# Patient Record
Sex: FEMALE | Race: WHITE | NOT HISPANIC OR LATINO | Employment: OTHER | URBAN - METROPOLITAN AREA
[De-identification: names, ages, dates, MRNs, and addresses within clinical notes are randomized per-mention and may not be internally consistent; named-entity substitution may affect disease eponyms.]

---

## 2017-03-15 ENCOUNTER — TRANSFERRED RECORDS (OUTPATIENT)
Dept: HEALTH INFORMATION MANAGEMENT | Facility: CLINIC | Age: 57
End: 2017-03-15

## 2017-04-04 ENCOUNTER — MEDICAL CORRESPONDENCE (OUTPATIENT)
Dept: HEALTH INFORMATION MANAGEMENT | Facility: CLINIC | Age: 57
End: 2017-04-04

## 2017-04-19 ENCOUNTER — TRANSFERRED RECORDS (OUTPATIENT)
Dept: HEALTH INFORMATION MANAGEMENT | Facility: CLINIC | Age: 57
End: 2017-04-19

## 2017-04-27 ENCOUNTER — PRE VISIT (OUTPATIENT)
Dept: RHEUMATOLOGY | Facility: CLINIC | Age: 57
End: 2017-04-27

## 2017-04-27 NOTE — TELEPHONE ENCOUNTER
"    [facility] CLINICAL DOCUMENTATION    Pre-Visit Planning   PREVISIT INFORMATION                                                    Janice Mayfield scheduled for future visit at [facility] specialty clinics.    Patient is scheduled to see LO Cavazos (provider) on 4/28/17 (date)  Reason for visit: rheumatoid arthritis involving multiple sites with positive RF; consult, diagnose and treat  Referring provider Marah Hare PA-C, [facility]  Has patient seen previous specialist? Yes.  Name of provider Zana Peck MD , [facility].   Medical Records:  Records received from [facility] office visit notes, laboratory testing, consults and etc.       REVIEW                                                      New patient packet mailed to patient: N/A  Medication reconciliation complete: N/A      No current outpatient prescriptions on file.     Current Outpatient Prescriptions   Medication     FOLIC ACID PO     Calcium Carb-Cholecalciferol (CALCIUM + D3) 600-200 MG-UNIT TABS     InFLIXimab (REMICADE IV)     Methotrexate Sodium (METHOTREXATE PO)     PREDNISONE PO     DiphenhydrAMINE HCl (BENADRYL PO)     DOXYCYCLINE PO     Acetaminophen (TYLENOL PO)     VITAMIN D, CHOLECALCIFEROL, PO     No current facility-administered medications for this visit.         Allergies   Allergen Reactions     Humira      hives     Methotrexate      anxiety     Plaquenil [Hydroxychloroquine]      Elevated LFTs     Sulfa Drugs      delusions           Rheumatology Previsit:    FYI: from Dr. Salvador, \"I don't need all lab going back years but initial lab that may include autoimmune testing is important, and if they are already on DMARD or biologic therapy lab going back 6-12 months to assure there is no trend.  Any imaging, preferably the images and not just reports is helpful.  There is rarely pathology but if there is that is important.  GB\"    What is the reason for your visit? RA to " "establish care, consult, diagnose and treat  Who is the referring provider and clinic name? PCP  Have you been seen by Rheumatologist, if so, when was your last office visit? Zana Peck MD [facility] 2/23/2016   (name and location)  Who is currently managing your care (primary care provider)? Unknown?  Are you currently taking any medications to manage your rheumatology condition? Unknown if still taking medication?  If not, have you previously taken any rheumatology medications like DMARD or biologic (type, dates, length of tx, effective or not)? See note below. Unknown timeframe of treatment of Enbrel, Humira, Plaquenil. Currently on methotrexate, infliximab and prednisone.  Are you taking any medications over-the-counter? unknown    Have you had any recent rheumatology labs? [place] report, 2016 labs. No lab report from PCP.   Last lab results in chart:  No results found for: WBC  No results found for: RBC  No results found for: HGB  No results found for: HCT  No results found for: MCV  No results found for: MCH  No results found for: MCHC  No results found for: RDW  No results found for: PLT  No results found for: AST  No results found for: ALT  No results found for: CR  No results found for: ALBUMIN  No results found for: COLOR, APPEARANCE, URINEGLC, URINEBILI, URINEKETONE, SG, URINEPH, PROTEIN, UROBILINOGEN, NITRITE, LEUKEST    Have you had any recent x-rays related to your visit? Unknown, no reports received    Are your immunizations up-to-date? unknown Do you have copy of your immunizations?     There is no immunization history on file for this patient.      Note copied from [facility] Rheumatology Assessment by Zana Peck MD on 2/23/2016, LOV.  \"Chief Complaint:  RA.    History of Present Illness:  She developed symptoms of inflammatory arthritis starting in June 2009. The diagnosis of rheumatoid arthritis was made by Dr. Miranda. The patient had evidence of synovitis on exam and had a " "positive rheumatoid factor. CCP antibody was negative. She was initially started on hydroxychloroquine, and later, methotrexate was added. Because of liver enzyme elevation, methotrexate was later discontinued, and plaquenil was also stopped. She was then on Enbrel and later was switched to Humira and did note improvement in her joint symptoms. Unfortunately, she developed a generalized urticarial reaction to the Humira.    On her evaluation here in December 2011, the patient was noted to have rheumatoid factor positive, erosive disease. Ultrasound of the abdomen showed fatty liver infiltration. She also had an MR elastogram of the liver which was normal.   She has been treated with combination of methotrexate, infliximab, and low-dose prednisone.     Currently, she is doing very well with no symptoms of active joint inflammation. She is tolerating medications well. Additional details including the review of systems is documented in the CDM report.    Mrs. Melly Mayfield reports that she has had a corneal abrasion/scar in the right eye and has been treated with antibiotics for two months. My understanding is that there was no active infection. She was evaluated by an ophthalmologist locally.    Impression/report/plan:  #1 Rheumatoid arthritis, in remission on therapy  Overall, Mrs. Melly Mayfield is doing very well on combination of methotrexate, infliximab, and low-dose prednisone. We discussed her prednisone, and we will try tapering by 1 mg every two to four weeks as tolerated. Otherwise, plan to continue methotrexate and infliximab at the current dose. Overall, she is doing very well. Mrs. Melly Mayfield asked about a general physical examination and transferring her general medical are here to [facility]. We will try to make arrangements for her to be seen in the [facility].\"       PLAN/FOLLOW-UP NEEDED                                                      Will route to care " team for follow-up. Attempted to contact patient, unable to leave voicemail-mail box full.   Records placed into providers folder for review.    Patient Reminders Given:  Please, make sure you bring an updated list of your medications.   If you are having a procedure, please, present 15 minutes early.  If you need to cancel or reschedule,please call [phone].    VIKAS VelásquezN, RN, PHN  Rheumatology Care Coordinator  [facility]

## 2017-04-28 ENCOUNTER — CARE COORDINATION (OUTPATIENT)
Dept: RHEUMATOLOGY | Facility: CLINIC | Age: 57
End: 2017-04-28

## 2017-04-28 ENCOUNTER — RADIANT APPOINTMENT (OUTPATIENT)
Dept: GENERAL RADIOLOGY | Facility: CLINIC | Age: 57
End: 2017-04-28
Attending: NURSE PRACTITIONER
Payer: COMMERCIAL

## 2017-04-28 ENCOUNTER — OFFICE VISIT (OUTPATIENT)
Dept: RHEUMATOLOGY | Facility: CLINIC | Age: 57
End: 2017-04-28
Payer: COMMERCIAL

## 2017-04-28 VITALS
SYSTOLIC BLOOD PRESSURE: 104 MMHG | WEIGHT: 162.8 LBS | DIASTOLIC BLOOD PRESSURE: 68 MMHG | HEART RATE: 73 BPM | TEMPERATURE: 97.8 F

## 2017-04-28 DIAGNOSIS — M25.512 CHRONIC LEFT SHOULDER PAIN: ICD-10-CM

## 2017-04-28 DIAGNOSIS — M05.79 RHEUMATOID ARTHRITIS INVOLVING MULTIPLE SITES WITH POSITIVE RHEUMATOID FACTOR (H): Primary | ICD-10-CM

## 2017-04-28 DIAGNOSIS — D84.9 IMMUNOSUPPRESSION (H): ICD-10-CM

## 2017-04-28 DIAGNOSIS — Z79.52 LONG TERM (CURRENT) USE OF SYSTEMIC STEROIDS: ICD-10-CM

## 2017-04-28 DIAGNOSIS — M05.79 RHEUMATOID ARTHRITIS INVOLVING MULTIPLE SITES WITH POSITIVE RHEUMATOID FACTOR (H): ICD-10-CM

## 2017-04-28 DIAGNOSIS — G89.29 CHRONIC LEFT SHOULDER PAIN: ICD-10-CM

## 2017-04-28 LAB
ALBUMIN SERPL-MCNC: 3.3 G/DL (ref 3.4–5)
ALBUMIN UR-MCNC: NEGATIVE MG/DL
ALP SERPL-CCNC: 94 U/L (ref 40–150)
ALT SERPL W P-5'-P-CCNC: 26 U/L (ref 0–50)
ANION GAP SERPL CALCULATED.3IONS-SCNC: 4 MMOL/L (ref 3–14)
APPEARANCE UR: CLEAR
AST SERPL W P-5'-P-CCNC: 21 U/L (ref 0–45)
BACTERIA #/AREA URNS HPF: ABNORMAL /HPF
BASOPHILS # BLD AUTO: 0 10E9/L (ref 0–0.2)
BASOPHILS NFR BLD AUTO: 0.2 %
BILIRUB SERPL-MCNC: 0.3 MG/DL (ref 0.2–1.3)
BILIRUB UR QL STRIP: NEGATIVE
BUN SERPL-MCNC: 16 MG/DL (ref 7–30)
CALCIUM SERPL-MCNC: 9 MG/DL (ref 8.5–10.1)
CHLORIDE SERPL-SCNC: 107 MMOL/L (ref 94–109)
CO2 SERPL-SCNC: 30 MMOL/L (ref 20–32)
COLOR UR AUTO: YELLOW
CREAT SERPL-MCNC: 0.7 MG/DL (ref 0.52–1.04)
CRP SERPL-MCNC: 18.1 MG/L (ref 0–8)
DEPRECATED CALCIDIOL+CALCIFEROL SERPL-MC: 14 UG/L (ref 20–75)
DIFFERENTIAL METHOD BLD: NORMAL
EOSINOPHIL # BLD AUTO: 0.1 10E9/L (ref 0–0.7)
EOSINOPHIL NFR BLD AUTO: 0.8 %
ERYTHROCYTE [DISTWIDTH] IN BLOOD BY AUTOMATED COUNT: 14.9 % (ref 10–15)
ERYTHROCYTE [SEDIMENTATION RATE] IN BLOOD BY WESTERGREN METHOD: 32 MM/H (ref 0–30)
GFR SERPL CREATININE-BSD FRML MDRD: 88 ML/MIN/1.7M2
GLUCOSE SERPL-MCNC: 84 MG/DL (ref 70–99)
GLUCOSE UR STRIP-MCNC: NEGATIVE MG/DL
HBV CORE AB SERPL QL IA: NONREACTIVE
HBV SURFACE AG SERPL QL IA: NONREACTIVE
HCT VFR BLD AUTO: 36.3 % (ref 35–47)
HCV AB SERPL QL IA: NORMAL
HGB BLD-MCNC: 11.9 G/DL (ref 11.7–15.7)
HGB UR QL STRIP: NEGATIVE
KETONES UR STRIP-MCNC: NEGATIVE MG/DL
LEUKOCYTE ESTERASE UR QL STRIP: ABNORMAL
LYMPHOCYTES # BLD AUTO: 2.4 10E9/L (ref 0.8–5.3)
LYMPHOCYTES NFR BLD AUTO: 39.6 %
MCH RBC QN AUTO: 28.7 PG (ref 26.5–33)
MCHC RBC AUTO-ENTMCNC: 32.8 G/DL (ref 31.5–36.5)
MCV RBC AUTO: 88 FL (ref 78–100)
MONOCYTES # BLD AUTO: 0.8 10E9/L (ref 0–1.3)
MONOCYTES NFR BLD AUTO: 14 %
NEUTROPHILS # BLD AUTO: 2.7 10E9/L (ref 1.6–8.3)
NEUTROPHILS NFR BLD AUTO: 45.4 %
NITRATE UR QL: NEGATIVE
NON-SQ EPI CELLS #/AREA URNS LPF: ABNORMAL /LPF
PH UR STRIP: 6 PH (ref 5–7)
PLATELET # BLD AUTO: 313 10E9/L (ref 150–450)
POTASSIUM SERPL-SCNC: 3.9 MMOL/L (ref 3.4–5.3)
PROT SERPL-MCNC: 7.9 G/DL (ref 6.8–8.8)
RADIOLOGIST FLAGS: NORMAL
RBC # BLD AUTO: 4.14 10E12/L (ref 3.8–5.2)
RBC #/AREA URNS AUTO: ABNORMAL /HPF (ref 0–2)
SODIUM SERPL-SCNC: 141 MMOL/L (ref 133–144)
SP GR UR STRIP: 1.02 (ref 1–1.03)
URN SPEC COLLECT METH UR: ABNORMAL
UROBILINOGEN UR STRIP-MCNC: NORMAL MG/DL (ref 0–2)
WBC # BLD AUTO: 5.9 10E9/L (ref 4–11)
WBC #/AREA URNS AUTO: ABNORMAL /HPF (ref 0–2)

## 2017-04-28 PROCEDURE — 86803 HEPATITIS C AB TEST: CPT | Performed by: NURSE PRACTITIONER

## 2017-04-28 PROCEDURE — 99204 OFFICE O/P NEW MOD 45 MIN: CPT | Performed by: NURSE PRACTITIONER

## 2017-04-28 PROCEDURE — 82306 VITAMIN D 25 HYDROXY: CPT | Performed by: NURSE PRACTITIONER

## 2017-04-28 PROCEDURE — 85652 RBC SED RATE AUTOMATED: CPT | Performed by: NURSE PRACTITIONER

## 2017-04-28 PROCEDURE — 81001 URINALYSIS AUTO W/SCOPE: CPT | Performed by: NURSE PRACTITIONER

## 2017-04-28 PROCEDURE — 80053 COMPREHEN METABOLIC PANEL: CPT | Performed by: NURSE PRACTITIONER

## 2017-04-28 PROCEDURE — 86480 TB TEST CELL IMMUN MEASURE: CPT | Performed by: NURSE PRACTITIONER

## 2017-04-28 PROCEDURE — 73130 X-RAY EXAM OF HAND: CPT | Mod: RT | Performed by: RADIOLOGY

## 2017-04-28 PROCEDURE — 85025 COMPLETE CBC W/AUTO DIFF WBC: CPT | Performed by: NURSE PRACTITIONER

## 2017-04-28 PROCEDURE — 86140 C-REACTIVE PROTEIN: CPT | Performed by: NURSE PRACTITIONER

## 2017-04-28 PROCEDURE — 73030 X-RAY EXAM OF SHOULDER: CPT | Mod: LT | Performed by: RADIOLOGY

## 2017-04-28 PROCEDURE — 87340 HEPATITIS B SURFACE AG IA: CPT | Performed by: NURSE PRACTITIONER

## 2017-04-28 PROCEDURE — 36415 COLL VENOUS BLD VENIPUNCTURE: CPT | Performed by: NURSE PRACTITIONER

## 2017-04-28 PROCEDURE — 73100 X-RAY EXAM OF WRIST: CPT | Mod: LT | Performed by: RADIOLOGY

## 2017-04-28 PROCEDURE — 86704 HEP B CORE ANTIBODY TOTAL: CPT | Performed by: NURSE PRACTITIONER

## 2017-04-28 RX ORDER — PREDNISONE 1 MG/1
4 TABLET ORAL DAILY
Qty: 120 TABLET | Refills: 5 | Status: SHIPPED | OUTPATIENT
Start: 2017-04-28 | End: 2017-11-03

## 2017-04-28 RX ORDER — DIPHENHYDRAMINE HCL 25 MG
25 CAPSULE ORAL
Status: CANCELLED
Start: 2017-04-28

## 2017-04-28 RX ORDER — ACETAMINOPHEN 325 MG/1
650 TABLET ORAL
Status: CANCELLED
Start: 2017-04-28

## 2017-04-28 RX ORDER — FOLIC ACID 1 MG/1
1 TABLET ORAL DAILY
Qty: 90 TABLET | Refills: 3 | Status: SHIPPED | OUTPATIENT
Start: 2017-04-28 | End: 2017-04-28

## 2017-04-28 RX ORDER — METHYLPREDNISOLONE SODIUM SUCCINATE 125 MG/2ML
100 INJECTION, POWDER, LYOPHILIZED, FOR SOLUTION INTRAMUSCULAR; INTRAVENOUS
Status: CANCELLED | OUTPATIENT
Start: 2017-04-28

## 2017-04-28 RX ORDER — FOLIC ACID 1 MG/1
2 TABLET ORAL DAILY
Qty: 180 TABLET | Refills: 1 | Status: SHIPPED | OUTPATIENT
Start: 2017-04-28 | End: 2017-11-03

## 2017-04-28 ASSESSMENT — PAIN SCALES - GENERAL: PAINLEVEL: MILD PAIN (2)

## 2017-04-28 NOTE — PROGRESS NOTES
X-rays hands/wrists noted. +severe degenerative radiocarpal joints b/l with widening of scapholunate, total loss of space between triquetrum and lunate in the right, marginal erosions. Exam showed right wrist drop. Will recommend her to see wrist surgeon for opinion of this

## 2017-04-28 NOTE — LETTER
4/28/2017      RE: Janice Mayfield  [address] [id number]       Rheumatology Visit     Janice Mayfield MRN# [id number]   YOB: 1960 Age: 56 year old     Date of visit: April 28, 2017  Primary care provider: Marah Hare          Assessment/Plan:     1. Erosive Rheumatoid Arthritis w/+RF (-CCP/ZANDER) controlled on infliximab 5 mg/kg every 4-6 week, methotrexate 10 mg week, prednisone 4 mg day- no flaring. Moderate activity today. RAPID 3=8.7. Exam shows right wrist drop and reduced ROM left shoulder (chronic). Will repeat x-rays, left shoulder x-rays, labs. Mild fatigue day of methotrexate, so will ask her increase folic acid 2 mg day. Continue this plan (will check hep B/C, MTB QG).     2. Chronic left shoulder pain with reduced ROM. I suspect RA/OA and maybe some internal derangement. No acute injury or falls. X-rays, referral to PT. Ortho prn  3. Osteopenia. Chronic prednisone use 4 mg day. DEXA ordered. Continue calcium and vitamin D. Check vitamin D today. May taper by 0.5-1 mg every 1-2 month until off.    4. Immunosuppression, long-term risk medication. Will check labs every 8-12 weeks while on immunosuppresive therapy and hx elevated liver enzymes.     The patient understood the rational for the diagnosis and treatment plan. Patient shared in the decision making. All questions were answered to best of my ability and the patient's satisfaction. Sing-up for Nelli. She agrees to this plan.   Pola Santana APRN, CNP, MSN  [facility]  Department of Rheumatology & Autoimmune Disorders          History of Present Illness:   Janice Mayfield is a 56 year old female who presents as a consultation for erosive RA +RF -CCP -ZANDER/C3/C4, -ANCA -HLA B27. Prior care at [facility] w/ Andry Jones here to establish d/t insurance     Review: hydroxychloroquine, methotrexate (d/c liver enzymes elevation), enbrel (ineffective), humira *d/c  "(uticarial reaction), sulfites (anxiety)      Went to [place] in a wheelchair [\"knees were melons\", pads of feet, not use hands, wrists].     On remicade 5 mg/kg Q 4-5 week infusions with pre-meds once a month (Jan 2011) last infusion about 6 weeks ago, FA 1 mg/day, MTX 10 mg Q Sunday week and prednisone 4-5 mg day.     Increased remicade was every 4-5 weeks as was wearing down rather then increase the dose. Tolerating and s/e. Mild foggy day after taking methotrexate (no SI, hairloss, diarrhea), FA 1 mg day     Eats healthy. Reports arthritis is controlled, functioning well and not in wheelchair. Weather affects her arthritis. Root canal 2 weeks ago due to tooth abscess \"thinning of your jaw\". No flaring with her RA and this treatment. shouilder and wrist, knee so mild compared to when dx but is noticing she is due. Hx right wrist injection 2 yr ago. Pain is 4 out 10.     No infectious over the years. Reports up-to-date with physical and cancer screening.         Past Medical/Surgical History:   Injuries-None  No Blood transfusions  Medical-RA, osteopenia (DEXA  in 2008 -0.6, postemenopausal, anemia, elevated liver enzymes (fatty liver, MR elastroplaty NL 2012 seen Dr. Anderson; initially hep C + but HCV RNA neg), polyclonal hypergammaglobuinemia, diverticulum   No past medical history on file.    Surgical-  No past surgical history on file.          Family/Social History:   Family-  No autoimmune disorders, psoriasis, UC, crohn's, SLE, RA, PsA, gout.   No MS  Mother-Uterine CA-passed  Father-alive PPM and arrythmia  MGM-parkinsons, OP, possible arthritis-passed  [id number]  PGM-brain CA, OP-passed  PGF-colon CA-passed  2 brothers-start to have heart issue  2 sisters-healthy HTN  2 children  Common low BP and low HR, varicose veing    History reviewed. No pertinent family history.    Social-  No Alcohol. Never Smoking. 3 Children (youngest deformed pulmonary valve s/p OHS). NO IVDU or drug use. 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              Allergies/Medications:     Allergies   Allergen Reactions     Humira      hives     Methotrexate      anxiety     Plaquenil [Hydroxychloroquine]      Elevated LFTs     Sulfa Drugs      delusions       Outpatient Prescriptions Marked as Taking for the 4/28/17 encounter (Office Visit) with Pola Santana APRN CNP   Medication Sig     FOLIC ACID PO Take 1 mg by mouth daily      Calcium Carb-Cholecalciferol (CALCIUM + D3) 600-200 MG-UNIT TABS Take 1 tablet by mouth 2 times daily     InFLIXimab (REMICADE IV) Inject 100 mg into the vein every 28 days      Methotrexate Sodium (METHOTREXATE PO) Take 10 mg by mouth once a week     PREDNISONE PO Take 4 mg by mouth daily      DiphenhydrAMINE HCl (BENADRYL PO) Take 25 mg by mouth as needed (Pre-medication for remicade)     Acetaminophen (TYLENOL PO) Take 650 mg by mouth once     VITAMIN D, CHOLECALCIFEROL, PO Take 2,000 Units by mouth daily            Review of Systems:   Negative raynaud s phenomena, hairloss, sun sensitivities, keratoconjunctivitis sicca, pleurisy, inflammatory joint symptoms, significant rashes like malar, oral/nasal or ulcerations, inflammatory eye disease, inflammatory bowel disease, dactylitis, tenosynovitis, or enthespathy. Negative for miscarriages over 2 months into pregnancy, blood clots, gout, psoriasis, UC, crohn's. No temporal headache, no jaw claudication, no scalp tenderness, vision changes, carotidynia, cough  +See MDHAQ  +less now some raynauds rare  +dry eyes  +hemorrhoids   CONSTITUTIONAL: No fevers, night sweats or unintentional weight change. No acute distress, swollen glands  EYES: No vision change, diplopia, pain in eyes or red eyes   EARS, NOSE, MOUTH, THROAT: No tinnitus or hearing change, no epistaxis or nasal discharge, no oral lesions, throat clear. Normal saliva pool.  No drymouth. No thyroid enlargement  CARDIOVASCULAR: No chest pain, palpitations, or pain with  walking, no orthopnea or PND   RESPIRATORY: No dyspnea, cough, shortness of breath or wheezing. No pleurisy.   GI: No nausea, vomiting, diarrhea or constipation, no abdominal pain, or blood in stools.   : No change in urine, no dysuria or hematuria   MUSCKL: No swollen, tender, red or painful joints. No nodules. No enthesitis, plantar fascitis or heel pain.   INTEGUMENTARY: No concerning lesions or moles   NEURO: No loss of strength or sensation, no numbness or tingling, no tremor, no dizziness, no headache. No falls   ENDO: No polyuria or polydipsia, no temperature intolerance   HEME/LYMPH:No concerning bumps, bleeding problems, or swollen lymph nodes. No recent infections, hospitalizations or new illnesses.   ALLERGY: No environmental allergies   PSYCH:No depression or anxiety, no sleep problems.  Otherwise 14 point ROS obtained, reviewed and found negative.          Physical Exam:     Constitutional: WD-WN-WG cooperative  Eyes: nl EOM, PERRLA, vision, conjunctiva, sclera  ENT: nl external ears, nose, hearing, lips, teeth, gums, throat. Nl saliva pool  No mucous membrane lesions, normal saliva pool  Neck: no mass or thyroid enlargement. non-tender.  Resp: lungs clear to auscultation, nl to palpation, nl breath sounds  CV: RRR, no murmurs, rubs or gallops, no edema  GI: no ABD mass or tenderness, no HSM. No RUQ pain.   : not tested  Lymph: no cervical, supraclavicular, inguinal or epitrochlear nodes  MSK: Right wrist drop wirh reduced flexion and extension, left shoulder reduced ER about 50% shoulder lifts. right halgus valgus bunions  All other TMJ, neck, shoulder, elbow, wrist, MCP/PIP/DIP, spine, hip, knee, ankle, and foot MTP/IP joints were examined and  found normal. NL prayer sign. No periuncle erythema. Normal  strength. No dactylitis,  tenosynovitis, enthespathy. Negative MCP and MTP squeeze. Negative Lhermitte's sign.   Skin: No nail pitting, alopecia, rash, nodules or lesions.   Neuro: nl cranial  nerves, strength, sensation  Psych: nl judgement, orientation, memory, affect.         Labs/Imaging:   Reviewed medications, labs, imaging, and EMR.   Reviewed Rheumatology Lab Flowsheet & Lab Flowsheet    TSH Nl 2016  SPEP nl 2011  Anti-smooth, antimitochrondial, myelo AB amd PR3 NL in 2011  Lymes neg 2011    DEXA 5-2016  Osteopenia z-0.4 t-1  Xrays feet 2-2014 negative  2-2014 B/L hand joint space narrowing and erosive change on radiocarpal  2011 neg SI x-ays     Endoscopy/colonoscopy 5-2016 single diverticulum dx diverticulosis in colon     No results found for this or any previous visit.  No lab results found.    Invalid input(s): SEDRATE, TBILI, NH3           Education:   1. Immunization: Reviewed CDC guidelines with patient updated, information provided to patient based on CDC guidelines for vaccination recommendations, live vaccines contraindicated   2. Bone Health:Educated on adequate calcium and vitamin D intake, Educated on exercise, Glucocorticoid education discussed risks, benefits & potential long term side effects from use  3. Discussed routine annual physicals, cancer screening, cardiac risk monitoring, bone health and primary care with primary care provider.   4. No alcohol while taking methotrexate.  5. No live vaccines 1 month before starting, while taking, or within 3 months of biologics (verify with rheumatologist).   6. Educated on diagnosis, prognosis, labs, imaging, treatment options (including risks, benefits, risk of no treatment), medications (use, dose, side-effects, risks of medications including infection/cancer/bone marrow suppression, lab monitoring), infections what to do, plan of cares, goals of treatment.       Ploa BLANCHARD, CNP, MSN  [facility]  Department of Rheumatology & Autoimmune Disorders  [facility]: [phone] (opt.2 then opt. 1 scheduling, opt. 3 nurse)  [facility]: [phone]             LO Gao CNP

## 2017-04-28 NOTE — TELEPHONE ENCOUNTER
[facility]    Tyesha Palmer, VIKASN, RN, PHN  Rheumatology Care Coordinator  [facility]

## 2017-04-28 NOTE — PATIENT INSTRUCTIONS
Thank-you for allowing me to participate in your care.     Pola BLANCHARD, COOKIE, MSN  [facility]  Department of Rheumatology & Autoimmune Disorders  [facility]: [phone]; [phone] Option 7 scheduling

## 2017-04-28 NOTE — PROGRESS NOTES
Janice,     Your left shoulder x-rays show some erosion and someradiolucencies of humerus. I think given your limted range of motion, I would advise you to see an orthopedic specialist to evaluate this further who may consider doing more imaging. Please notify my nurse if you wish to proceed, then I can place the order. I think this is the best next step with the physical therapy although you may want to see the orthopedic provider first.     Your wrists and hand x-rays show erosions. The wrist x-rays show widening, and if you have pain then you will need to see a hand surgeon. Sometimes this can cause instability of the wrists. Hand therapy would also be recommended to fit you for braces.     Let my nurse know and I will put in the orders. I think you should come in every 4 months instead so we can watch your arthritis closer. I have attached the numbers on how to get a hold of us    Thank-you for allowing me to participate in your care.     Pola BLANCHARD, CNP, MSN  [facility]  Department of Rheumatology & Autoimmune Disorders  [facility]: [phone]; [phone] Option 7 scheduling

## 2017-04-28 NOTE — PROGRESS NOTES
Orders signed and completed by provider.    Request for PA and notify patient when ready to schedule    Prior Authorization Infusion/Clinic Administered Request    Medication/Dose: Infliximab 5 mg/kg   Frequency: every 4-6 weeks  Route:IV  Diagnosis and ICD:RA  Location: [place]  Important Lab Values: see chart  Insurance Change: No    Previously Tried and Failed Therapies: see chart notes    Rationale:     Would you like to include any research articles?    If yes please include the hyperlink(s) below or fax @ [phone].    Include the patients name and MRN on the fax cover sheet.    Email sent to infusion PA team.    Tyesha Palmer, VIKASN, RN, PHN  Rheumatology Care Coordinator  [facility]

## 2017-04-28 NOTE — MR AVS SNAPSHOT
After Visit Summary   4/28/2017    Janice Mayfield    MRN: [id number]           Patient Information     Date Of Birth          1960        Visit Information        Provider Department      4/28/2017 8:00 AM Pola Santana APRN CNP M [facility]        Today's Diagnoses     Rheumatoid arthritis involving multiple sites with positive rheumatoid factor (H)    -  1    Chronic left shoulder pain        Long term (current) use of systemic steroids        Immunosuppression (H)          Care Instructions    Thank-you for allowing me to participate in your care.     Pola BLANCHARD, COOKIE, MSN  [facility]  Department of Rheumatology & Autoimmune Disorders  [facility]: [phone]; [phone] Option 7 scheduling            Follow-ups after your visit        Additional Services     PHYSICAL THERAPY REFERRAL       *This therapy referral will be filtered to a centralized scheduling office at [facility] and the patient will receive a call to schedule an appointment at a [facility] location most convenient for them. *     [facility] provides Physical Therapy evaluation and treatment and many specialty services across the [facility] system.  If requesting a specialty program, please choose from the list below.    If you have not heard from the scheduling office within 2 business days, please call [phone] for all locations, with the exception of [place], please call [phone].  Treatment: Evaluation & Treatment  Special Instructions/Modalities: eval and treat  Special Programs: eval and treat cervical precautions     Please be aware that coverage of these services is subject to the terms and limitations of your health insurance plan.  Call member services at your health plan with any benefit or coverage questions.      **Note to Provider:  If you are referring outside of [facility] for the therapy  appointment, please list the name of the location in the  special instructions  above, print the referral and give to the patient to schedule the appointment.                  Follow-up notes from your care team     Return in about 6 months (around 10/28/2017) for Labs every 8-12 weeks, Labs + X-rays Today.      Your next 10 appointments already scheduled     Jul 28, 2017  8:00 AM CDT   LAB with LAB FIRST FLOOR [facility]    [address] [place] [id number] [phone]           Patient must bring picture ID.  Patient should be prepared to give a urine specimen  Please do not eat 10-12 hours before your appointment if you are coming in fasting for labs on lipids, cholesterol, or glucose (sugar).  Pregnant women should follow their Care Team instructions. Water with medications is okay. Do not drink coffee or other fluids.   If you have concerns about taking  your medications, please ask at office or if scheduling via [employer], send a message by clicking on Secure Messaging, Message Your Care Team.            Nov 03, 2017  8:30 AM CDT   LAB with LAB FIRST FLOOR [facility])    [id number] [address] [place] [id number] [phone]           Patient must bring picture ID.  Patient should be prepared to give a urine specimen  Please do not eat 10-12 hours before your appointment if you are coming in fasting for labs on lipids, cholesterol, or glucose (sugar).  Pregnant women should follow their Care Team instructions. Water with medications is okay. Do not drink coffee or other fluids.   If you have concerns about taking  your medications, please ask at office or if scheduling via [employer], send a message by clicking on Secure Messaging, Message Your Care Team.            Nov 03, 2017  9:00 AM CDT   LAB with LO Parra CNP   [facility]    [address] [place] [id number] [phone]           Patient must bring picture ID.  Patient should be prepared to give a urine specimen  Please do not eat 10-12 hours before your appointment if you are coming in fasting for labs on lipids, cholesterol, or glucose (sugar).  Pregnant women should follow their Care Team instructions. Water with medications is okay. Do not drink coffee or other fluids.   If you have concerns about taking  your medications, please ask at office or if scheduling via [employer], send a message by clicking on Secure Messaging, Message Your Care Team.              Future tests that were ordered for you today     Open Standing Orders        Priority Remaining Interval Expires Ordered    CBC with platelets differential Routine 6/6 every 8-12 weeks 4/28/2018 4/28/2017    AST Routine 6/6 every 8-12 weeks 4/28/2018 4/28/2017    ALT Routine 6/6 every 8-12 weeks 4/28/2018 4/28/2017    Albumin level Routine 6/6 every 8-12 weeks 4/28/2018 4/28/2017    Creatinine Routine 6/6 every 8-12 weeks 4/28/2018 4/28/2017    CRP inflammation Routine 6/6 every 8-12 weeks 4/28/2018 4/28/2017    Erythrocyte sedimentation rate auto Routine 6/6 every 8-12 weeks 4/28/2018 4/28/2017          Open Future Orders        Priority Expected Expires Ordered    Vitamin D Deficiency Routine 4/28/2017 5/28/2017 4/28/2017    Hepatitis B core antibody Routine 4/28/2017 5/12/2017 4/28/2017    Hepatitis B surface antigen Routine 4/28/2017 5/12/2017 4/28/2017    Hepatitis C antibody Routine 4/28/2017 5/12/2017 4/28/2017    M Tuberculosis by Quantiferon Routine 4/28/2017 5/12/2017 4/28/2017    CBC with platelets differential Routine 4/28/2017 5/12/2017 4/28/2017    CRP inflammation Routine 4/28/2017 5/12/2017 4/28/2017    Erythrocyte sedimentation rate auto Routine 4/28/2017 5/12/2017 4/28/2017    XR Wrist Bilateral 2 Views Routine 4/28/2017 5/12/2017 4/28/2017    XR Hand Bilateral G/E 3 Views Routine  4/28/2017 5/12/2017 4/28/2017    XR Shoulder Left G/E 3 Views Routine 4/28/2017 5/12/2017 4/28/2017    Comprehensive metabolic panel Routine 4/28/2017 5/12/2017 4/28/2017    UA with Microscopic Routine 4/28/2017 5/12/2017 4/28/2017            Who to contact     If you have questions or need follow up information about today's clinic visit or your schedule please contact [facility] directly at [phone].  Normal or non-critical lab and imaging results will be communicated to you by [employer], letter or phone within 4 business days after the clinic has received the results. If you do not hear from us within 7 days, please contact the clinic through [employer] or phone. If you have a critical or abnormal lab result, we will notify you by phone as soon as possible.  Submit refill requests through [employer] or call your pharmacy and they will forward the refill request to us. Please allow 3 business days for your refill to be completed.          Additional Information About Your Visit        [employer] Information     [employer] gives you secure access to your electronic health record. If you see a primary care provider, you can also send messages to your care team and make appointments. If you have questions, please call your primary care clinic.  If you do not have a primary care provider, please call [phone] and they will assist you.      [employer] is an electronic gateway that provides easy, online access to your medical records. With [employer], you can request a clinic appointment, read your test results, renew a prescription or communicate with your care team.     To access your existing account, please contact your [facility] or call [phone] for assistance.        Care EveryWhere ID     This is your Care EveryWhere ID. This could be used by other organizations to access your [place] medical records  [id number]        Your Vitals Were     Pulse Temperature                 73 97.8  F (36.6  C)           Blood Pressure from Last 3 Encounters:   04/28/17 104/68    Weight from Last 3 Encounters:   04/28/17 73.8 kg (162 lb 12.8 oz)              We Performed the Following     PHYSICAL THERAPY REFERRAL          Today's Medication Changes          These changes are accurate as of: 4/28/17  9:22 AM.  If you have any questions, ask your nurse or doctor.               These medicines have changed or have updated prescriptions.        Dose/Directions    folic acid 1 MG tablet   Commonly known as:  FOLVITE   This may have changed:    - medication strength  - how much to take   Used for:  Rheumatoid arthritis involving multiple sites with positive rheumatoid factor (H), Long term (current) use of systemic steroids, Immunosuppression (H), Chronic left shoulder pain   Changed by:  Pola Santana APRN CNP        Dose:  2 mg   Take 2 tablets (2 mg) by mouth daily   Quantity:  180 tablet   Refills:  1       methotrexate 2.5 MG tablet CHEMO   This may have changed:  additional instructions   Used for:  Rheumatoid arthritis involving multiple sites with positive rheumatoid factor (H), Long term (current) use of systemic steroids, Immunosuppression (H), Chronic left shoulder pain   Changed by:  Pola Santana APRN CNP        Dose:  10 mg   Take 4 tablets (10 mg) by mouth once a week Take 4 tablets (10mg) by mouth weekly. Labs every 8 weeks   Quantity:  16 tablet   Refills:  3       * PREDNISONE PO   Indication:  1-4 tablets by [facility] one time daily, 4 mg daily, taper 1 mg every 2-4 weeks   This may have changed:  Another medication with the same name was added. Make sure you understand how and when to take each.   Changed by:  Tyesha Palmer RN        Dose:  4 mg   Take 4 mg by mouth daily   Refills:  0       * predniSONE 1 MG tablet   Commonly known as:  DELTASONE   This may have changed:  You were already taking a medication with the same name, and this prescription was added. Make sure  you understand how and when to take each.   Used for:  Rheumatoid arthritis involving multiple sites with positive rheumatoid factor (H), Chronic left shoulder pain, Long term (current) use of systemic steroids   Changed by:  Pola Santana APRN CNP        Dose:  4 mg   Take 4 tablets (4 mg) by mouth daily   Quantity:  120 tablet   Refills:  5       * Notice:  This list has 2 medication(s) that are the same as other medications prescribed for you. Read the directions carefully, and ask your doctor or other care provider to review them with you.         Where to get your medicines      These medications were sent to [facility]/pharmacy #[id number] - [place], [place] - [address]  [address] [facility] [address]     Phone:  [phone]     folic acid 1 MG tablet    methotrexate 2.5 MG tablet CHEMO    predniSONE 1 MG tablet                Primary Care Provider Office Phone # Fax #    Marah Hare [phone]       [address]   [facility] [address]        Thank you!     Thank you for choosing [facility]  for your care. Our goal is always to provide you with excellent care. Hearing back from our patients is one way we can continue to improve our services. Please take a few minutes to complete the written survey that you may receive in the mail after your visit with us. Thank you!             Your Updated Medication List - Protect others around you: Learn how to safely use, store and throw away your medicines at www.disposemymeds.org.          This list is accurate as of: 4/28/17  9:22 AM.  Always use your most recent med list.                   Brand Name Dispense Instructions for use    BENADRYL PO      Take 25 mg by mouth as needed (Pre-medication for remicade)       Calcium + D3 600-200 MG-UNIT Tabs      Take 1 tablet by mouth 2 times daily       folic acid 1 MG tablet    FOLVITE    180 tablet    Take 2 tablets (2 mg) by mouth  daily       methotrexate 2.5 MG tablet CHEMO     16 tablet    Take 4 tablets (10 mg) by mouth once a week Take 4 tablets (10mg) by mouth weekly. Labs every 8 weeks       * PREDNISONE PO      Take 4 mg by mouth daily       * predniSONE 1 MG tablet    DELTASONE    120 tablet    Take 4 tablets (4 mg) by mouth daily       REMICADE IV      Inject 100 mg into the vein every 28 days       TYLENOL PO      Take 650 mg by mouth once       VITAMIN D (CHOLECALCIFEROL) PO      Take 2,000 Units by mouth daily       * Notice:  This list has 2 medication(s) that are the same as other medications prescribed for you. Read the directions carefully, and ask your doctor or other care provider to review them with you.

## 2017-04-28 NOTE — NURSING NOTE
Janice Mayfield's goals for this visit include:   Chief Complaint   Patient presents with     Consult     RA       She requests these members of her care team be copied on today's visit information: yes     PCP: Marah Hare    Referring Provider:  Marah Hare MD  [address]   [place] [address]    Chief Complaint   Patient presents with     Consult     RA       Initial /68  Pulse 73  Temp 97.8  F (36.6  C)  Wt 73.8 kg (162 lb 12.8 oz) There is no height or weight on file to calculate BMI.  Medication Reconciliation: complete    Do you need any medication refills at today's visit?

## 2017-04-28 NOTE — PROGRESS NOTES
Just an FYI-I tried to call the patient and there was no answer.  Her mailbox was also full    I will try again later    Thanks,    Earline Dos Santos  Infusion   [facility]   Office: [phone]  Fax: [phone]    From: Earline Dos Santos   Sent: Friday, April 28, 2017 11:11 AM  To: Tyesha Palmer  Subject: RE: PA request: RHEUM - INFLIXIMAB (REMICADE)    There is no prior auth required.  She can make an appointment when she wants.  I will let the patient know    Thanks,    Earline Dos Santos  Infusion   [facility]   Office: [phone]  Fax: [phone]

## 2017-04-28 NOTE — PROGRESS NOTES
"Rheumatology Visit     Janice Mayfield MRN# [id number]   YOB: 1960 Age: 56 year old     Date of visit: April 28, 2017  Primary care provider: Marah Hare          Assessment/Plan:     1. Erosive Rheumatoid Arthritis w/+RF (-CCP/ZANDER) controlled on infliximab 5 mg/kg every 4-6 week, methotrexate 10 mg week, prednisone 4 mg day- no flaring. Moderate activity today. RAPID 3=8.7. Exam shows right wrist drop and reduced ROM left shoulder (chronic). Will repeat x-rays, left shoulder x-rays, labs. Mild fatigue day of methotrexate, so will ask her increase folic acid 2 mg day. Continue this plan (will check hep B/C, MTB QG).     2. Chronic left shoulder pain with reduced ROM. I suspect RA/OA and maybe some internal derangement. No acute injury or falls. X-rays, referral to PT. Ortho prn  3. Osteopenia. Chronic prednisone use 4 mg day. DEXA ordered. Continue calcium and vitamin D. Check vitamin D today. May taper by 0.5-1 mg every 1-2 month until off.    4. Immunosuppression, long-term risk medication. Will check labs every 8-12 weeks while on immunosuppresive therapy and hx elevated liver enzymes.     The patient understood the rational for the diagnosis and treatment plan. Patient shared in the decision making. All questions were answered to best of my ability and the patient's satisfaction. Sing-up for Nelli. She agrees to this plan.   Pola Santana APRN, CNP, MSN  [facility]  Department of Rheumatology & Autoimmune Disorders          History of Present Illness:   Janice Mayfield is a 56 year old female who presents as a consultation for erosive RA +RF -CCP -ZANDER/C3/C4, -ANCA -HLA B27. Prior care at [facility] w/ Andry Jones here to establish d/t insurance     Review: hydroxychloroquine, methotrexate (d/c liver enzymes elevation), enbrel (ineffective), humira *d/c (uticarial reaction), sulfites (anxiety)      Went to [facility] in a wheelchair [\"knees were melons\", pads " "of feet, not use hands, wrists].     On remicade 5 mg/kg Q 4-5 week infusions with pre-meds once a month (Jan 2011) last infusion about 6 weeks ago, FA 1 mg/day, MTX 10 mg Q Sunday week and prednisone 4-5 mg day.     Increased remicade was every 4-5 weeks as was wearing down rather then increase the dose. Tolerating and s/e. Mild foggy day after taking methotrexate (no SI, hairloss, diarrhea), FA 1 mg day     Eats healthy. Reports arthritis is controlled, functioning well and not in wheelchair. Weather affects her arthritis. Root canal 2 weeks ago due to tooth abscess \"thinning of your jaw\". No flaring with her RA and this treatment. shouilder and wrist, knee so mild compared to when dx but is noticing she is due. Hx right wrist injection 2 yr ago. Pain is 4 out 10.     No infectious over the years. Reports up-to-date with physical and cancer screening.         Past Medical/Surgical History:   Injuries-None  No Blood transfusions  Medical-RA, osteopenia (DEXA  in 2008 -0.6, postemenopausal, anemia, elevated liver enzymes (fatty liver, MR elastroplaty NL 2012 seen Dr. Anderson; initially hep C + but HCV RNA neg), polyclonal hypergammaglobuinemia, diverticulum   No past medical history on file.    Surgical-  No past surgical history on file.          Family/Social History:   Family-  No autoimmune disorders, psoriasis, UC, crohn's, SLE, RA, PsA, gout.   No MS  Mother-Uterine CA-passed  Father-alive PPM and arrythmia  MGM-parkinsons, OP, possible arthritis-passed  FNL-REF-hibcgh  PGM-brain CA, OP-passed  PGF-colon CA-passed  2 brothers-start to have heart issue  2 sisters-healthy HTN  2 children  Common low BP and low HR, varicose veing    History reviewed. No pertinent family history.    Social-  No Alcohol. Never Smoking. 3 Children (youngest deformed pulmonary valve s/p OHS). NO IVDU or drug use. 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              Allergies/Medications:     Allergies   Allergen " Reactions     Humira      hives     Methotrexate      anxiety     Plaquenil [Hydroxychloroquine]      Elevated LFTs     Sulfa Drugs      delusions       Outpatient Prescriptions Marked as Taking for the 4/28/17 encounter (Office Visit) with Pola Santana APRN CNP   Medication Sig     FOLIC ACID PO Take 1 mg by mouth daily      Calcium Carb-Cholecalciferol (CALCIUM + D3) 600-200 MG-UNIT TABS Take 1 tablet by mouth 2 times daily     InFLIXimab (REMICADE IV) Inject 100 mg into the vein every 28 days      Methotrexate Sodium (METHOTREXATE PO) Take 10 mg by mouth once a week     PREDNISONE PO Take 4 mg by mouth daily      DiphenhydrAMINE HCl (BENADRYL PO) Take 25 mg by mouth as needed (Pre-medication for remicade)     Acetaminophen (TYLENOL PO) Take 650 mg by mouth once     VITAMIN D, CHOLECALCIFEROL, PO Take 2,000 Units by mouth daily            Review of Systems:   Negative raynaud s phenomena, hairloss, sun sensitivities, keratoconjunctivitis sicca, pleurisy, inflammatory joint symptoms, significant rashes like malar, oral/nasal or ulcerations, inflammatory eye disease, inflammatory bowel disease, dactylitis, tenosynovitis, or enthespathy. Negative for miscarriages over 2 months into pregnancy, blood clots, gout, psoriasis, UC, crohn's. No temporal headache, no jaw claudication, no scalp tenderness, vision changes, carotidynia, cough  +See MDHAQ  +less now some raynauds rare  +dry eyes  +hemorrhoids   CONSTITUTIONAL: No fevers, night sweats or unintentional weight change. No acute distress, swollen glands  EYES: No vision change, diplopia, pain in eyes or red eyes   EARS, NOSE, MOUTH, THROAT: No tinnitus or hearing change, no epistaxis or nasal discharge, no oral lesions, throat clear. Normal saliva pool.  No drymouth. No thyroid enlargement  CARDIOVASCULAR: No chest pain, palpitations, or pain with walking, no orthopnea or PND   RESPIRATORY: No dyspnea, cough, shortness of breath or wheezing. No pleurisy.   GI:  No nausea, vomiting, diarrhea or constipation, no abdominal pain, or blood in stools.   : No change in urine, no dysuria or hematuria   MUSCKL: No swollen, tender, red or painful joints. No nodules. No enthesitis, plantar fascitis or heel pain.   INTEGUMENTARY: No concerning lesions or moles   NEURO: No loss of strength or sensation, no numbness or tingling, no tremor, no dizziness, no headache. No falls   ENDO: No polyuria or polydipsia, no temperature intolerance   HEME/LYMPH:No concerning bumps, bleeding problems, or swollen lymph nodes. No recent infections, hospitalizations or new illnesses.   ALLERGY: No environmental allergies   PSYCH:No depression or anxiety, no sleep problems.  Otherwise 14 point ROS obtained, reviewed and found negative.          Physical Exam:     Constitutional: WD-WN-WG cooperative  Eyes: nl EOM, PERRLA, vision, conjunctiva, sclera  ENT: nl external ears, nose, hearing, lips, teeth, gums, throat. Nl saliva pool  No mucous membrane lesions, normal saliva pool  Neck: no mass or thyroid enlargement. non-tender.  Resp: lungs clear to auscultation, nl to palpation, nl breath sounds  CV: RRR, no murmurs, rubs or gallops, no edema  GI: no ABD mass or tenderness, no HSM. No RUQ pain.   : not tested  Lymph: no cervical, supraclavicular, inguinal or epitrochlear nodes  MSK: Right wrist drop wirh reduced flexion and extension, left shoulder reduced ER about 50% shoulder lifts. right halgus valgus bunions  All other TMJ, neck, shoulder, elbow, wrist, MCP/PIP/DIP, spine, hip, knee, ankle, and foot MTP/IP joints were examined and  found normal. NL prayer sign. No periuncle erythema. Normal  strength. No dactylitis,  tenosynovitis, enthespathy. Negative MCP and MTP squeeze. Negative Lhermitte's sign.   Skin: No nail pitting, alopecia, rash, nodules or lesions.   Neuro: nl cranial nerves, strength, sensation  Psych: nl judgement, orientation, memory, affect.         Labs/Imaging:   Reviewed  medications, labs, imaging, and EMR.   Reviewed Rheumatology Lab Flowsheet & Lab Flowsheet    TSH Nl 2016  SPEP nl 2011  Anti-smooth, antimitochrondial, myelo AB amd PR3 NL in 2011  Lymes neg 2011    DEXA 5-2016  Osteopenia z-0.4 t-1  Xrays feet 2-2014 negative  2-2014 B/L hand joint space narrowing and erosive change on radiocarpal  2011 neg SI x-ays     Endoscopy/colonoscopy 5-2016 single diverticulum dx diverticulosis in colon     No results found for this or any previous visit.  No lab results found.    Invalid input(s): SEDRATE, TBILI, NH3           Education:   1. Immunization: Reviewed CDC guidelines with patient updated, information provided to patient based on CDC guidelines for vaccination recommendations, live vaccines contraindicated   2. Bone Health:Educated on adequate calcium and vitamin D intake, Educated on exercise, Glucocorticoid education discussed risks, benefits & potential long term side effects from use  3. Discussed routine annual physicals, cancer screening, cardiac risk monitoring, bone health and primary care with primary care provider.   4. No alcohol while taking methotrexate.  5. No live vaccines 1 month before starting, while taking, or within 3 months of biologics (verify with rheumatologist).   6. Educated on diagnosis, prognosis, labs, imaging, treatment options (including risks, benefits, risk of no treatment), medications (use, dose, side-effects, risks of medications including infection/cancer/bone marrow suppression, lab monitoring), infections what to do, plan of cares, goals of treatment.       Pola Santana APRN, CNP, MSN  [facility]  Department of Rheumatology & Autoimmune Disorders  [facility]: [phone] (opt.2 then opt. 1 scheduling, opt. 3 nurse)  [facility]: [phone]

## 2017-04-28 NOTE — PROGRESS NOTES
[employer] message sent to patient re: infliximab infusion and no PA required.    Tyesha Palmer, VIKASN, RN, PHN  Rheumatology Care Coordinator  [facility]

## 2017-04-28 NOTE — PROGRESS NOTES
Date: 4/28/2017    Time of Call: 8:52 AM     Diagnosis:  RA     [ VORB ] Ordering provider: LO Cavazos  Order: infliximab 5 mg/kg every 4-6 weeks with pre-medications and labs every 8-12 weeks.     Order received by: VIKAS VelásquezN, RN     Follow-up/additional notes: routed to provider for review and completion

## 2017-05-01 ENCOUNTER — TELEPHONE (OUTPATIENT)
Dept: RHEUMATOLOGY | Facility: CLINIC | Age: 57
End: 2017-05-01

## 2017-05-01 DIAGNOSIS — M05.79 RHEUMATOID ARTHRITIS INVOLVING MULTIPLE SITES WITH POSITIVE RHEUMATOID FACTOR (H): Primary | Chronic | ICD-10-CM

## 2017-05-01 DIAGNOSIS — M21.331 RIGHT WRIST DROP: ICD-10-CM

## 2017-05-01 DIAGNOSIS — G89.29 CHRONIC LEFT SHOULDER PAIN: ICD-10-CM

## 2017-05-01 DIAGNOSIS — M25.512 CHRONIC LEFT SHOULDER PAIN: ICD-10-CM

## 2017-05-01 LAB
M TB TUBERC IFN-G BLD QL: NEGATIVE
M TB TUBERC IFN-G/MITOGEN IGNF BLD: 0 IU/ML

## 2017-05-01 NOTE — TELEPHONE ENCOUNTER
[facility] Call Center    Phone Message    Name of Caller: Janice    Phone Number: Home number on file [phone] (home)    Best time to return call: any    May a detailed message be left on voicemail: yes    Relation to patient: Self    Reason for Call: Patient is requesting Referral: Ortho    Action Taken: Message routed to:  Adult Clinics: Rheumatology p [phone]

## 2017-05-01 NOTE — TELEPHONE ENCOUNTER
"Chart reviewed.  Copied note from OV plan: \"Chronic left shoulder pain with reduced ROM. I suspect RA/OA and maybe some internal derangement. No acute injury or falls. X-rays, referral to PT. Ortho prn\"    Spoke with patient, reviewed x-ray results and plan. Placed orders for ORTHOPEDICS ADULT REFERRAL shoulder and wrist, advised patient to review [facility] web site to find hand/wrist specialist and shoulder in orthopedics.  Pt verbalizes understanding and agrees with plan. Arranged lab appointments every 8-12 weeks. Pt scheduled for remicade infusion at on Wednesday.     Tyesha Palmer, VIKASN, RN, PHN  Rheumatology Care Coordinator  [facility]      "

## 2017-05-03 ENCOUNTER — INFUSION THERAPY VISIT (OUTPATIENT)
Dept: INFUSION THERAPY | Facility: CLINIC | Age: 57
End: 2017-05-03
Payer: COMMERCIAL

## 2017-05-03 ENCOUNTER — DOCUMENTATION ONLY (OUTPATIENT)
Dept: SPIRITUAL SERVICES | Facility: CLINIC | Age: 57
End: 2017-05-03

## 2017-05-03 ENCOUNTER — TELEPHONE (OUTPATIENT)
Dept: RHEUMATOLOGY | Facility: CLINIC | Age: 57
End: 2017-05-03

## 2017-05-03 ENCOUNTER — OFFICE VISIT (OUTPATIENT)
Dept: ORTHOPEDICS | Facility: CLINIC | Age: 57
End: 2017-05-03
Payer: COMMERCIAL

## 2017-05-03 VITALS
SYSTOLIC BLOOD PRESSURE: 110 MMHG | TEMPERATURE: 98.6 F | HEART RATE: 80 BPM | DIASTOLIC BLOOD PRESSURE: 70 MMHG | RESPIRATION RATE: 16 BRPM | WEIGHT: 168.3 LBS | OXYGEN SATURATION: 96 %

## 2017-05-03 VITALS — HEART RATE: 71 BPM | SYSTOLIC BLOOD PRESSURE: 100 MMHG | DIASTOLIC BLOOD PRESSURE: 72 MMHG | OXYGEN SATURATION: 98 %

## 2017-05-03 DIAGNOSIS — Z79.52 LONG TERM CURRENT USE OF SYSTEMIC STEROIDS: ICD-10-CM

## 2017-05-03 DIAGNOSIS — G89.29 CHRONIC LEFT SHOULDER PAIN: Primary | ICD-10-CM

## 2017-05-03 DIAGNOSIS — M05.79 RHEUMATOID ARTHRITIS INVOLVING MULTIPLE SITES WITH POSITIVE RHEUMATOID FACTOR (H): Primary | ICD-10-CM

## 2017-05-03 DIAGNOSIS — Z71.81 SPIRITUAL OR RELIGIOUS COUNSELING: Primary | ICD-10-CM

## 2017-05-03 DIAGNOSIS — E55.9 VITAMIN D DEFICIENCY: Primary | ICD-10-CM

## 2017-05-03 DIAGNOSIS — M25.512 CHRONIC LEFT SHOULDER PAIN: Primary | ICD-10-CM

## 2017-05-03 PROCEDURE — 99203 OFFICE O/P NEW LOW 30 MIN: CPT | Performed by: FAMILY MEDICINE

## 2017-05-03 PROCEDURE — 99207 ZZC NO CHARGE LOS: CPT

## 2017-05-03 PROCEDURE — 96415 CHEMO IV INFUSION ADDL HR: CPT | Performed by: INTERNAL MEDICINE

## 2017-05-03 PROCEDURE — 96413 CHEMO IV INFUSION 1 HR: CPT | Performed by: INTERNAL MEDICINE

## 2017-05-03 RX ORDER — DIPHENHYDRAMINE HCL 25 MG
25 CAPSULE ORAL
Status: CANCELLED
Start: 2017-05-03

## 2017-05-03 RX ORDER — ACETAMINOPHEN 325 MG/1
650 TABLET ORAL
Status: DISCONTINUED | OUTPATIENT
Start: 2017-05-03 | End: 2017-05-03 | Stop reason: HOSPADM

## 2017-05-03 RX ORDER — METHYLPREDNISOLONE SODIUM SUCCINATE 125 MG/2ML
100 INJECTION, POWDER, LYOPHILIZED, FOR SOLUTION INTRAMUSCULAR; INTRAVENOUS
Status: CANCELLED | OUTPATIENT
Start: 2017-05-03

## 2017-05-03 RX ORDER — DIPHENHYDRAMINE HCL 25 MG
25 CAPSULE ORAL
Status: DISCONTINUED | OUTPATIENT
Start: 2017-05-03 | End: 2017-05-03 | Stop reason: HOSPADM

## 2017-05-03 RX ORDER — ERGOCALCIFEROL 1.25 MG/1
50000 CAPSULE, LIQUID FILLED ORAL
Qty: 12 CAPSULE | Refills: 0 | Status: SHIPPED | OUTPATIENT
Start: 2017-05-03 | End: 2017-11-03

## 2017-05-03 RX ORDER — ACETAMINOPHEN 325 MG/1
650 TABLET ORAL
Status: CANCELLED
Start: 2017-05-03

## 2017-05-03 RX ADMIN — Medication 25 MG: at 13:40

## 2017-05-03 RX ADMIN — ACETAMINOPHEN 650 MG: 325 TABLET ORAL at 13:40

## 2017-05-03 RX ADMIN — Medication 250 ML: at 14:14

## 2017-05-03 ASSESSMENT — PAIN SCALES - GENERAL
PAINLEVEL: MODERATE PAIN (4)
PAINLEVEL: MODERATE PAIN (5)

## 2017-05-03 NOTE — PROGRESS NOTES
HISTORY OF PRESENT ILLNESS  Ms. coral Mayfield is a pleasant 56 year old year old female who presents to clinic today with multiple joint complaints.  She's seen at the request of Pola Santana.  Janice has a history of rheumatoid arthritis which was diagnosed in 2009. She started Remicade infusions in 2011. She is done well with this for some time and has been under the care of the [facility] system since onset. She recently switched care to [place] after insurance changes.  Janice has bilateral wrist pain, left shoulder pain, and multiple other minor joint complaints. Her right wrist is the most bothersome, she is an artist, she also plays piano frequently. She has had a cortisone injection in her right wrist a couple of years ago, this helped markedly at the time. Her left shoulder bothers her with all range of motion, all movements. She's been forced to hold her shoulder at her side due to the pain. She also has to move her left arm passively above her head up times when exercising.  She has a Remicade infusion tomorrow. She is a few weeks overdue.  Quality: achy pain, sharp at times     Severity: severe  Timing: occurs constantly  Modifying factors: resting and non-use makes it better, movement and use makes it worse  Associated signs & symptoms: none  Previous similar pain: yes  Additional history: as documented    MEDICAL HISTORY  Patient Active Problem List   Diagnosis     Rheumatoid arthritis involving multiple sites with positive rheumatoid factor (H)       Current Outpatient Prescriptions   Medication Sig Dispense Refill     methotrexate 2.5 MG tablet CHEMO Take 4 tablets (10 mg) by mouth once a week Take 4 tablets (10mg) by mouth weekly. Labs every 8 weeks 16 tablet 3     folic acid (FOLVITE) 1 MG tablet Take 2 tablets (2 mg) by mouth daily 180 tablet 1     predniSONE (DELTASONE) 1 MG tablet Take 4 tablets (4 mg) by mouth daily 120 tablet 5     Calcium Carb-Cholecalciferol (CALCIUM + D3) 600-200  MG-UNIT TABS Take 1 tablet by mouth 2 times daily       InFLIXimab (REMICADE IV) Inject 100 mg into the vein every 28 days        PREDNISONE PO Take 4 mg by mouth daily        DiphenhydrAMINE HCl (BENADRYL PO) Take 25 mg by mouth as needed (Pre-medication for remicade)       Acetaminophen (TYLENOL PO) Take 650 mg by mouth once       VITAMIN D, CHOLECALCIFEROL, PO Take 2,000 Units by mouth daily         Allergies   Allergen Reactions     Humira      hives     Methotrexate      anxiety     Plaquenil [Hydroxychloroquine]      Elevated LFTs     Sulfa Drugs      delusions       No family history on file.    Additional medical/Social/Surgical histories reviewed in EPIC and updated as appropriate.     REVIEW OF SYSTEMS (5/3/2017)  10 point ROS of systems including Constitutional, Eyes, Respiratory, Cardiovascular, Gastroenterology, Genitourinary, Integumentary, Musculoskeletal, Psychiatric were all negative except for pertinent positives noted in my HPI.     PHYSICAL EXAM  Vitals:    05/03/17 0733   BP: 100/72   BP Location: Right arm   Patient Position: Chair   Cuff Size: Adult Regular   Pulse: 71   SpO2: 98%     General  - normal appearance, in no obvious distress  CV  - normal radial pulse  Pulm  - normal respiratory pattern, non-labored  Musculoskeletal - right wrist  - inspection: normal joint alignment, no obvious deformity, no swelling  - palpation: tender throughout carpal row dorsally  - ROM:  90 deg flexion   70 deg extension   25 deg abduction   65 deg adduction    Musculoskeletal - left shoulder  - inspection: normal bone and joint alignment, no obvious deformity, no scapular winging, no AC step-off  - palpation: mildly tender RC insertion, normal clavicle, non-tender AC  - ROM:  painful and limited flexion and ER at 30 deg, cannot abduct above 45 deg, normal passive ROM  - strength: 5/5  strength, 5/5 in all shoulder planes    Neuro  - no sensory or motor deficit, grossly normal coordination, normal muscle  tone  Skin  - no ecchymosis, erythema, warmth, or induration, no obvious rash  Psych  - interactive, appropriate, normal mood and affect          ASSESSMENT & PLAN  Ms. coral Mayfield is a 56 year old year old female who is in the office today with multiple joint complaints.    I reviewed her xray imaging in the room with her which reads:  Left Shoulder - IMPRESSION:   1. Some erosion subchondral bone superior aspect humeral head which  may be related to arthritis and rheumatoid arthritis.  2. Radiolucencies over proximal humerus. Findings are nonspecific.  Follow-up of these radiolucent lucent lesions with plain x-ray could  be performed in 3 months or an MRI study could be performed to see if  there is bone marrow edema associated with this or soft tissue mass  that might indicate active lesion.   [Consider Follow Up: Radiolucencies proximal humerus see above.. It is  unlikely that these represent multiple myeloma or other and neoplastic  disease however follow-up recommended.  Bilateral Hand - IMPRESSION:   1. Severe degenerative changes radiocarpal joints bilaterally with  widening of scapholunate interosseous distance consistent with  disruption of scapholunate interosseous ligament.  2. Total loss of space between the triquetrum and lunate right breast  consistent with triangular fibro cartilage degenerative change.  3. Marginal joint erosions as noted above which can be seen in  rheumatoid arthritis.  4. Osteopenia of the bones of the hands.  Bilateral Wrist - IMPRESSION:   1. Severe degenerative changes radiocarpal joints bilaterally with  widening of scapholunate interosseous distance consistent with  disruption of scapholunate interosseous ligament.  2. Total loss of space between the triquetrum and lunate right breast  consistent with triangular fibro cartilage degenerative change.  3. Marginal joint erosions as noted above which can be seen in  rheumatoid arthritis.  4. Osteopenia of the bones of the  hands.    We had a long discussion regarding treatment moving forward.  She does have a Remicade infusion tomorrow, I suspect that she'll get a good deal of relief from this.  Her risks does seem to be the most symptomatic, we did discuss, and exercise regimens including a dry Rice bowl that she could heat or cold, continuing playing piano, and using other palliative modalities such as ice and heat.    Her left shoulder does not exhibit signs of adhesive capsulitis, but is markedly painful with most movements.  I am referring her to physical therapy to build a light strengthening regimen and to provide some passive range of motion.    Janice will follow-up in the future if she like to discuss corticosteroid injections, continued exercise, or other advanced options.    It was a pleasure taking care of Janice.        Ventura Harvey DO, CAM

## 2017-05-03 NOTE — PROGRESS NOTES
SPIRITUAL HEALTH SERVICES  SPIRITUAL ASSESSMENT Progress Note  [facility]    PRIMARY FOCUS:     Support for coping    ILLNESS CIRCUMSTANCES:   Reviewed documentation. Reflective conversation shared with Janice Mayfield which integrated elements of illness and family narratives.     Context of Serious Illness/Symptom(s) - Rheumatoid arthritis    Resources for Support - , family, Orthodoxy dianne    DISTRESS:     Emotional/Spiritual/Existential Distress - the distress which accompanies living with rheumatoid arthritis.    Worship Distress - Not Applicable    Social/Cultural/Economic Distress - Not Discussed    SPIRITUAL/Jew COPING:     Nondenominational/Dianne - Orthodoxy (parents were missionaries and teachers in [facility])    Spiritual Practice(s) - Prayer- Pt requested a prayer, which  provided.    Emotional/Relational/Existential Connections - Not Discussed    GOALS OF CARE:    Goals of Care - help pt cope with RA    Meaning/Sense-Making - Pt talked about searching to find meaning in suffering.  She said she didn't understand it, but said how she coped with her disease could give glory to God.    PLAN:  will follow up in the future.    Jim Li M.Div., [facility]  Staff   Office tel: [phone]

## 2017-05-03 NOTE — MR AVS SNAPSHOT
After Visit Summary   5/3/2017    Janice Mayfield    MRN: [id number]           Patient Information     Date Of Birth          1960        Visit Information        Provider Department      5/3/2017 1:00 PM [address]        Today's Diagnoses     Rheumatoid arthritis involving multiple sites with positive rheumatoid factor (H)    -  1       Follow-ups after your visit        Your next 10 appointments already scheduled     Jun 26, 2017  8:00 AM CDT   LAB with LAB FIRST FLOOR Critical access hospital ([facility])    [address] [place] [id number] [phone]           Patient must bring picture ID.  Patient should be prepared to give a urine specimen  Please do not eat 10-12 hours before your appointment if you are coming in fasting for labs on lipids, cholesterol, or glucose (sugar).  Pregnant women should follow their Care Team instructions. Water with medications is okay. Do not drink coffee or other fluids.   If you have concerns about taking  your medications, please ask at office or if scheduling via Berg, send a message by clicking on Secure Messaging, Message Your Care Team.            Aug 28, 2017  8:00 AM CDT   LAB with LAB FIRST FLOOR Critical access hospital ([facility])    [address] [address] [id number] [phone]           Patient must bring picture ID.  Patient should be prepared to give a urine specimen  Please do not eat 10-12 hours before your appointment if you are coming in fasting for labs on lipids, cholesterol, or glucose (sugar).  Pregnant women should follow their Care Team instructions. Water with medications is okay. Do not drink coffee or other fluids.   If you have concerns about taking  your medications, please ask at office or if scheduling via Berg, send a message by clicking on Secure Messaging, Message Your Care Team.             Nov 03, 2017  8:30 AM CDT   LAB with LAB FIRST FLOOR [facility] ([facility])    [address] [address] [place] [id number] [phone]           Patient must bring picture ID.  Patient should be prepared to give a urine specimen  Please do not eat 10-12 hours before your appointment if you are coming in fasting for labs on lipids, cholesterol, or glucose (sugar).  Pregnant women should follow their Care Team instructions. Water with medications is okay. Do not drink coffee or other fluids.   If you have concerns about taking  your medications, please ask at office or if scheduling via [employer], send a message by clicking on Secure Messaging, Message Your Care Team.            Nov 03, 2017  9:00 AM CDT   LAB with LO Parra CNP   [facility]    [address] [facility] [id number] [phone]           Patient must bring picture ID.  Patient should be prepared to give a urine specimen  Please do not eat 10-12 hours before your appointment if you are coming in fasting for labs on lipids, cholesterol, or glucose (sugar).  Pregnant women should follow their Care Team instructions. Water with medications is okay. Do not drink coffee or other fluids.   If you have concerns about taking  your medications, please ask at office or if scheduling via [employer], send a message by clicking on Secure Messaging, Message Your Care Team.              Who to contact     If you have questions or need follow up information about today's clinic visit or your schedule please contact [facility] directly at [phone].  Normal or non-critical lab and imaging results will be communicated to you by MyChart, letter or phone within 4 business days after the clinic has received the results. If you do not hear from us within 7 days, please contact the clinic through MyChart or phone. If you have a  critical or abnormal lab result, we will notify you by phone as soon as possible.  Submit refill requests through Interse or call your pharmacy and they will forward the refill request to us. Please allow 3 business days for your refill to be completed.          Additional Information About Your Visit        [employer] Information     Interse gives you secure access to your electronic health record. If you see a primary care provider, you can also send messages to your care team and make appointments. If you have questions, please call your primary care clinic.  If you do not have a primary care provider, please call [phone] and they will assist you.      Interse is an electronic gateway that provides easy, online access to your medical records. With Interse, you can request a clinic appointment, read your test results, renew a prescription or communicate with your care team.     To access your existing account, please contact your [facility] or call [phone] for assistance.        Care EveryWhere ID     This is your Care EveryWhere ID. This could be used by other organizations to access your [facility] medical records  [id number]        Your Vitals Were     Pulse Temperature Respirations Pulse Oximetry          80 98.6  F (37  C) (Oral) 16 96%         Blood Pressure from Last 3 Encounters:   05/03/17 110/70   05/03/17 100/72   04/28/17 104/68    Weight from Last 3 Encounters:   05/03/17 76.3 kg (168 lb 4.8 oz)   04/28/17 73.8 kg (162 lb 12.8 oz)              We Performed the Following     MD Instruction for Therapy Plan     Nursing Communication 1     Nursing Communication 2     Nursing Communication 3     Treatment Conditions        Primary Care Provider Office Phone # Fax #    Marah Hare [phone]       [address]   [place] [facility] [address]        Thank you!     Thank you for choosing [facility]  for your care.  Our goal is always to provide you with excellent care. Hearing back from our patients is one way we can continue to improve our services. Please take a few minutes to complete the written survey that you may receive in the mail after your visit with us. Thank you!             Your Updated Medication List - Protect others around you: Learn how to safely use, store and throw away your medicines at www.disposemymeds.org.          This list is accurate as of: 5/3/17  4:38 PM.  Always use your most recent med list.                   Brand Name Dispense Instructions for use    BENADRYL PO      Take 25 mg by mouth as needed (Pre-medication for remicade)       Calcium + D3 600-200 MG-UNIT Tabs      Take 1 tablet by mouth 2 times daily       folic acid 1 MG tablet    FOLVITE    180 tablet    Take 2 tablets (2 mg) by mouth daily       methotrexate 2.5 MG tablet CHEMO     16 tablet    Take 4 tablets (10 mg) by mouth once a week Take 4 tablets (10mg) by mouth weekly. Labs every 8 weeks       * PREDNISONE PO      Take 4 mg by mouth daily       * predniSONE 1 MG tablet    DELTASONE    120 tablet    Take 4 tablets (4 mg) by mouth daily       REMICADE IV      Inject 100 mg into the vein every 28 days       TYLENOL PO      Take 650 mg by mouth once       VITAMIN D (CHOLECALCIFEROL) PO      Take 2,000 Units by mouth daily       * Notice:  This list has 2 medication(s) that are the same as other medications prescribed for you. Read the directions carefully, and ask your doctor or other care provider to review them with you.

## 2017-05-03 NOTE — PATIENT INSTRUCTIONS
Thanks for coming today.  Ortho/Sports Medicine Clinic  [address] [place] [address]    To schedule future appointments in Ortho Clinic, you may call [phone].    To schedule ordered imaging by your provider:   Call [facility] [phone]    To schedule an injection ordered by your provider:  Call [facility] Injection scheduling line: [phone]  [email or web address] available online at:  [email or web address]    Please call if any further questions or concerns ([phone]).  Clinic hours 8 am to 5 pm.    Return to clinic (call) if symptoms worsen or fail to improve.

## 2017-05-03 NOTE — NURSING NOTE
Janice Mayfield's goals for this visit include: Evaluate and treat left shoulder and right wrist  She requests these members of her care team be copied on today's visit information: yes    PCP: Marah Hare    Referring Provider:  LO Parra CNP  [address] [address]    Chief Complaint   Patient presents with     Consult     Shoulder left     Elbow right       Initial /72 (BP Location: Right arm, Patient Position: Chair, Cuff Size: Adult Regular)  Pulse 71  SpO2 98% There is no height or weight on file to calculate BMI.  Medication Reconciliation: complete

## 2017-05-03 NOTE — MR AVS SNAPSHOT
After Visit Summary   5/3/2017    Janice Mayfield    MRN: [id number]           Patient Information     Date Of Birth          1960        Visit Information        Provider Department      5/3/2017 7:20 AM Ventura Harvey,  [facility]        Today's Diagnoses     Chronic left shoulder pain    -  1      Care Instructions    Thanks for coming today.  [facility] Clinic  [address] [place] [address]    To schedule future appointments in Ortho Clinic, you may call [phone].    To schedule ordered imaging by your provider:   Call [facility] [phone]    To schedule an injection ordered by your provider:  Call [facility] Injection scheduling line: [phone]  Modern Guildhart available online at:  [email or web address]    Please call if any further questions or concerns ([phone]).  Clinic hours 8 am to 5 pm.    Return to clinic (call) if symptoms worsen or fail to improve.          Follow-ups after your visit        Additional Services     PHYSICAL THERAPY REFERRAL       Please eval and treat for left shoulder pain, lack of ROM, hx of RA                  Your next 10 appointments already scheduled     May 03, 2017  1:00 PM CDT   Level 3 with [address] ([facility])    [address] [place] [address] [phone]            2017  8:00 AM CDT   LAB with LAB FIRST FLOOR [facility])    [address] [place] [address] [phone]           Patient must bring picture ID.  Patient should be prepared to give a urine specimen  Please do not eat 10-12 hours before your appointment if you are coming in fasting for labs on lipids, cholesterol, or glucose (sugar).  Pregnant women should follow their Care Team instructions. Water with medications is okay. Do not drink coffee or other  fluids.   If you have concerns about taking  your medications, please ask at office or if scheduling via [employer], send a message by clicking on Secure Messaging, Message Your Care Team.            Aug 28, 2017  8:00 AM CDT   LAB with LAB FIRST FLOOR [facility])    [address] [facility] [id number] [phone]           Patient must bring picture ID.  Patient should be prepared to give a urine specimen  Please do not eat 10-12 hours before your appointment if you are coming in fasting for labs on lipids, cholesterol, or glucose (sugar).  Pregnant women should follow their Care Team instructions. Water with medications is okay. Do not drink coffee or other fluids.   If you have concerns about taking  your medications, please ask at office or if scheduling via [employer], send a message by clicking on Secure Messaging, Message Your Care Team.            Nov 03, 2017  8:30 AM CDT   LAB with LAB FIRST FLOOR [facility] ([facility])    [address] [facility] [id number] [phone]           Patient must bring picture ID.  Patient should be prepared to give a urine specimen  Please do not eat 10-12 hours before your appointment if you are coming in fasting for labs on lipids, cholesterol, or glucose (sugar).  Pregnant women should follow their Care Team instructions. Water with medications is okay. Do not drink coffee or other fluids.   If you have concerns about taking  your medications, please ask at office or if scheduling via [employer], send a message by clicking on Secure Messaging, Message Your Care Team.            Nov 03, 2017  9:00 AM CDT   LAB with [facility])    [address] [facility] [id number] [phone]           Patient must bring picture ID.  Patient should be prepared to give a urine  specimen  Please do not eat 10-12 hours before your appointment if you are coming in fasting for labs on lipids, cholesterol, or glucose (sugar).  Pregnant women should follow their Care Team instructions. Water with medications is okay. Do not drink coffee or other fluids.   If you have concerns about taking  your medications, please ask at office or if scheduling via [employer], send a message by clicking on Secure Messaging, Message Your Care Team.              Who to contact     If you have questions or need follow up information about today's clinic visit or your schedule please contact [facility] directly at [phone].  Normal or non-critical lab and imaging results will be communicated to you by [employer], letter or phone within 4 business days after the clinic has received the results. If you do not hear from us within 7 days, please contact the clinic through DoYouBuzzt or phone. If you have a critical or abnormal lab result, we will notify you by phone as soon as possible.  Submit refill requests through DEONTICS or call your pharmacy and they will forward the refill request to us. Please allow 3 business days for your refill to be completed.          Additional Information About Your Visit        [employer]isle[employer]inder Information     DE[employer] gives you secure access to your electronic health record. If you see a primary care provider, you can also send messages to your care team and make appointments. If you have questions, please call your primary care clinic.  If you do not have a primary care provider, please call [phone] and they will assist you.      DEONTICS is an electronic gateway that provides easy, online access to your medical records. With DEONTICS, you can request a clinic appointment, read your test results, renew a prescription or communicate with your care team.     To access your existing account, please contact your [facility] or call [phone] for  assistance.        Care EveryWhere ID     This is your Care EveryWhere ID. This could be used by other organizations to access your [place] medical records  [id number]        Your Vitals Were     Pulse Pulse Oximetry                71 98%           Blood Pressure from Last 3 Encounters:   05/03/17 100/72   04/28/17 104/68    Weight from Last 3 Encounters:   04/28/17 73.8 kg (162 lb 12.8 oz)              We Performed the Following     PHYSICAL THERAPY REFERRAL        Primary Care Provider Office Phone # Fax #    Marah Hare [phone]       [address]EREZ   [facility] [address]        Thank you!     Thank you for choosing [facility]  for your care. Our goal is always to provide you with excellent care. Hearing back from our patients is one way we can continue to improve our services. Please take a few minutes to complete the written survey that you may receive in the mail after your visit with us. Thank you!             Your Updated Medication List - Protect others around you: Learn how to safely use, store and throw away your medicines at www.disposemymeds.org.          This list is accurate as of: 5/3/17  8:11 AM.  Always use your most recent med list.                   Brand Name Dispense Instructions for use    BENADRYL PO      Take 25 mg by mouth as needed (Pre-medication for remicade)       Calcium + D3 600-200 MG-UNIT Tabs      Take 1 tablet by mouth 2 times daily       folic acid 1 MG tablet    FOLVITE    180 tablet    Take 2 tablets (2 mg) by mouth daily       methotrexate 2.5 MG tablet CHEMO     16 tablet    Take 4 tablets (10 mg) by mouth once a week Take 4 tablets (10mg) by mouth weekly. Labs every 8 weeks       * PREDNISONE PO      Take 4 mg by mouth daily       * predniSONE 1 MG tablet    DELTASONE    120 tablet    Take 4 tablets (4 mg) by mouth daily       REMICADE IV      Inject 100 mg into the vein every 28 days       TYLENOL PO       Take 650 mg by mouth once       VITAMIN D (CHOLECALCIFEROL) PO      Take 2,000 Units by mouth daily       * Notice:  This list has 2 medication(s) that are the same as other medications prescribed for you. Read the directions carefully, and ask your doctor or other care provider to review them with you.

## 2017-05-03 NOTE — PROGRESS NOTES
"Infusion Nursing Note:  Janice Mayfield presents today for Remicade.    Patient seen by provider today: No   present during visit today: Not Applicable.    Note: N/A.    Intravenous Access:  Peripheral IV placed.    Treatment Conditions:  Rheumatology Infusion Checklist: PRIOR TO INFUSION OF BIOLOGICAL MEDICATIONS OR ANY OF THESE AS LISTED: Remicaide (infliximab) \".rheumbiologicalchecklist\"    Prior to Infusion of biological medications or any of these as listed:    1. Elevated temperature, fever, chills, productive cough or abnormal vital signs, night sweats, coughing up blood or sputum, no appetite or abnormal vital signs : NO    2. Open wounds or new incisions: NO    3. Recent hospitalization: NO    4.  Recent surgeries:  NO    5. Any upcoming surgeries or dental procedures?:NO    6. Any current or recent bouts of illness or infection? On any antibiotics? : NO    7. Any new, sudden or worsening abdominal pain :NO    8. Vaccination within 4 weeks? Patient or someone in the household is scheduled to receive vaccination? No live virus vaccines prior to or during treatment :NO    9. Any nervous system diseases [i.e. multiple sclerosis, Guillain-[place], seizures, neurological  changes]: NO    10. Pregnant or breast feeding; or plans on pregnancy in the future: NO    11. Signs of worsening depression or suicidal ideations while taking benlysta:NO    12. New-onset medical symptoms: NO    13.  New cancer diagnosis or on chemotherapy or radiation NO    14.  Evaluate for any sign of active TB [Unexplained weight loss, Loss of appetite, Night sweats, Fever, Fatigue, Chills, Coughing for 3 weeks or longer, Hemoptysis (coughing up blood), Chest pain]: NO    **Note: If answered yes to any of the above, hold the infusion and contact ordering rheumatologist or on-call rheumatologist.   .      Post Infusion Assessment:  Patient tolerated infusion without incident.  Blood return noted pre and post infusion.  Site " patent and intact, free from redness, edema or discomfort.  Access discontinued per protocol.  Rheumatology Post Infusion: POST-INFUSION OF BIOLOGICAL MEDICATION:    Reviewed with patient.  Given biologic medication or medication hand-out. Inform patient if any fever, chills or signs of infection, new symptoms, abdominal pain, heart palpitations, shortness of breath, reaction, weakness, neurological changes, seek medical attention immediately and should not receive infusions. No live virus vaccines prior to or during treatment or up to 6 months post infusion. If the patient has an upcoming procedure or surgery, this should be discussed with the rheumatologist and surgeon or provider..    Discharge Plan:   Patient discharged in stable condition accompanied by: self.  Departure Mode: Ambulatory.    Lucrecia Manuel RN

## 2017-05-03 NOTE — PROGRESS NOTES
Negative [MTB QG, hep B/C, Urine unremarkable, CMP, CBCD]   Vitamin D is low at 14--will send in a replacement for vitamin D 50K unit 1 tab day for 12 weeks then recheck in 4 month   CRP 18 ESR 32

## 2017-05-17 ENCOUNTER — DOCUMENTATION ONLY (OUTPATIENT)
Dept: HEALTH INFORMATION MANAGEMENT | Facility: CLINIC | Age: 57
End: 2017-05-17

## 2017-05-17 NOTE — PROGRESS NOTES
May 17, 2017  Records received from [facility].  Routed to Pola Santana CNP to review and return to [facility] for scanning.    Cici SHELDON Assistant  [facility] [place]

## 2017-05-31 ENCOUNTER — INFUSION THERAPY VISIT (OUTPATIENT)
Dept: INFUSION THERAPY | Facility: CLINIC | Age: 57
End: 2017-05-31
Payer: COMMERCIAL

## 2017-05-31 VITALS
WEIGHT: 168.3 LBS | TEMPERATURE: 97.6 F | HEART RATE: 93 BPM | SYSTOLIC BLOOD PRESSURE: 110 MMHG | DIASTOLIC BLOOD PRESSURE: 75 MMHG | OXYGEN SATURATION: 99 % | RESPIRATION RATE: 16 BRPM

## 2017-05-31 DIAGNOSIS — M05.79 RHEUMATOID ARTHRITIS INVOLVING MULTIPLE SITES WITH POSITIVE RHEUMATOID FACTOR (H): Primary | ICD-10-CM

## 2017-05-31 PROCEDURE — 96413 CHEMO IV INFUSION 1 HR: CPT | Performed by: INTERNAL MEDICINE

## 2017-05-31 PROCEDURE — 99207 ZZC NO CHARGE NURSE ONLY: CPT

## 2017-05-31 PROCEDURE — 96415 CHEMO IV INFUSION ADDL HR: CPT | Performed by: INTERNAL MEDICINE

## 2017-05-31 RX ORDER — ACETAMINOPHEN 325 MG/1
650 TABLET ORAL
Status: CANCELLED
Start: 2017-05-31

## 2017-05-31 RX ORDER — METHYLPREDNISOLONE SODIUM SUCCINATE 125 MG/2ML
100 INJECTION, POWDER, LYOPHILIZED, FOR SOLUTION INTRAMUSCULAR; INTRAVENOUS
Status: CANCELLED | OUTPATIENT
Start: 2017-05-31

## 2017-05-31 RX ORDER — ACETAMINOPHEN 325 MG/1
650 TABLET ORAL
Status: DISCONTINUED | OUTPATIENT
Start: 2017-05-31 | End: 2017-05-31 | Stop reason: HOSPADM

## 2017-05-31 RX ORDER — DIPHENHYDRAMINE HCL 25 MG
25 CAPSULE ORAL
Status: CANCELLED
Start: 2017-05-31

## 2017-05-31 RX ORDER — DIPHENHYDRAMINE HCL 25 MG
25 CAPSULE ORAL
Status: DISCONTINUED | OUTPATIENT
Start: 2017-05-31 | End: 2017-05-31 | Stop reason: HOSPADM

## 2017-05-31 RX ADMIN — Medication 25 MG: at 08:17

## 2017-05-31 RX ADMIN — ACETAMINOPHEN 650 MG: 325 TABLET ORAL at 08:17

## 2017-05-31 RX ADMIN — Medication 250 ML: at 08:28

## 2017-05-31 ASSESSMENT — PAIN SCALES - GENERAL: PAINLEVEL: MILD PAIN (2)

## 2017-05-31 NOTE — PROGRESS NOTES
"Infusion Nursing Note:  Janice Mayfield presents today for non-rapid remicade.    Patient seen by provider today: No   present during visit today: Not Applicable.    Intravenous Access:  Peripheral IV placed.    Treatment Conditions:  Rheumatology Infusion Checklist: PRIOR TO INFUSION OF BIOLOGICAL MEDICATIONS OR ANY OF THESE AS LISTED: Remicaide (infliximab) \".rheumbiologicalchecklist\"    Prior to Infusion of biological medications or any of these as listed:    1. Elevated temperature, fever, chills, productive cough or abnormal vital signs, night sweats, coughing up blood or sputum, no appetite or abnormal vital signs : NO    2. Open wounds or new incisions: NO    3. Recent hospitalization: NO    4.  Recent surgeries:  NO    5. Any upcoming surgeries or dental procedures?:NO    6. Any current or recent bouts of illness or infection? On any antibiotics? : NO    7. Any new, sudden or worsening abdominal pain :NO    8. Vaccination within 4 weeks? Patient or someone in the household is scheduled to receive vaccination? No live virus vaccines prior to or during treatment :NO    9. Any nervous system diseases [i.e. multiple sclerosis, Guillain-[place], seizures, neurological  changes]: NO    10. Pregnant or breast feeding; or plans on pregnancy in the future: NO    11. Signs of worsening depression or suicidal ideations while taking benlysta:NO    12. New-onset medical symptoms: NO    13.  New cancer diagnosis or on chemotherapy or radiation NO    14.  Evaluate for any sign of active TB [Unexplained weight loss, Loss of appetite, Night sweats, Fever, Fatigue, Chills, Coughing for 3 weeks or longer, Hemoptysis (coughing up blood), Chest pain]: NO    **Note: If answered yes to any of the above, hold the infusion and contact ordering rheumatologist or on-call rheumatologist.   .      Post Infusion Assessment:  Blood return noted pre and post infusion.  Site patent and intact, free from redness, edema or " discomfort.  No evidence of extravasations.  Access discontinued per protocol.      Rheumatology Post Infusion: POST-INFUSION OF BIOLOGICAL MEDICATION:  Reviewed with patient: if any fever, chills or signs of infection, new symptoms, abdominal pain, heart palpitations, shortness of breath, reaction, weakness, neurological changes, seek medical attention immediately and should not receive infusions. No live virus vaccines prior to or during treatment or up to 6 months post infusion. If the patient has an upcoming procedure or surgery, this should be discussed with the rheumatologist and surgeon or provider..    Discharge Plan:   Copy of AVS reviewed with patient and/or family.  Patient will return 6/28 for next remicade appointment.  Patient discharged in stable condition accompanied by: self.  Departure Mode: Ambulatory.    Luli Newman RN

## 2017-05-31 NOTE — MR AVS SNAPSHOT
After Visit Summary   5/31/2017    Janice Mayfield    MRN: [id number]           Patient Information     Date Of Birth          1960        Visit Information        Provider Department      5/31/2017 8:00 AM [address]        Today's Diagnoses     Rheumatoid arthritis involving multiple sites with positive rheumatoid factor (H)    -  1       Follow-ups after your visit        Your next 10 appointments already scheduled     Jun 26, 2017  8:00 AM CDT   LAB with LAB FIRST FLOOR [facility] ([facility])    [address] [facility] [address]   [phone]           Patient must bring picture ID.  Patient should be prepared to give a urine specimen  Please do not eat 10-12 hours before your appointment if you are coming in fasting for labs on lipids, cholesterol, or glucose (sugar).  Pregnant women should follow their Care Team instructions. Water with medications is okay. Do not drink coffee or other fluids.   If you have concerns about taking  your medications, please ask at office or if scheduling via [employer], send a message by clicking on Secure Messaging, Message Your Care Team.            Jun 28, 2017  1:00 PM CDT   Level 3 with [address] ([facility])    [address] [facility] [address] [phone]            Aug 28, 2017  8:00 AM CDT   LAB with LAB FIRST FLOOR [facility] ([facility])    [address] [facility] [address] [phone]           Patient must bring picture ID.  Patient should be prepared to give a urine specimen  Please do not eat 10-12 hours before your appointment if you are coming in fasting for labs on lipids, cholesterol, or glucose (sugar).  Pregnant women should follow their Care Team instructions. Water with medications is okay. Do not drink coffee or  other fluids.   If you have concerns about taking  your medications, please ask at office or if scheduling via [employer], send a message by clicking on Secure Messaging, Message Your Care Team.            Nov 03, 2017  8:30 AM CDT   LAB with LAB FIRST FLOOR [facility] ([facility])    [address] [place] [address]   [phone]           Patient must bring picture ID.  Patient should be prepared to give a urine specimen  Please do not eat 10-12 hours before your appointment if you are coming in fasting for labs on lipids, cholesterol, or glucose (sugar).  Pregnant women should follow their Care Team instructions. Water with medications is okay. Do not drink coffee or other fluids.   If you have concerns about taking  your medications, please ask at office or if scheduling via [employer], send a message by clicking on Secure Messaging, Message Your Care Team.            Nov 03, 2017  9:00 AM CDT   LAB with [facility] ([facility])    [address] [facility] [address]   [phone]           Patient must bring picture ID.  Patient should be prepared to give a urine specimen  Please do not eat 10-12 hours before your appointment if you are coming in fasting for labs on lipids, cholesterol, or glucose (sugar).  Pregnant women should follow their Care Team instructions. Water with medications is okay. Do not drink coffee or other fluids.   If you have concerns about taking  your medications, please ask at office or if scheduling via [employer], send a message by clicking on Secure Messaging, Message Your Care Team.              Who to contact     If you have questions or need follow up information about today's clinic visit or your schedule please contact [facility] directly at [phone].  Normal or non-critical lab and imaging results will be communicated to you by  MyChart, letter or phone within 4 business days after the clinic has received the results. If you do not hear from us within 7 days, please contact the clinic through [employer] or phone. If you have a critical or abnormal lab result, we will notify you by phone as soon as possible.  Submit refill requests through [employer] or call your pharmacy and they will forward the refill request to us. Please allow 3 business days for your refill to be completed.          Additional Information About Your Visit        [employer] Information     [employer] gives you secure access to your electronic health record. If you see a primary care provider, you can also send messages to your care team and make appointments. If you have questions, please call your primary care clinic.  If you do not have a primary care provider, please call [phone] and they will assist you.      [employer] is an electronic gateway that provides easy, online access to your medical records. With [employer], you can request a clinic appointment, read your test results, renew a prescription or communicate with your care team.     To access your existing account, please contact your [facility] or call [phone] for assistance.        Care EveryWhere ID     This is your Care EveryWhere ID. This could be used by other organizations to access your [place] medical records  [id number]        Your Vitals Were     Pulse Temperature Respirations Pulse Oximetry          93 97.6  F (36.4  C) (Oral) 16 99%         Blood Pressure from Last 3 Encounters:   05/31/17 110/75   05/03/17 110/70   05/03/17 100/72    Weight from Last 3 Encounters:   05/31/17 76.3 kg (168 lb 4.8 oz)   05/03/17 76.3 kg (168 lb 4.8 oz)   04/28/17 73.8 kg (162 lb 12.8 oz)              Today, you had the following     No orders found for display       Primary Care Provider Office Phone # Fax #    Marah Hare [phone]       [address]  [address]   [facility] [address]        Thank you!     Thank you for choosing [facility]  for your care. Our goal is always to provide you with excellent care. Hearing back from our patients is one way we can continue to improve our services. Please take a few minutes to complete the written survey that you may receive in the mail after your visit with us. Thank you!             Your Updated Medication List - Protect others around you: Learn how to safely use, store and throw away your medicines at www.disposemymeds.org.          This list is accurate as of: 5/31/17 11:27 AM.  Always use your most recent med list.                   Brand Name Dispense Instructions for use    BENADRYL PO      Take 25 mg by mouth as needed (Pre-medication for remicade)       Calcium + D3 600-200 MG-UNIT Tabs      Take 1 tablet by mouth 2 times daily       folic acid 1 MG tablet    FOLVITE    180 tablet    Take 2 tablets (2 mg) by mouth daily       methotrexate 2.5 MG tablet CHEMO     16 tablet    Take 4 tablets (10 mg) by mouth once a week Take 4 tablets (10mg) by mouth weekly. Labs every 8 weeks       * PREDNISONE PO      Take 4 mg by mouth daily       * predniSONE 1 MG tablet    DELTASONE    120 tablet    Take 4 tablets (4 mg) by mouth daily       REMICADE IV      Inject 100 mg into the vein every 28 days       TYLENOL PO      Take 650 mg by mouth once       VITAMIN D (CHOLECALCIFEROL) PO      Take 2,000 Units by mouth daily       vitamin D 58389 UNIT capsule    ERGOCALCIFEROL    12 capsule    Take 1 capsule (50,000 Units) by mouth every 7 days       * Notice:  This list has 2 medication(s) that are the same as other medications prescribed for you. Read the directions carefully, and ask your doctor or other care provider to review them with you.

## 2017-06-26 DIAGNOSIS — G89.29 CHRONIC LEFT SHOULDER PAIN: ICD-10-CM

## 2017-06-26 DIAGNOSIS — Z79.52 LONG TERM (CURRENT) USE OF SYSTEMIC STEROIDS: ICD-10-CM

## 2017-06-26 DIAGNOSIS — M05.79 RHEUMATOID ARTHRITIS INVOLVING MULTIPLE SITES WITH POSITIVE RHEUMATOID FACTOR (H): ICD-10-CM

## 2017-06-26 DIAGNOSIS — M25.512 CHRONIC LEFT SHOULDER PAIN: ICD-10-CM

## 2017-06-26 DIAGNOSIS — D84.9 IMMUNOSUPPRESSION (H): ICD-10-CM

## 2017-06-26 LAB
ALBUMIN SERPL-MCNC: 3.4 G/DL (ref 3.4–5)
ALT SERPL W P-5'-P-CCNC: 18 U/L (ref 0–50)
AST SERPL W P-5'-P-CCNC: 13 U/L (ref 0–45)
BASOPHILS # BLD AUTO: 0 10E9/L (ref 0–0.2)
BASOPHILS NFR BLD AUTO: 0.1 %
CREAT SERPL-MCNC: 0.75 MG/DL (ref 0.52–1.04)
CRP SERPL-MCNC: 5.4 MG/L (ref 0–8)
DIFFERENTIAL METHOD BLD: ABNORMAL
EOSINOPHIL # BLD AUTO: 0 10E9/L (ref 0–0.7)
EOSINOPHIL NFR BLD AUTO: 0.4 %
ERYTHROCYTE [DISTWIDTH] IN BLOOD BY AUTOMATED COUNT: 15.1 % (ref 10–15)
ERYTHROCYTE [SEDIMENTATION RATE] IN BLOOD BY WESTERGREN METHOD: 19 MM/H (ref 0–30)
GFR SERPL CREATININE-BSD FRML MDRD: 81 ML/MIN/1.7M2
HCT VFR BLD AUTO: 39.1 % (ref 35–47)
HGB BLD-MCNC: 12.7 G/DL (ref 11.7–15.7)
LYMPHOCYTES # BLD AUTO: 2.3 10E9/L (ref 0.8–5.3)
LYMPHOCYTES NFR BLD AUTO: 31.9 %
MCH RBC QN AUTO: 29.2 PG (ref 26.5–33)
MCHC RBC AUTO-ENTMCNC: 32.5 G/DL (ref 31.5–36.5)
MCV RBC AUTO: 90 FL (ref 78–100)
MONOCYTES # BLD AUTO: 0.7 10E9/L (ref 0–1.3)
MONOCYTES NFR BLD AUTO: 9.8 %
NEUTROPHILS # BLD AUTO: 4.1 10E9/L (ref 1.6–8.3)
NEUTROPHILS NFR BLD AUTO: 57.8 %
PLATELET # BLD AUTO: 279 10E9/L (ref 150–450)
RBC # BLD AUTO: 4.35 10E12/L (ref 3.8–5.2)
WBC # BLD AUTO: 7.1 10E9/L (ref 4–11)

## 2017-06-26 PROCEDURE — 84460 ALANINE AMINO (ALT) (SGPT): CPT | Performed by: NURSE PRACTITIONER

## 2017-06-26 PROCEDURE — 85652 RBC SED RATE AUTOMATED: CPT | Performed by: NURSE PRACTITIONER

## 2017-06-26 PROCEDURE — 85025 COMPLETE CBC W/AUTO DIFF WBC: CPT | Performed by: NURSE PRACTITIONER

## 2017-06-26 PROCEDURE — 82565 ASSAY OF CREATININE: CPT | Performed by: NURSE PRACTITIONER

## 2017-06-26 PROCEDURE — 84450 TRANSFERASE (AST) (SGOT): CPT | Performed by: NURSE PRACTITIONER

## 2017-06-26 PROCEDURE — 86140 C-REACTIVE PROTEIN: CPT | Performed by: NURSE PRACTITIONER

## 2017-06-26 PROCEDURE — 36415 COLL VENOUS BLD VENIPUNCTURE: CPT | Performed by: NURSE PRACTITIONER

## 2017-06-26 PROCEDURE — 82040 ASSAY OF SERUM ALBUMIN: CPT | Performed by: NURSE PRACTITIONER

## 2017-06-26 NOTE — LETTER
June 29, 2017      Janice moncada Chaparro  [address] [facility]  [facility] [address]              Dear Janice,      Results for orders placed or performed in visit on 06/26/17   CBC with platelets differential   Result Value Ref Range    WBC 7.1 4.0 - 11.0 10e9/L    RBC Count 4.35 3.8 - 5.2 10e12/L    Hemoglobin 12.7 11.7 - 15.7 g/dL    Hematocrit 39.1 35.0 - 47.0 %    MCV 90 78 - 100 fl    MCH 29.2 26.5 - 33.0 pg    MCHC 32.5 31.5 - 36.5 g/dL    RDW 15.1 (H) 10.0 - 15.0 %    Platelet Count 279 150 - 450 10e9/L    Diff Method Automated Method     % Neutrophils 57.8 %    % Lymphocytes 31.9 %    % Monocytes 9.8 %    % Eosinophils 0.4 %    % Basophils 0.1 %    Absolute Neutrophil 4.1 1.6 - 8.3 10e9/L    Absolute Lymphocytes 2.3 0.8 - 5.3 10e9/L    Absolute Monocytes 0.7 0.0 - 1.3 10e9/L    Absolute Eosinophils 0.0 0.0 - 0.7 10e9/L    Absolute Basophils 0.0 0.0 - 0.2 10e9/L   AST   Result Value Ref Range    AST 13 0 - 45 U/L   ALT   Result Value Ref Range    ALT 18 0 - 50 U/L   Albumin level   Result Value Ref Range    Albumin 3.4 3.4 - 5.0 g/dL   Creatinine   Result Value Ref Range    Creatinine 0.75 0.52 - 1.04 mg/dL    GFR Estimate 81 >60 mL/min/1.7m2    GFR Estimate If Black >90   GFR Calc   >60 mL/min/1.7m2   CRP inflammation   Result Value Ref Range    CRP Inflammation 5.4 0.0 - 8.0 mg/L   Erythrocyte sedimentation rate auto   Result Value Ref Range    Sed Rate 19 0 - 30 mm/h     Your blood counts, liver, kidney and CRP, ESR inflammation labs are normal. No changes are needed at this time. Please contact our clinic if you have any questions. Thank-you for allowing me to participate in your care.     I would recommend you to repeat your left shoulder x-rays to compare to the previous one that was recommended on the last x-rays.     Have a great day!    Please let me know if your shoulder has not improved or please return to the orthopedics if need and physical therapy.     Pola BLANCHARD, CNP,  MSN  [facility]  Department of Rheumatology & Autoimmune Disorders  [facility]: [phone]; [phone] Option 7 scheduling

## 2017-06-28 ENCOUNTER — INFUSION THERAPY VISIT (OUTPATIENT)
Dept: INFUSION THERAPY | Facility: CLINIC | Age: 57
End: 2017-06-28
Payer: COMMERCIAL

## 2017-06-28 VITALS
HEART RATE: 63 BPM | WEIGHT: 166.4 LBS | SYSTOLIC BLOOD PRESSURE: 102 MMHG | OXYGEN SATURATION: 98 % | RESPIRATION RATE: 16 BRPM | TEMPERATURE: 97.4 F | DIASTOLIC BLOOD PRESSURE: 70 MMHG

## 2017-06-28 DIAGNOSIS — M05.79 RHEUMATOID ARTHRITIS INVOLVING MULTIPLE SITES WITH POSITIVE RHEUMATOID FACTOR (H): Primary | ICD-10-CM

## 2017-06-28 PROCEDURE — 96413 CHEMO IV INFUSION 1 HR: CPT | Performed by: INTERNAL MEDICINE

## 2017-06-28 PROCEDURE — 96415 CHEMO IV INFUSION ADDL HR: CPT | Performed by: INTERNAL MEDICINE

## 2017-06-28 PROCEDURE — 99207 ZZC NO CHARGE LOS: CPT

## 2017-06-28 RX ORDER — DIPHENHYDRAMINE HCL 25 MG
25 CAPSULE ORAL
Status: CANCELLED
Start: 2017-06-28

## 2017-06-28 RX ORDER — METHYLPREDNISOLONE SODIUM SUCCINATE 125 MG/2ML
100 INJECTION, POWDER, LYOPHILIZED, FOR SOLUTION INTRAMUSCULAR; INTRAVENOUS
Status: CANCELLED | OUTPATIENT
Start: 2017-06-28

## 2017-06-28 RX ORDER — DIPHENHYDRAMINE HCL 25 MG
25 CAPSULE ORAL
Status: DISCONTINUED | OUTPATIENT
Start: 2017-06-28 | End: 2017-06-28 | Stop reason: HOSPADM

## 2017-06-28 RX ORDER — ACETAMINOPHEN 325 MG/1
650 TABLET ORAL
Status: CANCELLED
Start: 2017-06-28

## 2017-06-28 RX ORDER — ACETAMINOPHEN 325 MG/1
650 TABLET ORAL
Status: DISCONTINUED | OUTPATIENT
Start: 2017-06-28 | End: 2017-06-28 | Stop reason: HOSPADM

## 2017-06-28 RX ADMIN — ACETAMINOPHEN 650 MG: 325 TABLET ORAL at 13:52

## 2017-06-28 RX ADMIN — Medication 250 ML: at 13:52

## 2017-06-28 RX ADMIN — Medication 25 MG: at 13:52

## 2017-06-28 ASSESSMENT — PAIN SCALES - GENERAL: PAINLEVEL: MODERATE PAIN (4)

## 2017-06-28 NOTE — PROGRESS NOTES
"Infusion Nursing Note:  Janice Mayfield presents today for Remicade.    Patient seen by provider today: No   present during visit today: Not Applicable.    Note: N/A.    Intravenous Access:  Peripheral IV placed.    Treatment Conditions:  Rheumatology Infusion Checklist: PRIOR TO INFUSION OF BIOLOGICAL MEDICATIONS OR ANY OF THESE AS LISTED: Remicaide (infliximab) \".rheumbiologicalchecklist\"    Prior to Infusion of biological medications or any of these as listed:    1. Elevated temperature, fever, chills, productive cough or abnormal vital signs, night sweats, coughing up blood or sputum, no appetite or abnormal vital signs : NO    2. Open wounds or new incisions: NO    3. Recent hospitalization: NO    4.  Recent surgeries:  NO    5. Any upcoming surgeries or dental procedures?:NO    6. Any current or recent bouts of illness or infection? On any antibiotics? : NO    7. Any new, sudden or worsening abdominal pain :NO    8. Vaccination within 4 weeks? Patient or someone in the household is scheduled to receive vaccination? No live virus vaccines prior to or during treatment :NO    9. Any nervous system diseases [i.e. multiple sclerosis, Guillain-[place], seizures, neurological  changes]: NO    10. Pregnant or breast feeding; or plans on pregnancy in the future: NO    11. Signs of worsening depression or suicidal ideations while taking benlysta:NO    12. New-onset medical symptoms: NO    13.  New cancer diagnosis or on chemotherapy or radiation NO    14.  Evaluate for any sign of active TB [Unexplained weight loss, Loss of appetite, Night sweats, Fever, Fatigue, Chills, Coughing for 3 weeks or longer, Hemoptysis (coughing up blood), Chest pain]: NO    **Note: If answered yes to any of the above, hold the infusion and contact ordering rheumatologist or on-call rheumatologist.   .      Post Infusion Assessment:  Patient tolerated infusion without incident.    Discharge Plan:   7/26 as scheduled for " Nik .    Kya Teague RN

## 2017-06-28 NOTE — MR AVS SNAPSHOT
After Visit Summary   6/28/2017    Janice Mayfield    MRN: [id number]           Patient Information     Date Of Birth          1960        Visit Information        Provider Department      6/28/2017 1:00 PM [place] [facility]        Today's Diagnoses     Rheumatoid arthritis involving multiple sites with positive rheumatoid factor (H)    -  1       Follow-ups after your visit        Your next 10 appointments already scheduled     Jul 26, 2017 10:30 AM CDT   Level 3 with [address] ([facility])    [address] [address] [facility] [address]   [phone]            Aug 28, 2017  8:00 AM CDT   LAB with LAB FIRST FLOOR [facility])    [address] [place] [address]   [phone]           Patient must bring picture ID.  Patient should be prepared to give a urine specimen  Please do not eat 10-12 hours before your appointment if you are coming in fasting for labs on lipids, cholesterol, or glucose (sugar).  Pregnant women should follow their Care Team instructions. Water with medications is okay. Do not drink coffee or other fluids.   If you have concerns about taking  your medications, please ask at office or if scheduling via UserMojo, send a message by clicking on Secure Messaging, Message Your Care Team.            Nov 03, 2017  8:30 AM CDT   LAB with LAB FIRST FLOOR [facility] ([facility])    [address] [address] [place] [id number] [phone]           Patient must bring picture ID.  Patient should be prepared to give a urine specimen  Please do not eat 10-12 hours before your appointment if you are coming in fasting for labs on lipids, cholesterol, or glucose (sugar).  Pregnant women should follow their Care Team instructions. Water with medications is okay. Do not drink coffee or  other fluids.   If you have concerns about taking  your medications, please ask at office or if scheduling via Rezora, send a message by clicking on Secure Messaging, Message Your Care Team.            Nov 03, 2017  9:00 AM CDT   LAB with LO Parra CNP   [facility] ([facility])    [address] [address] [place] [address] [phone]           Patient must bring picture ID.  Patient should be prepared to give a urine specimen  Please do not eat 10-12 hours before your appointment if you are coming in fasting for labs on lipids, cholesterol, or glucose (sugar).  Pregnant women should follow their Care Team instructions. Water with medications is okay. Do not drink coffee or other fluids.   If you have concerns about taking  your medications, please ask at office or if scheduling via Rezora, send a message by clicking on Secure Messaging, Message Your Care Team.              Who to contact     If you have questions or need follow up information about today's clinic visit or your schedule please contact [facility] directly at [phone].  Normal or non-critical lab and imaging results will be communicated to you by Studentgemshart, letter or phone within 4 business days after the clinic has received the results. If you do not hear from us within 7 days, please contact the clinic through Affinity Solutionst or phone. If you have a critical or abnormal lab result, we will notify you by phone as soon as possible.  Submit refill requests through Rezora or call your pharmacy and they will forward the refill request to us. Please allow 3 business days for your refill to be completed.          Additional Information About Your Visit        Rezora Information     Rezora gives you secure access to your electronic health record. If you see a primary care provider, you can also send messages to your care team and make appointments. If you have questions, please call  your primary care clinic.  If you do not have a primary care provider, please call [phone] and they will assist you.      Breeze is an electronic gateway that provides easy, online access to your medical records. With Breeze, you can request a clinic appointment, read your test results, renew a prescription or communicate with your care team.     To access your existing account, please contact your [facility] or call [phone] for assistance.        Care EveryWhere ID     This is your Care EveryWhere ID. This could be used by other organizations to access your [place] medical records  [id number]        Your Vitals Were     Pulse Temperature Respirations Pulse Oximetry          63 97.4  F (36.3  C) (Oral) 16 98%         Blood Pressure from Last 3 Encounters:   06/28/17 102/70   05/31/17 110/75   05/03/17 110/70    Weight from Last 3 Encounters:   06/28/17 75.5 kg (166 lb 6.4 oz)   05/31/17 76.3 kg (168 lb 4.8 oz)   05/03/17 76.3 kg (168 lb 4.8 oz)              Today, you had the following     No orders found for display       Primary Care Provider Office Phone # Fax #    Marah Hare [phone]       [address]   [place] [facility] [address]        Equal Access to Services     AUREA RUIZ : Hadii aad ku hadasho Soomaali, waaxda luqadaha, qaybta kaalmada adeegyada, waxay tamarin hayflo del cid. So [facility] [phone].    ATENCIÓN: Si habla español, tiene a kelly disposición servicios gratuitos de asistencia lingüística. Llame al [phone].    We comply with applicable federal civil rights laws and [place] laws. We do not discriminate on the basis of race, color, national origin, age, disability sex, sexual orientation or gender identity.            Thank you!     Thank you for choosing [facility]  for your care. Our goal is always to provide you with excellent care. Hearing back from our patients is one  way we can continue to improve our services. Please take a few minutes to complete the written survey that you may receive in the mail after your visit with us. Thank you!             Your Updated Medication List - Protect others around you: Learn how to safely use, store and throw away your medicines at www.disposemymeds.org.          This list is accurate as of: 6/28/17  4:32 PM.  Always use your most recent med list.                   Brand Name Dispense Instructions for use Diagnosis    BENADRYL PO      Take 25 mg by mouth as needed (Pre-medication for remicade)        Calcium + D3 600-200 MG-UNIT Tabs      Take 1 tablet by mouth 2 times daily        folic acid 1 MG tablet    FOLVITE    180 tablet    Take 2 tablets (2 mg) by mouth daily    Rheumatoid arthritis involving multiple sites with positive rheumatoid factor (H), Long term (current) use of systemic steroids, Immunosuppression (H), Chronic left shoulder pain       methotrexate 2.5 MG tablet CHEMO     16 tablet    Take 4 tablets (10 mg) by mouth once a week Take 4 tablets (10mg) by mouth weekly. Labs every 8 weeks    Rheumatoid arthritis involving multiple sites with positive rheumatoid factor (H), Long term (current) use of systemic steroids, Immunosuppression (H), Chronic left shoulder pain       * PREDNISONE PO      Take 4 mg by mouth daily        * predniSONE 1 MG tablet    DELTASONE    120 tablet    Take 4 tablets (4 mg) by mouth daily    Rheumatoid arthritis involving multiple sites with positive rheumatoid factor (H), Chronic left shoulder pain, Long term (current) use of systemic steroids       REMICADE IV      Inject 100 mg into the vein every 28 days        TYLENOL PO      Take 650 mg by mouth once        VITAMIN D (CHOLECALCIFEROL) PO      Take 2,000 Units by mouth daily        vitamin D 55002 UNIT capsule    ERGOCALCIFEROL    12 capsule    Take 1 capsule (50,000 Units) by mouth every 7 days    Vitamin D deficiency       * Notice:  This list has  2 medication(s) that are the same as other medications prescribed for you. Read the directions carefully, and ask your doctor or other care provider to review them with you.

## 2017-06-29 NOTE — PROGRESS NOTES
Repeat L shoulder x-rays as recommended to compare with previous. Order done and wrote this in the letter with results. At last visit, ordered DEXA

## 2017-08-01 ENCOUNTER — INFUSION THERAPY VISIT (OUTPATIENT)
Dept: INFUSION THERAPY | Facility: CLINIC | Age: 57
End: 2017-08-01
Payer: COMMERCIAL

## 2017-08-01 VITALS
WEIGHT: 167 LBS | SYSTOLIC BLOOD PRESSURE: 116 MMHG | OXYGEN SATURATION: 100 % | RESPIRATION RATE: 16 BRPM | TEMPERATURE: 96.9 F | HEART RATE: 74 BPM | DIASTOLIC BLOOD PRESSURE: 78 MMHG

## 2017-08-01 DIAGNOSIS — M05.79 RHEUMATOID ARTHRITIS INVOLVING MULTIPLE SITES WITH POSITIVE RHEUMATOID FACTOR (H): Primary | ICD-10-CM

## 2017-08-01 PROCEDURE — 99207 ZZC NO CHARGE LOS: CPT

## 2017-08-01 PROCEDURE — 96413 CHEMO IV INFUSION 1 HR: CPT | Performed by: INTERNAL MEDICINE

## 2017-08-01 PROCEDURE — 96415 CHEMO IV INFUSION ADDL HR: CPT | Performed by: INTERNAL MEDICINE

## 2017-08-01 RX ORDER — ACETAMINOPHEN 325 MG/1
650 TABLET ORAL
Status: DISCONTINUED | OUTPATIENT
Start: 2017-08-01 | End: 2017-08-01 | Stop reason: HOSPADM

## 2017-08-01 RX ORDER — DIPHENHYDRAMINE HCL 25 MG
25 CAPSULE ORAL
Status: DISCONTINUED | OUTPATIENT
Start: 2017-08-01 | End: 2017-08-01 | Stop reason: HOSPADM

## 2017-08-01 RX ORDER — DIPHENHYDRAMINE HCL 25 MG
25 CAPSULE ORAL
Status: CANCELLED
Start: 2017-08-01

## 2017-08-01 RX ORDER — ACETAMINOPHEN 325 MG/1
650 TABLET ORAL
Status: CANCELLED
Start: 2017-08-01

## 2017-08-01 RX ORDER — METHYLPREDNISOLONE SODIUM SUCCINATE 125 MG/2ML
100 INJECTION, POWDER, LYOPHILIZED, FOR SOLUTION INTRAMUSCULAR; INTRAVENOUS
Status: CANCELLED | OUTPATIENT
Start: 2017-08-01

## 2017-08-01 RX ADMIN — Medication 500 ML: at 14:20

## 2017-08-01 RX ADMIN — ACETAMINOPHEN 650 MG: 325 TABLET ORAL at 14:20

## 2017-08-01 RX ADMIN — Medication 25 MG: at 14:20

## 2017-08-01 ASSESSMENT — PAIN SCALES - GENERAL: PAINLEVEL: MODERATE PAIN (4)

## 2017-08-01 NOTE — MR AVS SNAPSHOT
After Visit Summary   8/1/2017    Janice Mayfield    MRN: [id number]           Patient Information     Date Of Birth          1960        Visit Information        Provider Department      8/1/2017 2:00 PM [address]        Today's Diagnoses     Rheumatoid arthritis involving multiple sites with positive rheumatoid factor (H)    -  1       Follow-ups after your visit        Your next 10 appointments already scheduled     Aug 29, 2017 11:00 AM CDT   LAB with LAB ONC [facility] ([facility])    [address] [facility] [address]   [phone]           Patient must bring picture ID. Patient should be prepared to give a urine specimen  Please do not eat 10-12 hours before your appointment if you are coming in fasting for labs on lipids, cholesterol, or glucose (sugar). Pregnant women should follow their Care Team instructions. Water with medications is okay. Do not drink coffee or other fluids. If you have concerns about taking  your medications, please ask at office or if scheduling via Zeta Interactive, send a message by clicking on Secure Messaging, Message Your Care Team.            Aug 29, 2017 11:30 AM CDT   Level 3 with [address] ([facility])    [address] [facility] [address] [phone]            Nov 03, 2017  8:30 AM CDT   LAB with LAB FIRST FLOOR [facility] ([facility])    [address] [address] [facility] [address] [phone]           Patient must bring picture ID. Patient should be prepared to give a urine specimen  Please do not eat 10-12 hours before your appointment if you are coming in fasting for labs on lipids, cholesterol, or glucose (sugar). Pregnant women should follow their Care Team instructions. Water with medications is okay. Do not drink coffee or other fluids. If you  have concerns about taking  your medications, please ask at office or if scheduling via Ideedock, send a message by clicking on Secure Messaging, Message Your Care Team.            Nov 03, 2017  9:00 AM CDT   LAB with LO Parra CNP   [facility] ([facility])    [address] [address] [place] [address] [phone]           Patient must bring picture ID. Patient should be prepared to give a urine specimen  Please do not eat 10-12 hours before your appointment if you are coming in fasting for labs on lipids, cholesterol, or glucose (sugar). Pregnant women should follow their Care Team instructions. Water with medications is okay. Do not drink coffee or other fluids. If you have concerns about taking  your medications, please ask at office or if scheduling via Ideedock, send a message by clicking on Secure Messaging, Message Your Care Team.              Who to contact     If you have questions or need follow up information about today's clinic visit or your schedule please contact [facility] directly at [phone].  Normal or non-critical lab and imaging results will be communicated to you by Piictuhart, letter or phone within 4 business days after the clinic has received the results. If you do not hear from us within 7 days, please contact the clinic through Forat or phone. If you have a critical or abnormal lab result, we will notify you by phone as soon as possible.  Submit refill requests through Ideedock or call your pharmacy and they will forward the refill request to us. Please allow 3 business days for your refill to be completed.          Additional Information About Your Visit        Ideedock Information     Ideedock gives you secure access to your electronic health record. If you see a primary care provider, you can also send messages to your care team and make appointments. If you have questions, please call your primary care clinic.   If you do not have a primary care provider, please call [phone] and they will assist you.      I Read Books is an electronic gateway that provides easy, online access to your medical records. With I Read Books, you can request a clinic appointment, read your test results, renew a prescription or communicate with your care team.     To access your existing account, please contact your [facility] or call [phone] for assistance.        Care EveryWhere ID     This is your Care EveryWhere ID. This could be used by other organizations to access your [place] medical records  [id number]        Your Vitals Were     Pulse Temperature Respirations Pulse Oximetry          74 96.9  F (36.1  C) (Oral) 16 100%         Blood Pressure from Last 3 Encounters:   08/01/17 116/78   06/28/17 102/70   05/31/17 110/75    Weight from Last 3 Encounters:   08/01/17 75.8 kg (167 lb)   06/28/17 75.5 kg (166 lb 6.4 oz)   05/31/17 76.3 kg (168 lb 4.8 oz)              Today, you had the following     No orders found for display       Primary Care Provider Office Phone # Fax #    Marah Hare [phone]       [address]EREZ   [facility] [address]        Equal Access to Services     AUREA RUIZ : Hadii aad ku hadasho Soomaali, waaxda luqadaha, qaybta kaalmada adeegyada, waxay idiin hayaan danyelle khroderick del cid. So [facility] [phone].    ATENCIÓN: Si habla español, tiene a kelly disposición servicios gratuitos de asistencia lingüística. Llame al [phone].    We comply with applicable federal civil rights laws and [place] laws. We do not discriminate on the basis of race, color, national origin, age, disability sex, sexual orientation or gender identity.            Thank you!     Thank you for choosing [facility]  for your care. Our goal is always to provide you with excellent care. Hearing back from our patients is one way we can continue to improve  our services. Please take a few minutes to complete the written survey that you may receive in the mail after your visit with us. Thank you!             Your Updated Medication List - Protect others around you: Learn how to safely use, store and throw away your medicines at www.disposemymeds.org.          This list is accurate as of: 8/1/17  5:16 PM.  Always use your most recent med list.                   Brand Name Dispense Instructions for use Diagnosis    BENADRYL PO      Take 25 mg by mouth as needed (Pre-medication for remicade)        Calcium + D3 600-200 MG-UNIT Tabs      Take 1 tablet by mouth 2 times daily        folic acid 1 MG tablet    FOLVITE    180 tablet    Take 2 tablets (2 mg) by mouth daily    Rheumatoid arthritis involving multiple sites with positive rheumatoid factor (H), Long term (current) use of systemic steroids, Immunosuppression (H), Chronic left shoulder pain       methotrexate 2.5 MG tablet CHEMO     16 tablet    Take 4 tablets (10 mg) by mouth once a week Take 4 tablets (10mg) by mouth weekly. Labs every 8 weeks    Rheumatoid arthritis involving multiple sites with positive rheumatoid factor (H), Long term (current) use of systemic steroids, Immunosuppression (H), Chronic left shoulder pain       * PREDNISONE PO      Take 4 mg by mouth daily        * predniSONE 1 MG tablet    DELTASONE    120 tablet    Take 4 tablets (4 mg) by mouth daily    Rheumatoid arthritis involving multiple sites with positive rheumatoid factor (H), Chronic left shoulder pain, Long term (current) use of systemic steroids       REMICADE IV      Inject 100 mg into the vein every 28 days        TYLENOL PO      Take 650 mg by mouth once        VITAMIN D (CHOLECALCIFEROL) PO      Take 2,000 Units by mouth daily        vitamin D 67420 UNIT capsule    ERGOCALCIFEROL    12 capsule    Take 1 capsule (50,000 Units) by mouth every 7 days    Vitamin D deficiency       * Notice:  This list has 2 medication(s) that are the  same as other medications prescribed for you. Read the directions carefully, and ask your doctor or other care provider to review them with you.

## 2017-08-29 ENCOUNTER — INFUSION THERAPY VISIT (OUTPATIENT)
Dept: INFUSION THERAPY | Facility: CLINIC | Age: 57
End: 2017-08-29
Payer: COMMERCIAL

## 2017-08-29 VITALS
OXYGEN SATURATION: 100 % | WEIGHT: 168.5 LBS | DIASTOLIC BLOOD PRESSURE: 75 MMHG | SYSTOLIC BLOOD PRESSURE: 110 MMHG | RESPIRATION RATE: 16 BRPM | TEMPERATURE: 97.8 F | HEART RATE: 69 BPM

## 2017-08-29 DIAGNOSIS — Z79.52 LONG TERM (CURRENT) USE OF SYSTEMIC STEROIDS: ICD-10-CM

## 2017-08-29 DIAGNOSIS — D84.9 IMMUNOSUPPRESSION (H): ICD-10-CM

## 2017-08-29 DIAGNOSIS — M25.512 CHRONIC LEFT SHOULDER PAIN: ICD-10-CM

## 2017-08-29 DIAGNOSIS — G89.29 CHRONIC LEFT SHOULDER PAIN: ICD-10-CM

## 2017-08-29 DIAGNOSIS — M05.79 RHEUMATOID ARTHRITIS INVOLVING MULTIPLE SITES WITH POSITIVE RHEUMATOID FACTOR (H): ICD-10-CM

## 2017-08-29 DIAGNOSIS — M05.79 RHEUMATOID ARTHRITIS INVOLVING MULTIPLE SITES WITH POSITIVE RHEUMATOID FACTOR (H): Primary | ICD-10-CM

## 2017-08-29 DIAGNOSIS — E55.9 VITAMIN D DEFICIENCY: ICD-10-CM

## 2017-08-29 DIAGNOSIS — Z79.52 LONG TERM CURRENT USE OF SYSTEMIC STEROIDS: ICD-10-CM

## 2017-08-29 LAB
ALBUMIN SERPL-MCNC: 3.3 G/DL (ref 3.4–5)
ALT SERPL W P-5'-P-CCNC: 18 U/L (ref 0–50)
AST SERPL W P-5'-P-CCNC: 12 U/L (ref 0–45)
BASOPHILS # BLD AUTO: 0 10E9/L (ref 0–0.2)
BASOPHILS NFR BLD AUTO: 0 %
CREAT SERPL-MCNC: 0.8 MG/DL (ref 0.52–1.04)
CRP SERPL-MCNC: <2.9 MG/L (ref 0–8)
DIFFERENTIAL METHOD BLD: NORMAL
EOSINOPHIL # BLD AUTO: 0.1 10E9/L (ref 0–0.7)
EOSINOPHIL NFR BLD AUTO: 1 %
ERYTHROCYTE [DISTWIDTH] IN BLOOD BY AUTOMATED COUNT: 14.3 % (ref 10–15)
ERYTHROCYTE [SEDIMENTATION RATE] IN BLOOD BY WESTERGREN METHOD: 19 MM/H (ref 0–30)
GFR SERPL CREATININE-BSD FRML MDRD: 75 ML/MIN/1.7M2
HCT VFR BLD AUTO: 36.6 % (ref 35–47)
HGB BLD-MCNC: 12.2 G/DL (ref 11.7–15.7)
LYMPHOCYTES # BLD AUTO: 2.2 10E9/L (ref 0.8–5.3)
LYMPHOCYTES NFR BLD AUTO: 36 %
MCH RBC QN AUTO: 30 PG (ref 26.5–33)
MCHC RBC AUTO-ENTMCNC: 33.3 G/DL (ref 31.5–36.5)
MCV RBC AUTO: 90 FL (ref 78–100)
MONOCYTES # BLD AUTO: 0.7 10E9/L (ref 0–1.3)
MONOCYTES NFR BLD AUTO: 11.3 %
NEUTROPHILS # BLD AUTO: 3.2 10E9/L (ref 1.6–8.3)
NEUTROPHILS NFR BLD AUTO: 51.7 %
PLATELET # BLD AUTO: 255 10E9/L (ref 150–450)
RBC # BLD AUTO: 4.07 10E12/L (ref 3.8–5.2)
WBC # BLD AUTO: 6.2 10E9/L (ref 4–11)

## 2017-08-29 PROCEDURE — 96413 CHEMO IV INFUSION 1 HR: CPT | Performed by: NURSE PRACTITIONER

## 2017-08-29 PROCEDURE — 85025 COMPLETE CBC W/AUTO DIFF WBC: CPT | Performed by: NURSE PRACTITIONER

## 2017-08-29 PROCEDURE — 82306 VITAMIN D 25 HYDROXY: CPT | Performed by: NURSE PRACTITIONER

## 2017-08-29 PROCEDURE — 84460 ALANINE AMINO (ALT) (SGPT): CPT | Performed by: NURSE PRACTITIONER

## 2017-08-29 PROCEDURE — 86140 C-REACTIVE PROTEIN: CPT | Performed by: NURSE PRACTITIONER

## 2017-08-29 PROCEDURE — 82040 ASSAY OF SERUM ALBUMIN: CPT | Performed by: NURSE PRACTITIONER

## 2017-08-29 PROCEDURE — 96415 CHEMO IV INFUSION ADDL HR: CPT | Performed by: NURSE PRACTITIONER

## 2017-08-29 PROCEDURE — 85652 RBC SED RATE AUTOMATED: CPT | Performed by: NURSE PRACTITIONER

## 2017-08-29 PROCEDURE — 99207 ZZC NO CHARGE LOS: CPT

## 2017-08-29 PROCEDURE — 82565 ASSAY OF CREATININE: CPT | Performed by: NURSE PRACTITIONER

## 2017-08-29 PROCEDURE — 84450 TRANSFERASE (AST) (SGOT): CPT | Performed by: NURSE PRACTITIONER

## 2017-08-29 PROCEDURE — 36415 COLL VENOUS BLD VENIPUNCTURE: CPT | Performed by: NURSE PRACTITIONER

## 2017-08-29 RX ORDER — METHYLPREDNISOLONE SODIUM SUCCINATE 125 MG/2ML
100 INJECTION, POWDER, LYOPHILIZED, FOR SOLUTION INTRAMUSCULAR; INTRAVENOUS
Status: CANCELLED | OUTPATIENT
Start: 2017-08-29

## 2017-08-29 RX ORDER — DIPHENHYDRAMINE HCL 25 MG
25 CAPSULE ORAL
Status: DISCONTINUED | OUTPATIENT
Start: 2017-08-29 | End: 2017-08-29 | Stop reason: HOSPADM

## 2017-08-29 RX ORDER — DIPHENHYDRAMINE HCL 25 MG
25 CAPSULE ORAL
Status: CANCELLED
Start: 2017-08-29

## 2017-08-29 RX ORDER — ACETAMINOPHEN 325 MG/1
650 TABLET ORAL
Status: CANCELLED
Start: 2017-08-29

## 2017-08-29 RX ORDER — ACETAMINOPHEN 325 MG/1
650 TABLET ORAL
Status: DISCONTINUED | OUTPATIENT
Start: 2017-08-29 | End: 2017-08-29 | Stop reason: HOSPADM

## 2017-08-29 RX ADMIN — ACETAMINOPHEN 650 MG: 325 TABLET ORAL at 11:37

## 2017-08-29 RX ADMIN — Medication 25 MG: at 11:37

## 2017-08-29 RX ADMIN — Medication 250 ML: at 11:52

## 2017-08-29 ASSESSMENT — PAIN SCALES - GENERAL: PAINLEVEL: MILD PAIN (2)

## 2017-08-29 NOTE — MR AVS SNAPSHOT
After Visit Summary   8/29/2017    Janice Mayfield    MRN: [id number]           Patient Information     Date Of Birth          1960        Visit Information        Provider Department      8/29/2017 11:30 AM [address]        Today's Diagnoses     Rheumatoid arthritis involving multiple sites with positive rheumatoid factor (H)    -  1       Follow-ups after your visit        Your next 10 appointments already scheduled     Nov 03, 2017  8:30 AM CDT   LAB with LAB FIRST FLOOR [facility])    [address] [place] [id number] [phone]           Patient must bring picture ID. Patient should be prepared to give a urine specimen  Please do not eat 10-12 hours before your appointment if you are coming in fasting for labs on lipids, cholesterol, or glucose (sugar). Pregnant women should follow their Care Team instructions. Water with medications is okay. Do not drink coffee or other fluids. If you have concerns about taking  your medications, please ask at office or if scheduling via [employer], send a message by clicking on Secure Messaging, Message Your Care Team.            Nov 03, 2017  9:00 AM CDT   LAB with LO Parra [facility])    [address] [address] [facility] [address] [phone]           Patient must bring picture ID. Patient should be prepared to give a urine specimen  Please do not eat 10-12 hours before your appointment if you are coming in fasting for labs on lipids, cholesterol, or glucose (sugar). Pregnant women should follow their Care Team instructions. Water with medications is okay. Do not drink coffee or other fluids. If you have concerns about taking  your medications, please ask at office or if scheduling via [employer], send a message by clicking on Secure Messaging, Message Your Care Team.               Who to contact     If you have questions or need follow up information about today's clinic visit or your schedule please contact [facility] directly at [phone].  Normal or non-critical lab and imaging results will be communicated to you by [employer], letter or phone within 4 business days after the clinic has received the results. If you do not hear from us within 7 days, please contact the clinic through [employer] or phone. If you have a critical or abnormal lab result, we will notify you by phone as soon as possible.  Submit refill requests through [email or web address] or call your pharmacy and they will forward the refill request to us. Please allow 3 business days for your refill to be completed.          Additional Information About Your Visit        [email or web address] Information     [email or web address] gives you secure access to your electronic health record. If you see a primary care provider, you can also send messages to your care team and make appointments. If you have questions, please call your primary care clinic.  If you do not have a primary care provider, please call [phone] and they will assist you.      [email or web address] is an electronic gateway that provides easy, online access to your medical records. With [email or web address], you can request a clinic appointment, read your test results, renew a prescription or communicate with your care team.     To access your existing account, please contact your [facility] or call [phone] for assistance.        Care EveryWhere ID     This is your Care EveryWhere ID. This could be used by other organizations to access your [facility] medical records  [id number]        Your Vitals Were     Pulse Temperature Respirations Pulse Oximetry          69 97.8  F (36.6  C) (Oral) 16 100%         Blood Pressure from Last 3 Encounters:   08/29/17 110/75   08/01/17 116/78   06/28/17 102/70    Weight from Last 3 Encounters:   08/29/17 76.4 kg (168 lb 8 oz)    08/01/17 75.8 kg (167 lb)   06/28/17 75.5 kg (166 lb 6.4 oz)              Today, you had the following     No orders found for display       Primary Care Provider Office Phone # Fax #    Marah Hare [phone]       [address]   [place] [address]        Equal Access to Services     [facility]: Hadii aad ku hadasho Soomaali, waaxda luqadaha, qaybta kaalmada adeegyada, waxay idiin hayaan adeeg khroderick laNhandadan . So [facility] [phone].    ATENCIÓN: Si habla español, tiene a kelly disposición servicios gratuitos de asistencia lingüística. Llame al [phone].    We comply with applicable federal civil rights laws and [place] laws. We do not discriminate on the basis of race, color, national origin, age, disability sex, sexual orientation or gender identity.            Thank you!     Thank you for choosing [facility]  for your care. Our goal is always to provide you with excellent care. Hearing back from our patients is one way we can continue to improve our services. Please take a few minutes to complete the written survey that you may receive in the mail after your visit with us. Thank you!             Your Updated Medication List - Protect others around you: Learn how to safely use, store and throw away your medicines at www.disposemymeds.org.          This list is accurate as of: 8/29/17  2:11 PM.  Always use your most recent med list.                   Brand Name Dispense Instructions for use Diagnosis    BENADRYL PO      Take 25 mg by mouth as needed (Pre-medication for remicade)        Calcium + D3 600-200 MG-UNIT Tabs      Take 1 tablet by mouth 2 times daily        folic acid 1 MG tablet    FOLVITE    180 tablet    Take 2 tablets (2 mg) by mouth daily    Rheumatoid arthritis involving multiple sites with positive rheumatoid factor (H), Long term (current) use of systemic steroids, Immunosuppression (H), Chronic left shoulder pain        methotrexate 2.5 MG tablet CHEMO     16 tablet    Take 4 tablets (10 mg) by mouth once a week Take 4 tablets (10mg) by mouth weekly. Labs every 8 weeks    Rheumatoid arthritis involving multiple sites with positive rheumatoid factor (H), Long term (current) use of systemic steroids, Immunosuppression (H), Chronic left shoulder pain       * PREDNISONE PO      Take 4 mg by mouth daily        * predniSONE 1 MG tablet    DELTASONE    120 tablet    Take 4 tablets (4 mg) by mouth daily    Rheumatoid arthritis involving multiple sites with positive rheumatoid factor (H), Chronic left shoulder pain, Long term (current) use of systemic steroids       REMICADE IV      Inject 100 mg into the vein every 28 days        TYLENOL PO      Take 650 mg by mouth once        VITAMIN D (CHOLECALCIFEROL) PO      Take 2,000 Units by mouth daily        vitamin D 19295 UNIT capsule    ERGOCALCIFEROL    12 capsule    Take 1 capsule (50,000 Units) by mouth every 7 days    Vitamin D deficiency       * Notice:  This list has 2 medication(s) that are the same as other medications prescribed for you. Read the directions carefully, and ask your doctor or other care provider to review them with you.

## 2017-08-29 NOTE — PROGRESS NOTES
"Infusion Nursing Note:  Janice Mayfield presents today for Remicade.    Patient seen by provider today: No   present during visit today: Not Applicable.    Note: N/A.    Intravenous Access:  Peripheral IV placed.    Treatment Conditions:  Rheumatology Infusion Checklist: PRIOR TO INFUSION OF BIOLOGICAL MEDICATIONS OR ANY OF THESE AS LISTED: Remicaide (infliximab) \".rheumbiologicalchecklist\"    Prior to Infusion of biological medications or any of these as listed:    1. Elevated temperature, fever, chills, productive cough or abnormal vital signs, night sweats, coughing up blood or sputum, no appetite or abnormal vital signs : NO    2. Open wounds or new incisions: NO    3. Recent hospitalization: NO    4.  Recent surgeries:  NO    5. Any upcoming surgeries or dental procedures?:NO    6. Any current or recent bouts of illness or infection? On any antibiotics? : NO    7. Any new, sudden or worsening abdominal pain :NO    8. Vaccination within 4 weeks? Patient or someone in the household is scheduled to receive vaccination? No live virus vaccines prior to or during treatment :NO    9. Any nervous system diseases [i.e. multiple sclerosis, Guillain-[place], seizures, neurological  changes]: NO    10. Pregnant or breast feeding; or plans on pregnancy in the future: NO    11. Signs of worsening depression or suicidal ideations while taking benlysta:NO    12. New-onset medical symptoms: NO    13.  New cancer diagnosis or on chemotherapy or radiation NO    14.  Evaluate for any sign of active TB [Unexplained weight loss, Loss of appetite, Night sweats, Fever, Fatigue, Chills, Coughing for 3 weeks or longer, Hemoptysis (coughing up blood), Chest pain]: NO    **Note: If answered yes to any of the above, hold the infusion and contact ordering rheumatologist or on-call rheumatologist.     Post Infusion Assessment:  Patient tolerated infusion without incident.  Blood return noted pre and post infusion.  Site " patent and intact, free from redness, edema or discomfort.  Access discontinued per protocol.    Discharge Plan:   Patient discharged in stable condition accompanied by: self.  Departure Mode: Ambulatory.    Lucrecia Manuel RN

## 2017-08-30 LAB — DEPRECATED CALCIDIOL+CALCIFEROL SERPL-MC: 23 UG/L (ref 20–75)

## 2017-08-30 NOTE — PROGRESS NOTES
The blood counts, liver, kidney and CRP inflammation labs are normal. Vitamin D 23     Pola BLANCHARD, CNP, MSN  8/30/2017  2:22 PM

## 2017-09-26 ENCOUNTER — INFUSION THERAPY VISIT (OUTPATIENT)
Dept: INFUSION THERAPY | Facility: CLINIC | Age: 57
End: 2017-09-26
Payer: COMMERCIAL

## 2017-09-26 VITALS
HEART RATE: 62 BPM | DIASTOLIC BLOOD PRESSURE: 78 MMHG | OXYGEN SATURATION: 99 % | SYSTOLIC BLOOD PRESSURE: 118 MMHG | TEMPERATURE: 98.5 F

## 2017-09-26 DIAGNOSIS — M05.79 RHEUMATOID ARTHRITIS INVOLVING MULTIPLE SITES WITH POSITIVE RHEUMATOID FACTOR (H): Primary | ICD-10-CM

## 2017-09-26 PROCEDURE — 99207 ZZC NO CHARGE NURSE ONLY: CPT

## 2017-09-26 PROCEDURE — 96415 CHEMO IV INFUSION ADDL HR: CPT | Performed by: INTERNAL MEDICINE

## 2017-09-26 PROCEDURE — 96413 CHEMO IV INFUSION 1 HR: CPT | Performed by: INTERNAL MEDICINE

## 2017-09-26 RX ORDER — DIPHENHYDRAMINE HCL 25 MG
25 CAPSULE ORAL
Status: CANCELLED
Start: 2017-09-26

## 2017-09-26 RX ORDER — ACETAMINOPHEN 325 MG/1
650 TABLET ORAL
Status: DISCONTINUED | OUTPATIENT
Start: 2017-09-26 | End: 2017-09-26 | Stop reason: HOSPADM

## 2017-09-26 RX ORDER — ACETAMINOPHEN 325 MG/1
650 TABLET ORAL
Status: CANCELLED
Start: 2017-09-26

## 2017-09-26 RX ORDER — METHYLPREDNISOLONE SODIUM SUCCINATE 125 MG/2ML
100 INJECTION, POWDER, LYOPHILIZED, FOR SOLUTION INTRAMUSCULAR; INTRAVENOUS
Status: CANCELLED | OUTPATIENT
Start: 2017-09-26

## 2017-09-26 RX ORDER — DIPHENHYDRAMINE HCL 25 MG
25 CAPSULE ORAL
Status: DISCONTINUED | OUTPATIENT
Start: 2017-09-26 | End: 2017-09-26 | Stop reason: HOSPADM

## 2017-09-26 RX ADMIN — Medication 250 ML: at 13:53

## 2017-09-26 RX ADMIN — Medication 25 MG: at 13:52

## 2017-09-26 RX ADMIN — ACETAMINOPHEN 650 MG: 325 TABLET ORAL at 13:52

## 2017-09-26 ASSESSMENT — PAIN SCALES - GENERAL: PAINLEVEL: MODERATE PAIN (5)

## 2017-09-26 NOTE — PROGRESS NOTES
"Infusion Nursing Note:  Janice Mayfield presents today for Remicade.    Patient seen by provider today: No   present during visit today: Not Applicable.    Note: Patient complains of generalized joint pain, see flow sheet for assessment.    Intravenous Access:  Peripheral IV placed.    Treatment Conditions:  Rheumatology Infusion Checklist: PRIOR TO INFUSION OF BIOLOGICAL MEDICATIONS OR ANY OF THESE AS LISTED: Remicaide (infliximab) \".rheumbiologicalchecklist\"    Prior to Infusion of biological medications or any of these as listed:    1. Elevated temperature, fever, chills, productive cough or abnormal vital signs, night sweats, coughing up blood or sputum, no appetite or abnormal vital signs : NO    2. Open wounds or new incisions: NO    3. Recent hospitalization: NO    4.  Recent surgeries:  NO    5. Any upcoming surgeries or dental procedures?:NO    6. Any current or recent bouts of illness or infection? On any antibiotics? : NO    7. Any new, sudden or worsening abdominal pain :NO    8. Vaccination within 4 weeks? Patient or someone in the household is scheduled to receive vaccination? No live virus vaccines prior to or during treatment :NO    9. Any nervous system diseases [i.e. multiple sclerosis, Guillain-[place], seizures, neurological  changes]: NO    10. Pregnant or breast feeding; or plans on pregnancy in the future: NO    11. Signs of worsening depression or suicidal ideations while taking benlysta:NO    12. New-onset medical symptoms: NO    13.  New cancer diagnosis or on chemotherapy or radiation NO    14.  Evaluate for any sign of active TB [Unexplained weight loss, Loss of appetite, Night sweats, Fever, Fatigue, Chills, Coughing for 3 weeks or longer, Hemoptysis (coughing up blood), Chest pain]: NO    **Note: If answered yes to any of the above, hold the infusion and contact ordering rheumatologist or on-call rheumatologist.   .        Post Infusion Assessment:  Patient tolerated " infusion without incident.  Site patent and intact, free from redness, edema or discomfort.  Access discontinued per protocol.    Discharge Plan:   Patient discharged in stable condition accompanied by: self.  Departure Mode: Ambulatory.  Patient will call to schedule her next appointment.    Pravin Blanco RN

## 2017-09-26 NOTE — MR AVS SNAPSHOT
After Visit Summary   9/26/2017    Janice Mayfield    MRN: [id number]           Patient Information     Date Of Birth          1960        Visit Information        Provider Department      9/26/2017 1:00 PM [address]        Today's Diagnoses     Rheumatoid arthritis involving multiple sites with positive rheumatoid factor (H)    -  1       Follow-ups after your visit        Your next 10 appointments already scheduled     Oct 24, 2017  1:00 PM CDT   Level 3 with [address] ([facility])    [address] [address] [facility] [address]   [phone]            Nov 03, 2017  8:30 AM CDT   LAB with LAB FIRST FLOOR [facility])    [address] [place] [address]   [phone]           Patient must bring picture ID. Patient should be prepared to give a urine specimen  Please do not eat 10-12 hours before your appointment if you are coming in fasting for labs on lipids, cholesterol, or glucose (sugar). Pregnant women should follow their Care Team instructions. Water with medications is okay. Do not drink coffee or other fluids. If you have concerns about taking  your medications, please ask at office or if scheduling via [employer], send a message by clicking on Secure Messaging, Message Your Care Team.            Nov 03, 2017  9:00 AM CDT   LAB with LO [facility])    [address] [place] [address]   [phone]           Patient must bring picture ID. Patient should be prepared to give a urine specimen  Please do not eat 10-12 hours before your appointment if you are coming in fasting for labs on lipids, cholesterol, or glucose (sugar). Pregnant women should follow their Care Team instructions. Water with medications is okay. Do not drink coffee or  other fluids. If you have concerns about taking  your medications, please ask at office or if scheduling via [employer], send a message by clicking on Secure Messaging, Message Your Care Team.            Nov 21, 2017  1:00 PM CST   Level 3 with [address] [facility] ([facility])    [address] [address] [place] [address] [phone]              Who to contact     If you have questions or need follow up information about today's clinic visit or your schedule please contact [facility] directly at [phone].  Normal or non-critical lab and imaging results will be communicated to you by Tracely[employer]hart, letter or phone within 4 business days after the clinic has received the results. If you do not hear from us within 7 days, please contact the clinic through [employer] or phone. If you have a critical or abnormal lab result, we will notify you by phone as soon as possible.  Submit refill requests through [employer] or call your pharmacy and they will forward the refill request to us. Please allow 3 business days for your refill to be completed.          Additional Information About Your Visit        Tracelytics[employer]nsure Information     [employer] gives you secure access to your electronic health record. If you see a primary care provider, you can also send messages to your care team and make appointments. If you have questions, please call your primary care clinic.  If you do not have a primary care provider, please call [phone] and they will assist you.      Animal[employer] is an electronic gateway that provides easy, online access to your medical records. With [employer], you can request a clinic appointment, read your test results, renew a prescription or communicate with your care team.     To access your existing account, please contact your [facility] or call [phone] for assistance.        Care EveryWhere ID     This is your Care EveryWhere  ID. This could be used by other organizations to access your [facility] medical records  [id number]        Your Vitals Were     Pulse Temperature Pulse Oximetry             62 98.5  F (36.9  C) (Oral) 99%          Blood Pressure from Last 3 Encounters:   09/26/17 118/78   08/29/17 110/75   08/01/17 116/78    Weight from Last 3 Encounters:   08/29/17 76.4 kg (168 lb 8 oz)   08/01/17 75.8 kg (167 lb)   06/28/17 75.5 kg (166 lb 6.4 oz)              Today, you had the following     No orders found for display       Primary Care Provider Office Phone # Fax #    Marah Hare [phone]       [address]   [facility] [address]        Equal Access to Services     [facility] : Hadii aad ku hadasho Soaddison, waaxda luqadaha, qaybta kaalmada reyna, kadeem rojo . So [facility] [phone].    ATENCIÓN: Si habla español, tiene a kelly disposición servicios gratuitos de asistencia lingüística. Maryanne al [phone].    We comply with applicable federal civil rights laws and [place] laws. We do not discriminate on the basis of race, color, national origin, age, disability sex, sexual orientation or gender identity.            Thank you!     Thank you for choosing [facility]  for your care. Our goal is always to provide you with excellent care. Hearing back from our patients is one way we can continue to improve our services. Please take a few minutes to complete the written survey that you may receive in the mail after your visit with us. Thank you!             Your Updated Medication List - Protect others around you: Learn how to safely use, store and throw away your medicines at www.disposemymeds.org.          This list is accurate as of: 9/26/17  4:25 PM.  Always use your most recent med list.                   Brand Name Dispense Instructions for use Diagnosis    BENADRYL PO      Take 25 mg by mouth as needed (Pre-medication for  remicade)        Calcium + D3 600-200 MG-UNIT Tabs      Take 1 tablet by mouth 2 times daily        folic acid 1 MG tablet    FOLVITE    180 tablet    Take 2 tablets (2 mg) by mouth daily    Rheumatoid arthritis involving multiple sites with positive rheumatoid factor (H), Long term (current) use of systemic steroids, Immunosuppression (H), Chronic left shoulder pain       methotrexate 2.5 MG tablet CHEMO     16 tablet    Take 4 tablets (10 mg) by mouth once a week Take 4 tablets (10mg) by mouth weekly. Labs every 8 weeks    Rheumatoid arthritis involving multiple sites with positive rheumatoid factor (H), Long term (current) use of systemic steroids, Immunosuppression (H), Chronic left shoulder pain       * PREDNISONE PO      Take 4 mg by mouth daily        * predniSONE 1 MG tablet    DELTASONE    120 tablet    Take 4 tablets (4 mg) by mouth daily    Rheumatoid arthritis involving multiple sites with positive rheumatoid factor (H), Chronic left shoulder pain, Long term (current) use of systemic steroids       REMICADE IV      Inject 100 mg into the vein every 28 days        TYLENOL PO      Take 650 mg by mouth once        VITAMIN D (CHOLECALCIFEROL) PO      Take 2,000 Units by mouth daily        vitamin D 43100 UNIT capsule    ERGOCALCIFEROL    12 capsule    Take 1 capsule (50,000 Units) by mouth every 7 days    Vitamin D deficiency       * Notice:  This list has 2 medication(s) that are the same as other medications prescribed for you. Read the directions carefully, and ask your doctor or other care provider to review them with you.

## 2017-10-24 ENCOUNTER — INFUSION THERAPY VISIT (OUTPATIENT)
Dept: INFUSION THERAPY | Facility: CLINIC | Age: 57
End: 2017-10-24
Payer: COMMERCIAL

## 2017-10-24 VITALS
SYSTOLIC BLOOD PRESSURE: 113 MMHG | WEIGHT: 162.5 LBS | DIASTOLIC BLOOD PRESSURE: 77 MMHG | HEART RATE: 76 BPM | OXYGEN SATURATION: 100 % | RESPIRATION RATE: 18 BRPM | TEMPERATURE: 97.9 F

## 2017-10-24 DIAGNOSIS — M05.79 RHEUMATOID ARTHRITIS INVOLVING MULTIPLE SITES WITH POSITIVE RHEUMATOID FACTOR (H): Primary | ICD-10-CM

## 2017-10-24 PROCEDURE — 96413 CHEMO IV INFUSION 1 HR: CPT | Performed by: INTERNAL MEDICINE

## 2017-10-24 PROCEDURE — 99207 ZZC NO CHARGE LOS: CPT

## 2017-10-24 PROCEDURE — 96415 CHEMO IV INFUSION ADDL HR: CPT | Performed by: INTERNAL MEDICINE

## 2017-10-24 RX ORDER — DIPHENHYDRAMINE HCL 25 MG
25 CAPSULE ORAL
Status: CANCELLED
Start: 2017-10-24

## 2017-10-24 RX ORDER — METHYLPREDNISOLONE SODIUM SUCCINATE 125 MG/2ML
100 INJECTION, POWDER, LYOPHILIZED, FOR SOLUTION INTRAMUSCULAR; INTRAVENOUS
Status: CANCELLED | OUTPATIENT
Start: 2017-10-24

## 2017-10-24 RX ORDER — ACETAMINOPHEN 325 MG/1
650 TABLET ORAL
Status: CANCELLED
Start: 2017-10-24

## 2017-10-24 RX ORDER — ACETAMINOPHEN 325 MG/1
650 TABLET ORAL
Status: DISCONTINUED | OUTPATIENT
Start: 2017-10-24 | End: 2017-10-24 | Stop reason: HOSPADM

## 2017-10-24 RX ORDER — DIPHENHYDRAMINE HCL 25 MG
25 CAPSULE ORAL
Status: DISCONTINUED | OUTPATIENT
Start: 2017-10-24 | End: 2017-10-24 | Stop reason: HOSPADM

## 2017-10-24 RX ADMIN — ACETAMINOPHEN 650 MG: 325 TABLET ORAL at 13:22

## 2017-10-24 RX ADMIN — Medication 250 ML: at 13:37

## 2017-10-24 RX ADMIN — Medication 25 MG: at 13:21

## 2017-10-24 NOTE — PROGRESS NOTES
"Infusion Nursing Note:  Janice Mayfield presents today for Remicade.    Patient seen by provider today: No   present during visit today: Not Applicable.    Note: N/A.    Intravenous Access:  Peripheral IV placed.    Treatment Conditions:  Rheumatology Infusion Checklist: PRIOR TO INFUSION OF BIOLOGICAL MEDICATIONS OR ANY OF THESE AS LISTED: Remicaide (infliximab) \".rheumbiologicalchecklist\"    Prior to Infusion of biological medications or any of these as listed:    1. Elevated temperature, fever, chills, productive cough or abnormal vital signs, night sweats, coughing up blood or sputum, no appetite or abnormal vital signs : NO    2. Open wounds or new incisions: NO    3. Recent hospitalization: NO    4.  Recent surgeries:  NO    5. Any upcoming surgeries or dental procedures?:NO    6. Any current or recent bouts of illness or infection? On any antibiotics? : NO    7. Any new, sudden or worsening abdominal pain :NO    8. Vaccination within 4 weeks? Patient or someone in the household is scheduled to receive vaccination? No live virus vaccines prior to or during treatment :NO    9. Any nervous system diseases [i.e. multiple sclerosis, Guillain-[place], seizures, neurological  changes]: NO    10. Pregnant or breast feeding; or plans on pregnancy in the future: NO    11. Signs of worsening depression or suicidal ideations while taking benlysta:NO    12. New-onset medical symptoms: NO    13.  New cancer diagnosis or on chemotherapy or radiation NO    14.  Evaluate for any sign of active TB [Unexplained weight loss, Loss of appetite, Night sweats, Fever, Fatigue, Chills, Coughing for 3 weeks or longer, Hemoptysis (coughing up blood), Chest pain]: NO    **Note: If answered yes to any of the above, hold the infusion and contact ordering rheumatologist or on-call rheumatologist.   .        Post Infusion Assessment:  Patient tolerated infusion without incident.  Site patent and intact, free from redness, " edema or discomfort.  Access discontinued per protocol.    Discharge Plan:   AVS to patient via MYCHART.  Patient will return 11/21/17 for next appointment.   Patient discharged in stable condition accompanied by: self.  Departure Mode: Ambulatory.    Evelia Can RN

## 2017-10-24 NOTE — MR AVS SNAPSHOT
After Visit Summary   10/24/2017    Janice Mayfield    MRN: [id number]           Patient Information     Date Of Birth          1960        Visit Information        Provider Department      10/24/2017 1:00 PM [address]        Today's Diagnoses     Rheumatoid arthritis involving multiple sites with positive rheumatoid factor (H)    -  1       Follow-ups after your visit        Your next 10 appointments already scheduled     Nov 03, 2017  8:30 AM CDT   LAB with LAB FIRST FLOOR [facility])    [address] [place] [id number] [phone]           Patient must bring picture ID. Patient should be prepared to give a urine specimen  Please do not eat 10-12 hours before your appointment if you are coming in fasting for labs on lipids, cholesterol, or glucose (sugar). Pregnant women should follow their Care Team instructions. Water with medications is okay. Do not drink coffee or other fluids. If you have concerns about taking  your medications, please ask at office or if scheduling via SoCAT, send a message by clicking on Secure Messaging, Message Your Care Team.            Nov 03, 2017  9:00 AM CDT   LAB with [facility])    [address] [address] [facility] [id number] [phone]           Patient must bring picture ID. Patient should be prepared to give a urine specimen  Please do not eat 10-12 hours before your appointment if you are coming in fasting for labs on lipids, cholesterol, or glucose (sugar). Pregnant women should follow their Care Team instructions. Water with medications is okay. Do not drink coffee or other fluids. If you have concerns about taking  your medications, please ask at office or if scheduling via SoCAT, send a message by clicking on Secure Messaging, Message Your Care Team.             Nov 21, 2017  1:00 PM CST   Level 3 with [place] 10 INFUSION   [facility] ([facility])    [address] [place] [address] [phone]              Who to contact     If you have questions or need follow up information about today's clinic visit or your schedule please contact [facility] directly at [phone].  Normal or non-critical lab and imaging results will be communicated to you by TextDiggerhart, letter or phone within 4 business days after the clinic has received the results. If you do not hear from us within 7 days, please contact the clinic through TextDiggerhart or phone. If you have a critical or abnormal lab result, we will notify you by phone as soon as possible.  Submit refill requests through Ethical Deal or call your pharmacy and they will forward the refill request to us. Please allow 3 business days for your refill to be completed.          Additional Information About Your Visit        Ethical Deal Information     Ethical Deal gives you secure access to your electronic health record. If you see a primary care provider, you can also send messages to your care team and make appointments. If you have questions, please call your primary care clinic.  If you do not have a primary care provider, please call [phone] and they will assist you.      Ethical Deal is an electronic gateway that provides easy, online access to your medical records. With Ethical Deal, you can request a clinic appointment, read your test results, renew a prescription or communicate with your care team.     To access your existing account, please contact your [facility] or call [phone] for assistance.        Care EveryWhere ID     This is your Care EveryWhere ID. This could be used by other organizations to access your [facility] medical records  [id number]        Your Vitals Were     Pulse Temperature Respirations Pulse Oximetry          76 97.9  F  (36.6  C) (Oral) 18 100%         Blood Pressure from Last 3 Encounters:   10/24/17 113/77   09/26/17 118/78   08/29/17 110/75    Weight from Last 3 Encounters:   10/24/17 73.7 kg (162 lb 8 oz)   08/29/17 76.4 kg (168 lb 8 oz)   08/01/17 75.8 kg (167 lb)              Today, you had the following     No orders found for display       Primary Care Provider Office Phone # Fax #    Marah Hare [phone]       [address] PEREZ   [facility] [address]        Equal Access to Services     [facility]: Hadii padmini Barton, waaxda vidal, robin kaalmada reyna, kadeem rojo . So [facility] [phone].    ATENCIÓN: Si habla español, tiene a kelly disposición servicios gratuitos de asistencia lingüística. Ll[facility] [phone].    We comply with applicable federal civil rights laws and [place] laws. We do not discriminate on the basis of race, color, national origin, age, disability, sex, sexual orientation, or gender identity.            Thank you!     Thank you for choosing [facility]  for your care. Our goal is always to provide you with excellent care. Hearing back from our patients is one way we can continue to improve our services. Please take a few minutes to complete the written survey that you may receive in the mail after your visit with us. Thank you!             Your Updated Medication List - Protect others around you: Learn how to safely use, store and throw away your medicines at www.disposemymeds.org.          This list is accurate as of: 10/24/17  5:14 PM.  Always use your most recent med list.                   Brand Name Dispense Instructions for use Diagnosis    BENADRYL PO      Take 25 mg by mouth as needed (Pre-medication for remicade)        Calcium + D3 600-200 MG-UNIT Tabs      Take 1 tablet by mouth 2 times daily        folic acid 1 MG tablet    FOLVITE    180 tablet    Take 2 tablets (2 mg) by mouth  daily    Rheumatoid arthritis involving multiple sites with positive rheumatoid factor (H), Long term (current) use of systemic steroids, Immunosuppression (H), Chronic left shoulder pain       methotrexate 2.5 MG tablet CHEMO     16 tablet    Take 4 tablets (10 mg) by mouth once a week Take 4 tablets (10mg) by mouth weekly. Labs every 8 weeks    Rheumatoid arthritis involving multiple sites with positive rheumatoid factor (H), Long term (current) use of systemic steroids, Immunosuppression (H), Chronic left shoulder pain       * PREDNISONE PO      Take 4 mg by mouth daily        * predniSONE 1 MG tablet    DELTASONE    120 tablet    Take 4 tablets (4 mg) by mouth daily    Rheumatoid arthritis involving multiple sites with positive rheumatoid factor (H), Chronic left shoulder pain, Long term (current) use of systemic steroids       REMICADE IV      Inject 100 mg into the vein every 28 days        TYLENOL PO      Take 650 mg by mouth once        VITAMIN D (CHOLECALCIFEROL) PO      Take 2,000 Units by mouth daily        vitamin D 65682 UNIT capsule    ERGOCALCIFEROL    12 capsule    Take 1 capsule (50,000 Units) by mouth every 7 days    Vitamin D deficiency       * Notice:  This list has 2 medication(s) that are the same as other medications prescribed for you. Read the directions carefully, and ask your doctor or other care provider to review them with you.

## 2017-11-03 ENCOUNTER — RADIANT APPOINTMENT (OUTPATIENT)
Dept: GENERAL RADIOLOGY | Facility: CLINIC | Age: 57
End: 2017-11-03
Attending: NURSE PRACTITIONER
Payer: COMMERCIAL

## 2017-11-03 VITALS
DIASTOLIC BLOOD PRESSURE: 83 MMHG | HEART RATE: 61 BPM | SYSTOLIC BLOOD PRESSURE: 114 MMHG | OXYGEN SATURATION: 100 % | WEIGHT: 159.4 LBS

## 2017-11-03 DIAGNOSIS — M25.512 CHRONIC LEFT SHOULDER PAIN: ICD-10-CM

## 2017-11-03 DIAGNOSIS — M05.79 RHEUMATOID ARTHRITIS INVOLVING MULTIPLE SITES WITH POSITIVE RHEUMATOID FACTOR (H): Primary | Chronic | ICD-10-CM

## 2017-11-03 DIAGNOSIS — E55.9 VITAMIN D DEFICIENCY: ICD-10-CM

## 2017-11-03 DIAGNOSIS — Z79.52 LONG TERM (CURRENT) USE OF SYSTEMIC STEROIDS: ICD-10-CM

## 2017-11-03 DIAGNOSIS — M75.42 IMPINGEMENT SYNDROME OF SHOULDER REGION, LEFT: ICD-10-CM

## 2017-11-03 DIAGNOSIS — M05.79 RHEUMATOID ARTHRITIS INVOLVING MULTIPLE SITES WITH POSITIVE RHEUMATOID FACTOR (H): ICD-10-CM

## 2017-11-03 DIAGNOSIS — G89.29 CHRONIC LEFT SHOULDER PAIN: ICD-10-CM

## 2017-11-03 DIAGNOSIS — D84.9 IMMUNOSUPPRESSION (H): ICD-10-CM

## 2017-11-03 DIAGNOSIS — Z79.52 LONG TERM CURRENT USE OF SYSTEMIC STEROIDS: ICD-10-CM

## 2017-11-03 LAB
ALBUMIN SERPL-MCNC: 3.4 G/DL (ref 3.4–5)
ALT SERPL W P-5'-P-CCNC: 22 U/L (ref 0–50)
AST SERPL W P-5'-P-CCNC: 11 U/L (ref 0–45)
BASOPHILS # BLD AUTO: 0 10E9/L (ref 0–0.2)
BASOPHILS NFR BLD AUTO: 0.2 %
CREAT SERPL-MCNC: 0.7 MG/DL (ref 0.52–1.04)
CRP SERPL-MCNC: 3.1 MG/L (ref 0–8)
DIFFERENTIAL METHOD BLD: NORMAL
EOSINOPHIL # BLD AUTO: 0 10E9/L (ref 0–0.7)
EOSINOPHIL NFR BLD AUTO: 0.8 %
ERYTHROCYTE [DISTWIDTH] IN BLOOD BY AUTOMATED COUNT: 13.9 % (ref 10–15)
ERYTHROCYTE [SEDIMENTATION RATE] IN BLOOD BY WESTERGREN METHOD: 12 MM/H (ref 0–30)
GFR SERPL CREATININE-BSD FRML MDRD: 87 ML/MIN/1.7M2
HCT VFR BLD AUTO: 40.1 % (ref 35–47)
HGB BLD-MCNC: 13.2 G/DL (ref 11.7–15.7)
LYMPHOCYTES # BLD AUTO: 2.3 10E9/L (ref 0.8–5.3)
LYMPHOCYTES NFR BLD AUTO: 43.1 %
MCH RBC QN AUTO: 30.3 PG (ref 26.5–33)
MCHC RBC AUTO-ENTMCNC: 32.9 G/DL (ref 31.5–36.5)
MCV RBC AUTO: 92 FL (ref 78–100)
MONOCYTES # BLD AUTO: 0.7 10E9/L (ref 0–1.3)
MONOCYTES NFR BLD AUTO: 12.9 %
NEUTROPHILS # BLD AUTO: 2.3 10E9/L (ref 1.6–8.3)
NEUTROPHILS NFR BLD AUTO: 43 %
PLATELET # BLD AUTO: 254 10E9/L (ref 150–450)
RBC # BLD AUTO: 4.36 10E12/L (ref 3.8–5.2)
WBC # BLD AUTO: 5.3 10E9/L (ref 4–11)

## 2017-11-03 PROCEDURE — 85025 COMPLETE CBC W/AUTO DIFF WBC: CPT | Performed by: NURSE PRACTITIONER

## 2017-11-03 PROCEDURE — 84450 TRANSFERASE (AST) (SGOT): CPT | Performed by: NURSE PRACTITIONER

## 2017-11-03 PROCEDURE — 99214 OFFICE O/P EST MOD 30 MIN: CPT | Performed by: NURSE PRACTITIONER

## 2017-11-03 PROCEDURE — 82040 ASSAY OF SERUM ALBUMIN: CPT | Performed by: NURSE PRACTITIONER

## 2017-11-03 PROCEDURE — 86140 C-REACTIVE PROTEIN: CPT | Performed by: NURSE PRACTITIONER

## 2017-11-03 PROCEDURE — 73030 X-RAY EXAM OF SHOULDER: CPT | Mod: LT | Performed by: RADIOLOGY

## 2017-11-03 PROCEDURE — 85652 RBC SED RATE AUTOMATED: CPT | Performed by: NURSE PRACTITIONER

## 2017-11-03 PROCEDURE — 36415 COLL VENOUS BLD VENIPUNCTURE: CPT | Performed by: NURSE PRACTITIONER

## 2017-11-03 PROCEDURE — 82565 ASSAY OF CREATININE: CPT | Performed by: NURSE PRACTITIONER

## 2017-11-03 PROCEDURE — 84460 ALANINE AMINO (ALT) (SGPT): CPT | Performed by: NURSE PRACTITIONER

## 2017-11-03 RX ORDER — PREDNISONE 1 MG/1
4 TABLET ORAL DAILY
Qty: 360 TABLET | Refills: 3 | Status: SHIPPED | OUTPATIENT
Start: 2017-11-03 | End: 2018-05-11

## 2017-11-03 RX ORDER — FAMOTIDINE 20 MG
2000 TABLET ORAL DAILY
Qty: 1 CAPSULE | Refills: 0 | COMMUNITY
Start: 2017-11-03

## 2017-11-03 RX ORDER — FOLIC ACID 1 MG/1
2 TABLET ORAL DAILY
Qty: 180 TABLET | Refills: 1 | Status: SHIPPED | OUTPATIENT
Start: 2017-11-03 | End: 2018-05-11

## 2017-11-03 NOTE — PROGRESS NOTES
"Date of visit: November 3, 2017  Last seen me: April 28, 2017  Primary care provider: Marah Hare Zairemargot Chaparro is a 57 year old female who here for erosive RA +RF -CCP -ZANDER/C3/C4, -ANCA -HLA B27. Prior care at [place] w/ Andry Jones here to establish d/t insurance.  On transfer of care here 4-2017 x-rays severe b/l degenerative changes RC joints, marginal erosions, osteopenia of hands, total loss joint space right wrist; left shoulder erosion and non-specific lesion (see ortho 5-2017)    Review: hydroxychloroquine, methotrexate (d/c liver enzymes elevation), enbrel (ineffective), humira *d/c (uticarial reaction), sulfites (anxiety)     HISTORY COPIED FORWARD: Went to [place] in a wheelchair [\"knees were melons\", pads of feet, not use hands, wrists].   On remicade 5 mg/kg Q 4-5 week infusions with pre-meds once a month (Jan 2011) last infusion about 6 weeks ago, FA 1 mg/day, MTX 10 mg Q Sunday week and prednisone 4-5 mg day. Increased remicade was every 4-5 weeks as was wearing down rather then increase the dose. Tolerating and s/e. Mild foggy day after taking methotrexate (no SI, hairloss, diarrhea), FA 1 mg day  Eats healthy. Reports arthritis is controlled, functioning well and not in wheelchair. Weather affects her arthritis. Root canal 2 weeks ago due to tooth abscess \"thinning of your jaw\". No flaring with her RA and this treatment. shouilder and wrist, knee so mild compared to when dx but is noticing she is due. Hx right wrist injection 2 yr ago. Pain is 4 out 10. No infectious over the years. Reports up-to-date with physical and cancer screening.     November 3, 2017  Seen ortho left shoulder and right wrist 5-2017 who ordered PT but insurance issues. Working with her dtr who is  at . Last remicade infusion 10-24 tolerated well. Labs NL inlcuding CRP 8-2017, todays labs are NL. Doing stretches for her shoulders and body, working on ROM which is slowly improving. No " flaring of RA. Mild pain in left shoulder    MTX 10mg PO Q HS Sun mild fatigue day of and the higher FA 2 mg day is helping. No other s/e. No infections. No other changes in her PSMH> ti got  and had a child. Doing kyaka and biking. Did the whole 30 diet and overall feels better. Avoiding sugar and salt. Helps mother in law who is not doing well 17 yr out of kidney transplant. Prednisone 4 mg day not able to do 3 mg day she tried.teeth issues are resolved. No EAS and her RA is doing well. No further changes to her joints. Feet and neck are good      PMSFH:   Injuries-None  No Blood transfusions  Medical-RA, osteopenia (DEXA  in 2008 -0.6, postemenopausal, anemia, elevated liver enzymes (fatty liver, MR elastroplaty NL 2012 seen Dr. Anderson; initially hep C + but HCV RNA neg), polyclonal hypergammaglobuinemia, diverticulum, vitamin D deficiency   Surgical-None     Family hx-  No autoimmune disorders, psoriasis, UC, crohn's, SLE, RA, PsA, gout.   No MS  Mother-Uterine CA-passed  Father-alive PPM and arrythmia  MGM-parkinsons, OP, possible arthritis-passed  RHH-ZGL-eewtev  PGM-brain CA, OP-passed  PGF-colon CA-passed  2 brothers-start to have heart issue  2 sisters-healthy HTN  2 children  Common low BP and low HR, varicose veing    Social-  No Alcohol. Never Smoking. 3 Children (youngest deformed pulmonary valve s/p OHS). NO IVDU or drug use. 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      Review of systems:   +See MDHAQ  +less now some raynauds rare  CONSTITUTIONAL: No fevers, night sweats or unintentional weight change. No acute distress, swollen glands  EYES: No vision change, diplopia, pain in eyes or red eyes   EARS, NOSE, MOUTH, THROAT: No tinnitus or hearing change, no epistaxis or nasal discharge, no oral lesions, throat clear. Normal saliva pool.  No drymouth. No thyroid enlargement  CARDIOVASCULAR: No chest pain, palpitations, or pain with walking, no orthopnea or PND    RESPIRATORY: No dyspnea, cough, shortness of breath or wheezing. No pleurisy.   GI: No nausea, vomiting, diarrhea or constipation, no abdominal pain, or blood in stools.   : No change in urine, no dysuria or hematuria   MUSCKL: No swollen, tender, red or painful joints. No nodules. No enthesitis, plantar fascitis or heel pain.   INTEGUMENTARY: No concerning lesions or moles   NEURO: No loss of strength or sensation, no numbness or tingling, no tremor, no dizziness, no headache. No falls   ENDO: No polyuria or polydipsia, no temperature intolerance   HEME/LYMPH:No concerning bumps, bleeding problems, or swollen lymph nodes. No recent infections, hospitalizations or new illnesses.   ALLERGY: No environmental allergies   PSYCH:No depression or anxiety, no sleep problems.  Otherwise 14 point ROS obtained, reviewed and found negative.     Physical exam:   Constitutional: WD-WN-WG cooperative  Eyes: nl EOM, PERRLA, vision, conjunctiva, sclera  ENT: nl external ears, nose, hearing, lips, teeth, gums, throat. Nl saliva pool  No mucous membrane lesions, normal saliva pool  Neck: no mass or thyroid enlargement. non-tender.  Resp: lungs clear to auscultation, nl to palpation, nl breath sounds  CV: RRR, no murmurs, rubs or gallops, no edema  GI: no ABD mass or tenderness, no HSM. No RUQ pain.   : not tested  Lymph: no cervical, supraclavicular, inguinal or epitrochlear nodes  MSK: Right wrist drop wirh reduced flexion and extension, left shoulder reduced IR, fullness cool, mild tender. right halgus valgus bunions  All other TMJ, neck, shoulder, elbow, wrist, MCP/PIP/DIP, spine, hip, knee, ankle, and foot MTP/IP joints were examined and  found normal. NL prayer sign. No periuncle erythema. Normal  strength. No dactylitis,  tenosynovitis, enthespathy. Negative MCP and MTP squeeze. Negative Lhermitte's sign.   Skin: No nail pitting, alopecia, rash, nodules or lesions.   Neuro: nl cranial nerves, strength,  sensation  Psych: nl judgement, orientation, memory, affect.      Labs/imaging:   TSH Nl 2016  SPEP nl 2011  Anti-smooth, antimitochrondial, myelo AB amd PR3 NL in 2011  Lymes neg 2011    DEXA 5-2016  Osteopenia z-0.4 t-1  Xrays feet 2-2014 negative  2-2014 B/L hand joint space narrowing and erosive change on radiocarpal  2011 neg SI x-ays     Endoscopy/colonoscopy 5-2016 single diverticulum dx diverticulosis in colon     Results for orders placed or performed in visit on 11/03/17   CBC with platelets differential   Result Value Ref Range    WBC 5.3 4.0 - 11.0 10e9/L    RBC Count 4.36 3.8 - 5.2 10e12/L    Hemoglobin 13.2 11.7 - 15.7 g/dL    Hematocrit 40.1 35.0 - 47.0 %    MCV 92 78 - 100 fl    MCH 30.3 26.5 - 33.0 pg    MCHC 32.9 31.5 - 36.5 g/dL    RDW 13.9 10.0 - 15.0 %    Platelet Count 254 150 - 450 10e9/L    Diff Method Automated Method     % Neutrophils 43.0 %    % Lymphocytes 43.1 %    % Monocytes 12.9 %    % Eosinophils 0.8 %    % Basophils 0.2 %    Absolute Neutrophil 2.3 1.6 - 8.3 10e9/L    Absolute Lymphocytes 2.3 0.8 - 5.3 10e9/L    Absolute Monocytes 0.7 0.0 - 1.3 10e9/L    Absolute Eosinophils 0.0 0.0 - 0.7 10e9/L    Absolute Basophils 0.0 0.0 - 0.2 10e9/L   AST   Result Value Ref Range    AST 11 0 - 45 U/L   ALT   Result Value Ref Range    ALT 22 0 - 50 U/L   Albumin level   Result Value Ref Range    Albumin 3.4 3.4 - 5.0 g/dL   Creatinine   Result Value Ref Range    Creatinine 0.70 0.52 - 1.04 mg/dL    GFR Estimate 87 >60 mL/min/1.7m2    GFR Estimate If Black >90 >60 mL/min/1.7m2   CRP inflammation   Result Value Ref Range    CRP Inflammation 3.1 0.0 - 8.0 mg/L   Erythrocyte sedimentation rate auto   Result Value Ref Range    Sed Rate 12 0 - 30 mm/h     Recent Labs   Lab Test  08/29/17   1101  06/26/17   0822  04/28/17   0929  05/18/16   WBC  6.2  7.1  5.9   --    --    HGB  12.2  12.7  11.9   --    --    HCT  36.6  39.1  36.3   --    --    MCV  90  90  88   --    --    PLT  255  279  313   --    --     BUN   --    --   16   --    --    TSH   --    --    --    --   2.8   AST  12  13  21   < >  18   ALT  18  18  26   --   16   ALKPHOS   --    --   94   --    --     < > = values in this interval not displayed.     XR SHOULDER LT G/E 3 VW 4/28/2017 10:10 AM      HISTORY:  Rheumatoid arthritis with rheumatoid factor of multiple  sites without organ or systems involvement, Pain in left shoulder,  Other chronic pain, Long term (current) use of systemic steroids,  Disorder involving the immune mechanism, unspecified     COMPARISON: Hand films and wrist films same date.     FINDINGS: Small marginal osteophytic spurs seen inferior aspect  glenohumeral joint. The humerus centers on the glenoid. The  acromioclavicular joint is unremarkable. There does appear to be some  erosion of subchondral bone over the superior aspect of the humeral  head.     Radiolucent rounded areas are noted over the left head neck area the  largest 3 to 4 mm in size. These are nonspecific. This may represent  bone resorption from use of steroids however findings are nonspecific.         IMPRESSION:   1. Some erosion subchondral bone superior aspect humeral head which  may be related to arthritis and rheumatoid arthritis.  2. Radiolucencies over proximal humerus. Findings are nonspecific.  Follow-up of these radiolucent lucent lesions with plain x-ray could  be performed in 3 months or an MRI study could be performed to see if  there is bone marrow edema associated with this or soft tissue mass  that might indicate active lesion.      [Consider Follow Up: Radiolucencies proximal humerus see above.. It is  unlikely that these represent multiple myeloma or other and neoplastic  disease however follow-up recommended.     This report will be copied to the [facility] to ensure a  provider acknowledges the finding.      ANDREW MCFADDEN MD  XR WRISTS BILATERAL 2 VW, XR HAND BILATERAL G/E 3 VW 4/28/2017 10:07  AM      HISTORY:  Rheumatoid  arthritis with rheumatoid factor of multiple  sites without organ or systems involvement, Pain in left shoulder,  Other chronic pain, Long term (current) use of systemic steroids,  Disorder involving the immune mechanism, unspecified     COMPARISON: None.     FINDINGS: There is osteopenia the bones of the hands and wrist.     Severe degenerative changes seen radial carpal joint compartment with  the lunate distance to distal radius markedly decreased on the left  with subchondral bony reactive change. There is increase in the  scapholunate distance consistent with disruption of the scapholunate  interosseous ligament on the left. Similar widening of the  scapholunate interosseous distance consistent with scapholunate  disruption on the right. The right wrist demonstrates total loss of  joint space between the scaphoid and lunate in the distal radius with  a subchondral cyst 7 mm in size distal radial articular surface.  Subchondral sclerotic changes seen distal radius bilaterally.     There is total loss of space between the lunate triquetrum and distal  ulna consistent with marked degenerative change triangular  fibrocartilage.     The hand films demonstrate marginal joint erosions radial aspect base  proximal phalanx third and fourth digits left hand and radial aspect  distal proximal phalanx third digit left hand. Similar marginal joint  erosions seen radial aspect base proximal phalanx third and fourth  digits right hand and radial distal aspect proximal phalanx third and  fourth digits right hand.            IMPRESSION:   1. Severe degenerative changes radiocarpal joints bilaterally with  widening of scapholunate interosseous distance consistent with  disruption of scapholunate interosseous ligament.  2. Total loss of space between the triquetrum and lunate right breast  consistent with triangular fibro cartilage degenerative change.  3. Marginal joint erosions as noted above which can be seen in  rheumatoid  arthritis.  4. Osteopenia of the bones of the hands.     ANDREW MCFADDEN MD    Assessment/Plan:   1. Erosive Deforming Rheumatoid Arthritis w/+RF (-CCP/ZANDER) controlled on infliximab 5 mg/kg every 4-6 week, methotrexate 10 mg week, prednisone 4 mg day- no flaring. Low activity =left shoulder impingment. Exam shows right wrist drop and reduced ROM left shoulder (chronic). X-rays left shoulder erosion and repeat due to ?lesion she will do today, Do left shoulder exercises and I will ask her to f/u ortho for possible left shoulder injection. Mild fatigue day of methotrexate, improved tolerable with folic acid 2 mg day. Continue this plan     Review: hydroxychloroquine, methotrexate (d/c liver enzymes elevation), enbrel (ineffective), humira *d/c (uticarial reaction), sulfites (anxiety)     2. Chronic left shoulder pain with reduced ROM, impingment, erosion on x-rays. PT insurance issue, doing stretches and working with her daughter who is . See #1.     3. Osteopenia. Chronic prednisone use 4 mg day. DEXA 2018. Continue calcium and vitamin D 2000 IU day. May by 0.5 mg every 1-2 month until off.      4. Immunosuppression, long-term risk medication. Will check labs every 8-12 weeks while on immunosuppresive therapy and hx elevated liver enzymes. Declined flu and pneumonia vaccines.     The patient understood the rational for the diagnosis and treatment plan. Patient shared in the decision making. All questions were answered to best of my ability and the patient's satisfaction. She agrees to this plan.   Pola BLANCHARD, CNP, MSN  [facility]  Department of Rheumatology & Autoimmune Disorders      Education:   1. Immunization: Reviewed CDC guidelines with patient updated, information provided to patient based on CDC guidelines for vaccination recommendations, live vaccines contraindicated   2. Bone Health:Educated on adequate calcium and vitamin D intake, Educated on exercise,  Glucocorticoid education discussed risks, benefits & potential long term side effects from use  3. Discussed routine annual physicals, cancer screening, cardiac risk monitoring, bone health and primary care with primary care provider.   4. No alcohol while taking methotrexate.  5. No live vaccines 1 month before starting, while taking, or within 3 months of biologics (verify with rheumatologist).   6. Educated on diagnosis, prognosis, labs, imaging, treatment options (including risks, benefits, risk of no treatment), medications (use, dose, side-effects, risks of medications including infection/cancer/bone marrow suppression, lab monitoring), infections what to do, plan of cares, goals of treatment.

## 2017-11-03 NOTE — PROGRESS NOTES
All labs reviewed and discussed with patient at office visit. Instructed to call for any further questions.       Pola BLANCHARD CNP, MSN  11/3/2017  10:39 AM

## 2017-11-03 NOTE — MR AVS SNAPSHOT
After Visit Summary   11/3/2017    Janice Mayfield    MRN: [id number]           Patient Information     Date Of Birth          1960        Visit Information        Provider Department      11/3/2017 9:00 AM Pola Santana APRN CNP [facility]        Today's Diagnoses     Rheumatoid arthritis involving multiple sites with positive rheumatoid factor (H)    -  1    Long term current use of systemic steroids        Vitamin D deficiency        Impingement syndrome of shoulder region, left        Long term (current) use of systemic steroids        Immunosuppression (H)        Chronic left shoulder pain           Follow-ups after your visit        Follow-up notes from your care team     Return in about 6 months (around 5/3/2018) for Labs every 8-12 weeks, X-rays Today.      Your next 10 appointments already scheduled     Nov 21, 2017  1:00 PM CST   Level 3 with [address] ([facility])    [address] [place] [id number] [phone]            Jan 12, 2018  8:00 AM CST   LAB with LAB FIRST FLOOR [facility] ([facility])    [address] [place] [address] [id number] [phone]           Please do not eat 10-12 hours before your appointment if you are coming in fasting for labs on lipids, cholesterol, or glucose (sugar). This does not apply to pregnant women. Water, hot tea and black coffee (with nothing added) are okay. Do not drink other fluids, diet soda or chew gum.            May 11, 2018  7:30 AM CDT   Return Visit with LO Parra CNP   [facility] ([facility])    [address] [place] [address] [id number] [phone]              Who to contact     If you have questions or need follow up information about today's clinic visit or your schedule please contact [facility] directly at  [phone].  Normal or non-critical lab and imaging results will be communicated to you by SeniorCarehart, letter or phone within 4 business days after the clinic has received the results. If you do not hear from us within 7 days, please contact the clinic through DeerTecht or phone. If you have a critical or abnormal lab result, we will notify you by phone as soon as possible.  Submit refill requests through Zeer or call your pharmacy and they will forward the refill request to us. Please allow 3 business days for your refill to be completed.          Additional Information About Your Visit        SeniorCare[employer] Information     Zeer gives you secure access to your electronic health record. If you see a primary care provider, you can also send messages to your care team and make appointments. If you have questions, please call your primary care clinic.  If you do not have a primary care provider, please call [phone] and they will assist you.      Zeer is an electronic gateway that provides easy, online access to your medical records. With Zeer, you can request a clinic appointment, read your test results, renew a prescription or communicate with your care team.     To access your existing account, please contact your [facility] or call [phone] for assistance.        Care EveryWhere ID     This is your Care EveryWhere ID. This could be used by other organizations to access your [place] medical records  [id number]        Your Vitals Were     Pulse Pulse Oximetry                61 100%           Blood Pressure from Last 3 Encounters:   11/03/17 114/83   10/24/17 113/77   09/26/17 118/78    Weight from Last 3 Encounters:   11/03/17 72.3 kg (159 lb 6.4 oz)   10/24/17 73.7 kg (162 lb 8 oz)   08/29/17 76.4 kg (168 lb 8 oz)              Today, you had the following     No orders found for display         Today's Medication Changes          These changes are accurate as of: 11/3/17  10:00 AM.  If you have any questions, ask your nurse or doctor.               These medicines have changed or have updated prescriptions.        Dose/Directions    predniSONE 1 MG tablet   Commonly known as:  DELTASONE   Indication:  1-4 tablets by mounth one time daily, 4 mg daily, taper 1 mg every 2-4 weeks   This may have changed:  additional instructions   Used for:  Rheumatoid arthritis involving multiple sites with positive rheumatoid factor (H), Long term (current) use of systemic steroids   Changed by:  Pola Santana APRN CNP        Dose:  4 mg   Take 4 tablets (4 mg) by mouth daily Try taper by 1/2 mg day every 2 month until off if able   Quantity:  360 tablet   Refills:  3       Vitamin D (Cholecalciferol) 1000 UNITS Caps   Indication:  take 3 capsules daily   This may have changed:  medication strength   Changed by:  Pola Santana APRN CNP        Dose:  2000 Units   Take 2,000 Units by mouth daily   Quantity:  1 capsule   Refills:  0            Where to get your medicines      These medications were sent to [facility]/pharmacy #[id number] [facility], [place]  [facility] [address] [address]     Phone:  [phone]     folic acid 1 MG tablet    methotrexate 2.5 MG tablet CHEMO    predniSONE 1 MG tablet                Primary Care Provider Office Phone # Fax #    Marah Hare [phone]       [address] ABHENA [address] [place] [id number]        Equal Access to Services     [facility] Center[facility] : Eveline Barton, waaxcelia drake, qaybta kaalshiva orellana, kadeem del cid. So [facility] [phone].    ATENCIÓN: Si habla español, tiene a kelly disposición servicios gratuitos de asistencia lingüística. Maryanne al [phone].    We comply with applicable federal civil rights laws and [place] laws. We do not discriminate on the basis of race, color, national origin, age, disability, sex, sexual orientation,  or gender identity.            Thank you!     Thank you for choosing [facility]  for your care. Our goal is always to provide you with excellent care. Hearing back from our patients is one way we can continue to improve our services. Please take a few minutes to complete the written survey that you may receive in the mail after your visit with us. Thank you!             Your Updated Medication List - Protect others around you: Learn how to safely use, store and throw away your medicines at www.disposemymeds.org.          This list is accurate as of: 11/3/17 10:00 AM.  Always use your most recent med list.                   Brand Name Dispense Instructions for use Diagnosis    BENADRYL PO      Take 25 mg by mouth as needed (Pre-medication for remicade)        folic acid 1 MG tablet    FOLVITE    180 tablet    Take 2 tablets (2 mg) by mouth daily    Rheumatoid arthritis involving multiple sites with positive rheumatoid factor (H), Long term (current) use of systemic steroids, Immunosuppression (H), Chronic left shoulder pain       methotrexate 2.5 MG tablet CHEMO     16 tablet    Take 4 tablets (10 mg) by mouth once a week Take 4 tablets (10mg) by mouth weekly. Labs every 8 weeks    Rheumatoid arthritis involving multiple sites with positive rheumatoid factor (H), Long term (current) use of systemic steroids, Immunosuppression (H), Chronic left shoulder pain       predniSONE 1 MG tablet    DELTASONE    360 tablet    Take 4 tablets (4 mg) by mouth daily Try taper by 1/2 mg day every 2 month until off if able    Rheumatoid arthritis involving multiple sites with positive rheumatoid factor (H), Long term (current) use of systemic steroids       REMICADE IV      Inject 100 mg into the vein every 28 days        TYLENOL PO      Take 650 mg by mouth once        Vitamin D (Cholecalciferol) 1000 UNITS Caps     1 capsule    Take 2,000 Units by mouth daily

## 2017-11-03 NOTE — NURSING NOTE
Janice Mayfield's goals for this visit include: CC  She requests these members of her care team be copied on today's visit information: No    PCP: Marah Hare    Referring Provider:  No referring provider defined for this encounter.    Chief Complaint   Patient presents with     RECHECK       Initial There were no vitals taken for this visit. There is no height or weight on file to calculate BMI.  Medication Reconciliation: complete

## 2017-11-21 ENCOUNTER — INFUSION THERAPY VISIT (OUTPATIENT)
Dept: INFUSION THERAPY | Facility: CLINIC | Age: 57
End: 2017-11-21
Payer: COMMERCIAL

## 2017-11-21 VITALS
WEIGHT: 161 LBS | OXYGEN SATURATION: 99 % | HEART RATE: 71 BPM | DIASTOLIC BLOOD PRESSURE: 73 MMHG | TEMPERATURE: 97.5 F | SYSTOLIC BLOOD PRESSURE: 108 MMHG

## 2017-11-21 DIAGNOSIS — M05.79 RHEUMATOID ARTHRITIS INVOLVING MULTIPLE SITES WITH POSITIVE RHEUMATOID FACTOR (H): Primary | ICD-10-CM

## 2017-11-21 PROCEDURE — 99207 ZZC NO CHARGE NURSE ONLY: CPT

## 2017-11-21 PROCEDURE — 96415 CHEMO IV INFUSION ADDL HR: CPT | Performed by: INTERNAL MEDICINE

## 2017-11-21 PROCEDURE — 96413 CHEMO IV INFUSION 1 HR: CPT | Performed by: INTERNAL MEDICINE

## 2017-11-21 RX ORDER — METHYLPREDNISOLONE SODIUM SUCCINATE 125 MG/2ML
100 INJECTION, POWDER, LYOPHILIZED, FOR SOLUTION INTRAMUSCULAR; INTRAVENOUS
Status: CANCELLED | OUTPATIENT
Start: 2017-11-21

## 2017-11-21 RX ORDER — ACETAMINOPHEN 325 MG/1
650 TABLET ORAL
Status: DISCONTINUED | OUTPATIENT
Start: 2017-11-21 | End: 2017-11-21 | Stop reason: HOSPADM

## 2017-11-21 RX ORDER — DIPHENHYDRAMINE HCL 25 MG
25 CAPSULE ORAL
Status: DISCONTINUED | OUTPATIENT
Start: 2017-11-21 | End: 2017-11-21 | Stop reason: HOSPADM

## 2017-11-21 RX ORDER — DIPHENHYDRAMINE HCL 25 MG
25 CAPSULE ORAL
Status: CANCELLED
Start: 2017-11-21

## 2017-11-21 RX ORDER — ACETAMINOPHEN 325 MG/1
650 TABLET ORAL
Status: CANCELLED
Start: 2017-11-21

## 2017-11-21 RX ADMIN — ACETAMINOPHEN 650 MG: 325 TABLET ORAL at 13:46

## 2017-11-21 RX ADMIN — Medication 25 MG: at 13:44

## 2017-11-21 RX ADMIN — Medication 250 ML: at 13:45

## 2017-11-21 NOTE — MR AVS SNAPSHOT
After Visit Summary   11/21/2017    Janice Mayfield    MRN: [id number]           Patient Information     Date Of Birth          1960        Visit Information        Provider Department      11/21/2017 1:00 PM [place] [facility]        Today's Diagnoses     Rheumatoid arthritis involving multiple sites with positive rheumatoid factor (H)    -  1       Follow-ups after your visit        Your next 10 appointments already scheduled     Jan 12, 2018  8:00 AM CST   LAB with LAB FIRST FLOOR [facility])    [address] [place] [id number] [phone]           Please do not eat 10-12 hours before your appointment if you are coming in fasting for labs on lipids, cholesterol, or glucose (sugar). This does not apply to pregnant women. Water, hot tea and black coffee (with nothing added) are okay. Do not drink other fluids, diet soda or chew gum.            May 11, 2018  7:30 AM CDT   Return Visit with LO Parra [facility] ([facility])    [address] [place] [address] [id number] [phone]              Who to contact     If you have questions or need follow up information about today's clinic visit or your schedule please contact [facility] directly at [phone].  Normal or non-critical lab and imaging results will be communicated to you by MyChart, letter or phone within 4 business days after the clinic has received the results. If you do not hear from us within 7 days, please contact the clinic through MyChart or phone. If you have a critical or abnormal lab result, we will notify you by phone as soon as possible.  Submit refill requests through Creative Allies or call your pharmacy and they will forward the refill request to us. Please allow 3 business days for your refill to be completed.          Additional Information  About Your Visit        Gazzanghart Information     [email or web address] gives you secure access to your electronic health record. If you see a primary care provider, you can also send messages to your care team and make appointments. If you have questions, please call your primary care clinic.  If you do not have a primary care provider, please call [phone] and they will assist you.      [email or web address] is an electronic gateway that provides easy, online access to your medical records. With [email or web address], you can request a clinic appointment, read your test results, renew a prescription or communicate with your care team.     To access your existing account, please contact your [facility] or call [phone] for assistance.        Care EveryWhere ID     This is your Care EveryWhere ID. This could be used by other organizations to access your [place] medical records  [id number]        Your Vitals Were     Pulse Temperature Pulse Oximetry             71 97.5  F (36.4  C) (Oral) 99%          Blood Pressure from Last 3 Encounters:   11/21/17 108/73   11/03/17 114/83   10/24/17 113/77    Weight from Last 3 Encounters:   11/21/17 73 kg (161 lb)   11/03/17 72.3 kg (159 lb 6.4 oz)   10/24/17 73.7 kg (162 lb 8 oz)              Today, you had the following     No orders found for display       Primary Care Provider Office Phone # Fax #    Marah Hare [phone]       [address] BAHENA [address] [place] [address]        Equal Access to Services     CHER RUIZ : Hadii padmini ku hadasho Soomaali, waaxda luqadaha, qaybta kaalmada adeegyada, waxlaurence parish rojo . So [facility] [phone].    ATENCIÓN: Si habla español, tiene a kelly disposición servicios gratuitos de asistencia lingüística. Llame al [phone].    We comply with applicable federal civil rights laws and [place] laws. We do not discriminate on the basis of race, color, national origin, age, disability,  sex, sexual orientation, or gender identity.            Thank you!     Thank you for choosing [facility]  for your care. Our goal is always to provide you with excellent care. Hearing back from our patients is one way we can continue to improve our services. Please take a few minutes to complete the written survey that you may receive in the mail after your visit with us. Thank you!             Your Updated Medication List - Protect others around you: Learn how to safely use, store and throw away your medicines at www.disposemymeds.org.          This list is accurate as of: 11/21/17  4:14 PM.  Always use your most recent med list.                   Brand Name Dispense Instructions for use Diagnosis    BENADRYL PO      Take 25 mg by mouth as needed (Pre-medication for remicade)        folic acid 1 MG tablet    FOLVITE    180 tablet    Take 2 tablets (2 mg) by mouth daily    Rheumatoid arthritis involving multiple sites with positive rheumatoid factor (H), Long term (current) use of systemic steroids, Immunosuppression (H), Chronic left shoulder pain       methotrexate 2.5 MG tablet CHEMO     16 tablet    Take 4 tablets (10 mg) by mouth once a week Take 4 tablets (10mg) by mouth weekly. Labs every 8 weeks    Rheumatoid arthritis involving multiple sites with positive rheumatoid factor (H), Long term (current) use of systemic steroids, Immunosuppression (H), Chronic left shoulder pain       predniSONE 1 MG tablet    DELTASONE    360 tablet    Take 4 tablets (4 mg) by mouth daily Try taper by 1/2 mg day every 2 month until off if able    Rheumatoid arthritis involving multiple sites with positive rheumatoid factor (H), Long term (current) use of systemic steroids       REMICADE IV      Inject 100 mg into the vein every 28 days        TYLENOL PO      Take 650 mg by mouth once        Vitamin D (Cholecalciferol) 1000 UNITS Caps     1 capsule    Take 2,000 Units by mouth daily

## 2017-11-21 NOTE — PROGRESS NOTES
"Infusion Nursing Note:  Janice Mayfield presents today for Remicade.    Patient seen by provider today: No   present during visit today: Not Applicable.    Note: Pt denies any new medical complaints, see flow sheet for assessment.    Intravenous Access:  Peripheral IV placed.    Treatment Conditions:  Rheumatology Infusion Checklist: PRIOR TO INFUSION OF BIOLOGICAL MEDICATIONS OR ANY OF THESE AS LISTED: Remicaide (infliximab) \".rheumbiologicalchecklist\"    Prior to Infusion of biological medications or any of these as listed:    1. Elevated temperature, fever, chills, productive cough or abnormal vital signs, night sweats, coughing up blood or sputum, no appetite or abnormal vital signs : NO    2. Open wounds or new incisions: NO    3. Recent hospitalization: NO    4.  Recent surgeries:  NO    5. Any upcoming surgeries or dental procedures?:NO    6. Any current or recent bouts of illness or infection? On any antibiotics? : NO    7. Any new, sudden or worsening abdominal pain :NO    8. Vaccination within 4 weeks? Patient or someone in the household is scheduled to receive vaccination? No live virus vaccines prior to or during treatment :NO    9. Any nervous system diseases [i.e. multiple sclerosis, Guillain-[place], seizures, neurological  changes]: NO    10. Pregnant or breast feeding; or plans on pregnancy in the future: NO    11. Signs of worsening depression or suicidal ideations while taking benlysta:NO    12. New-onset medical symptoms: NO    13.  New cancer diagnosis or on chemotherapy or radiation NO    14.  Evaluate for any sign of active TB [Unexplained weight loss, Loss of appetite, Night sweats, Fever, Fatigue, Chills, Coughing for 3 weeks or longer, Hemoptysis (coughing up blood), Chest pain]: NO    **Note: If answered yes to any of the above, hold the infusion and contact ordering rheumatologist or on-call rheumatologist.   .        Post Infusion Assessment:  Patient tolerated infusion " without incident.  Site patent and intact, free from redness, edema or discomfort.  Access discontinued per protocol.    Discharge Plan:   Patient discharged in stable condition accompanied by: self.  Departure Mode: Ambulatory.      Pravin Blanco RN

## 2017-12-19 ENCOUNTER — INFUSION THERAPY VISIT (OUTPATIENT)
Dept: INFUSION THERAPY | Facility: CLINIC | Age: 57
End: 2017-12-19
Payer: COMMERCIAL

## 2017-12-19 VITALS
OXYGEN SATURATION: 98 % | SYSTOLIC BLOOD PRESSURE: 125 MMHG | TEMPERATURE: 97.2 F | RESPIRATION RATE: 16 BRPM | HEART RATE: 75 BPM | DIASTOLIC BLOOD PRESSURE: 74 MMHG | WEIGHT: 161 LBS

## 2017-12-19 DIAGNOSIS — M05.79 RHEUMATOID ARTHRITIS INVOLVING MULTIPLE SITES WITH POSITIVE RHEUMATOID FACTOR (H): Primary | ICD-10-CM

## 2017-12-19 PROCEDURE — 99207 ZZC NO CHARGE LOS: CPT

## 2017-12-19 PROCEDURE — 96413 CHEMO IV INFUSION 1 HR: CPT | Performed by: INTERNAL MEDICINE

## 2017-12-19 PROCEDURE — 96415 CHEMO IV INFUSION ADDL HR: CPT | Performed by: INTERNAL MEDICINE

## 2017-12-19 RX ORDER — DIPHENHYDRAMINE HCL 25 MG
25 CAPSULE ORAL
Status: DISCONTINUED | OUTPATIENT
Start: 2017-12-19 | End: 2017-12-19 | Stop reason: HOSPADM

## 2017-12-19 RX ORDER — ACETAMINOPHEN 325 MG/1
650 TABLET ORAL
Status: CANCELLED
Start: 2017-12-19

## 2017-12-19 RX ORDER — METHYLPREDNISOLONE SODIUM SUCCINATE 125 MG/2ML
100 INJECTION, POWDER, LYOPHILIZED, FOR SOLUTION INTRAMUSCULAR; INTRAVENOUS
Status: CANCELLED | OUTPATIENT
Start: 2017-12-19

## 2017-12-19 RX ORDER — DIPHENHYDRAMINE HCL 25 MG
25 CAPSULE ORAL
Status: CANCELLED
Start: 2017-12-19

## 2017-12-19 RX ORDER — ACETAMINOPHEN 325 MG/1
650 TABLET ORAL
Status: DISCONTINUED | OUTPATIENT
Start: 2017-12-19 | End: 2017-12-19 | Stop reason: HOSPADM

## 2017-12-19 RX ADMIN — Medication 250 ML: at 13:41

## 2017-12-19 RX ADMIN — Medication 25 MG: at 13:33

## 2017-12-19 RX ADMIN — ACETAMINOPHEN 650 MG: 325 TABLET ORAL at 13:33

## 2017-12-19 NOTE — MR AVS SNAPSHOT
After Visit Summary   12/19/2017    Janice Mayfield    MRN: [id number]           Patient Information     Date Of Birth          1960        Visit Information        Provider Department      12/19/2017 1:00 PM [address]        Today's Diagnoses     Rheumatoid arthritis involving multiple sites with positive rheumatoid factor (H)    -  1       Follow-ups after your visit        Your next 10 appointments already scheduled     Jan 12, 2018  8:00 AM CST   LAB with LAB FIRST FLOOR [facility] ([facility])    [address] [place] [phone]   [phone]           Please do not eat 10-12 hours before your appointment if you are coming in fasting for labs on lipids, cholesterol, or glucose (sugar). This does not apply to pregnant women. Water, hot tea and black coffee (with nothing added) are okay. Do not drink other fluids, diet soda or chew gum.            Jan 16, 2018  1:00 PM CST   Level 3 with [address] ([facility])    [address] [place] [id number] [phone]            May 11, 2018  7:30 AM CDT   Return Visit with LO Parra [facility] ([facility])    [address] [place] [id number] [phone]              Who to contact     If you have questions or need follow up information about today's clinic visit or your schedule please contact [facility] directly at [phone].  Normal or non-critical lab and imaging results will be communicated to you by MyChart, letter or phone within 4 business days after the clinic has received the results. If you do not hear from us within 7 days, please contact the clinic through MyChart or phone. If you have a critical or abnormal lab result, we will notify you by phone as soon as possible.  Submit refill  requests through vSocial or call your pharmacy and they will forward the refill request to us. Please allow 3 business days for your refill to be completed.          Additional Information About Your Visit        MedShapeharMapHazardly Information     vSocial gives you secure access to your electronic health record. If you see a primary care provider, you can also send messages to your care team and make appointments. If you have questions, please call your primary care clinic.  If you do not have a primary care provider, please call [phone] and they will assist you.      vSocial is an electronic gateway that provides easy, online access to your medical records. With vSocial, you can request a clinic appointment, read your test results, renew a prescription or communicate with your care team.     To access your existing account, please contact your [facility] or call [phone] for assistance.        Care EveryWhere ID     This is your Care EveryWhere ID. This could be used by other organizations to access your [place] medical records  [id number]        Your Vitals Were     Pulse Temperature Respirations Pulse Oximetry          75 97.2  F (36.2  C) (Oral) 16 98%         Blood Pressure from Last 3 Encounters:   12/19/17 125/74   11/21/17 108/73   11/03/17 114/83    Weight from Last 3 Encounters:   12/19/17 73 kg (161 lb)   11/21/17 73 kg (161 lb)   11/03/17 72.3 kg (159 lb 6.4 oz)              Today, you had the following     No orders found for display       Primary Care Provider Office Phone # Fax #    Marah Hare [phone]       [address] PEREZ   [facility] [address]        Equal Access to Services     [facility]: Hadii padmini grier Soaddison, waaxda luqadaha, qaybta kaalmada kadeem orellana. So [facility] [phone].    ATENCIÓN: Si habla español, tiene a kelly disposición servicios gratuitos de asistencia  lingüística. Maryanne al [phone].    We comply with applicable federal civil rights laws and [place] laws. We do not discriminate on the basis of race, color, national origin, age, disability, sex, sexual orientation, or gender identity.            Thank you!     Thank you for choosing [facility]  for your care. Our goal is always to provide you with excellent care. Hearing back from our patients is one way we can continue to improve our services. Please take a few minutes to complete the written survey that you may receive in the mail after your visit with us. Thank you!             Your Updated Medication List - Protect others around you: Learn how to safely use, store and throw away your medicines at www.disposemymeds.org.          This list is accurate as of: 12/19/17  3:47 PM.  Always use your most recent med list.                   Brand Name Dispense Instructions for use Diagnosis    BENADRYL PO      Take 25 mg by mouth as needed (Pre-medication for remicade)        folic acid 1 MG tablet    FOLVITE    180 tablet    Take 2 tablets (2 mg) by mouth daily    Rheumatoid arthritis involving multiple sites with positive rheumatoid factor (H), Long term (current) use of systemic steroids, Immunosuppression (H), Chronic left shoulder pain       methotrexate 2.5 MG tablet CHEMO     16 tablet    Take 4 tablets (10 mg) by mouth once a week Take 4 tablets (10mg) by mouth weekly. Labs every 8 weeks    Rheumatoid arthritis involving multiple sites with positive rheumatoid factor (H), Long term (current) use of systemic steroids, Immunosuppression (H), Chronic left shoulder pain       predniSONE 1 MG tablet    DELTASONE    360 tablet    Take 4 tablets (4 mg) by mouth daily Try taper by 1/2 mg day every 2 month until off if able    Rheumatoid arthritis involving multiple sites with positive rheumatoid factor (H), Long term (current) use of systemic steroids       REMICADE IV      Inject 100 mg into the  vein every 28 days        TYLENOL PO      Take 650 mg by mouth once        Vitamin D (Cholecalciferol) 1000 UNITS Caps     1 capsule    Take 2,000 Units by mouth daily

## 2017-12-19 NOTE — PROGRESS NOTES
"Infusion Nursing Note:  Janice Mayfield presents today for NON-rapid remicade.    Patient seen by provider today: No   present during visit today: Not Applicable.    Note: N/A.    Intravenous Access:  Peripheral IV placed.    Treatment Conditions:  Rheumatology Infusion Checklist: PRIOR TO INFUSION OF BIOLOGICAL MEDICATIONS OR ANY OF THESE AS LISTED: Remicaide (infliximab) \".rheumbiologicalchecklist\"    Prior to Infusion of biological medications or any of these as listed:    1. Elevated temperature, fever, chills, productive cough or abnormal vital signs, night sweats, coughing up blood or sputum, no appetite or abnormal vital signs : NO    2. Open wounds or new incisions: NO    3. Recent hospitalization: NO    4.  Recent surgeries:  NO    5. Any upcoming surgeries or dental procedures?:NO    6. Any current or recent bouts of illness or infection? On any antibiotics? : NO    7. Any new, sudden or worsening abdominal pain :NO    8. Vaccination within 4 weeks? Patient or someone in the household is scheduled to receive vaccination? No live virus vaccines prior to or during treatment :NO    9. Any nervous system diseases [i.e. multiple sclerosis, Guillain-[place], seizures, neurological  changes]: NO    10. Pregnant or breast feeding; or plans on pregnancy in the future: NO    11. Signs of worsenng depression or suicidal ideations while taking benlysta:N/A    12. New-onset medical symptoms: NO    13.  New cancer diagnosis or on chemotherapy or radiation NO    14.  Evaluate for any sign of active TB [Unexplained weight loss, Loss of appetite, Night sweats, Fever, Fatigue, Chills, Coughing for 3 weeks or longer, Hemoptysis (coughing up blood), Chest pain]: NO    **Note: If answered yes to any of the above, hold the infusion and contact ordering rheumatologist or on-call rheumatologist.   .        Post Infusion Assessment:  Patient tolerated infusion without incident.  No evidence of " extravasations.  Access discontinued per protocol.  Rheumatology Post Infusion: POST-INFUSION OF BIOLOGICAL MEDICATION:    Reviewed with patient. Inform patient if any fever, chills or signs of infection, new symptoms, abdominal pain, heart palpitations, shortness of breath, reaction, weakness, neurological changes, seek medical attention immediately and should not receive infusions. No live virus vaccines prior to or during treatment or up to 6 months post infusion. If the patient has an upcoming procedure or surgery, this should be discussed with the rheumatologist and surgeon or provider..      Discharge Plan:   Discharge instructions reviewed with: Patient.  Patient and/or family verbalized understanding of discharge instructions and all questions answered.  Patient discharged in stable condition accompanied by: self.  Departure Mode: Ambulatory.    Shanique Nair RN

## 2018-01-21 ENCOUNTER — HEALTH MAINTENANCE LETTER (OUTPATIENT)
Age: 58
End: 2018-01-21

## 2018-01-25 ENCOUNTER — INFUSION THERAPY VISIT (OUTPATIENT)
Dept: INFUSION THERAPY | Facility: CLINIC | Age: 58
End: 2018-01-25
Payer: COMMERCIAL

## 2018-01-25 VITALS
DIASTOLIC BLOOD PRESSURE: 62 MMHG | WEIGHT: 157.5 LBS | RESPIRATION RATE: 18 BRPM | TEMPERATURE: 98.4 F | HEART RATE: 86 BPM | SYSTOLIC BLOOD PRESSURE: 112 MMHG | OXYGEN SATURATION: 100 %

## 2018-01-25 DIAGNOSIS — Z79.52 LONG TERM (CURRENT) USE OF SYSTEMIC STEROIDS: ICD-10-CM

## 2018-01-25 DIAGNOSIS — M05.79 RHEUMATOID ARTHRITIS INVOLVING MULTIPLE SITES WITH POSITIVE RHEUMATOID FACTOR (H): ICD-10-CM

## 2018-01-25 DIAGNOSIS — D84.9 IMMUNOSUPPRESSION (H): ICD-10-CM

## 2018-01-25 DIAGNOSIS — M25.512 CHRONIC LEFT SHOULDER PAIN: ICD-10-CM

## 2018-01-25 DIAGNOSIS — M05.79 RHEUMATOID ARTHRITIS INVOLVING MULTIPLE SITES WITH POSITIVE RHEUMATOID FACTOR (H): Primary | ICD-10-CM

## 2018-01-25 DIAGNOSIS — G89.29 CHRONIC LEFT SHOULDER PAIN: ICD-10-CM

## 2018-01-25 LAB
ALBUMIN SERPL-MCNC: 3.5 G/DL (ref 3.4–5)
ALT SERPL W P-5'-P-CCNC: 27 U/L (ref 0–50)
AST SERPL W P-5'-P-CCNC: 18 U/L (ref 0–45)
BASOPHILS # BLD AUTO: 0 10E9/L (ref 0–0.2)
BASOPHILS NFR BLD AUTO: 0.6 %
CREAT SERPL-MCNC: 0.76 MG/DL (ref 0.52–1.04)
CRP SERPL-MCNC: 7.5 MG/L (ref 0–8)
DIFFERENTIAL METHOD BLD: NORMAL
EOSINOPHIL # BLD AUTO: 0.1 10E9/L (ref 0–0.7)
EOSINOPHIL NFR BLD AUTO: 0.9 %
ERYTHROCYTE [DISTWIDTH] IN BLOOD BY AUTOMATED COUNT: 12.2 % (ref 10–15)
ERYTHROCYTE [SEDIMENTATION RATE] IN BLOOD BY WESTERGREN METHOD: 33 MM/H (ref 0–30)
GFR SERPL CREATININE-BSD FRML MDRD: 79 ML/MIN/1.7M2
HCT VFR BLD AUTO: 39.1 % (ref 35–47)
HGB BLD-MCNC: 12.4 G/DL (ref 11.7–15.7)
IMM GRANULOCYTES # BLD: 0 10E9/L (ref 0–0.4)
IMM GRANULOCYTES NFR BLD: 0.3 %
LYMPHOCYTES # BLD AUTO: 2.4 10E9/L (ref 0.8–5.3)
LYMPHOCYTES NFR BLD AUTO: 34.9 %
MCH RBC QN AUTO: 28.6 PG (ref 26.5–33)
MCHC RBC AUTO-ENTMCNC: 31.7 G/DL (ref 31.5–36.5)
MCV RBC AUTO: 90 FL (ref 78–100)
MONOCYTES # BLD AUTO: 0.4 10E9/L (ref 0–1.3)
MONOCYTES NFR BLD AUTO: 6.2 %
NEUTROPHILS # BLD AUTO: 3.9 10E9/L (ref 1.6–8.3)
NEUTROPHILS NFR BLD AUTO: 57.1 %
PLATELET # BLD AUTO: 320 10E9/L (ref 150–450)
RBC # BLD AUTO: 4.34 10E12/L (ref 3.8–5.2)
WBC # BLD AUTO: 6.9 10E9/L (ref 4–11)

## 2018-01-25 PROCEDURE — 99207 ZZC NO CHARGE LOS: CPT

## 2018-01-25 PROCEDURE — 36415 COLL VENOUS BLD VENIPUNCTURE: CPT | Performed by: NURSE PRACTITIONER

## 2018-01-25 PROCEDURE — 82565 ASSAY OF CREATININE: CPT | Performed by: NURSE PRACTITIONER

## 2018-01-25 PROCEDURE — 82040 ASSAY OF SERUM ALBUMIN: CPT | Performed by: NURSE PRACTITIONER

## 2018-01-25 PROCEDURE — 86140 C-REACTIVE PROTEIN: CPT | Performed by: NURSE PRACTITIONER

## 2018-01-25 PROCEDURE — 84460 ALANINE AMINO (ALT) (SGPT): CPT | Performed by: NURSE PRACTITIONER

## 2018-01-25 PROCEDURE — 84450 TRANSFERASE (AST) (SGOT): CPT | Performed by: NURSE PRACTITIONER

## 2018-01-25 PROCEDURE — 96413 CHEMO IV INFUSION 1 HR: CPT | Performed by: INTERNAL MEDICINE

## 2018-01-25 PROCEDURE — 96415 CHEMO IV INFUSION ADDL HR: CPT | Performed by: INTERNAL MEDICINE

## 2018-01-25 PROCEDURE — 85025 COMPLETE CBC W/AUTO DIFF WBC: CPT | Performed by: NURSE PRACTITIONER

## 2018-01-25 PROCEDURE — 85652 RBC SED RATE AUTOMATED: CPT | Performed by: NURSE PRACTITIONER

## 2018-01-25 RX ORDER — DIPHENHYDRAMINE HCL 25 MG
25 CAPSULE ORAL
Status: DISCONTINUED | OUTPATIENT
Start: 2018-01-25 | End: 2018-01-25 | Stop reason: HOSPADM

## 2018-01-25 RX ORDER — DIPHENHYDRAMINE HCL 25 MG
25 CAPSULE ORAL
Status: CANCELLED
Start: 2018-01-25

## 2018-01-25 RX ORDER — ACETAMINOPHEN 325 MG/1
650 TABLET ORAL
Status: CANCELLED
Start: 2018-01-25

## 2018-01-25 RX ORDER — ACETAMINOPHEN 325 MG/1
650 TABLET ORAL
Status: DISCONTINUED | OUTPATIENT
Start: 2018-01-25 | End: 2018-01-25 | Stop reason: HOSPADM

## 2018-01-25 RX ORDER — METHYLPREDNISOLONE SODIUM SUCCINATE 125 MG/2ML
100 INJECTION, POWDER, LYOPHILIZED, FOR SOLUTION INTRAMUSCULAR; INTRAVENOUS
Status: CANCELLED | OUTPATIENT
Start: 2018-01-25

## 2018-01-25 RX ADMIN — Medication 250 ML: at 14:08

## 2018-01-25 RX ADMIN — Medication 25 MG: at 13:51

## 2018-01-25 RX ADMIN — ACETAMINOPHEN 650 MG: 325 TABLET ORAL at 13:51

## 2018-01-25 ASSESSMENT — PAIN SCALES - GENERAL: PAINLEVEL: MODERATE PAIN (5)

## 2018-01-25 NOTE — MR AVS SNAPSHOT
After Visit Summary   1/25/2018    Janice Mayfield    MRN: [id number]           Patient Information     Date Of Birth          1960        Visit Information        Provider Department      1/25/2018 1:30 PM [address]        Today's Diagnoses     Rheumatoid arthritis involving multiple sites with positive rheumatoid factor (H)    -  1       Follow-ups after your visit        Your next 10 appointments already scheduled     May 11, 2018  7:30 AM CDT   Return Visit with LO Parra CNP   [facility] ([facility]    [id number] [place] [id number] [phone]              Who to contact     If you have questions or need follow up information about today's clinic visit or your schedule please contact [facility] directly at [phone].  Normal or non-critical lab and imaging results will be communicated to you by [employer], letter or phone within 4 business days after the clinic has received the results. If you do not hear from us within 7 days, please contact the clinic through [employer] or phone. If you have a critical or abnormal lab result, we will notify you by phone as soon as possible.  Submit refill requests through [employer] or call your pharmacy and they will forward the refill request to us. Please allow 3 business days for your refill to be completed.          Additional Information About Your Visit        MyChart Information     [employer] gives you secure access to your electronic health record. If you see a primary care provider, you can also send messages to your care team and make appointments. If you have questions, please call your primary care clinic.  If you do not have a primary care provider, please call [phone] and they will assist you.      [employer] is an electronic gateway that provides easy, online access to your medical records. With [employer], you can  request a clinic appointment, read your test results, renew a prescription or communicate with your care team.     To access your existing account, please contact your [facility] or call [phone] for assistance.        Care EveryWhere ID     This is your Care EveryWhere ID. This could be used by other organizations to access your [facility] medical records  [id number]        Your Vitals Were     Pulse Temperature Respirations Pulse Oximetry          86 98.4  F (36.9  C) (Oral) 18 100%         Blood Pressure from Last 3 Encounters:   01/25/18 112/62   12/19/17 125/74   11/21/17 108/73    Weight from Last 3 Encounters:   01/25/18 71.4 kg (157 lb 8 oz)   12/19/17 73 kg (161 lb)   11/21/17 73 kg (161 lb)              Today, you had the following     No orders found for display       Primary Care Provider Office Phone # Fax #    Marah Hare [phone]       [address] PEREZ   [facility] [address]        Equal Access to Services     [facility] : Hadii aad ku hadasho Soomaali, waaxda luqadaha, qaybta kaalmada adeegyada, kadeem lugo hayflo rojo . So [facility] [phone].    ATENCIÓN: Si habla español, tiene a kelly disposición servicios gratuitos de asistencia lingüística. Llame al [phone].    We comply with applicable federal civil rights laws and [place] laws. We do not discriminate on the basis of race, color, national origin, age, disability, sex, sexual orientation, or gender identity.            Thank you!     Thank you for choosing [facility]  for your care. Our goal is always to provide you with excellent care. Hearing back from our patients is one way we can continue to improve our services. Please take a few minutes to complete the written survey that you may receive in the mail after your visit with us. Thank you!             Your Updated Medication List - Protect others around you: Learn how to  safely use, store and throw away your medicines at www.disposemymeds.org.          This list is accurate as of 1/25/18  4:31 PM.  Always use your most recent med list.                   Brand Name Dispense Instructions for use Diagnosis    BENADRYL PO      Take 25 mg by mouth as needed (Pre-medication for remicade)        folic acid 1 MG tablet    FOLVITE    180 tablet    Take 2 tablets (2 mg) by mouth daily    Rheumatoid arthritis involving multiple sites with positive rheumatoid factor (H), Long term (current) use of systemic steroids, Immunosuppression (H), Chronic left shoulder pain       methotrexate 2.5 MG tablet CHEMO     16 tablet    Take 4 tablets (10 mg) by mouth once a week Take 4 tablets (10mg) by mouth weekly. Labs every 8 weeks    Rheumatoid arthritis involving multiple sites with positive rheumatoid factor (H), Long term (current) use of systemic steroids, Immunosuppression (H), Chronic left shoulder pain       predniSONE 1 MG tablet    DELTASONE    360 tablet    Take 4 tablets (4 mg) by mouth daily Try taper by 1/2 mg day every 2 month until off if able    Rheumatoid arthritis involving multiple sites with positive rheumatoid factor (H), Long term (current) use of systemic steroids       REMICADE IV      Inject 100 mg into the vein every 28 days        TYLENOL PO      Take 650 mg by mouth once        Vitamin D (Cholecalciferol) 1000 UNITS Caps     1 capsule    Take 2,000 Units by mouth daily

## 2018-01-25 NOTE — PROGRESS NOTES
"Infusion Nursing Note:  Janice Mayfield presents today for Remicade.    Patient seen by provider today: No   present during visit today: Not Applicable.    Note:     PRIOR TO INFUSION OF BIOLOGICAL MEDICATIONS OR ANY OF THESE AS LISTED: Remicaide (infliximab) \".rheumbiologicalchecklist\"    Prior to Infusion of biological medications or any of these as listed:    1. Elevated temperature, fever, chills, productive cough or abnormal vital signs, night sweats, coughing up blood or sputum, no appetite or abnormal vital signs : NO    2. Open wounds or new incisions: NO    3. Recent hospitalization: NO    4.  Recent surgeries:  NO    5. Any upcoming surgeries or dental procedures?:NO    6. Any current or recent bouts of illness or infection? On any antibiotics? : NO    7. Any new, sudden or worsening abdominal pain :NO    8. Vaccination within 4 weeks? Patient or someone in the household is scheduled to receive vaccination? No live virus vaccines prior to or during treatment :NO    9. Any nervous system diseases [i.e. multiple sclerosis, Guillain-[place], seizures, neurological  changes]: NO    10. Pregnant or breast feeding; or plans on pregnancy in the future: NO    11. Signs of worsening depression or suicidal ideations while taking benlysta:NO    12. New-onset medical symptoms: NO    13.  New cancer diagnosis or on chemotherapy or radiation NO    14.  Evaluate for any sign of active TB [Unexplained weight loss, Loss of appetite, Night sweats, Fever, Fatigue, Chills, Coughing for 3 weeks or longer, Hemoptysis (coughing up blood), Chest pain]: NO    **Note: If answered yes to any of the above, hold the infusion and contact ordering rheumatologist or on-call rheumatologist.       Intravenous Access:  Peripheral IV placed.    Treatment Conditions:  Lab Results   Component Value Date    HGB 12.4 01/25/2018     Lab Results   Component Value Date    WBC 6.9 01/25/2018      Lab Results   Component Value Date "    ANEU 3.9 01/25/2018     Lab Results   Component Value Date     01/25/2018      Lab Results   Component Value Date     04/28/2017                   Lab Results   Component Value Date    POTASSIUM 3.9 04/28/2017           No results found for: MAG         Lab Results   Component Value Date    CR 0.76 01/25/2018                   Lab Results   Component Value Date    EDMAR 9.0 04/28/2017                Lab Results   Component Value Date    BILITOTAL 0.3 04/28/2017           Lab Results   Component Value Date    ALBUMIN 3.5 01/25/2018                    Lab Results   Component Value Date    ALT 27 01/25/2018           Lab Results   Component Value Date    AST 18 01/25/2018       Post Infusion Assessment:  Patient tolerated infusion without incident.  No evidence of extravasations.  Access discontinued per protocol.    Discharge Plan:   Schedule reviewed with patient and/or family.  Patient will return in 4-6 weeks for next appointment, she will schedule this on her way out.  Patient discharged in stable condition accompanied by: self.  Departure Mode: Ambulatory.    Monae Zhu RN

## 2018-01-25 NOTE — PROGRESS NOTES
The blood counts, liver, kidney and CRP inflammation labs are normal. Mild elevate ESR     Pola BLANCHARD, CNP, MSN  1/25/2018  1:57 PM

## 2018-03-09 ENCOUNTER — INFUSION THERAPY VISIT (OUTPATIENT)
Dept: INFUSION THERAPY | Facility: CLINIC | Age: 58
End: 2018-03-09
Payer: COMMERCIAL

## 2018-03-09 VITALS
SYSTOLIC BLOOD PRESSURE: 116 MMHG | RESPIRATION RATE: 16 BRPM | WEIGHT: 155 LBS | DIASTOLIC BLOOD PRESSURE: 75 MMHG | TEMPERATURE: 98 F | HEART RATE: 70 BPM | OXYGEN SATURATION: 97 %

## 2018-03-09 DIAGNOSIS — M05.79 RHEUMATOID ARTHRITIS INVOLVING MULTIPLE SITES WITH POSITIVE RHEUMATOID FACTOR (H): Primary | ICD-10-CM

## 2018-03-09 PROCEDURE — 96375 TX/PRO/DX INJ NEW DRUG ADDON: CPT | Performed by: NURSE PRACTITIONER

## 2018-03-09 PROCEDURE — 99207 ZZC NO CHARGE LOS: CPT

## 2018-03-09 PROCEDURE — 96413 CHEMO IV INFUSION 1 HR: CPT | Performed by: NURSE PRACTITIONER

## 2018-03-09 PROCEDURE — 96415 CHEMO IV INFUSION ADDL HR: CPT | Performed by: NURSE PRACTITIONER

## 2018-03-09 RX ORDER — ACETAMINOPHEN 325 MG/1
650 TABLET ORAL
Status: DISCONTINUED | OUTPATIENT
Start: 2018-03-09 | End: 2018-03-09 | Stop reason: HOSPADM

## 2018-03-09 RX ORDER — DIPHENHYDRAMINE HCL 25 MG
25 CAPSULE ORAL
Status: CANCELLED
Start: 2018-03-09

## 2018-03-09 RX ORDER — ACETAMINOPHEN 325 MG/1
650 TABLET ORAL
Status: CANCELLED
Start: 2018-03-09

## 2018-03-09 RX ORDER — DIPHENHYDRAMINE HCL 25 MG
25 CAPSULE ORAL
Status: DISCONTINUED | OUTPATIENT
Start: 2018-03-09 | End: 2018-03-09 | Stop reason: HOSPADM

## 2018-03-09 RX ORDER — METHYLPREDNISOLONE SODIUM SUCCINATE 125 MG/2ML
100 INJECTION, POWDER, LYOPHILIZED, FOR SOLUTION INTRAMUSCULAR; INTRAVENOUS
Status: CANCELLED | OUTPATIENT
Start: 2018-03-09

## 2018-03-09 RX ORDER — METHYLPREDNISOLONE SODIUM SUCCINATE 125 MG/2ML
100 INJECTION, POWDER, LYOPHILIZED, FOR SOLUTION INTRAMUSCULAR; INTRAVENOUS
Status: DISCONTINUED | OUTPATIENT
Start: 2018-03-09 | End: 2018-03-09 | Stop reason: HOSPADM

## 2018-03-09 RX ADMIN — ACETAMINOPHEN 650 MG: 325 TABLET ORAL at 11:28

## 2018-03-09 RX ADMIN — Medication 25 MG: at 11:28

## 2018-03-09 RX ADMIN — Medication 250 ML: at 11:37

## 2018-03-09 RX ADMIN — METHYLPREDNISOLONE SODIUM SUCCINATE 100 MG: 125 INJECTION INTRAMUSCULAR; INTRAVENOUS at 11:40

## 2018-03-09 NOTE — MR AVS SNAPSHOT
After Visit Summary   3/9/2018    Janice Mayfield    MRN: [id number]           Patient Information     Date Of Birth          1960        Visit Information        Provider Department      3/9/2018 11:00 AM [address]        Today's Diagnoses     Rheumatoid arthritis involving multiple sites with positive rheumatoid factor (H)    -  1       Follow-ups after your visit        Your next 10 appointments already scheduled     May 11, 2018  7:30 AM CDT   Return Visit with LO Parra CNP   [facility] ([facility])    [id number] [place] [id number] [phone]              Who to contact     If you have questions or need follow up information about today's clinic visit or your schedule please contact [facility] directly at [phone].  Normal or non-critical lab and imaging results will be communicated to you by [employer], letter or phone within 4 business days after the clinic has received the results. If you do not hear from us within 7 days, please contact the clinic through [employer] or phone. If you have a critical or abnormal lab result, we will notify you by phone as soon as possible.  Submit refill requests through [employer] or call your pharmacy and they will forward the refill request to us. Please allow 3 business days for your refill to be completed.          Additional Information About Your Visit        MyChart Information     [employer] gives you secure access to your electronic health record. If you see a primary care provider, you can also send messages to your care team and make appointments. If you have questions, please call your primary care clinic.  If you do not have a primary care provider, please call [phone] and they will assist you.      [employer] is an electronic gateway that provides easy, online access to your medical records. With [employer], you can request  a clinic appointment, read your test results, renew a prescription or communicate with your care team.     To access your existing account, please contact your [facility] or call [phone] for assistance.        Care EveryWhere ID     This is your Care EveryWhere ID. This could be used by other organizations to access your [facility] medical records  [id number]        Your Vitals Were     Pulse Temperature Respirations Pulse Oximetry          70 98  F (36.7  C) (Oral) 16 97%         Blood Pressure from Last 3 Encounters:   03/09/18 116/75   01/25/18 112/62   12/19/17 125/74    Weight from Last 3 Encounters:   03/09/18 70.3 kg (155 lb)   01/25/18 71.4 kg (157 lb 8 oz)   12/19/17 73 kg (161 lb)              Today, you had the following     No orders found for display       Primary Care Provider Office Phone # Fax #    Marah Hare [phone]       [address] PEREZ   [facility] [address]        Equal Access to Services     [facility] : Hadii aad ku hadasho Soomaali, waaxda luqadaha, qaybta kaalmada adeegyada, kadeem rojo . So [facility] [phone].    ATENCIÓN: Si habla español, tiene a kelly disposición servicios gratuitos de asistencia lingüística. Llame al [phone].    We comply with applicable federal civil rights laws and [place] laws. We do not discriminate on the basis of race, color, national origin, age, disability, sex, sexual orientation, or gender identity.            Thank you!     Thank you for choosing [facility]  for your care. Our goal is always to provide you with excellent care. Hearing back from our patients is one way we can continue to improve our services. Please take a few minutes to complete the written survey that you may receive in the mail after your visit with us. Thank you!             Your Updated Medication List - Protect others around you: Learn how to safely use,  store and throw away your medicines at www.disposemymeds.org.          This list is accurate as of 3/9/18  1:57 PM.  Always use your most recent med list.                   Brand Name Dispense Instructions for use Diagnosis    BENADRYL PO      Take 25 mg by mouth as needed (Pre-medication for remicade)        folic acid 1 MG tablet    FOLVITE    180 tablet    Take 2 tablets (2 mg) by mouth daily    Rheumatoid arthritis involving multiple sites with positive rheumatoid factor (H), Long term (current) use of systemic steroids, Immunosuppression (H), Chronic left shoulder pain       methotrexate 2.5 MG tablet CHEMO     16 tablet    Take 4 tablets (10 mg) by mouth once a week Take 4 tablets (10mg) by mouth weekly. Labs every 8 weeks    Rheumatoid arthritis involving multiple sites with positive rheumatoid factor (H), Long term (current) use of systemic steroids, Immunosuppression (H), Chronic left shoulder pain       predniSONE 1 MG tablet    DELTASONE    360 tablet    Take 4 tablets (4 mg) by mouth daily Try taper by 1/2 mg day every 2 month until off if able    Rheumatoid arthritis involving multiple sites with positive rheumatoid factor (H), Long term (current) use of systemic steroids       REMICADE IV      Inject 100 mg into the vein every 28 days        TYLENOL PO      Take 650 mg by mouth once        Vitamin D (Cholecalciferol) 1000 UNITS Caps     1 capsule    Take 2,000 Units by mouth daily

## 2018-03-09 NOTE — PROGRESS NOTES
"Infusion Nursing Note:  Janice Mayfield presents today for remicade.    Patient seen by provider today: No   present during visit today: Not Applicable.    Note: N/A.    Intravenous Access:  Peripheral IV placed.    Treatment Conditions:  Rheumatology Infusion Checklist: PRIOR TO INFUSION OF BIOLOGICAL MEDICATIONS OR ANY OF THESE AS LISTED: Remicaide (infliximab) \".rheumbiologicalchecklist\"    Prior to Infusion of biological medications or any of these as listed:    1. Elevated temperature, fever, chills, productive cough or abnormal vital signs, night sweats, coughing up blood or sputum, no appetite or abnormal vital signs : NO    2. Open wounds or new incisions: NO    3. Recent hospitalization: NO    4.  Recent surgeries:  NO    5. Any upcoming surgeries or dental procedures?:NO    6. Any current or recent bouts of illness or infection? On any antibiotics? : NO    7. Any new, sudden or worsening abdominal pain :NO    8. Vaccination within 4 weeks? Patient or someone in the household is scheduled to receive vaccination? No live virus vaccines prior to or during treatment :NO    9. Any nervous system diseases [i.e. multiple sclerosis, Guillain-[place], seizures, neurological  changes]: NO    10. Pregnant or breast feeding; or plans on pregnancy in the future: NO    11. Signs of worsening depression or suicidal ideations while taking benlysta:N/A    12. New-onset medical symptoms: NO    13.  New cancer diagnosis or on chemotherapy or radiation NO    14.  Evaluate for any sign of active TB [Unexplained weight loss, Loss of appetite, Night sweats, Fever, Fatigue, Chills, Coughing for 3 weeks or longer, Hemoptysis (coughing up blood), Chest pain]: NO    **Note: If answered yes to any of the above, hold the infusion and contact ordering rheumatologist or on-call rheumatologist.   .      Post Infusion Assessment:  Patient tolerated infusion without incident.  Site patent and intact, free from redness, " edema or discomfort.  No evidence of extravasations.  Access discontinued per protocol.  Rheumatology Post Infusion: POST-INFUSION OF BIOLOGICAL MEDICATION:    Reviewed with patient.  Inform patient if any fever, chills or signs of infection, new symptoms, abdominal pain, heart palpitations, shortness of breath, reaction, weakness, neurological changes, seek medical attention immediately and should not receive infusions. No live virus vaccines prior to or during treatment or up to 6 months post infusion. If the patient has an upcoming procedure or surgery, this should be discussed with the rheumatologist and surgeon or provider..    Discharge Plan:   Discharge instructions reviewed with: Patient.  Patient and/or family verbalized understanding of discharge instructions and all questions answered.  Patient discharged in stable condition accompanied by: self.  Departure Mode: Ambulatory.    Shanique Nair RN

## 2018-04-06 ENCOUNTER — INFUSION THERAPY VISIT (OUTPATIENT)
Dept: INFUSION THERAPY | Facility: CLINIC | Age: 58
End: 2018-04-06
Payer: COMMERCIAL

## 2018-04-06 VITALS
TEMPERATURE: 98.2 F | WEIGHT: 156 LBS | SYSTOLIC BLOOD PRESSURE: 107 MMHG | OXYGEN SATURATION: 99 % | HEART RATE: 67 BPM | DIASTOLIC BLOOD PRESSURE: 75 MMHG | RESPIRATION RATE: 18 BRPM

## 2018-04-06 DIAGNOSIS — M05.79 RHEUMATOID ARTHRITIS INVOLVING MULTIPLE SITES WITH POSITIVE RHEUMATOID FACTOR (H): Primary | ICD-10-CM

## 2018-04-06 PROCEDURE — 96375 TX/PRO/DX INJ NEW DRUG ADDON: CPT | Performed by: NURSE PRACTITIONER

## 2018-04-06 PROCEDURE — 99207 ZZC NO CHARGE LOS: CPT

## 2018-04-06 PROCEDURE — 96415 CHEMO IV INFUSION ADDL HR: CPT | Performed by: NURSE PRACTITIONER

## 2018-04-06 PROCEDURE — 96413 CHEMO IV INFUSION 1 HR: CPT | Performed by: NURSE PRACTITIONER

## 2018-04-06 RX ORDER — ACETAMINOPHEN 325 MG/1
650 TABLET ORAL
Status: DISCONTINUED | OUTPATIENT
Start: 2018-04-06 | End: 2018-04-06 | Stop reason: HOSPADM

## 2018-04-06 RX ORDER — ACETAMINOPHEN 325 MG/1
650 TABLET ORAL
Status: CANCELLED
Start: 2018-04-06

## 2018-04-06 RX ORDER — METHYLPREDNISOLONE SODIUM SUCCINATE 125 MG/2ML
100 INJECTION, POWDER, LYOPHILIZED, FOR SOLUTION INTRAMUSCULAR; INTRAVENOUS
Status: DISCONTINUED | OUTPATIENT
Start: 2018-04-06 | End: 2018-04-06 | Stop reason: HOSPADM

## 2018-04-06 RX ORDER — DIPHENHYDRAMINE HCL 25 MG
25 CAPSULE ORAL
Status: CANCELLED
Start: 2018-04-06

## 2018-04-06 RX ORDER — DIPHENHYDRAMINE HCL 25 MG
25 CAPSULE ORAL
Status: DISCONTINUED | OUTPATIENT
Start: 2018-04-06 | End: 2018-04-06 | Stop reason: HOSPADM

## 2018-04-06 RX ORDER — METHYLPREDNISOLONE SODIUM SUCCINATE 125 MG/2ML
100 INJECTION, POWDER, LYOPHILIZED, FOR SOLUTION INTRAMUSCULAR; INTRAVENOUS
Status: CANCELLED | OUTPATIENT
Start: 2018-04-06

## 2018-04-06 RX ADMIN — ACETAMINOPHEN 650 MG: 325 TABLET ORAL at 08:58

## 2018-04-06 RX ADMIN — Medication 25 MG: at 08:58

## 2018-04-06 RX ADMIN — Medication 250 ML: at 09:10

## 2018-04-06 RX ADMIN — METHYLPREDNISOLONE SODIUM SUCCINATE 100 MG: 125 INJECTION INTRAMUSCULAR; INTRAVENOUS at 09:13

## 2018-04-06 NOTE — PROGRESS NOTES
"Infusion Nursing Note:  Janice Mayfield presents today for Remicaide.    Patient seen by provider today: No   present during visit today: Not Applicable.    Note: N/A.    Intravenous Access:  Peripheral IV placed.    Treatment Conditions:  Rheumatology Infusion Checklist: PRIOR TO INFUSION OF BIOLOGICAL MEDICATIONS OR ANY OF THESE AS LISTED: Remicaide (infliximab) \".rheumbiologicalchecklist\"    Prior to Infusion of biological medications or any of these as listed:    1. Elevated temperature, fever, chills, productive cough or abnormal vital signs, night sweats, coughing up blood or sputum, no appetite or abnormal vital signs : NO    2. Open wounds or new incisions: NO    3. Recent hospitalization: NO    4.  Recent surgeries:  NO    5. Any upcoming surgeries or dental procedures?:NO    6. Any current or recent bouts of illness or infection? On any antibiotics? : NO    7. Any new, sudden or worsening abdominal pain :NO    8. Vaccination within 4 weeks? Patient or someone in the household is scheduled to receive vaccination? No live virus vaccines prior to or during treatment :NO    9. Any nervous system diseases [i.e. multiple sclerosis, Guillain-[place], seizures, neurological  changes]: NO    10. Pregnant or breast feeding; or plans on pregnancy in the future: NO    11. Signs of worsening depression or suicidal ideations while taking benlysta:NO    12. New-onset medical symptoms: NO    13.  New cancer diagnosis or on chemotherapy or radiation NO      14.  Evaluate for any sign of active TB [Unexplained weight loss, Loss of appetite, Night sweats, Fever, Fatigue, Chills, Coughing for 3 weeks or longer, Hemoptysis (coughing up blood), Chest pain]: NO    **Note: If answered yes to any of the above, hold the infusion and contact ordering rheumatologist or on-call rheumatologist.   .      Post Infusion Assessment:  Patient tolerated infusion without incident.  No evidence of extravasations.  Access " discontinued per protocol.  Rheumatology Post Infusion: POST-INFUSION OF BIOLOGICAL MEDICATION:    Reviewed with patient. Inform patient if any fever, chills or signs of infection, new symptoms, abdominal pain, heart palpitations, shortness of breath, reaction, weakness, neurological changes, seek medical attention immediately and should not receive infusions. No live virus vaccines prior to or during treatment or up to 6 months post infusion. If the patient has an upcoming procedure or surgery, this should be discussed with the rheumatologist and surgeon or provider..    Discharge Plan:   Discharge instructions reviewed with: Patient.  Patient and/or family verbalized understanding of discharge instructions and all questions answered.  Patient discharged in stable condition accompanied by: self  Departure Mode: Ambulatory.    Shanique Nair RN

## 2018-04-06 NOTE — MR AVS SNAPSHOT
After Visit Summary   4/6/2018    Janice Mayfield    MRN: [id number]           Patient Information     Date Of Birth          1960        Visit Information        Provider Department      4/6/2018 9:00 AM [place] [facility]        Today's Diagnoses     Rheumatoid arthritis involving multiple sites with positive rheumatoid factor (H)    -  1       Follow-ups after your visit        Your next 10 appointments already scheduled     May 04, 2018  9:00 AM CDT   Level 3 with [address] ([facility])    [address] [place] [id number] [phone]            May 11, 2018  7:30 AM CDT   Return Visit with LO Parra CNP   [facility] ([facility])    [address] [place] [id number] [phone]              Who to contact     If you have questions or need follow up information about today's clinic visit or your schedule please contact [facility] directly at [phone].  Normal or non-critical lab and imaging results will be communicated to you by Traackrhart, letter or phone within 4 business days after the clinic has received the results. If you do not hear from us within 7 days, please contact the clinic through Traackrhart or phone. If you have a critical or abnormal lab result, we will notify you by phone as soon as possible.  Submit refill requests through [employer] or call your pharmacy and they will forward the refill request to us. Please allow 3 business days for your refill to be completed.          Additional Information About Your Visit        Traackrhart Information     [employer] gives you secure access to your electronic health record. If you see a primary care provider, you can also send messages to your care team and make appointments. If you have questions, please call your primary care clinic.  If you do not have a primary  care provider, please call [phone] and they will assist you.      Jaman is an electronic gateway that provides easy, online access to your medical records. With Jaman, you can request a clinic appointment, read your test results, renew a prescription or communicate with your care team.     To access your existing account, please contact your [facility] or call [phone] for assistance.        Care EveryWhere ID     This is your Care EveryWhere ID. This could be used by other organizations to access your [place] medical records  [id number]        Your Vitals Were     Pulse Temperature Respirations Pulse Oximetry          67 98.2  F (36.8  C) (Oral) 18 99%         Blood Pressure from Last 3 Encounters:   04/06/18 107/75   03/09/18 116/75   01/25/18 112/62    Weight from Last 3 Encounters:   04/06/18 70.8 kg (156 lb)   03/09/18 70.3 kg (155 lb)   01/25/18 71.4 kg (157 lb 8 oz)              Today, you had the following     No orders found for display       Primary Care Provider Office Phone # Fax #    Marah Hare [phone]       [address] PEREZ   [facility] [address]        Equal Access to Services     AUREA RUIZ : Hadii aad ku hadasho Soomaali, waaxda luqadaha, qaybta kaalmada adeegyada, waxay tamarin haydadan danyelle del cid. So [facility] [phone].    ATENCIÓN: Si habla español, tiene a kelly disposición servicios gratuitos de asistencia lingüística. Llame al [phone].    We comply with applicable federal civil rights laws and [place] laws. We do not discriminate on the basis of race, color, national origin, age, disability, sex, sexual orientation, or gender identity.            Thank you!     Thank you for choosing [facility]  for your care. Our goal is always to provide you with excellent care. Hearing back from our patients is one way we can continue to improve our services. Please take a few minutes  to complete the written survey that you may receive in the mail after your visit with us. Thank you!             Your Updated Medication List - Protect others around you: Learn how to safely use, store and throw away your medicines at www.disposemymeds.org.          This list is accurate as of 4/6/18  4:12 PM.  Always use your most recent med list.                   Brand Name Dispense Instructions for use Diagnosis    BENADRYL PO      Take 25 mg by mouth as needed (Pre-medication for remicade)        folic acid 1 MG tablet    FOLVITE    180 tablet    Take 2 tablets (2 mg) by mouth daily    Rheumatoid arthritis involving multiple sites with positive rheumatoid factor (H), Long term (current) use of systemic steroids, Immunosuppression (H), Chronic left shoulder pain       methotrexate 2.5 MG tablet CHEMO     16 tablet    Take 4 tablets (10 mg) by mouth once a week Take 4 tablets (10mg) by mouth weekly. Labs every 8 weeks    Rheumatoid arthritis involving multiple sites with positive rheumatoid factor (H), Long term (current) use of systemic steroids, Immunosuppression (H), Chronic left shoulder pain       predniSONE 1 MG tablet    DELTASONE    360 tablet    Take 4 tablets (4 mg) by mouth daily Try taper by 1/2 mg day every 2 month until off if able    Rheumatoid arthritis involving multiple sites with positive rheumatoid factor (H), Long term (current) use of systemic steroids       REMICADE IV      Inject 100 mg into the vein every 28 days        TYLENOL PO      Take 650 mg by mouth once        Vitamin D (Cholecalciferol) 1000 UNITS Caps     1 capsule    Take 2,000 Units by mouth daily

## 2018-04-20 ENCOUNTER — TELEPHONE (OUTPATIENT)
Dept: RHEUMATOLOGY | Facility: CLINIC | Age: 58
End: 2018-04-20

## 2018-04-20 NOTE — TELEPHONE ENCOUNTER
[facility]     Therapy plan:   Received message from infusion team, therapy plan orders . Requested new therapy plan orders to be updated and signed by ordering provider.      Medication: RHEUM - INFLIXIMAB (REMICADE)   Dose: 5 mg/kg  Frequency: every 4 to 6 weeks  Route:IV  Pre-medications: tylenol 650 mg PO, benadryl 25 mg capsule, solu-medrol 100 mg IV  Standing lab orders: every 8-12 weeks due now    Rheumatology Provider: [facility]MP:  [facility],Rheumatology Pola Santana NMarlenPMarlen [phone]     Last infusion: 18  Next infusion: 18    Last Office Visit:  2017  Next Enc:  Visit date not found          Routed to provider for review and completion of therapy plan.      Tyesha Palmer, BSN, RN, PHN  Rheumatology Care Coordinator    [facility]

## 2018-04-23 RX ORDER — DIPHENHYDRAMINE HCL 25 MG
25 CAPSULE ORAL ONCE
Status: CANCELLED
Start: 2018-05-04 | End: 2018-05-04

## 2018-04-23 RX ORDER — METHYLPREDNISOLONE SODIUM SUCCINATE 125 MG/2ML
100 INJECTION, POWDER, LYOPHILIZED, FOR SOLUTION INTRAMUSCULAR; INTRAVENOUS ONCE
Status: CANCELLED | OUTPATIENT
Start: 2018-05-04 | End: 2018-05-04

## 2018-04-23 RX ORDER — ACETAMINOPHEN 325 MG/1
650 TABLET ORAL ONCE
Status: CANCELLED
Start: 2018-05-04 | End: 2018-05-04

## 2018-05-04 ENCOUNTER — INFUSION THERAPY VISIT (OUTPATIENT)
Dept: INFUSION THERAPY | Facility: CLINIC | Age: 58
End: 2018-05-04
Payer: COMMERCIAL

## 2018-05-04 VITALS
WEIGHT: 157 LBS | TEMPERATURE: 97.9 F | OXYGEN SATURATION: 97 % | SYSTOLIC BLOOD PRESSURE: 125 MMHG | DIASTOLIC BLOOD PRESSURE: 84 MMHG | HEART RATE: 56 BPM | RESPIRATION RATE: 16 BRPM

## 2018-05-04 DIAGNOSIS — G89.29 CHRONIC LEFT SHOULDER PAIN: ICD-10-CM

## 2018-05-04 DIAGNOSIS — M25.512 CHRONIC LEFT SHOULDER PAIN: ICD-10-CM

## 2018-05-04 DIAGNOSIS — M05.79 RHEUMATOID ARTHRITIS INVOLVING MULTIPLE SITES WITH POSITIVE RHEUMATOID FACTOR (H): Primary | ICD-10-CM

## 2018-05-04 DIAGNOSIS — D84.9 IMMUNOSUPPRESSION (H): ICD-10-CM

## 2018-05-04 DIAGNOSIS — Z79.52 LONG TERM (CURRENT) USE OF SYSTEMIC STEROIDS: ICD-10-CM

## 2018-05-04 LAB
ALBUMIN SERPL-MCNC: 3.2 G/DL (ref 3.4–5)
ALT SERPL W P-5'-P-CCNC: 19 U/L (ref 0–50)
AST SERPL W P-5'-P-CCNC: 24 U/L (ref 0–45)
BASOPHILS # BLD AUTO: 0 10E9/L (ref 0–0.2)
BASOPHILS NFR BLD AUTO: 0.5 %
CREAT SERPL-MCNC: 0.65 MG/DL (ref 0.52–1.04)
CRP SERPL-MCNC: <2.9 MG/L (ref 0–8)
DIFFERENTIAL METHOD BLD: ABNORMAL
EOSINOPHIL # BLD AUTO: 0.1 10E9/L (ref 0–0.7)
EOSINOPHIL NFR BLD AUTO: 1 %
ERYTHROCYTE [DISTWIDTH] IN BLOOD BY AUTOMATED COUNT: 13.9 % (ref 10–15)
ERYTHROCYTE [SEDIMENTATION RATE] IN BLOOD BY WESTERGREN METHOD: 16 MM/H (ref 0–30)
GFR SERPL CREATININE-BSD FRML MDRD: >90 ML/MIN/1.7M2
HCT VFR BLD AUTO: 38.1 % (ref 35–47)
HGB BLD-MCNC: 11.9 G/DL (ref 11.7–15.7)
IMM GRANULOCYTES # BLD: 0 10E9/L (ref 0–0.4)
IMM GRANULOCYTES NFR BLD: 0.2 %
LYMPHOCYTES # BLD AUTO: 2.3 10E9/L (ref 0.8–5.3)
LYMPHOCYTES NFR BLD AUTO: 38.9 %
MCH RBC QN AUTO: 28.6 PG (ref 26.5–33)
MCHC RBC AUTO-ENTMCNC: 31.2 G/DL (ref 31.5–36.5)
MCV RBC AUTO: 92 FL (ref 78–100)
MONOCYTES # BLD AUTO: 0.9 10E9/L (ref 0–1.3)
MONOCYTES NFR BLD AUTO: 14.8 %
NEUTROPHILS # BLD AUTO: 2.7 10E9/L (ref 1.6–8.3)
NEUTROPHILS NFR BLD AUTO: 44.6 %
PLATELET # BLD AUTO: 252 10E9/L (ref 150–450)
RBC # BLD AUTO: 4.16 10E12/L (ref 3.8–5.2)
WBC # BLD AUTO: 6 10E9/L (ref 4–11)

## 2018-05-04 PROCEDURE — 85652 RBC SED RATE AUTOMATED: CPT | Performed by: INTERNAL MEDICINE

## 2018-05-04 PROCEDURE — 82040 ASSAY OF SERUM ALBUMIN: CPT | Performed by: INTERNAL MEDICINE

## 2018-05-04 PROCEDURE — 82565 ASSAY OF CREATININE: CPT | Performed by: INTERNAL MEDICINE

## 2018-05-04 PROCEDURE — 85025 COMPLETE CBC W/AUTO DIFF WBC: CPT | Performed by: INTERNAL MEDICINE

## 2018-05-04 PROCEDURE — 84450 TRANSFERASE (AST) (SGOT): CPT | Performed by: INTERNAL MEDICINE

## 2018-05-04 PROCEDURE — 84460 ALANINE AMINO (ALT) (SGPT): CPT | Performed by: INTERNAL MEDICINE

## 2018-05-04 PROCEDURE — 86140 C-REACTIVE PROTEIN: CPT | Performed by: INTERNAL MEDICINE

## 2018-05-04 PROCEDURE — 96415 CHEMO IV INFUSION ADDL HR: CPT | Performed by: INTERNAL MEDICINE

## 2018-05-04 PROCEDURE — 99207 ZZC NO CHARGE LOS: CPT

## 2018-05-04 PROCEDURE — 96413 CHEMO IV INFUSION 1 HR: CPT | Performed by: INTERNAL MEDICINE

## 2018-05-04 PROCEDURE — 96375 TX/PRO/DX INJ NEW DRUG ADDON: CPT | Performed by: INTERNAL MEDICINE

## 2018-05-04 RX ORDER — ACETAMINOPHEN 325 MG/1
650 TABLET ORAL ONCE
Status: CANCELLED
Start: 2018-05-04 | End: 2018-05-04

## 2018-05-04 RX ORDER — ACETAMINOPHEN 325 MG/1
650 TABLET ORAL ONCE
Status: COMPLETED | OUTPATIENT
Start: 2018-05-04 | End: 2018-05-04

## 2018-05-04 RX ORDER — DIPHENHYDRAMINE HCL 25 MG
25 CAPSULE ORAL ONCE
Status: CANCELLED
Start: 2018-05-04 | End: 2018-05-04

## 2018-05-04 RX ORDER — METHYLPREDNISOLONE SODIUM SUCCINATE 125 MG/2ML
100 INJECTION, POWDER, LYOPHILIZED, FOR SOLUTION INTRAMUSCULAR; INTRAVENOUS ONCE
Status: CANCELLED | OUTPATIENT
Start: 2018-05-04 | End: 2018-05-04

## 2018-05-04 RX ORDER — METHYLPREDNISOLONE SODIUM SUCCINATE 125 MG/2ML
100 INJECTION, POWDER, LYOPHILIZED, FOR SOLUTION INTRAMUSCULAR; INTRAVENOUS ONCE
Status: COMPLETED | OUTPATIENT
Start: 2018-05-04 | End: 2018-05-04

## 2018-05-04 RX ORDER — DIPHENHYDRAMINE HCL 25 MG
25 CAPSULE ORAL ONCE
Status: COMPLETED | OUTPATIENT
Start: 2018-05-04 | End: 2018-05-04

## 2018-05-04 RX ADMIN — ACETAMINOPHEN 650 MG: 325 TABLET ORAL at 09:26

## 2018-05-04 RX ADMIN — Medication 250 ML: at 09:40

## 2018-05-04 RX ADMIN — METHYLPREDNISOLONE SODIUM SUCCINATE 100 MG: 125 INJECTION INTRAMUSCULAR; INTRAVENOUS at 09:40

## 2018-05-04 RX ADMIN — Medication 25 MG: at 09:26

## 2018-05-04 ASSESSMENT — PAIN SCALES - GENERAL: PAINLEVEL: NO PAIN (0)

## 2018-05-04 NOTE — MR AVS SNAPSHOT
After Visit Summary   5/4/2018    Janice Mayfield    MRN: [id number]           Patient Information     Date Of Birth          1960        Visit Information        Provider Department      5/4/2018 9:00 AM [place] [facility]        Today's Diagnoses     Rheumatoid arthritis involving multiple sites with positive rheumatoid factor (H)    -  1    Chronic left shoulder pain        Long term (current) use of systemic steroids        Immunosuppression (H)           Follow-ups after your visit        Your next 10 appointments already scheduled     May 11, 2018  7:30 AM CDT   Return Visit with LO Parra [facility] ([facility])    [address] [place] [address] [id number] [phone]            Jun 01, 2018  9:30 AM CDT   Level 3 with [address] ([facility])    [address] [place] [id number] [phone]              Who to contact     If you have questions or need follow up information about today's clinic visit or your schedule please contact [facility] [[email or web address]] directly at [phone].  Normal or non-critical lab and imaging results will be communicated to you by MyChart, letter or phone within 4 business days after the clinic has received the results. If you do not hear from us within 7 days, please contact the clinic through CallMinerhart or phone. If you have a critical or abnormal lab result, we will notify you by phone as soon as possible.  Submit refill requests through [employer] ([employer]) or call your pharmacy and they will forward the refill request to us. Please allow 3 business days for your refill to be completed.          Additional Information About Your Visit        MyChart Information     [employer] ([employer]) gives you secure access to your electronic health record. If you see a primary care provider, you can also send messages to your care  team and make appointments. If you have questions, please call your primary care clinic.  If you do not have a primary care provider, please call [phone] and they will assist you.      [facility] is an electronic gateway that provides easy, online access to your medical records. With [facility], you can request a clinic appointment, read your test results, renew a prescription or communicate with your care team.     To access your existing account, please contact your [facility] or call [phone] for assistance.        Care EveryWhere ID     This is your Care EveryWhere ID. This could be used by other organizations to access your [place] medical records  [id number]        Your Vitals Were     Pulse Temperature Respirations Pulse Oximetry          56 97.9  F (36.6  C) 16 97%         Blood Pressure from Last 3 Encounters:   05/04/18 125/84   04/06/18 107/75   03/09/18 116/75    Weight from Last 3 Encounters:   05/04/18 71.2 kg (157 lb)   04/06/18 70.8 kg (156 lb)   03/09/18 70.3 kg (155 lb)              We Performed the Following     Albumin level     ALT     AST     CBC with platelets differential     Creatinine     CRP inflammation     Erythrocyte sedimentation rate auto        Primary Care Provider Office Phone # Fax #    Marah Hare [phone]       [address] BAHENA [address] [place] [address]        Equal Access to Services     AUREA RUIZ : Hadii aad ku hadasho Soomaali, waaxda luqadaha, qaybta kaalmada adeegyada, kadeem del cid. So [facility] [phone].    ATENCIÓN: Si habla español, tiene a kelly disposición servicios gratuitos de asistencia lingüística. Llame al [phone].    We comply with applicable federal civil rights laws and [place] laws. We do not discriminate on the basis of race, color, national origin, age, disability, sex, sexual orientation, or gender identity.            Thank you!     Thank you  for choosing [facility]  for your care. Our goal is always to provide you with excellent care. Hearing back from our patients is one way we can continue to improve our services. Please take a few minutes to complete the written survey that you may receive in the mail after your visit with us. Thank you!             Your Updated Medication List - Protect others around you: Learn how to safely use, store and throw away your medicines at www.disposemymeds.org.          This list is accurate as of 5/4/18  1:30 PM.  Always use your most recent med list.                   Brand Name Dispense Instructions for use Diagnosis    BENADRYL PO      Take 25 mg by mouth as needed (Pre-medication for remicade)        folic acid 1 MG tablet    FOLVITE    180 tablet    Take 2 tablets (2 mg) by mouth daily    Rheumatoid arthritis involving multiple sites with positive rheumatoid factor (H), Long term (current) use of systemic steroids, Immunosuppression (H), Chronic left shoulder pain       methotrexate 2.5 MG tablet CHEMO     16 tablet    Take 4 tablets (10 mg) by mouth once a week Take 4 tablets (10mg) by mouth weekly. Labs every 8 weeks    Rheumatoid arthritis involving multiple sites with positive rheumatoid factor (H), Long term (current) use of systemic steroids, Immunosuppression (H), Chronic left shoulder pain       predniSONE 1 MG tablet    DELTASONE    360 tablet    Take 4 tablets (4 mg) by mouth daily Try taper by 1/2 mg day every 2 month until off if able    Rheumatoid arthritis involving multiple sites with positive rheumatoid factor (H), Long term (current) use of systemic steroids       REMICADE IV      Inject 100 mg into the vein every 28 days        TYLENOL PO      Take 650 mg by mouth once        Vitamin D (Cholecalciferol) 1000 units Caps     1 capsule    Take 2,000 Units by mouth daily

## 2018-05-04 NOTE — PROGRESS NOTES
Infusion Nursing Note:  Janice Mayfield presents today for Remicade.    Patient seen by provider today: No   present during visit today: Not Applicable.    Note: Remicade ordered every 4-6 weeks. Last received 4/6/18    Intravenous Access:  Peripheral IV placed.    Treatment Conditions:    Labs ordered every 8-12 weeks. Last done 1/25, drawn today      ~~~ NOTE: If the patient answers yes to any of the questions below, hold the infusion and contact ordering provider or on-call provider.    1. Have you recently had an elevated temperature, fever, chills, productive cough, coughing for 3 weeks or longer or hemoptysis,  abnormal vital signs, night sweats,  chest pain or have you noticed a decrease in your appetite, unexplained weight loss or fatigue? No  2. Do you have any open wounds or new incisions? No  3. Do you have any recent or upcoming hospitalizations, surgeries or dental procedures? No  4. Do you currently have or recently have had any signs of illness or infection or are you on any antibiotics? No  5. Have you had any new, sudden or worsening abdominal pain? No  6. Have you or anyone in your household received a live vaccination in the past 4 weeks? Please note:  No live vaccines while on biologic/chemotherapy until 6 months after the last treatment.  Patient can receive the flu vaccine (shot only) and the pneumovax.  It is optimal for the patient to get these vaccines mid cycle, but they can be given at any time as long as it is not on the day of the infusion. No  7. Have you recently been diagnosed with any new nervous system diseases (ie. Multiple sclerosis, Guillain [place], seizures, neurological changes) or cancer diagnosis? Are you on any form of radiation or chemotherapy? No  8. Are you pregnant or breast feeding or do you have plans of pregnancy in the future? No  9. Have you been having any signs of worsening depression or suicidal ideations?  (benlysta only) No  10. Have there  been any other new onset medical symptoms? No      Post Infusion Assessment:  Patient tolerated infusion without incident.  Blood return noted pre and post infusion.  No evidence of extravasations.  Access discontinued per protocol.    Discharge Plan:   Schedule reviewed with patient and/or family.  Patient will return 6/1 for next appointment.  Patient discharged in stable condition accompanied by: self.  Departure Mode: Ambulatory.    Monae Zhu RN

## 2018-05-04 NOTE — PROGRESS NOTES
The blood counts, liver, kidney and CRP inflammation labs are normal.     Pola BLANCHARD, CNP, MSN  5/4/2018  10:14 AM

## 2018-05-11 ENCOUNTER — OFFICE VISIT (OUTPATIENT)
Dept: RHEUMATOLOGY | Facility: CLINIC | Age: 58
End: 2018-05-11
Payer: COMMERCIAL

## 2018-05-11 VITALS
HEART RATE: 72 BPM | HEIGHT: 65 IN | OXYGEN SATURATION: 99 % | WEIGHT: 155.5 LBS | SYSTOLIC BLOOD PRESSURE: 117 MMHG | BODY MASS INDEX: 25.91 KG/M2 | DIASTOLIC BLOOD PRESSURE: 71 MMHG

## 2018-05-11 DIAGNOSIS — Z79.52 LONG TERM CURRENT USE OF SYSTEMIC STEROIDS: ICD-10-CM

## 2018-05-11 DIAGNOSIS — M25.512 CHRONIC LEFT SHOULDER PAIN: ICD-10-CM

## 2018-05-11 DIAGNOSIS — G89.29 CHRONIC LEFT SHOULDER PAIN: ICD-10-CM

## 2018-05-11 DIAGNOSIS — M15.3 OTHER SECONDARY OSTEOARTHRITIS OF MULTIPLE SITES: ICD-10-CM

## 2018-05-11 DIAGNOSIS — Z79.60 LONG-TERM USE OF IMMUNOSUPPRESSANT MEDICATION: ICD-10-CM

## 2018-05-11 DIAGNOSIS — M75.42 IMPINGEMENT SYNDROME OF SHOULDER REGION, LEFT: ICD-10-CM

## 2018-05-11 DIAGNOSIS — M05.79 RHEUMATOID ARTHRITIS INVOLVING MULTIPLE SITES WITH POSITIVE RHEUMATOID FACTOR (H): Primary | Chronic | ICD-10-CM

## 2018-05-11 DIAGNOSIS — E55.9 VITAMIN D DEFICIENCY: ICD-10-CM

## 2018-05-11 PROCEDURE — 99214 OFFICE O/P EST MOD 30 MIN: CPT | Performed by: NURSE PRACTITIONER

## 2018-05-11 RX ORDER — FOLIC ACID 1 MG/1
2 TABLET ORAL DAILY
Qty: 180 TABLET | Refills: 1 | Status: SHIPPED | OUTPATIENT
Start: 2018-05-11 | End: 2018-07-06

## 2018-05-11 RX ORDER — PREDNISONE 1 MG/1
4 TABLET ORAL DAILY
Qty: 360 TABLET | Refills: 3 | Status: SHIPPED | OUTPATIENT
Start: 2018-05-11 | End: 2019-05-18

## 2018-05-11 ASSESSMENT — CLINICAL DISEASE ACTIVITY INDEX (CDAI): TOTAL_SCORE: 6

## 2018-05-11 ASSESSMENT — DISEASE ACTIVITY SCORE (DAS28)
ESR_MM_PER_HR: LOW 2.6-3.2
ESR_MM_PER_HR: 3.06
CRP_MG_PER_LITER: 2.57
CRP_MG_PER_LITER: REMISSION <2.6

## 2018-05-11 ASSESSMENT — ROUTINE ASSESSMENT OF PATIENT INDEX DATA (RAPID3)
TOTAL RAPID3 SCORE: 5.3
RAPID3 INTERPRETATION: LOW 3.1-6

## 2018-05-11 ASSESSMENT — PAIN SCALES - GENERAL: PAINLEVEL: NO PAIN (0)

## 2018-05-11 ASSESSMENT — JOINT PAIN: TOTAL NUMBER OF SWOLLEN JOINTS: 1

## 2018-05-11 NOTE — PATIENT INSTRUCTIONS
PNEUMONIA VACCINES RECOMMENDS     SHINGRIX     Shingrix vaccine. Recommended shingrix. CDC recommends this vaccine 2 doses,  by 2-6 months for adults 50 years and older. It is killed virus and ok to be used in immunocompromised patients. Shingrix reduces the risk of shingles and post-herpetic neuralgia (PH) by more than 90% in people 50 and older.      Possible side-effects include: pain, redness and swelling at the inj site, myalgia, fatigue, headaches, shivering, fever and stomach upset. Vaccine is indicated as immunocompromised    Shingrix is an inactive, subunit vaccine for H. zoster, >50 yrs, given as 2 IM doses (0.5 mL each), 2nd dose 2-6 months after 1st. Shingrix is more effective than Zostavax but has more possible side effects.     May get a more robust response to this if holds or off of the methotrexate for a couple weeks so then hold her next week's dosing then get the vaccination remain off of it for another week before restarts

## 2018-05-11 NOTE — MR AVS SNAPSHOT
After Visit Summary   5/11/2018    Janice Mayfield    MRN: [id number]           Patient Information     Date Of Birth          1960        Visit Information        Provider Department      5/11/2018 7:30 AM Pola Santana APRN CNP M [facility]        Today's Diagnoses     Rheumatoid arthritis involving multiple sites with positive rheumatoid factor (H)    -  1    Other secondary osteoarthritis of multiple sites        Vitamin D deficiency        Long term current use of systemic steroids        Long-term use of immunosuppressant medication        Chronic left shoulder pain        Impingement syndrome of shoulder region, left          Care Instructions    PNEUMONIA VACCINES RECOMMENDS     SHINGRIX     Shingrix vaccine. Recommended shingrix. CDC recommends this vaccine 2 doses,  by 2-6 months for adults 50 years and older. It is killed virus and ok to be used in immunocompromised patients. Shingrix reduces the risk of shingles and post-herpetic neuralgia (PH) by more than 90% in people 50 and older.      Possible side-effects include: pain, redness and swelling at the inj site, myalgia, fatigue, headaches, shivering, fever and stomach upset. Vaccine is indicated as immunocompromised    Shingrix is an inactive, subunit vaccine for H. zoster, >50 yrs, given as 2 IM doses (0.5 mL each), 2nd dose 2-6 months after 1st. Shingrix is more effective than Zostavax but has more possible side effects.     May get a more robust response to this if holds or off of the methotrexate for a couple weeks so then hold her next week's dosing then get the vaccination remain off of it for another week before restarts             Follow-ups after your visit        Additional Services     PHYSICAL THERAPY REFERRAL       *This therapy referral will be filtered to a centralized scheduling office at [facility] and the patient will receive a call to schedule an  "appointment at a [facility] location most convenient for them. *     [facility] provides Physical Therapy evaluation and treatment and many specialty services across the [facility] system.  If requesting a specialty program, please choose from the list below.    If you have not heard from the scheduling office within 2 business days, please call [phone] for all locations, with the exception of [place], please call [phone] and [facility] [place], please call [phone]  Treatment: Evaluation & Treatment  Special Instructions/Modalities: cervical prevauation  Special Programs: None    Please be aware that coverage of these services is subject to the terms and limitations of your health insurance plan.  Call member services at your health plan with any benefit or coverage questions.      **Note to Provider:  If you are referring outside of [facility] for the therapy appointment, please list the name of the location in the \"special instructions\" above, print the referral and give to the patient to schedule the appointment.                  Follow-up notes from your care team     Return in about 6 months (around 11/11/2018) for Annual Physical Exam with Primary Care Provider.      Your next 10 appointments already scheduled     Jun 01, 2018  9:30 AM CDT   Level 3 with [address] ([facility])    [id number] [place] [address] [id number] [phone]            Jun 22, 2018  7:30 AM CDT   LAB with LAB FIRST FLOOR [facility] ([facility])    [id number] [place] [address] [id number] [phone]           Please do not eat 10-12 hours before your appointment if you are coming in fasting for labs on lipids, cholesterol, or glucose (sugar). This does not apply to pregnant women. Water, hot tea and black coffee (with nothing added) are okay. Do not drink other fluids, diet soda or chew gum.         "    Nov 02, 2018  7:30 AM CDT   Return Visit with LO Parra CNP   [facility] ([facility])    [address] [place] [id number] [phone]              Future tests that were ordered for you today     Open Standing Orders        Priority Remaining Interval Expires Ordered    Albumin level Routine 6/6 every 8-12 week 5/11/2019 5/11/2018    ALT Routine 6/6 every 8-12 week 5/11/2019 5/11/2018    AST Routine 6/6 every 8-12 week 5/11/2019 5/11/2018    Creatinine Routine 6/6 every 8-12 week 5/11/2019 5/11/2018    CRP inflammation Routine 6/6 every 8-12 week 5/11/2019 5/11/2018    CBC with platelets Routine 6/6 every 8-12 week 5/11/2019 5/11/2018          Open Future Orders        Priority Expected Expires Ordered    Albumin level Routine 6/15/2018 6/29/2018 5/11/2018    ALT Routine 6/15/2018 6/29/2018 5/11/2018    AST Routine 6/15/2018 6/29/2018 5/11/2018    CBC with platelets Routine 6/15/2018 6/29/2018 5/11/2018            Who to contact     If you have questions or need follow up information about today's clinic visit or your schedule please contact [facility] directly at [phone].  Normal or non-critical lab and imaging results will be communicated to you by [email or web address], letter or phone within 4 business days after the clinic has received the results. If you do not hear from us within 7 days, please contact the clinic through [email or web address] or phone. If you have a critical or abnormal lab result, we will notify you by phone as soon as possible.  Submit refill requests through [facility] or call your pharmacy and they will forward the refill request to us. Please allow 3 business days for your refill to be completed.          Additional Information About Your Visit        [email or web address] Information     [facility] gives you secure access to your electronic health record. If you see a primary care provider, you can also send messages to your care team and  "make appointments. If you have questions, please call your primary care clinic.  If you do not have a primary care provider, please call [phone] and they will assist you.      [employer] is an electronic gateway that provides easy, online access to your medical records. With [employer], you can request a clinic appointment, read your test results, renew a prescription or communicate with your care team.     To access your existing account, please contact your [facility] or call [phone] for assistance.        Care EveryWhere ID     This is your Care EveryWhere ID. This could be used by other organizations to access your [facility] medical records  [id number]        Your Vitals Were     Pulse Height Pulse Oximetry BMI (Body Mass Index)          72 1.638 m (5' 4.5\") 99% 26.28 kg/m2         Blood Pressure from Last 3 Encounters:   05/11/18 117/71   05/04/18 125/84   04/06/18 107/75    Weight from Last 3 Encounters:   05/11/18 70.5 kg (155 lb 8 oz)   05/04/18 71.2 kg (157 lb)   04/06/18 70.8 kg (156 lb)              We Performed the Following     PHYSICAL THERAPY REFERRAL          Today's Medication Changes          These changes are accurate as of 5/11/18  8:18 AM.  If you have any questions, ask your nurse or doctor.               These medicines have changed or have updated prescriptions.        Dose/Directions    methotrexate 2.5 MG tablet CHEMO   This may have changed:    - how much to take  - additional instructions   Used for:  Rheumatoid arthritis involving multiple sites with positive rheumatoid factor (H), Chronic left shoulder pain, Other secondary osteoarthritis of multiple sites, Vitamin D deficiency, Long term current use of systemic steroids, Long-term use of immunosuppressant medication   Changed by:  Pola Snatana APRN CNP        Dose:  12.5 mg   Take 5 tablets (12.5 mg) by mouth once a week Labs in 6 weeks   Quantity:  20 tablet   Refills:  3            Where to " get your medicines      These medications were sent to [facility]/pharmacy #[id number] - [place], [place] - [address]  [address] [place] [address]     Phone:  [phone]     folic acid 1 MG tablet    methotrexate 2.5 MG tablet CHEMO    predniSONE 1 MG tablet                Primary Care Provider Office Phone # Fax #    Marah Hare [phone]       [facility] [address]   [place] [address]        Equal Access to Services     CHER RUIZ : Hadii aad ku hadasho Soomaali, waaxda luqadaha, qaybta kaalmada adeegyada, waxay idiin haydadan danyelle rojo . So [facility] [phone].    ATENCIÓN: Si habla español, tiene a kelly disposición servicios gratuitos de asistencia lingüística. [facility] [phone].    We comply with applicable federal civil rights laws and [place] laws. We do not discriminate on the basis of race, color, national origin, age, disability, sex, sexual orientation, or gender identity.            Thank you!     Thank you for choosing [facility]  for your care. Our goal is always to provide you with excellent care. Hearing back from our patients is one way we can continue to improve our services. Please take a few minutes to complete the written survey that you may receive in the mail after your visit with us. Thank you!             Your Updated Medication List - Protect others around you: Learn how to safely use, store and throw away your medicines at www.disposemymeds.org.          This list is accurate as of 5/11/18  8:18 AM.  Always use your most recent med list.                   Brand Name Dispense Instructions for use Diagnosis    BENADRYL PO      Take 25 mg by mouth as needed (Pre-medication for remicade)        folic acid 1 MG tablet    FOLVITE    180 tablet    Take 2 tablets (2 mg) by mouth daily    Rheumatoid arthritis involving multiple sites with positive rheumatoid factor (H), Chronic left shoulder pain,  Other secondary osteoarthritis of multiple sites, Vitamin D deficiency, Long term current use of systemic steroids, Long-term use of immunosuppressant medication       methotrexate 2.5 MG tablet CHEMO     20 tablet    Take 5 tablets (12.5 mg) by mouth once a week Labs in 6 weeks    Rheumatoid arthritis involving multiple sites with positive rheumatoid factor (H), Chronic left shoulder pain, Other secondary osteoarthritis of multiple sites, Vitamin D deficiency, Long term current use of systemic steroids, Long-term use of immunosuppressant medication       predniSONE 1 MG tablet    DELTASONE    360 tablet    Take 4 tablets (4 mg) by mouth daily Try taper by 1/2 mg day every 2 month until off if able    Rheumatoid arthritis involving multiple sites with positive rheumatoid factor (H), Other secondary osteoarthritis of multiple sites, Vitamin D deficiency, Long term current use of systemic steroids, Long-term use of immunosuppressant medication, Chronic left shoulder pain       REMICADE IV      Inject 100 mg into the vein every 28 days        TYLENOL PO      Take 650 mg by mouth once        Vitamin D (Cholecalciferol) 1000 units Caps     1 capsule    Take 2,000 Units by mouth daily

## 2018-05-11 NOTE — NURSING NOTE
Janice Mayfield's goals for this visit include: return  She requests these members of her care team be copied on today's visit information:     PCP: Marah Hare    Referring Provider:  No referring provider defined for this encounter.    @[facility]@    Do you need any medication refills at today's visit? ?

## 2018-05-11 NOTE — PROGRESS NOTES
Rheumatology Visit     Janice Mayfield MRN# [id number]   YOB: 1960 Age: 57 year old     Date of visit: May 11, 2018  Last visit: April 28, 2017  Primary care provider: Marah Hare          Assessment/Plan:     1. Erosive (deforming as lost ROM left shoulder, right wrist) Rheumatoid Arthritis w/+RF (-CCP/ZANDER, x-rays severe degenerative changes RC b/l, Rt wrist total loss space between triquetrum and marginal erosions diffuse; left shoulder erosion/mild GH OA) controlled on infliximab 5 mg/kg every 4-6 week, methotrexate 10 mg week, prednisone 4 mg day- no flaring but not able to wean prednisone. RA activity =remission to low. Labs 5-2018 NL including CRP Exam shows right wrist drop and reduced ROM left shoulder (chronic). We again discussed the long-term use of prednisone, and she did agree to higher dose MTX to to try to reduce with the goal to wean off the steroid. I will repeat x-rays in 1 yr. No sulfa--reactions to this. Repeat labs in 4-6 week.    2. Chronic left shoulder, Mild OA with erosive changes on x-rya . Seen ortho-return prn  3. Osteopenia. Chronic prednisone use 4 mg day. DEXA due next time last . Continue calcium and vitamin D. Check vitamin D today. May taper by 0.5-1 mg every 1-2 month until off.    4. Immunosuppression, long-term risk medication. Will check labs every 8-12 weeks while on immunosuppresive therapy and hx elevated liver enzymes.     Declines pnuemonia or shingrix vaccines   The patient understood the rational for the diagnosis and treatment plan. Patient shared in the decision making. All questions were answered to best of my ability and the patient's satisfaction. Sing-up for [facility]. She agrees to this plan.   Pola Santana APRN, CNP, MSN  [facility]  Department of Rheumatology & Autoimmune Disorders          History of Present Illness:   Janice Mayfield is a 57 year old female who f/u erosive RA +RF -CCP  "-ZANDER/C3/C4, -ANCA -HLA B27. Prior care at [facility] w/ Andry Jones.     Review: hydroxychloroquine, methotrexate (d/c liver enzymes elevation), enbrel (ineffective), humira *d/c (uticarial reaction), sulfites (anxiety) --so no sulfasalazine     HISTORY CARRIED FORWARD 4-: Went to [facility] in a wheelchair [\"knees were melons\", pads of feet, not use hands, wrists]. On remicade 5 mg/kg Q 4-5 week infusions with pre-meds once a month (Jan 2011) last infusion about 6 weeks ago, FA 1 mg/day, MTX 10 mg Q Sunday week and prednisone 4-5 mg day. Increased remicade was every 4-5 weeks as was wearing down rather then increase the dose. Tolerating and s/e. Mild foggy day after taking methotrexate (no SI, hairloss, diarrhea), FA 1 mg day   Eats healthy. Reports arthritis is controlled, functioning well and not in wheelchair. Weather affects her arthritis. Root canal 2 weeks ago due to tooth abscess \"thinning of your jaw\". No flaring with her RA and this treatment. shouilder and wrist, knee so mild compared to when dx but is noticing she is due. Hx right wrist injection 2 yr ago. Pain is 4 out 10.     May 11, 2018  On remicade 5 mg/kg Q 4-5 week infusions with pre-meds once a month (Jan 2011) last infusion 5-2018 tolerated well , FA 1 mg/day, MTX 10 mg Q Sunday week and prednisone 4 mg day, folic acid 2 mg day. Tolerating well. No infections. No RA flares or EAS. No joint pain or swelling. Did not do PT for her left shoulder, this is still limited with the ROM but she is wishing to do this now since she has more time. No swelling. Mild plan left shoulder and right wrist where the prior damage was. Mild \"delay\" in thinking day of MTX but tolerating and the higher dose of folic acid is working well     No infectious over the years.         Past Medical/Surgical History:   Injuries-None  No Blood transfusions  Medical-RA, osteopenia (DEXA  in 2008 -0.6, postemenopausal, anemia, elevated liver enzymes [facility] liver[facility]; initially hep C + but HCV RNA neg), polyclonal hypergammaglobuinemia, diverticulum,   No past medical history on file.    Surgical-  No past surgical history on file.          Family/Social History:   Family-  No autoimmune disorders, psoriasis, UC, crohn's, SLE, RA, PsA, gout.   No MS  Mother-Uterine CA-passed  Father-alive PPM and arrythmia  MGM-parkinsons, OP, possible arthritis-passed  EPF-VZH-heefsz  PGM-brain CA, OP-passed  PGF-colon CA-passed  2 brothers-start to have heart issue  2 sisters-healthy HTN  2 children  Common low BP and low HR, varicose vein, arrhythmia    Social-  No Alcohol. Never Smoking. 3 Children (youngest deformed pulmonary valve s/p OHS). NO IVDU or drug use. 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              Allergies/Medications:     Allergies   Allergen Reactions     Humira      hives     Methotrexate      anxiety     Plaquenil [Hydroxychloroquine]      Elevated LFTs     Sulfa Drugs      delusions       No outpatient prescriptions have been marked as taking for the 5/11/18 encounter (Appointment) with Pola Santana, LO CNP.            Review of Systems:   Negative raynaud s phenomena, hairloss, sun sensitivities, keratoconjunctivitis sicca, pleurisy, inflammatory joint symptoms, significant rashes like malar, oral/nasal or ulcerations, inflammatory eye disease, inflammatory bowel disease, dactylitis, tenosynovitis, or enthespathy. Negative for miscarriages over 2 months into pregnancy, blood clots, gout, psoriasis, UC, crohn's. No temporal headache, no jaw claudication, no scalp tenderness, vision changes, carotidynia, cough  +See MDHAQ  +less now some raynauds rare  +dry eyes  +hemorrhoids   CONSTITUTIONAL: No fevers, night sweats or unintentional weight change. No acute distress, swollen glands  EYES: No vision change, diplopia, pain in eyes or red eyes   EARS, NOSE, MOUTH, THROAT: No tinnitus or hearing change, no  epistaxis or nasal discharge, no oral lesions, throat clear. Normal saliva pool.  No drymouth. No thyroid enlargement  CARDIOVASCULAR: No chest pain, palpitations, or pain with walking, no orthopnea or PND   RESPIRATORY: No dyspnea, cough, shortness of breath or wheezing. No pleurisy.   GI: No nausea, vomiting, diarrhea or constipation, no abdominal pain, or blood in stools.   : No change in urine, no dysuria or hematuria   MUSCKL: No swollen, tender, red or painful joints. No nodules. No enthesitis, plantar fascitis or heel pain.   INTEGUMENTARY: No concerning lesions or moles   NEURO: No loss of strength or sensation, no numbness or tingling, no tremor, no dizziness, no headache. No falls   ENDO: No polyuria or polydipsia, no temperature intolerance   HEME/LYMPH:No concerning bumps, bleeding problems, or swollen lymph nodes. No recent infections, hospitalizations or new illnesses.   ALLERGY: No environmental allergies   PSYCH:No depression or anxiety, no sleep problems.  Otherwise 14 point ROS obtained, reviewed and found negative.          Physical Exam:     Constitutional: WD-WN-WG cooperative  Eyes: nl EOM, PERRLA, vision, conjunctiva, sclera  ENT: nl external ears, nose, hearing, lips, teeth, gums, throat. Nl saliva pool  No mucous membrane lesions, normal saliva pool  Neck: no mass or thyroid enlargement. non-tender.  Resp: lungs clear to auscultation, nl to palpation, nl breath sounds  CV: RRR, no murmurs, rubs or gallops, no edema  GI: no ABD mass or tenderness, no HSM. No RUQ pain.   : not tested  Lymph: no cervical, supraclavicular, inguinal or epitrochlear nodes  MSK: Right wrist drop wirh reduced flexion and extension, left shoulder reduced ER about 50% shoulder lifts. right halgus valgus bunions  All other TMJ, neck, shoulder, elbow, wrist, MCP/PIP/DIP, spine, hip, knee, ankle, and foot MTP/IP joints were examined and  found normal. NL prayer sign. No periuncle erythema. Normal  strength. No  dactylitis,  tenosynovitis, enthespathy. Negative MCP and MTP squeeze. Negative Lhermitte's sign.   Skin: No nail pitting, alopecia, rash, nodules or lesions.   Neuro: nl cranial nerves, strength, sensation  Psych: nl judgement, orientation, memory, affect.         Labs/Imaging:   Reviewed medications, labs, imaging, and EMR.   Reviewed Rheumatology Lab Flowsheet & Lab Flowsheet    TSH Nl 2016  SPEP nl 2011  Anti-smooth, antimitochrondial, myelo AB amd PR3 NL in 2011  Lymes neg 2011    DEXA 5-2016  Osteopenia z-0.4 t-1  Xrays feet 2-2014 negative  2-2014 B/L hand joint space narrowing and erosive change on radiocarpal  2011 neg SI x-ays     Endoscopy/colonoscopy 5-2016 single diverticulum dx diverticulosis in colon     Results for orders placed or performed in visit on 05/04/18   CBC with platelets differential   Result Value Ref Range    WBC 6.0 4.0 - 11.0 10e9/L    RBC Count 4.16 3.8 - 5.2 10e12/L    Hemoglobin 11.9 11.7 - 15.7 g/dL    Hematocrit 38.1 35.0 - 47.0 %    MCV 92 78 - 100 fl    MCH 28.6 26.5 - 33.0 pg    MCHC 31.2 (L) 31.5 - 36.5 g/dL    RDW 13.9 10.0 - 15.0 %    Platelet Count 252 150 - 450 10e9/L    Diff Method Automated Method     % Neutrophils 44.6 %    % Lymphocytes 38.9 %    % Monocytes 14.8 %    % Eosinophils 1.0 %    % Basophils 0.5 %    % Immature Granulocytes 0.2 %    Absolute Neutrophil 2.7 1.6 - 8.3 10e9/L    Absolute Lymphocytes 2.3 0.8 - 5.3 10e9/L    Absolute Monocytes 0.9 0.0 - 1.3 10e9/L    Absolute Eosinophils 0.1 0.0 - 0.7 10e9/L    Absolute Basophils 0.0 0.0 - 0.2 10e9/L    Abs Immature Granulocytes 0.0 0 - 0.4 10e9/L   AST   Result Value Ref Range    AST 24 0 - 45 U/L   ALT   Result Value Ref Range    ALT 19 0 - 50 U/L   Albumin level   Result Value Ref Range    Albumin 3.2 (L) 3.4 - 5.0 g/dL   Creatinine   Result Value Ref Range    Creatinine 0.65 0.52 - 1.04 mg/dL    GFR Estimate >90 >60 mL/min/1.7m2    GFR Estimate If Black >90 >60 mL/min/1.7m2   CRP inflammation   Result Value  Ref Range    CRP Inflammation <2.9 0.0 - 8.0 mg/L   Erythrocyte sedimentation rate auto   Result Value Ref Range    Sed Rate 16 0 - 30 mm/h     Recent Labs   Lab Test  05/04/18   0938  01/25/18   1307  11/03/17   0828   04/28/17   0929  05/18/16   WBC  6.0  6.9  5.3   < >  5.9   --    --    HGB  11.9  12.4  13.2   < >  11.9   --    --    HCT  38.1  39.1  40.1   < >  36.3   --    --    MCV  92  90  92   < >  88   --    --    PLT  252  320  254   < >  313   --    --    BUN   --    --    --    --   16   --    --    TSH   --    --    --    --    --    --   2.8   AST  24  18  11   < >  21   < >  18   ALT  19  27  22   < >  26   --   16   ALKPHOS   --    --    --    --   94   --    --     < > = values in this interval not displayed.              Education:   1. Immunization: Reviewed CDC guidelines with patient updated, information provided to patient based on CDC guidelines for vaccination recommendations, live vaccines contraindicated   2. Bone Health:Educated on adequate calcium and vitamin D intake, Educated on exercise, Glucocorticoid education discussed risks, benefits & potential long term side effects from use  3. Discussed routine annual physicals, cancer screening, cardiac risk monitoring, bone health and primary care with primary care provider.   4. No alcohol while taking methotrexate.  5. No live vaccines 1 month before starting, while taking, or within 3 months of biologics (verify with rheumatologist).   6. Educated on diagnosis, prognosis, labs, imaging, treatment options (including risks, benefits, risk of no treatment), medications (use, dose, side-effects, risks of medications including infection/cancer/bone marrow suppression, lab monitoring), infections what to do, plan of cares, goals of treatment.       Pola Santana APRN, CNP, MSN  [facility]  Department of Rheumatology & Autoimmune Disorders  [facility] [facility]: [phone] (opt.2 then opt. 1 scheduling, opt. 3  nurse)  [facility] [place]: [phone]

## 2018-05-18 ENCOUNTER — THERAPY VISIT (OUTPATIENT)
Dept: PHYSICAL THERAPY | Facility: CLINIC | Age: 58
End: 2018-05-18
Payer: COMMERCIAL

## 2018-05-18 DIAGNOSIS — G89.29 CHRONIC LEFT SHOULDER PAIN: Primary | ICD-10-CM

## 2018-05-18 DIAGNOSIS — M25.512 CHRONIC LEFT SHOULDER PAIN: Primary | ICD-10-CM

## 2018-05-18 PROCEDURE — G8984 CARRY CURRENT STATUS: HCPCS | Mod: GP | Performed by: PHYSICAL THERAPIST

## 2018-05-18 PROCEDURE — 97140 MANUAL THERAPY 1/> REGIONS: CPT | Mod: GP | Performed by: PHYSICAL THERAPIST

## 2018-05-18 PROCEDURE — 97110 THERAPEUTIC EXERCISES: CPT | Mod: GP | Performed by: PHYSICAL THERAPIST

## 2018-05-18 PROCEDURE — 97161 PT EVAL LOW COMPLEX 20 MIN: CPT | Mod: GP | Performed by: PHYSICAL THERAPIST

## 2018-05-18 PROCEDURE — G8985 CARRY GOAL STATUS: HCPCS | Mod: GP | Performed by: PHYSICAL THERAPIST

## 2018-05-18 NOTE — MR AVS SNAPSHOT
After Visit Summary   5/18/2018    Janice Mayfield    MRN: [id number]           Patient Information     Date Of Birth          1960        Visit Information        Provider Department      5/18/2018 7:00 AM Dipti Mcdowell PT [facility] Physical Therapy        Today's Diagnoses     Chronic left shoulder pain    -  1       Follow-ups after your visit        Your next 10 appointments already scheduled     May 25, 2018  9:40 AM CDT   MICHELLE Spine with Mandi Lemus PTA   [facility] Physical Therapy (MICHELLE [place])    [address]  [facility] [address]   [phone]            Jun 01, 2018  9:30 AM CDT   Level 3 with [address])    [address] [place] [id number] [phone]            Jun 22, 2018  7:30 AM CDT   LAB with LAB FIRST FLOOR [facility] ([facility] [[email or web address]])    [address] [place] [id number] [phone]           Please do not eat 10-12 hours before your appointment if you are coming in fasting for labs on lipids, cholesterol, or glucose (sugar). This does not apply to pregnant women. Water, hot tea and black coffee (with nothing added) are okay. Do not drink other fluids, diet soda or chew gum.            Nov 02, 2018  7:30 AM CDT   Return Visit with LO Parra [facility])    [address] [place] [address] [id number] [phone]              Who to contact     If you have questions or need follow up information about today's clinic visit or your schedule please contact [facility] PHYSICAL THERAPY directly at [phone].  Normal or non-critical lab and imaging results will be communicated to you by MyChart, letter or phone  within 4 business days after the clinic has received the results. If you do not hear from us within 7 days, please contact the clinic through [email or web address] or phone. If you have a critical or abnormal lab result, we will notify you by phone as soon as possible.  Submit refill requests through [email or web address] or call your pharmacy and they will forward the refill request to us. Please allow 3 business days for your refill to be completed.          Additional Information About Your Visit        CurrentlyharAsicAhead Information     [email or web address] gives you secure access to your electronic health record. If you see a primary care provider, you can also send messages to your care team and make appointments. If you have questions, please call your primary care clinic.  If you do not have a primary care provider, please call [phone] and they will assist you.        Care EveryWhere ID     This is your Care EveryWhere ID. This could be used by other organizations to access your [facility] medical records  [id number]         Blood Pressure from Last 3 Encounters:   05/11/18 117/71   05/04/18 125/84   04/06/18 107/75    Weight from Last 3 Encounters:   05/11/18 70.5 kg (155 lb 8 oz)   05/04/18 71.2 kg (157 lb)   04/06/18 70.8 kg (156 lb)              We Performed the Following     MICHELLE Inital Eval Report     Manual Ther Tech, 1+Regions, EA 15 min     PT Eval, Low Complexity (22223)     Therapeutic Exercises        Primary Care Provider Office Phone # Fax #    Marah Hare [phone]       [address] BAHENA [address] [place] [address]        Equal Access to Services     AUREA RUIZ : Hadii padmini ku hadasho Soomaali, waaxda luqadaha, qaybta kaalmada reyna, kadeem del cid. So [place] [phone].    ATENCIÓN: Si habla español, tiene a kelly disposición servicios gratuitos de asistencia lingüística. Llame al [phone].    We comply with applicable federal civil rights laws and [place] laws. We  do not discriminate on the basis of race, color, national origin, age, disability, sex, sexual orientation, or gender identity.            Thank you!     Thank you for choosing [facility] PHYSICAL THERAPY  for your care. Our goal is always to provide you with excellent care. Hearing back from our patients is one way we can continue to improve our services. Please take a few minutes to complete the written survey that you may receive in the mail after your visit with us. Thank you!             Your Updated Medication List - Protect others around you: Learn how to safely use, store and throw away your medicines at www.disposemymeds.org.          This list is accurate as of 5/18/18  4:54 PM.  Always use your most recent med list.                   Brand Name Dispense Instructions for use Diagnosis    BENADRYL PO      Take 25 mg by mouth as needed (Pre-medication for remicade)        folic acid 1 MG tablet    FOLVITE    180 tablet    Take 2 tablets (2 mg) by mouth daily    Rheumatoid arthritis involving multiple sites with positive rheumatoid factor (H), Chronic left shoulder pain, Other secondary osteoarthritis of multiple sites, Vitamin D deficiency, Long term current use of systemic steroids, Long-term use of immunosuppressant medication       methotrexate 2.5 MG tablet CHEMO     20 tablet    Take 5 tablets (12.5 mg) by mouth once a week Labs in 6 weeks    Rheumatoid arthritis involving multiple sites with positive rheumatoid factor (H), Chronic left shoulder pain, Other secondary osteoarthritis of multiple sites, Vitamin D deficiency, Long term current use of systemic steroids, Long-term use of immunosuppressant medication       predniSONE 1 MG tablet    DELTASONE    360 tablet    Take 4 tablets (4 mg) by mouth daily Try taper by 1/2 mg day every 2 month until off if able    Rheumatoid arthritis involving multiple sites with positive rheumatoid factor (H), Other secondary  osteoarthritis of multiple sites, Vitamin D deficiency, Long term current use of systemic steroids, Long-term use of immunosuppressant medication, Chronic left shoulder pain       REMICADE IV      Inject 100 mg into the vein every 28 days        TYLENOL PO      Take 650 mg by mouth once        Vitamin D (Cholecalciferol) 1000 units Caps     1 capsule    Take 2,000 Units by mouth daily

## 2018-05-18 NOTE — LETTER
[facility]  [address]  [place] [address]  [phone]    May 21, 2018    Re: Janice Mayfield   :   1960  MRN:  [id number]   REFERRING PHYSICIAN:   Pola Santana    [facility]    Date of Initial Evaluation:  2018  Visits:  Rxs Used: 1  Reason for Referral:  Chronic left shoulder pain    EVALUATION SUMMARY    [facility] Initial Evaluation  Subjective:  Patient is a 57 year old female presenting with rehab left shoulder hpi. The history is provided by the patient. No  was used.   Janice Mayfield is a 57 year old female with a left shoulder condition. Condition occurred with: Degenerative joint disease. Condition occurred: for unknown reasons.  This is a chronic condition. I have had left shoulder pain for a long time. I got RA 2010 and started to deteriorate in the shoulder. I have small tears in the shoulder bones. I have a two year old grandson and have to do some caring for my mother in law which involved some shoulder activity. I saw my MD on 2018.    Patient reports pain: Anterior and lateral. Radiates to: Shoulder and upper arm. Pain is described as sharp and aching (tenderness) and is constant and reported as 2/10 and 5/10 (range). Associated with: cracking. Pain is worse in the P.M. Symptoms are exacerbated by using arm overhead and using arm at shoulder level and relieved by rest. Since onset symptoms are gradually worsening. Special testing: none. Previous treatment: none. General health as reported by patient is good. Pertinent medical history includes: Rheumatoid arthritis, anemia and menopausal. Medical allergies: yes (sulfa).  Surgical history: . Current medications: Steroids and anti-inflammatory (infusion). 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    Barriers: house.  Red flags:  None as reported by the patient.    Objective:  Standing Alignment:    Cervical/Thoracic:  Forward head  Shoulder/UE:  Rounded shoulders, scapular abduction L and scapular abduction R  Flexibility/Screens:   Positive screens:  Shoulder  Upper Extremity:    Decreased left upper extremity flexibility at:  Pectoralis Major and Pectoralis Minor  Spine:  Decreased left spine flexibility:  Scalenes and Upper Trap    Re: Janice Mayfield   :   1960    Shoulder Evaluation:  ROM:  AROM:    Flexion:  Left:  85 poor scapular control     Right:  WFL  Abduction:  Left: 110   Right:  WFL  Internal Rotation:  Left:  45      External Rotation:  Left:  45      Elbow Flexion:  Left:  WFL      Elbow Extension:  Left:  WFL     Flexion/External Rotation:  Left:  Neck    Right:  Base of neck   Extension/Internal Rotation:  Left:  Lumbar    Right:  Lumbar     Pain: poor middle and lower trapezius control   Strength:    Flexion: Left:5-/5   Pain:    Right: 5/5     Pain:   Abduction:  Left: 4+/5   Pain:-/+    Right: 5/5     Pain:  Adduction:  Left: 5/5    Pain:    Right: 5/5     Pain:  Internal Rotation:  Left:5/5     Pain:    Right: 5/5     Pain:  External Rotation:   Left:4/5      Pain:+   Right:5/5     Pain:    Elbow Flexion:  Left:5/5     Pain:    Right:5/5     Pain:  Elbow Extension:  Left:5/5     Pain:    Right:5/5     Pain:  Special Tests:    Left shoulder positive for the following special tests:  Acromioclavicular  Palpation:    Left shoulder tenderness present at:  Acrimioclavicular; Supraspinatus; Infraspinatus; Subscapularis; Levator and Upper Trap  Mobility Tests:    Glenohumeral anterior left:  Hypermobile    Glenohumeral posterior left:  Hypomobile    Glenohumeral inferior left:  Hypomobile    Scapulohumeral rhythm right:  Hypermobile           Assessment/Plan:    Patient is a 57 year old female with left side shoulder complaints.     Patient has the following significant findings with corresponding treatment plan.                Diagnosis 1:  Left shoulder pain/arthritis  Pain -  manual therapy, self management, education and home program  Decreased ROM/flexibility - manual therapy, therapeutic exercise, therapeutic activity and home program  Decreased joint mobility - manual therapy, therapeutic exercise, therapeutic activity and home program  Decreased strength - therapeutic exercise, therapeutic activities and home program  Impaired muscle performance - neuro re-education and home program  Decreased function - therapeutic activities and home program      Therapy Evaluation Codes:   1) History comprised of:   Personal factors that impact the plan of care:      Past/current experiences, Time since onset of symptoms and caring for    grandchild, mother in law.     Re: Janice Mayfield   :   1960     Comorbidity factors that impact the plan of care are:      Rheumatoid arthritis and Weakness.     Medications impacting care: Anti-inflammatory, Steroids and infusion.  2) Examination of Body Systems comprised of:   Body structures and functions that impact the plan of care:      Shoulder and Thoracic Spine.   Activity limitations that impact the plan of care are:      Bathing, Cooking, Driving, Dressing, Grasping, Lifting, Working, Sleeping and   reaching.  3) Clinical presentation characteristics are:   Stable/Uncomplicated.  4) Decision-Making    Low complexity using standardized patient assessment instrument and/or   measureable assessment of functional outcome.  Cumulative Therapy Evaluation is: Low complexity.    Previous and current functional limitations:  (See Goal Flow Sheet for this information)    Short term and Long term goals: (See Goal Flow Sheet for this information)     Communication ability:  Patient appears to be able to clearly communicate and understand verbal and written communication and follow directions  correctly.  Treatment Explanation - The following has been discussed with the patient:   RX ordered/plan of care  Possible risks and side effects  This patient would benefit from PT intervention to resume normal activities.   Rehab potential is good.    Frequency:  1 X week, once daily  Duration:  for 8 weeks  Discharge Plan:  Achieve all LTG.  Independent in home treatment program.      Thank you for your referral.    INQUIRIES  Therapist: Dipti Mcdowell PT  [facility] [place] [facility]  [address] [address]  Phone: [phone]  Fax: [phone]

## 2018-05-25 ENCOUNTER — THERAPY VISIT (OUTPATIENT)
Dept: PHYSICAL THERAPY | Facility: CLINIC | Age: 58
End: 2018-05-25
Payer: COMMERCIAL

## 2018-05-25 DIAGNOSIS — M25.512 CHRONIC LEFT SHOULDER PAIN: ICD-10-CM

## 2018-05-25 DIAGNOSIS — G89.29 CHRONIC LEFT SHOULDER PAIN: ICD-10-CM

## 2018-05-25 PROCEDURE — 97112 NEUROMUSCULAR REEDUCATION: CPT | Mod: GP | Performed by: PHYSICAL THERAPY ASSISTANT

## 2018-05-25 PROCEDURE — 97110 THERAPEUTIC EXERCISES: CPT | Mod: GP | Performed by: PHYSICAL THERAPY ASSISTANT

## 2018-05-25 PROCEDURE — 97140 MANUAL THERAPY 1/> REGIONS: CPT | Mod: GP | Performed by: PHYSICAL THERAPY ASSISTANT

## 2018-06-01 ENCOUNTER — THERAPY VISIT (OUTPATIENT)
Dept: PHYSICAL THERAPY | Facility: CLINIC | Age: 58
End: 2018-06-01
Payer: COMMERCIAL

## 2018-06-01 ENCOUNTER — INFUSION THERAPY VISIT (OUTPATIENT)
Dept: INFUSION THERAPY | Facility: CLINIC | Age: 58
End: 2018-06-01
Payer: COMMERCIAL

## 2018-06-01 VITALS
TEMPERATURE: 98 F | OXYGEN SATURATION: 97 % | DIASTOLIC BLOOD PRESSURE: 77 MMHG | BODY MASS INDEX: 26.19 KG/M2 | SYSTOLIC BLOOD PRESSURE: 141 MMHG | RESPIRATION RATE: 16 BRPM | HEART RATE: 62 BPM | WEIGHT: 155 LBS

## 2018-06-01 DIAGNOSIS — G89.29 CHRONIC LEFT SHOULDER PAIN: ICD-10-CM

## 2018-06-01 DIAGNOSIS — M05.79 RHEUMATOID ARTHRITIS INVOLVING MULTIPLE SITES WITH POSITIVE RHEUMATOID FACTOR (H): Primary | ICD-10-CM

## 2018-06-01 DIAGNOSIS — M25.512 CHRONIC LEFT SHOULDER PAIN: ICD-10-CM

## 2018-06-01 PROCEDURE — 96413 CHEMO IV INFUSION 1 HR: CPT | Performed by: INTERNAL MEDICINE

## 2018-06-01 PROCEDURE — 96415 CHEMO IV INFUSION ADDL HR: CPT | Performed by: INTERNAL MEDICINE

## 2018-06-01 PROCEDURE — 97140 MANUAL THERAPY 1/> REGIONS: CPT | Mod: GP | Performed by: PHYSICAL THERAPY ASSISTANT

## 2018-06-01 PROCEDURE — 97110 THERAPEUTIC EXERCISES: CPT | Mod: GP | Performed by: PHYSICAL THERAPY ASSISTANT

## 2018-06-01 PROCEDURE — 96375 TX/PRO/DX INJ NEW DRUG ADDON: CPT | Performed by: INTERNAL MEDICINE

## 2018-06-01 PROCEDURE — 99207 ZZC NO CHARGE LOS: CPT

## 2018-06-01 RX ORDER — DIPHENHYDRAMINE HCL 25 MG
25 CAPSULE ORAL ONCE
Status: COMPLETED | OUTPATIENT
Start: 2018-06-01 | End: 2018-06-01

## 2018-06-01 RX ORDER — DIPHENHYDRAMINE HCL 25 MG
25 CAPSULE ORAL ONCE
Status: CANCELLED
Start: 2018-06-01 | End: 2018-06-01

## 2018-06-01 RX ORDER — METHYLPREDNISOLONE SODIUM SUCCINATE 125 MG/2ML
100 INJECTION, POWDER, LYOPHILIZED, FOR SOLUTION INTRAMUSCULAR; INTRAVENOUS ONCE
Status: COMPLETED | OUTPATIENT
Start: 2018-06-01 | End: 2018-06-01

## 2018-06-01 RX ORDER — ACETAMINOPHEN 325 MG/1
650 TABLET ORAL ONCE
Status: CANCELLED
Start: 2018-06-01 | End: 2018-06-01

## 2018-06-01 RX ORDER — ACETAMINOPHEN 325 MG/1
650 TABLET ORAL ONCE
Status: COMPLETED | OUTPATIENT
Start: 2018-06-01 | End: 2018-06-01

## 2018-06-01 RX ORDER — METHYLPREDNISOLONE SODIUM SUCCINATE 125 MG/2ML
100 INJECTION, POWDER, LYOPHILIZED, FOR SOLUTION INTRAMUSCULAR; INTRAVENOUS ONCE
Status: CANCELLED | OUTPATIENT
Start: 2018-06-01 | End: 2018-06-01

## 2018-06-01 RX ADMIN — ACETAMINOPHEN 650 MG: 325 TABLET ORAL at 09:57

## 2018-06-01 RX ADMIN — Medication 250 ML: at 10:05

## 2018-06-01 RX ADMIN — Medication 25 MG: at 09:57

## 2018-06-01 RX ADMIN — METHYLPREDNISOLONE SODIUM SUCCINATE 100 MG: 125 INJECTION INTRAMUSCULAR; INTRAVENOUS at 10:07

## 2018-06-01 NOTE — PROGRESS NOTES
"Infusion Nursing Note:  Janice Mayfield presents today for Remicade.    Patient seen by provider today: No   present during visit today: Not Applicable.    Note: N/A.    Intravenous Access:  Peripheral IV placed.    Treatment Conditions:  Rheumatology Infusion Checklist: PRIOR TO INFUSION OF BIOLOGICAL MEDICATIONS OR ANY OF THESE AS LISTED: Remicaide (infliximab) \".rheumbiologicalchecklist\"    Prior to Infusion of biological medications or any of these as listed:    1. Elevated temperature, fever, chills, productive cough or abnormal vital signs, night sweats, coughing up blood or sputum, no appetite or abnormal vital signs : NO    2. Open wounds or new incisions: NO    3. Recent hospitalization: NO    4.  Recent surgeries:  NO    5. Any upcoming surgeries or dental procedures?:NO    6. Any current or recent bouts of illness or infection? On any antibiotics? : NO    7. Any new, sudden or worsening abdominal pain :NO    8. Vaccination within 4 weeks? Patient or someone in the household is scheduled to receive vaccination? No live virus vaccines prior to or during treatment :NO    9. Any nervous system diseases [i.e. multiple sclerosis, Guillain-Oelwein, seizures, neurological  changes]: NO    10. Pregnant or breast feeding; or plans on pregnancy in the future: NO    11. Signs of worsening depression or suicidal ideations while taking benlysta:N/A    12. New-onset medical symptoms: NO    13.  New cancer diagnosis or on chemotherapy or radiation NO    14.  Evaluate for any sign of active TB [Unexplained weight loss, Loss of appetite, Night sweats, Fever, Fatigue, Chills, Coughing for 3 weeks or longer, Hemoptysis (coughing up blood), Chest pain]: NO    **Note: If answered yes to any of the above, hold the infusion and contact ordering rheumatologist or on-call rheumatologist.   .      Post Infusion Assessment:  Patient tolerated infusion without incident.  No evidence of extravasations.  Access " discontinued per protocol.  Rheumatology Post Infusion: POST-INFUSION OF BIOLOGICAL MEDICATION:    Reviewed with patient. Inform patient if any fever, chills or signs of infection, new symptoms, abdominal pain, heart palpitations, shortness of breath, reaction, weakness, neurological changes, seek medical attention immediately and should not receive infusions. No live virus vaccines prior to or during treatment or up to 6 months post infusion. If the patient has an upcoming procedure or surgery, this should be discussed with the rheumatologist and surgeon or provider..    Discharge Plan:   Discharge instructions reviewed with: Patient.  Patient and/or family verbalized understanding of discharge instructions and all questions answered.  Patient discharged in stable condition accompanied by: self.  Departure Mode: Ambulatory.    Shanique Nair RN

## 2018-06-01 NOTE — MR AVS SNAPSHOT
After Visit Summary   6/1/2018    Janice Mayfield    MRN: [id number]           Patient Information     Date Of Birth          1960        Visit Information        Provider Department      6/1/2018 9:30 AM [address]        Today's Diagnoses     Rheumatoid arthritis involving multiple sites with positive rheumatoid factor (H)    -  1       Follow-ups after your visit        Your next 10 appointments already scheduled     Jun 11, 2018  7:40 AM CDT   MICHELLE Spine with Mandi Lemus [facility]   [place] [facility] Physical Therapy (MICHELLE [place])    [address]  [facility] [address]   [phone]            Jun 22, 2018  7:30 AM CDT   LAB with LAB FIRST FLOOR [facility] ([facility])    [address] [place] [address] [phone]           Please do not eat 10-12 hours before your appointment if you are coming in fasting for labs on lipids, cholesterol, or glucose (sugar). This does not apply to pregnant women. Water, hot tea and black coffee (with nothing added) are okay. Do not drink other fluids, diet soda or chew gum.            Jun 29, 2018  1:00 PM CDT   Level 3 with [address] ([facility])    [id number] [place] [address] [phone]            Nov 02, 2018  7:30 AM CDT   Return Visit with LO [facility] ([facility])    [address] [place] [address] [phone]              Who to contact     If you have questions or need follow up information about today's clinic visit or your schedule please contact [facility] directly at [phone].  Normal or non-critical lab and imaging results will be communicated to you by MyChart, letter or phone within 4 business days after  the clinic has received the results. If you do not hear from us within 7 days, please contact the clinic through [facility] or phone. If you have a critical or abnormal lab result, we will notify you by phone as soon as possible.  Submit refill requests through [facility] [place] [facility] or call your pharmacy and they will forward the refill request to us. Please allow 3 business days for your refill to be completed.          Additional Information About Your Visit        CrittercismharMemSQL Information     [facility] gives you secure access to your electronic health record. If you see a primary care provider, you can also send messages to your care team and make appointments. If you have questions, please call your primary care clinic.  If you do not have a primary care provider, please call [phone] and they will assist you.      [facility] [place] [facility] is an electronic gateway that provides easy, online access to your medical records. With [facility], you can request a clinic appointment, read your test results, renew a prescription or communicate with your care team.     To access your existing account, please contact your [facility] or call [phone] for assistance.        Care EveryWhere ID     This is your Care EveryWhere ID. This could be used by other organizations to access your [facility] medical records  [id number]        Your Vitals Were     Pulse Temperature Respirations Pulse Oximetry BMI (Body Mass Index)       62 98  F (36.7  C) (Oral) 16 97% 26.19 kg/m2        Blood Pressure from Last 3 Encounters:   06/01/18 141/77   05/11/18 117/71   05/04/18 125/84    Weight from Last 3 Encounters:   06/01/18 70.3 kg (155 lb)   05/11/18 70.5 kg (155 lb 8 oz)   05/04/18 71.2 kg (157 lb)              Today, you had the following     No orders found for display       Primary Care Provider Office Phone # Fax #    Marah Hare [phone]       [address]   [place] [facility] [address]         Equal Access to Services     [facility] Hospital[facility] : Hadii aad ku hadadaamadou Edenaddison, wapeterda luqmichaelha, qabrendata grzegorzyolakadeem castellanos. So [facility] [phone].    ATENCIÓN: Si habla español, tiene a kelly disposición servicios gratuitos de asistencia lingüística. Magdyame al [phone].    We comply with applicable federal civil rights laws and [place] laws. We do not discriminate on the basis of race, color, national origin, age, disability, sex, sexual orientation, or gender identity.            Thank you!     Thank you for choosing [facility]  for your care. Our goal is always to provide you with excellent care. Hearing back from our patients is one way we can continue to improve our services. Please take a few minutes to complete the written survey that you may receive in the mail after your visit with us. Thank you!             Your Updated Medication List - Protect others around you: Learn how to safely use, store and throw away your medicines at www.disposemymeds.org.          This list is accurate as of 6/1/18 12:58 PM.  Always use your most recent med list.                   Brand Name Dispense Instructions for use Diagnosis    BENADRYL PO      Take 25 mg by mouth as needed (Pre-medication for remicade)        folic acid 1 MG tablet    FOLVITE    180 tablet    Take 2 tablets (2 mg) by mouth daily    Rheumatoid arthritis involving multiple sites with positive rheumatoid factor (H), Chronic left shoulder pain, Other secondary osteoarthritis of multiple sites, Vitamin D deficiency, Long term current use of systemic steroids, Long-term use of immunosuppressant medication       methotrexate 2.5 MG tablet CHEMO     20 tablet    Take 5 tablets (12.5 mg) by mouth once a week Labs in 6 weeks    Rheumatoid arthritis involving multiple sites with positive rheumatoid factor (H), Chronic left shoulder pain, Other secondary osteoarthritis of multiple sites, Vitamin D deficiency,  Long term current use of systemic steroids, Long-term use of immunosuppressant medication       predniSONE 1 MG tablet    DELTASONE    360 tablet    Take 4 tablets (4 mg) by mouth daily Try taper by 1/2 mg day every 2 month until off if able    Rheumatoid arthritis involving multiple sites with positive rheumatoid factor (H), Other secondary osteoarthritis of multiple sites, Vitamin D deficiency, Long term current use of systemic steroids, Long-term use of immunosuppressant medication, Chronic left shoulder pain       REMICADE IV      Inject 100 mg into the vein every 28 days        TYLENOL PO      Take 650 mg by mouth once        Vitamin D (Cholecalciferol) 1000 units Caps     1 capsule    Take 2,000 Units by mouth daily

## 2018-06-11 ENCOUNTER — THERAPY VISIT (OUTPATIENT)
Dept: PHYSICAL THERAPY | Facility: CLINIC | Age: 58
End: 2018-06-11
Payer: COMMERCIAL

## 2018-06-11 DIAGNOSIS — M25.512 CHRONIC LEFT SHOULDER PAIN: ICD-10-CM

## 2018-06-11 DIAGNOSIS — G89.29 CHRONIC LEFT SHOULDER PAIN: ICD-10-CM

## 2018-06-11 PROCEDURE — 97112 NEUROMUSCULAR REEDUCATION: CPT | Mod: GP | Performed by: PHYSICAL THERAPY ASSISTANT

## 2018-06-11 PROCEDURE — 97110 THERAPEUTIC EXERCISES: CPT | Mod: GP | Performed by: PHYSICAL THERAPY ASSISTANT

## 2018-06-11 NOTE — MR AVS SNAPSHOT
After Visit Summary   6/11/2018    Janice Mayfield    MRN: [id number]           Patient Information     Date Of Birth          1960        Visit Information        Provider Department      6/11/2018 7:40 AM Mandi Lemus PTA [facility] Physical Therapy        Today's Diagnoses     Chronic left shoulder pain           Follow-ups after your visit        Your next 10 appointments already scheduled     Jun 22, 2018  7:30 AM CDT   LAB with LAB FIRST FLOOR [facility] ([facility])    [address] [place] [id number] [phone]           Please do not eat 10-12 hours before your appointment if you are coming in fasting for labs on lipids, cholesterol, or glucose (sugar). This does not apply to pregnant women. Water, hot tea and black coffee (with nothing added) are okay. Do not drink other fluids, diet soda or chew gum.            Jun 27, 2018  7:40 AM CDT   MICHELLE Spine with Mandi Lemus PTA   [facility] Physical Therapy ([facility])    [address] [facility] [address] [place] [id number] [phone]            Jun 29, 2018  1:00 PM CDT   Level 3 with [place] 3 [facility] ([facility])    [address] [place] [id number] [phone]            Nov 02, 2018  7:30 AM CDT   Return Visit with LO Parra [facility])    [address] [place] [id number] [phone]              Who to contact     If you have questions or need follow up information about today's clinic visit or your schedule please contact [facility] PHYSICAL THERAP[facility] directly at [phone].  Normal or non-critical lab and imaging results will be communicated to you by MyChart, letter or phone  within 4 business days after the clinic has received the results. If you do not hear from us within 7 days, please contact the clinic through [employer] or phone. If you have a critical or abnormal lab result, we will notify you by phone as soon as possible.  Submit refill requests through [employer] or call your pharmacy and they will forward the refill request to us. Please allow 3 business days for your refill to be completed.          Additional Information About Your Visit        [employer] Information     [employer] gives you secure access to your electronic health record. If you see a primary care provider, you can also send messages to your care team and make appointments. If you have questions, please call your primary care clinic.  If you do not have a primary care provider, please call [phone] and they will assist you.        Care EveryWhere ID     This is your Care EveryWhere ID. This could be used by other organizations to access your [place] medical records  [id number]         Blood Pressure from Last 3 Encounters:   06/01/18 141/77   05/11/18 117/71   05/04/18 125/84    Weight from Last 3 Encounters:   06/01/18 70.3 kg (155 lb)   05/11/18 70.5 kg (155 lb 8 oz)   05/04/18 71.2 kg (157 lb)              We Performed the Following     NEUROMUSCULAR RE-EDUCATION     THERAPEUTIC EXERCISES        Primary Care Provider Office Phone # Fax #    Marah Hare [phone]       [address] BAHENA [address] [place], [place] [id number]        Equal Access to Services     AUREA RUIZ AH: Hadii padmini silvero Soaddison, waaxda luqadaha, qaybta kaalmada danyelleyacelia, kadeem del cid. So [facility] [phone].    ATENCIÓN: Si habla español, tiene a kelly disposición servicios gratuitos de asistencia lingüística. Maryanne al [phone].    We comply with applicable federal civil rights laws and [place] laws. We do not discriminate on the basis of race, color, national origin, age,  disability, sex, sexual orientation, or gender identity.            Thank you!     Thank you for choosing [facility] PHYSICAL THER[facility]  for your care. Our goal is always to provide you with excellent care. Hearing back from our patients is one way we can continue to improve our services. Please take a few minutes to complete the written survey that you may receive in the mail after your visit with us. Thank you!             Your Updated Medication List - Protect others around you: Learn how to safely use, store and throw away your medicines at www.disposemymeds.org.          This list is accurate as of 6/11/18 12:14 PM.  Always use your most recent med list.                   Brand Name Dispense Instructions for use Diagnosis    BENADRYL PO      Take 25 mg by mouth as needed (Pre-medication for remicade)        folic acid 1 MG tablet    FOLVITE    180 tablet    Take 2 tablets (2 mg) by mouth daily    Rheumatoid arthritis involving multiple sites with positive rheumatoid factor (H), Chronic left shoulder pain, Other secondary osteoarthritis of multiple sites, Vitamin D deficiency, Long term current use of systemic steroids, Long-term use of immunosuppressant medication       methotrexate 2.5 MG tablet CHEMO     20 tablet    Take 5 tablets (12.5 mg) by mouth once a week Labs in 6 weeks    Rheumatoid arthritis involving multiple sites with positive rheumatoid factor (H), Chronic left shoulder pain, Other secondary osteoarthritis of multiple sites, Vitamin D deficiency, Long term current use of systemic steroids, Long-term use of immunosuppressant medication       predniSONE 1 MG tablet    DELTASONE    360 tablet    Take 4 tablets (4 mg) by mouth daily Try taper by 1/2 mg day every 2 month until off if able    Rheumatoid arthritis involving multiple sites with positive rheumatoid factor (H), Other secondary osteoarthritis of multiple sites, Vitamin D deficiency, Long term current use  of systemic steroids, Long-term use of immunosuppressant medication, Chronic left shoulder pain       REMICADE IV      Inject 100 mg into the vein every 28 days        TYLENOL PO      Take 650 mg by mouth once        Vitamin D (Cholecalciferol) 1000 units Caps     1 capsule    Take 2,000 Units by mouth daily

## 2018-07-06 DIAGNOSIS — E55.9 VITAMIN D DEFICIENCY: ICD-10-CM

## 2018-07-06 DIAGNOSIS — M15.3 OTHER SECONDARY OSTEOARTHRITIS OF MULTIPLE SITES: ICD-10-CM

## 2018-07-06 DIAGNOSIS — Z79.60 LONG-TERM USE OF IMMUNOSUPPRESSANT MEDICATION: ICD-10-CM

## 2018-07-06 DIAGNOSIS — G89.29 CHRONIC LEFT SHOULDER PAIN: ICD-10-CM

## 2018-07-06 DIAGNOSIS — Z79.52 LONG TERM CURRENT USE OF SYSTEMIC STEROIDS: ICD-10-CM

## 2018-07-06 DIAGNOSIS — M25.512 CHRONIC LEFT SHOULDER PAIN: ICD-10-CM

## 2018-07-06 DIAGNOSIS — M05.79 RHEUMATOID ARTHRITIS INVOLVING MULTIPLE SITES WITH POSITIVE RHEUMATOID FACTOR (H): Chronic | ICD-10-CM

## 2018-07-06 RX ORDER — FOLIC ACID 1 MG/1
2 TABLET ORAL DAILY
Qty: 180 TABLET | Refills: 3 | Status: SHIPPED | OUTPATIENT
Start: 2018-07-06 | End: 2019-08-20

## 2018-07-06 NOTE — TELEPHONE ENCOUNTER
Prescription approved per [facility] Refill Protocol.    VIKAS VelásquezN, RN, PHN  Rheumatology Care Coordinator    [facility]

## 2018-08-01 ENCOUNTER — INFUSION THERAPY VISIT (OUTPATIENT)
Dept: INFUSION THERAPY | Facility: CLINIC | Age: 58
End: 2018-08-01
Payer: COMMERCIAL

## 2018-08-01 VITALS
TEMPERATURE: 97.4 F | SYSTOLIC BLOOD PRESSURE: 124 MMHG | OXYGEN SATURATION: 97 % | DIASTOLIC BLOOD PRESSURE: 74 MMHG | RESPIRATION RATE: 16 BRPM | WEIGHT: 164.1 LBS | HEART RATE: 71 BPM | BODY MASS INDEX: 27.73 KG/M2

## 2018-08-01 DIAGNOSIS — M05.79 RHEUMATOID ARTHRITIS INVOLVING MULTIPLE SITES WITH POSITIVE RHEUMATOID FACTOR (H): Primary | ICD-10-CM

## 2018-08-01 PROCEDURE — 99207 ZZC NO CHARGE LOS: CPT

## 2018-08-01 PROCEDURE — 96413 CHEMO IV INFUSION 1 HR: CPT | Performed by: INTERNAL MEDICINE

## 2018-08-01 RX ORDER — DIPHENHYDRAMINE HCL 25 MG
25 CAPSULE ORAL ONCE
Status: COMPLETED | OUTPATIENT
Start: 2018-08-01 | End: 2018-08-01

## 2018-08-01 RX ORDER — ACETAMINOPHEN 325 MG/1
650 TABLET ORAL ONCE
Status: COMPLETED | OUTPATIENT
Start: 2018-08-01 | End: 2018-08-01

## 2018-08-01 RX ORDER — DIPHENHYDRAMINE HCL 25 MG
25 CAPSULE ORAL ONCE
Status: CANCELLED
Start: 2018-08-01 | End: 2018-08-01

## 2018-08-01 RX ORDER — ACETAMINOPHEN 325 MG/1
650 TABLET ORAL ONCE
Status: CANCELLED
Start: 2018-08-01 | End: 2018-08-01

## 2018-08-01 RX ORDER — METHYLPREDNISOLONE SODIUM SUCCINATE 125 MG/2ML
100 INJECTION, POWDER, LYOPHILIZED, FOR SOLUTION INTRAMUSCULAR; INTRAVENOUS ONCE
Status: CANCELLED | OUTPATIENT
Start: 2018-08-01 | End: 2018-08-01

## 2018-08-01 RX ADMIN — Medication 250 ML: at 13:37

## 2018-08-01 RX ADMIN — ACETAMINOPHEN 650 MG: 325 TABLET ORAL at 13:37

## 2018-08-01 RX ADMIN — Medication 25 MG: at 13:37

## 2018-08-01 NOTE — PROGRESS NOTES
"Infusion Nursing Note:  Janice Mayfield presents today for Remicade (rapid).    Patient seen by provider today: No   present during visit today: Not Applicable.    Note: Pharmacy team spoke with pt. regarding the option for rapid remicade. Pt in agreement to give it a try. Tolerated 1 hour infusion without issues.     Intravenous Access:  Peripheral IV placed.    Treatment Conditions:  Rheumatology Infusion Checklist: PRIOR TO INFUSION OF BIOLOGICAL MEDICATIONS OR ANY OF THESE AS LISTED: Remicaide (infliximab) \".rheumbiologicalchecklist\"    Prior to Infusion of biological medications or any of these as listed:    1. Elevated temperature, fever, chills, productive cough or abnormal vital signs, night sweats, coughing up blood or sputum, no appetite or abnormal vital signs : NO    2. Open wounds or new incisions: NO    3. Recent hospitalization: NO    4.  Recent surgeries:  NO    5. Any upcoming surgeries or dental procedures?:NO    6. Any current or recent bouts of illness or infection? On any antibiotics? : NO    7. Any new, sudden or worsening abdominal pain :NO    8. Vaccination within 4 weeks? Patient or someone in the household is scheduled to receive vaccination? No live virus vaccines prior to or during treatment :NO    9. Any nervous system diseases [i.e. multiple sclerosis, Guillain-[place], seizures, neurological  changes]: NO    10. Pregnant or breast feeding; or plans on pregnancy in the future: NO    11. Signs of worsening depression or suicidal ideations while taking benlysta:NO    12. New-onset medical symptoms: NO    13.  New cancer diagnosis or on chemotherapy or radiation NO    14.  Evaluate for any sign of active TB [Unexplained weight loss, Loss of appetite, Night sweats, Fever, Fatigue, Chills, Coughing for 3 weeks or longer, Hemoptysis (coughing up blood), Chest pain]: NO    **Note: If answered yes to any of the above, hold the infusion and contact ordering rheumatologist or " on-call rheumatologist.   .      Post Infusion Assessment:  Patient tolerated infusion without incident.  Site patent and intact, free from redness, edema or discomfort.  No evidence of extravasations.  Access discontinued per protocol.  Rheumatology Post Infusion: POST-INFUSION OF BIOLOGICAL MEDICATION:    Reviewed with patient. Inform patient if any fever, chills or signs of infection, new symptoms, abdominal pain, heart palpitations, shortness of breath, reaction, weakness, neurological changes, seek medical attention immediately and should not receive infusions. No live virus vaccines prior to or during treatment or up to 6 months post infusion. If the patient has an upcoming procedure or surgery, this should be discussed with the rheumatologist and surgeon or provider..    Discharge Plan:   Discharge instructions reviewed with: Patient.  Patient and/or family verbalized understanding of discharge instructions and all questions answered.  Patient discharged in stable condition accompanied by: self.  Departure Mode: Ambulatory.    Shanique Nair RN

## 2018-08-01 NOTE — MR AVS SNAPSHOT
After Visit Summary   8/1/2018    Janice Mayfield    MRN: [id number]           Patient Information     Date Of Birth          1960        Visit Information        Provider Department      8/1/2018 1:00 PM BAY 5 INFUSION [facility]        Today's Diagnoses     Rheumatoid arthritis involving multiple sites with positive rheumatoid factor (H)    -  1       Follow-ups after your visit        Your next 10 appointments already scheduled     Aug 29, 2018  1:00 PM CDT   Level 3 with BAY 4 INFUSION   [facility]    [address] [place] [id number] [phone]            Nov 02, 2018  7:30 AM CDT   Return Visit with LO Parra CNP   [facility] ([facility]    [address] [place] [id number] [phone]              Who to contact     If you have questions or need follow up information about today's clinic visit or your schedule please contact [facility] directly at [phone].  Normal or non-critical lab and imaging results will be communicated to you by [employer], letter or phone within 4 business days after the clinic has received the results. If you do not hear from us within 7 days, please contact the clinic through [employer] or phone. If you have a critical or abnormal lab result, we will notify you by phone as soon as possible.  Submit refill requests through [employer] or call your pharmacy and they will forward the refill request to us. Please allow 3 business days for your refill to be completed.          Additional Information About Your Visit        [employer] Information     [employer] gives you secure access to your electronic health record. If you see a primary care provider, you can also send messages to your care team and make appointments. If you have questions, please call your primary care clinic.  If you do not have a primary  care provider, please call [phone] and they will assist you.      [employer] is an electronic gateway that provides easy, online access to your medical records. With [employer], you can request a clinic appointment, read your test results, renew a prescription or communicate with your care team.     To access your existing account, please contact your [facility] or call [phone] for assistance.        Care EveryWhere ID     This is your Care EveryWhere ID. This could be used by other organizations to access your [place] medical records  [id number]        Your Vitals Were     Pulse Temperature Respirations Pulse Oximetry BMI (Body Mass Index)       71 97.4  F (36.3  C) (Oral) 16 97% 27.73 kg/m2        Blood Pressure from Last 3 Encounters:   08/01/18 124/74   06/01/18 141/77   05/11/18 117/71    Weight from Last 3 Encounters:   08/01/18 74.4 kg (164 lb 1.6 oz)   06/01/18 70.3 kg (155 lb)   05/11/18 70.5 kg (155 lb 8 oz)              Today, you had the following     No orders found for display       Primary Care Provider Office Phone # Fax #    Marah Hare [phone]       [address] PEREZ   [facility] [address]        Equal Access to Services     AUREA RUIZ : Hadii aad ku hadasho Soomaali, waaxda luqadaha, qaybta kaalmada adeegyada, waxay idiin hayaan danyelle del cid. So [facility] [phone].    ATENCIÓN: Si habla español, tiene a kelly disposición servicios gratuitos de asistencia lingüística. Llame al [phone].    We comply with applicable federal civil rights laws and [place] laws. We do not discriminate on the basis of race, color, national origin, age, disability, sex, sexual orientation, or gender identity.            Thank you!     Thank you for choosing [facility]  for your care. Our goal is always to provide you with excellent care. Hearing back from our patients is one way we can continue to improve  our services. Please take a few minutes to complete the written survey that you may receive in the mail after your visit with us. Thank you!             Your Updated Medication List - Protect others around you: Learn how to safely use, store and throw away your medicines at www.disposemymeds.org.          This list is accurate as of 8/1/18  4:19 PM.  Always use your most recent med list.                   Brand Name Dispense Instructions for use Diagnosis    BENADRYL PO      Take 25 mg by mouth as needed (Pre-medication for remicade)        folic acid 1 MG tablet    FOLVITE    180 tablet    Take 2 tablets (2 mg) by mouth daily    Rheumatoid arthritis involving multiple sites with positive rheumatoid factor (H), Chronic left shoulder pain, Other secondary osteoarthritis of multiple sites, Vitamin D deficiency, Long term current use of systemic steroids, Long-term use of immunosuppressant medication       methotrexate 2.5 MG tablet CHEMO     20 tablet    Take 5 tablets (12.5 mg) by mouth once a week Labs in 6 weeks    Rheumatoid arthritis involving multiple sites with positive rheumatoid factor (H), Chronic left shoulder pain, Other secondary osteoarthritis of multiple sites, Vitamin D deficiency, Long term current use of systemic steroids, Long-term use of immunosuppressant medication       predniSONE 1 MG tablet    DELTASONE    360 tablet    Take 4 tablets (4 mg) by mouth daily Try taper by 1/2 mg day every 2 month until off if able    Rheumatoid arthritis involving multiple sites with positive rheumatoid factor (H), Other secondary osteoarthritis of multiple sites, Vitamin D deficiency, Long term current use of systemic steroids, Long-term use of immunosuppressant medication, Chronic left shoulder pain       REMICADE IV      Inject 100 mg into the vein every 28 days        TYLENOL PO      Take 650 mg by mouth once        Vitamin D (Cholecalciferol) 1000 units Caps     1 capsule    Take 2,000 Units by mouth daily

## 2018-08-29 ENCOUNTER — INFUSION THERAPY VISIT (OUTPATIENT)
Dept: INFUSION THERAPY | Facility: CLINIC | Age: 58
End: 2018-08-29
Payer: COMMERCIAL

## 2018-08-29 VITALS
BODY MASS INDEX: 27.58 KG/M2 | TEMPERATURE: 98.1 F | WEIGHT: 163.2 LBS | OXYGEN SATURATION: 100 % | SYSTOLIC BLOOD PRESSURE: 119 MMHG | DIASTOLIC BLOOD PRESSURE: 71 MMHG | HEART RATE: 80 BPM

## 2018-08-29 DIAGNOSIS — M25.512 CHRONIC LEFT SHOULDER PAIN: ICD-10-CM

## 2018-08-29 DIAGNOSIS — M05.79 RHEUMATOID ARTHRITIS INVOLVING MULTIPLE SITES WITH POSITIVE RHEUMATOID FACTOR (H): Chronic | ICD-10-CM

## 2018-08-29 DIAGNOSIS — G89.29 CHRONIC LEFT SHOULDER PAIN: ICD-10-CM

## 2018-08-29 DIAGNOSIS — M15.3 OTHER SECONDARY OSTEOARTHRITIS OF MULTIPLE SITES: ICD-10-CM

## 2018-08-29 DIAGNOSIS — Z79.52 LONG TERM CURRENT USE OF SYSTEMIC STEROIDS: ICD-10-CM

## 2018-08-29 DIAGNOSIS — Z79.60 LONG-TERM USE OF IMMUNOSUPPRESSANT MEDICATION: ICD-10-CM

## 2018-08-29 DIAGNOSIS — E55.9 VITAMIN D DEFICIENCY: ICD-10-CM

## 2018-08-29 DIAGNOSIS — M05.79 RHEUMATOID ARTHRITIS INVOLVING MULTIPLE SITES WITH POSITIVE RHEUMATOID FACTOR (H): Primary | ICD-10-CM

## 2018-08-29 LAB
ALBUMIN SERPL-MCNC: 3.4 G/DL (ref 3.4–5)
ALT SERPL W P-5'-P-CCNC: 22 U/L (ref 0–50)
AST SERPL W P-5'-P-CCNC: 15 U/L (ref 0–45)
CREAT SERPL-MCNC: 0.8 MG/DL (ref 0.52–1.04)
CRP SERPL-MCNC: <2.9 MG/L (ref 0–8)
ERYTHROCYTE [DISTWIDTH] IN BLOOD BY AUTOMATED COUNT: 12.9 % (ref 10–15)
GFR SERPL CREATININE-BSD FRML MDRD: 74 ML/MIN/1.7M2
HCT VFR BLD AUTO: 38.9 % (ref 35–47)
HGB BLD-MCNC: 12.4 G/DL (ref 11.7–15.7)
MCH RBC QN AUTO: 29.7 PG (ref 26.5–33)
MCHC RBC AUTO-ENTMCNC: 31.9 G/DL (ref 31.5–36.5)
MCV RBC AUTO: 93 FL (ref 78–100)
PLATELET # BLD AUTO: 248 10E9/L (ref 150–450)
RBC # BLD AUTO: 4.18 10E12/L (ref 3.8–5.2)
WBC # BLD AUTO: 5.7 10E9/L (ref 4–11)

## 2018-08-29 PROCEDURE — 96413 CHEMO IV INFUSION 1 HR: CPT | Performed by: INTERNAL MEDICINE

## 2018-08-29 PROCEDURE — 84450 TRANSFERASE (AST) (SGOT): CPT | Performed by: NURSE PRACTITIONER

## 2018-08-29 PROCEDURE — 36415 COLL VENOUS BLD VENIPUNCTURE: CPT | Performed by: NURSE PRACTITIONER

## 2018-08-29 PROCEDURE — 96375 TX/PRO/DX INJ NEW DRUG ADDON: CPT | Performed by: INTERNAL MEDICINE

## 2018-08-29 PROCEDURE — 99207 ZZC NO CHARGE LOS: CPT

## 2018-08-29 PROCEDURE — 82565 ASSAY OF CREATININE: CPT | Performed by: NURSE PRACTITIONER

## 2018-08-29 PROCEDURE — 85027 COMPLETE CBC AUTOMATED: CPT | Performed by: NURSE PRACTITIONER

## 2018-08-29 PROCEDURE — 84460 ALANINE AMINO (ALT) (SGPT): CPT | Performed by: NURSE PRACTITIONER

## 2018-08-29 PROCEDURE — 86140 C-REACTIVE PROTEIN: CPT | Performed by: NURSE PRACTITIONER

## 2018-08-29 PROCEDURE — 82040 ASSAY OF SERUM ALBUMIN: CPT | Performed by: NURSE PRACTITIONER

## 2018-08-29 RX ORDER — METHYLPREDNISOLONE SODIUM SUCCINATE 125 MG/2ML
100 INJECTION, POWDER, LYOPHILIZED, FOR SOLUTION INTRAMUSCULAR; INTRAVENOUS ONCE
Status: CANCELLED | OUTPATIENT
Start: 2018-08-29 | End: 2018-08-29

## 2018-08-29 RX ORDER — ACETAMINOPHEN 325 MG/1
650 TABLET ORAL ONCE
Status: COMPLETED | OUTPATIENT
Start: 2018-08-29 | End: 2018-08-29

## 2018-08-29 RX ORDER — DIPHENHYDRAMINE HCL 25 MG
25 CAPSULE ORAL ONCE
Status: COMPLETED | OUTPATIENT
Start: 2018-08-29 | End: 2018-08-29

## 2018-08-29 RX ORDER — ACETAMINOPHEN 325 MG/1
650 TABLET ORAL ONCE
Status: CANCELLED
Start: 2018-08-29 | End: 2018-08-29

## 2018-08-29 RX ORDER — DIPHENHYDRAMINE HCL 25 MG
25 CAPSULE ORAL ONCE
Status: CANCELLED
Start: 2018-08-29 | End: 2018-08-29

## 2018-08-29 RX ORDER — METHYLPREDNISOLONE SODIUM SUCCINATE 125 MG/2ML
100 INJECTION, POWDER, LYOPHILIZED, FOR SOLUTION INTRAMUSCULAR; INTRAVENOUS ONCE
Status: COMPLETED | OUTPATIENT
Start: 2018-08-29 | End: 2018-08-29

## 2018-08-29 RX ADMIN — METHYLPREDNISOLONE SODIUM SUCCINATE 100 MG: 125 INJECTION INTRAMUSCULAR; INTRAVENOUS at 14:26

## 2018-08-29 RX ADMIN — ACETAMINOPHEN 650 MG: 325 TABLET ORAL at 14:26

## 2018-08-29 RX ADMIN — Medication 250 ML: at 14:25

## 2018-08-29 RX ADMIN — Medication 25 MG: at 14:26

## 2018-08-29 ASSESSMENT — PAIN SCALES - GENERAL: PAINLEVEL: NO PAIN (0)

## 2018-08-29 NOTE — PROGRESS NOTES
The blood counts, liver, kidney and CRP inflammation labs are normal.     Pola BLANCHARD, CNP, MSN  8/29/2018  2:29 PM

## 2018-08-29 NOTE — PROGRESS NOTES
"Infusion Nursing Note:  Janice Mayfield presents today for Remicade.    Patient seen by provider today: No   present during visit today: Not Applicable.    Note: N/A.    Intravenous Access:  Peripheral IV placed.    Treatment Conditions:  Rheumatology Infusion Checklist: PRIOR TO INFUSION OF BIOLOGICAL MEDICATIONS OR ANY OF THESE AS LISTED: Remicaide (infliximab) \".rheumbiologicalchecklist\"    Prior to Infusion of biological medications or any of these as listed:    1. Elevated temperature, fever, chills, productive cough or abnormal vital signs, night sweats, coughing up blood or sputum, no appetite or abnormal vital signs : NO    2. Open wounds or new incisions: NO    3. Recent hospitalization: NO    4.  Recent surgeries:  NO    5. Any upcoming surgeries or dental procedures?:NO    6. Any current or recent bouts of illness or infection? On any antibiotics? : NO    7. Any new, sudden or worsening abdominal pain :NO    8. Vaccination within 4 weeks? Patient or someone in the household is scheduled to receive vaccination? No live virus vaccines prior to or during treatment :NO    9. Any nervous system diseases [i.e. multiple sclerosis, Guillain-[place], seizures, neurological  changes]: NO    10. Pregnant or breast feeding; or plans on pregnancy in the future: NO    11. Signs of worsening depression or suicidal ideations while taking benlysta:NO    12. New-onset medical symptoms: NO    13.  New cancer diagnosis or on chemotherapy or radiation NO    14.  Evaluate for any sign of active TB [Unexplained weight loss, Loss of appetite, Night sweats, Fever, Fatigue, Chills, Coughing for 3 weeks or longer, Hemoptysis (coughing up blood), Chest pain]: NO    **Note: If answered yes to any of the above, hold the infusion and contact ordering rheumatologist or on-call rheumatologist.     Post Infusion Assessment:  Patient tolerated infusion without incident.  Blood return noted pre and post infusion.  Site " patent and intact, free from redness, edema or discomfort.  Access discontinued per protocol.  Rheumatology Post Infusion: POST-INFUSION OF BIOLOGICAL MEDICATION:    Reviewed with patient.  Given biologic medication or medication hand-out. Inform patient if any fever, chills or signs of infection, new symptoms, abdominal pain, heart palpitations, shortness of breath, reaction, weakness, neurological changes, seek medical attention immediately and should not receive infusions. No live virus vaccines prior to or during treatment or up to 6 months post infusion. If the patient has an upcoming procedure or surgery, this should be discussed with the rheumatologist and surgeon or provider.    Discharge Plan:   Patient will return 9/26/18 for next appointment.   Patient discharged in stable condition accompanied by: self.  Departure Mode: Ambulatory.    Lucrecia Manuel RN

## 2018-08-29 NOTE — MR AVS SNAPSHOT
After Visit Summary   8/29/2018    Janice Mayfield    MRN: [id number]           Patient Information     Date Of Birth          1960        Visit Information        Provider Department      8/29/2018 1:00 PM BAY 4 INFUSION [facility] [place]        Today's Diagnoses     Rheumatoid arthritis involving multiple sites with positive rheumatoid factor (H)    -  1       Follow-ups after your visit        Your next 10 appointments already scheduled     Sep 26, 2018  2:00 PM CDT   Level 2 with BAY 9 INFUSION   [facility] [place] ([facility] [place])    [address] [place] [id number] [phone]            Oct 24, 2018  2:00 PM CDT   Level 2 with BAY 1 INFUSION   [facility])    [address] [place] [id number] [phone]            Nov 02, 2018  7:30 AM CDT   Return Visit with LO Parra CNP   [facility])    [address] [place] [id number] [phone]              Who to contact     If you have questions or need follow up information about today's clinic visit or your schedule please contact [facility] directly at [phone].  Normal or non-critical lab and imaging results will be communicated to you by MyChart, letter or phone within 4 business days after the clinic has received the results. If you do not hear from us within 7 days, please contact the clinic through MyChart or phone. If you have a critical or abnormal lab result, we will notify you by phone as soon as possible.  Submit refill requests through [employer] or call your pharmacy and they will forward the refill request to us. Please allow 3 business days for your refill to be completed.          Additional Information About Your Visit        [email or web address] Information     [employer] gives you secure access to your electronic health  record. If you see a primary care provider, you can also send messages to your care team and make appointments. If you have questions, please call your primary care clinic.  If you do not have a primary care provider, please call [phone] and they will assist you.      [employer] is an electronic gateway that provides easy, online access to your medical records. With [employer], you can request a clinic appointment, read your test results, renew a prescription or communicate with your care team.     To access your existing account, please contact your [facility] or call [phone] for assistance.        Care EveryWhere ID     This is your Care EveryWhere ID. This could be used by other organizations to access your [place] medical records  [id number]        Your Vitals Were     Pulse Temperature Pulse Oximetry BMI (Body Mass Index)          80 98.1  F (36.7  C) (Oral) 100% 27.58 kg/m2         Blood Pressure from Last 3 Encounters:   08/29/18 119/71   08/01/18 124/74   06/01/18 141/77    Weight from Last 3 Encounters:   08/29/18 74 kg (163 lb 3.2 oz)   08/01/18 74.4 kg (164 lb 1.6 oz)   06/01/18 70.3 kg (155 lb)              Today, you had the following     No orders found for display       Primary Care Provider Office Phone # Fax #    Marah Hare [phone]       [address] [place] [address] [place], [place] [address] [address]        Equal Access to Services     AUREA RUIZ : Hadii aad ku hadasho Soomaali, waaxda luqadaha, qaybta kaalmada adeegyada, kadeem del cid. So [facility] [phone].    ATENCIÓN: Si habla español, tiene a kelly disposición servicios gratuitos de asistencia lingüística. Llrobby al [phone].    We comply with applicable federal civil rights laws and [place] laws. We do not discriminate on the basis of race, color, national origin, age, disability, sex, sexual orientation, or gender identity.            Thank you!      Thank you for choosing [facility]  for your care. Our goal is always to provide you with excellent care. Hearing back from our patients is one way we can continue to improve our services. Please take a few minutes to complete the written survey that you may receive in the mail after your visit with us. Thank you!             Your Updated Medication List - Protect others around you: Learn how to safely use, store and throw away your medicines at www.disposemymeds.org.          This list is accurate as of 8/29/18 11:59 PM.  Always use your most recent med list.                   Brand Name Dispense Instructions for use Diagnosis    BENADRYL PO      Take 25 mg by mouth as needed (Pre-medication for remicade)        folic acid 1 MG tablet    FOLVITE    180 tablet    Take 2 tablets (2 mg) by mouth daily    Rheumatoid arthritis involving multiple sites with positive rheumatoid factor (H), Chronic left shoulder pain, Other secondary osteoarthritis of multiple sites, Vitamin D deficiency, Long term current use of systemic steroids, Long-term use of immunosuppressant medication       methotrexate 2.5 MG tablet CHEMO     20 tablet    Take 5 tablets (12.5 mg) by mouth once a week Labs in 6 weeks    Rheumatoid arthritis involving multiple sites with positive rheumatoid factor (H), Chronic left shoulder pain, Other secondary osteoarthritis of multiple sites, Vitamin D deficiency, Long term current use of systemic steroids, Long-term use of immunosuppressant medication       predniSONE 1 MG tablet    DELTASONE    360 tablet    Take 4 tablets (4 mg) by mouth daily Try taper by 1/2 mg day every 2 month until off if able    Rheumatoid arthritis involving multiple sites with positive rheumatoid factor (H), Other secondary osteoarthritis of multiple sites, Vitamin D deficiency, Long term current use of systemic steroids, Long-term use of immunosuppressant medication, Chronic left shoulder pain       REMICADE IV       Inject 100 mg into the vein every 28 days        TYLENOL PO      Take 650 mg by mouth once        Vitamin D (Cholecalciferol) 1000 units Caps     1 capsule    Take 2,000 Units by mouth daily

## 2018-09-17 PROBLEM — G89.29 CHRONIC LEFT SHOULDER PAIN: Status: RESOLVED | Noted: 2018-05-18 | Resolved: 2018-09-17

## 2018-09-17 PROBLEM — M25.512 CHRONIC LEFT SHOULDER PAIN: Status: RESOLVED | Noted: 2018-05-18 | Resolved: 2018-09-17

## 2018-09-17 NOTE — PROGRESS NOTES
Subjective:  HPI                    Objective:  System    Physical Exam    General     ROS    Assessment/Plan:    DISCHARGE REPORT    Progress reporting period is from 5/15/2018 to 6/11/2018.       SUBJECTIVE  Subjective changes noted by patient:   No pain reported upon arrival in B shoulders, intermittent twinge of pain occurs but does not linger.      Current Pain level: 0/10.     Previous pain level was  3/10  .   Changes in function:  Yes (See Goal flowsheet attached for changes in current functional level)  Adverse reaction to treatment or activity: None    OBJECTIVE  Changes noted in objective findings:  Patient has failed to return to therapy so current objective findings are unknown. and The objective findings below are from DOS 6/11/2018 L shoulder AROM flex=140, abd=120.  Increase awareness of posture.     ASSESSMENT/PLAN  Updated problem list and treatment plan: Diagnosis 1:  Left shoulder pain/RA  Pain -  self management and home program  Decreased ROM/flexibility - home program  Impaired muscle performance - home program  Impaired posture - home program  STG/LTGs have been met or progress has been made towards goals:  Yes (See Goal flow sheet completed today.)  Assessment of Progress: The patient's condition is improving.  The patient's condition has potential to improve.  Patient has not returned to therapy.  Current status is unknown and discharge G code cannot be reported.  Self Management Plans:  Patient has been instructed in a home treatment program.  Patient  has been instructed in self management of symptoms.    Recommendations:  Patient's mother passed away and did not return to therapy.  Plan to discharge patient at this time.    Please refer to the daily flowsheet for treatment today, total treatment time and time spent performing 1:1 timed codes.

## 2018-09-26 ENCOUNTER — INFUSION THERAPY VISIT (OUTPATIENT)
Dept: INFUSION THERAPY | Facility: CLINIC | Age: 58
End: 2018-09-26
Payer: COMMERCIAL

## 2018-09-26 VITALS
TEMPERATURE: 97.5 F | BODY MASS INDEX: 28.07 KG/M2 | HEART RATE: 76 BPM | WEIGHT: 166.1 LBS | RESPIRATION RATE: 16 BRPM | DIASTOLIC BLOOD PRESSURE: 72 MMHG | OXYGEN SATURATION: 98 % | SYSTOLIC BLOOD PRESSURE: 120 MMHG

## 2018-09-26 DIAGNOSIS — M05.79 RHEUMATOID ARTHRITIS INVOLVING MULTIPLE SITES WITH POSITIVE RHEUMATOID FACTOR (H): Primary | ICD-10-CM

## 2018-09-26 PROCEDURE — 99207 ZZC NO CHARGE NURSE ONLY: CPT

## 2018-09-26 PROCEDURE — 96413 CHEMO IV INFUSION 1 HR: CPT | Performed by: INTERNAL MEDICINE

## 2018-09-26 RX ORDER — METHYLPREDNISOLONE SODIUM SUCCINATE 125 MG/2ML
100 INJECTION, POWDER, LYOPHILIZED, FOR SOLUTION INTRAMUSCULAR; INTRAVENOUS ONCE
Status: CANCELLED | OUTPATIENT
Start: 2018-09-26 | End: 2018-09-26

## 2018-09-26 RX ORDER — ACETAMINOPHEN 325 MG/1
650 TABLET ORAL ONCE
Status: CANCELLED
Start: 2018-09-26 | End: 2018-09-26

## 2018-09-26 RX ORDER — DIPHENHYDRAMINE HCL 25 MG
25 CAPSULE ORAL ONCE
Status: CANCELLED
Start: 2018-09-26 | End: 2018-09-26

## 2018-09-26 RX ORDER — DIPHENHYDRAMINE HCL 25 MG
25 CAPSULE ORAL ONCE
Status: COMPLETED | OUTPATIENT
Start: 2018-09-26 | End: 2018-09-26

## 2018-09-26 RX ORDER — ACETAMINOPHEN 325 MG/1
650 TABLET ORAL ONCE
Status: COMPLETED | OUTPATIENT
Start: 2018-09-26 | End: 2018-09-26

## 2018-09-26 RX ADMIN — Medication 25 MG: at 14:31

## 2018-09-26 RX ADMIN — Medication 250 ML: at 14:43

## 2018-09-26 RX ADMIN — ACETAMINOPHEN 650 MG: 325 TABLET ORAL at 14:32

## 2018-09-26 ASSESSMENT — PAIN SCALES - GENERAL: PAINLEVEL: MODERATE PAIN (4)

## 2018-09-26 NOTE — MR AVS SNAPSHOT
After Visit Summary   9/26/2018    Janice Mayfield    MRN: [id number]           Patient Information     Date Of Birth          1960        Visit Information        Provider Department      9/26/2018 2:00 PM BAY 10 INFUSION [facility]        Today's Diagnoses     Rheumatoid arthritis involving multiple sites with positive rheumatoid factor (H)    -  1      Care Instructions    EDUCATION POST BIOLOGICAL/CHEMOTHERAPY INFUSION  Call the triage nurse at your clinic or seek medical attention if you have chills and/or temperature greater than or equal to 100.5, uncontrolled nausea/vomiting, diarrhea, constipation, dizziness, shortness of breath, chest pain, heart palpitations, weakness or any other new or concerning symptoms, questions or concerns.  You can not have any live virus vaccines prior to or during treatment or up to 6 months post infusion.  If you have an upcoming surgery, medical procedure or dental procedure during treatment, this should be discussed with your ordering physician and your surgeon/dentist.  If you are having any concerning symptom, if you are unsure if you should get your next infusion or wish to speak to a provider before your next infusion, please call your care coordinator or triage nurse at your clinic to notify them so we can adequately serve you.            Follow-ups after your visit        Your next 10 appointments already scheduled     Oct 24, 2018  2:00 PM CDT   Level 2 with BAY 1 INFUSION   [facility] ([facility])    [address] [place] [address]   [phone]            Nov 02, 2018  7:30 AM CDT   Return Visit with LO Parra CNP   [facility])    [address] [place] [id number]   [phone]              Who to contact     If you have questions or need follow up information about today's clinic visit or your  schedule please contact [facility] directly at [phone].  Normal or non-critical lab and imaging results will be communicated to you by [employer], letter or phone within 4 business days after the clinic has received the results. If you do not hear from us within 7 days, please contact the clinic through [employer] or phone. If you have a critical or abnormal lab result, we will notify you by phone as soon as possible.  Submit refill requests through [employer] or call your pharmacy and they will forward the refill request to us. Please allow 3 business days for your refill to be completed.          Additional Information About Your Visit        [employer] Information     [employer] gives you secure access to your electronic health record. If you see a primary care provider, you can also send messages to your care team and make appointments. If you have questions, please call your primary care clinic.  If you do not have a primary care provider, please call [phone] and they will assist you.      [employer] is an electronic gateway that provides easy, online access to your medical records. With [employer], you can request a clinic appointment, read your test results, renew a prescription or communicate with your care team.     To access your existing account, please contact your [facility] or call [phone] for assistance.        Care EveryWhere ID     This is your Care EveryWhere ID. This could be used by other organizations to access your [place] medical records  [id number]        Your Vitals Were     Pulse Temperature Respirations Pulse Oximetry BMI (Body Mass Index)       76 97.5  F (36.4  C) (Oral) 16 98% 28.07 kg/m2        Blood Pressure from Last 3 Encounters:   09/26/18 120/72   08/29/18 119/71   08/01/18 124/74    Weight from Last 3 Encounters:   09/26/18 75.3 kg (166 lb 1.6 oz)   08/29/18 74 kg (163 lb 3.2 oz)   08/01/18 74.4 kg (164 lb 1.6 oz)              Today,  you had the following     No orders found for display       Primary Care Provider Office Phone # Fax #    Marah Hare [phone]       [address] PEREZ   [facility] [address]        Equal Access to Services     AUREA RUIZ : Hadii padmini dia adrianneo Sooctaviaali, waaxda luqadaha, qaybta kaalmada adenormada, kadeem tamarin hayaajasper bassett jeff darrel del cid. So [facility] [phone].    ATENCIÓN: Si habla español, tiene a kelly disposición servicios gratuitos de asistencia lingüística. Llame al [phone].    We comply with applicable federal civil rights laws and [place] laws. We do not discriminate on the basis of race, color, national origin, age, disability, sex, sexual orientation, or gender identity.            Thank you!     Thank you for choosing [facility]  for your care. Our goal is always to provide you with excellent care. Hearing back from our patients is one way we can continue to improve our services. Please take a few minutes to complete the written survey that you may receive in the mail after your visit with us. Thank you!             Your Updated Medication List - Protect others around you: Learn how to safely use, store and throw away your medicines at www.disposemymeds.org.          This list is accurate as of 9/26/18 11:59 PM.  Always use your most recent med list.                   Brand Name Dispense Instructions for use Diagnosis    BENADRYL PO      Take 25 mg by mouth as needed (Pre-medication for remicade)        folic acid 1 MG tablet    FOLVITE    180 tablet    Take 2 tablets (2 mg) by mouth daily    Rheumatoid arthritis involving multiple sites with positive rheumatoid factor (H), Chronic left shoulder pain, Other secondary osteoarthritis of multiple sites, Vitamin D deficiency, Long term current use of systemic steroids, Long-term use of immunosuppressant medication       methotrexate 2.5 MG tablet CHEMO     20 tablet    Take 5 tablets (12.5 mg)  by mouth once a week Labs in 6 weeks    Rheumatoid arthritis involving multiple sites with positive rheumatoid factor (H), Chronic left shoulder pain, Other secondary osteoarthritis of multiple sites, Vitamin D deficiency, Long term current use of systemic steroids, Long-term use of immunosuppressant medication       predniSONE 1 MG tablet    DELTASONE    360 tablet    Take 4 tablets (4 mg) by mouth daily Try taper by 1/2 mg day every 2 month until off if able    Rheumatoid arthritis involving multiple sites with positive rheumatoid factor (H), Other secondary osteoarthritis of multiple sites, Vitamin D deficiency, Long term current use of systemic steroids, Long-term use of immunosuppressant medication, Chronic left shoulder pain       REMICADE IV      Inject 100 mg into the vein every 28 days        TYLENOL PO      Take 650 mg by mouth once        Vitamin D (Cholecalciferol) 1000 units Caps     1 capsule    Take 2,000 Units by mouth daily

## 2018-09-26 NOTE — PROGRESS NOTES
Infusion Nursing Note:  Janice Mayfield presents today for rapid remicade.    Patient seen by provider today: No   present during visit today: Not Applicable.    Intravenous Access:  Peripheral IV placed.    Treatment Conditions:  ~~~ NOTE: If the patient answers yes to any of the questions below, hold the infusion and contact ordering provider or on-call provider.    1. Have you recently had an elevated temperature, fever, chills, productive cough, coughing for 3 weeks or longer or hemoptysis,  abnormal vital signs, night sweats,  chest pain or have you noticed a decrease in your appetite, unexplained weight loss or fatigue? No  2. Do you have any open wounds or new incisions? No  3. Do you have any recent or upcoming hospitalizations, surgeries or dental procedures? No  4. Do you currently have or recently have had any signs of illness or infection or are you on any antibiotics? No  5. Have you had any new, sudden or worsening abdominal pain? No  6. Have you or anyone in your household received a live vaccination in the past 4 weeks? Please note:  No live vaccines while on biologic/chemotherapy until 6 months after the last treatment.  Patient can receive the flu vaccine (shot only) and the pneumovax.  It is optimal for the patient to get these vaccines mid cycle, but they can be given at any time as long as it is not on the day of the infusion. No  7. Have you recently been diagnosed with any new nervous system diseases (ie. Multiple sclerosis, Guillain [place], seizures, neurological changes) or cancer diagnosis? Are you on any form of radiation or chemotherapy? No  8. Are you pregnant or breast feeding or do you have plans of pregnancy in the future? No  9. Have you been having any signs of worsening depression or suicidal ideations?  (benlysta only) No  10. Have there been any other new onset medical symptoms? No  .      Post Infusion Assessment:  Patient tolerated infusion without  incident.  Blood return noted pre and post infusion.  Site patent and intact, free from redness, edema or discomfort.  No evidence of extravasations.  Access discontinued per protocol.    Discharge Plan:   Patient discharged in stable condition accompanied by: self.  Departure Mode: Ambulatory.  Verbally reviewed next appointment on 10/24/18.    Luli Newman RN

## 2018-10-18 DIAGNOSIS — M25.512 CHRONIC LEFT SHOULDER PAIN: ICD-10-CM

## 2018-10-18 DIAGNOSIS — M15.3 OTHER SECONDARY OSTEOARTHRITIS OF MULTIPLE SITES: ICD-10-CM

## 2018-10-18 DIAGNOSIS — G89.29 CHRONIC LEFT SHOULDER PAIN: ICD-10-CM

## 2018-10-18 DIAGNOSIS — Z79.60 LONG-TERM USE OF IMMUNOSUPPRESSANT MEDICATION: ICD-10-CM

## 2018-10-18 DIAGNOSIS — Z79.52 LONG TERM CURRENT USE OF SYSTEMIC STEROIDS: ICD-10-CM

## 2018-10-18 DIAGNOSIS — E55.9 VITAMIN D DEFICIENCY: ICD-10-CM

## 2018-10-18 DIAGNOSIS — M05.79 RHEUMATOID ARTHRITIS INVOLVING MULTIPLE SITES WITH POSITIVE RHEUMATOID FACTOR (H): Chronic | ICD-10-CM

## 2018-10-18 NOTE — TELEPHONE ENCOUNTER
Medication/Dose:      methotrexate 2.5 MG tablet CHEMO 20 tablet 3 5/11/2018  No     Sig - Route: Take 5 tablets (12.5 mg) by mouth once a week Labs in 6 weeks - Oral     Last Written Prescription Date: 05/11/2018  Last Fill Quantity: 20, # refills: 3  Last Office Visit:  5/11/2018  Next Enc:  11/02/2018    Next 5 appointments (look out 90 days)     Nov 02, 2018  7:30 AM CDT   Return Visit with LO Parra CNP   [facility] ([facility])    [id number] [place] [address] [phone]                  Standing lab orders every 8-12 weeks    WBC   Date Value Ref Range Status   08/29/2018 5.7 4.0 - 11.0 10e9/L Final     RBC Count   Date Value Ref Range Status   08/29/2018 4.18 3.8 - 5.2 10e12/L Final     Hemoglobin   Date Value Ref Range Status   08/29/2018 12.4 11.7 - 15.7 g/dL Final     Hematocrit   Date Value Ref Range Status   08/29/2018 38.9 35.0 - 47.0 % Final     MCV   Date Value Ref Range Status   08/29/2018 93 78 - 100 fl Final     MCH   Date Value Ref Range Status   08/29/2018 29.7 26.5 - 33.0 pg Final     MCHC   Date Value Ref Range Status   08/29/2018 31.9 31.5 - 36.5 g/dL Final     RDW   Date Value Ref Range Status   08/29/2018 12.9 10.0 - 15.0 % Final     Platelet Count   Date Value Ref Range Status   08/29/2018 248 150 - 450 10e9/L Final     AST   Date Value Ref Range Status   08/29/2018 15 0 - 45 U/L Final     ALT   Date Value Ref Range Status   08/29/2018 22 0 - 50 U/L Final     Creatinine   Date Value Ref Range Status   08/29/2018 0.80 0.52 - 1.04 mg/dL Final     Albumin   Date Value Ref Range Status   08/29/2018 3.4 3.4 - 5.0 g/dL Final     No results found for: CKTOTAL  CRP Inflammation   Date Value Ref Range Status   08/29/2018 <2.9 0.0 - 8.0 mg/L Final   05/04/2018 <2.9 0.0 - 8.0 mg/L Final   01/25/2018 7.5 0.0 - 8.0 mg/L Final     Sed Rate   Date Value Ref Range Status   05/04/2018 16 0 - 30 mm/h Final   01/25/2018 33 (H) 0 - 30 mm/h  Final   11/03/2017 12 0 - 30 mm/h Final     Color Urine (no units)   Date Value   04/28/2017 Yellow     Appearance Urine (no units)   Date Value   04/28/2017 Clear     Glucose Urine (mg/dL)   Date Value   04/28/2017 Negative     Bilirubin Urine (no units)   Date Value   04/28/2017 Negative     Ketones Urine (mg/dL)   Date Value   04/28/2017 Negative     Specific Gravity Urine (no units)   Date Value   04/28/2017 1.016     pH Urine (pH)   Date Value   04/28/2017 6.0     Protein Albumin Urine (mg/dL)   Date Value   04/28/2017 Negative     Nitrite Urine (no units)   Date Value   04/28/2017 Negative     Leukocyte Esterase Urine (no units)   Date Value   04/28/2017 Moderate (A)

## 2018-10-22 ENCOUNTER — OFFICE VISIT (OUTPATIENT)
Dept: PEDIATRICS | Facility: CLINIC | Age: 58
End: 2018-10-22
Payer: COMMERCIAL

## 2018-10-22 ENCOUNTER — NURSE TRIAGE (OUTPATIENT)
Dept: NURSING | Facility: CLINIC | Age: 58
End: 2018-10-22

## 2018-10-22 VITALS
BODY MASS INDEX: 27.59 KG/M2 | HEART RATE: 81 BPM | SYSTOLIC BLOOD PRESSURE: 102 MMHG | DIASTOLIC BLOOD PRESSURE: 64 MMHG | TEMPERATURE: 98.1 F | HEIGHT: 65 IN | WEIGHT: 165.6 LBS | OXYGEN SATURATION: 99 %

## 2018-10-22 DIAGNOSIS — R07.89 CHEST WALL PAIN: Primary | ICD-10-CM

## 2018-10-22 PROCEDURE — 99213 OFFICE O/P EST LOW 20 MIN: CPT | Performed by: INTERNAL MEDICINE

## 2018-10-22 NOTE — MR AVS SNAPSHOT
After Visit Summary   10/22/2018    Janice Mayfield    MRN: [id number]           Patient Information     Date Of Birth          1960        Visit Information        Provider Department      10/22/2018 3:50 PM Momo Nolen MD [facility]        Today's Diagnoses     Chest wall pain    -  1       Follow-ups after your visit        Follow-up notes from your care team     Return if symptoms worsen or fail to improve.      Your next 10 appointments already scheduled     Oct 24, 2018  2:00 PM CDT   Level 2 with [address] ([facility])    [address] [place] [id number] [phone]            Nov 02, 2018  7:30 AM CDT   Return Visit with LO Parra CNP   [facility]    [address] [place] [id number] [phone]              Who to contact     If you have questions or need follow up information about today's clinic visit or your schedule please contact [facility] directly at [phone].  Normal or non-critical lab and imaging results will be communicated to you by MyChart, letter or phone within 4 business days after the clinic has received the results. If you do not hear from us within 7 days, please contact the clinic through MyChart or phone. If you have a critical or abnormal lab result, we will notify you by phone as soon as possible.  Submit refill requests through [employer] or call your pharmacy and they will forward the refill request to us. Please allow 3 business days for your refill to be completed.          Additional Information About Your Visit        MyChart Information     [employer] gives you secure access to your electronic health record. If you see a primary care provider, you can also send messages to your care team and make appointments. If you have questions, please call your primary care  "clinic.  If you do not have a primary care provider, please call [phone] and they will assist you.      [employer] is an electronic gateway that provides easy, online access to your medical records. With [employer], you can request a clinic appointment, read your test results, renew a prescription or communicate with your care team.     To access your existing account, please contact your [facility] or call [phone] for assistance.        Care EveryWhere ID     This is your Care EveryWhere ID. This could be used by other organizations to access your [place] medical records  [id number]        Your Vitals Were     Pulse Temperature Height Pulse Oximetry BMI (Body Mass Index)       81 98.1  F (36.7  C) (Temporal) 5' 4.5\" (1.638 m) 99% 27.99 kg/m2        Blood Pressure from Last 3 Encounters:   10/22/18 102/64   09/26/18 120/72   08/29/18 119/71    Weight from Last 3 Encounters:   10/22/18 165 lb 9.6 oz (75.1 kg)   09/26/18 166 lb 1.6 oz (75.3 kg)   08/29/18 163 lb 3.2 oz (74 kg)              Today, you had the following     No orders found for display       Primary Care Provider Office Phone # Fax #    Marah Hare [phone]       [address]   [place] [address]        Equal Access to Services     [facility]: Hadii aad ku hadasho Soomaali, waaxda luqadaha, qaybta kaalmada adeegyada, kadeem rojo . So [facility] [phone].    ATENCIÓN: Si habla español, tiene a kelly disposición servicios gratuitos de asistencia lingüística. Llame al [phone].    We comply with applicable federal civil rights laws and [place] laws. We do not discriminate on the basis of race, color, national origin, age, disability, sex, sexual orientation, or gender identity.            Thank you!     Thank you for choosing [facility]  for your care. Our goal is always to provide you with excellent care. Hearing back " from our patients is one way we can continue to improve our services. Please take a few minutes to complete the written survey that you may receive in the mail after your visit with us. Thank you!             Your Updated Medication List - Protect others around you: Learn how to safely use, store and throw away your medicines at www.disposemymeds.org.          This list is accurate as of 10/22/18  4:44 PM.  Always use your most recent med list.                   Brand Name Dispense Instructions for use Diagnosis    BENADRYL PO      Take 25 mg by mouth as needed (Pre-medication for remicade)        folic acid 1 MG tablet    FOLVITE    180 tablet    Take 2 tablets (2 mg) by mouth daily    Rheumatoid arthritis involving multiple sites with positive rheumatoid factor (H), Chronic left shoulder pain, Other secondary osteoarthritis of multiple sites, Vitamin D deficiency, Long term current use of systemic steroids, Long-term use of immunosuppressant medication       IBUPROFEN PO      Take 200 mg by mouth as needed for moderate pain        methotrexate 2.5 MG tablet CHEMO     20 tablet    TAKE 5 TABLETS (12.5 MG) BY MOUTH ONCE A WEEK LABS IN 6 WEEKS    Rheumatoid arthritis involving multiple sites with positive rheumatoid factor (H), Chronic left shoulder pain, Other secondary osteoarthritis of multiple sites, Vitamin D deficiency, Long term current use of systemic steroids, Long-term use of immunosuppressant medication       predniSONE 1 MG tablet    DELTASONE    360 tablet    Take 4 tablets (4 mg) by mouth daily Try taper by 1/2 mg day every 2 month until off if able    Rheumatoid arthritis involving multiple sites with positive rheumatoid factor (H), Other secondary osteoarthritis of multiple sites, Vitamin D deficiency, Long term current use of systemic steroids, Long-term use of immunosuppressant medication, Chronic left shoulder pain       REMICADE IV      Inject 100 mg into the vein every 28 days        TYLENOL PO       Take 650 mg by mouth once        Vitamin D (Cholecalciferol) 1000 units Caps     1 capsule    Take 2,000 Units by mouth daily
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
## 2018-10-24 ENCOUNTER — INFUSION THERAPY VISIT (OUTPATIENT)
Dept: INFUSION THERAPY | Facility: CLINIC | Age: 58
End: 2018-10-24
Payer: COMMERCIAL

## 2018-10-24 VITALS
BODY MASS INDEX: 28.26 KG/M2 | SYSTOLIC BLOOD PRESSURE: 117 MMHG | HEART RATE: 79 BPM | RESPIRATION RATE: 18 BRPM | DIASTOLIC BLOOD PRESSURE: 73 MMHG | OXYGEN SATURATION: 99 % | TEMPERATURE: 98.3 F | WEIGHT: 167.2 LBS

## 2018-10-24 DIAGNOSIS — M05.79 RHEUMATOID ARTHRITIS INVOLVING MULTIPLE SITES WITH POSITIVE RHEUMATOID FACTOR (H): Primary | ICD-10-CM

## 2018-10-24 PROCEDURE — 99207 ZZC NO CHARGE NURSE ONLY: CPT

## 2018-10-24 PROCEDURE — 96413 CHEMO IV INFUSION 1 HR: CPT | Performed by: INTERNAL MEDICINE

## 2018-10-24 RX ORDER — DIPHENHYDRAMINE HCL 25 MG
25 CAPSULE ORAL ONCE
Status: COMPLETED | OUTPATIENT
Start: 2018-10-24 | End: 2018-10-24

## 2018-10-24 RX ORDER — DIPHENHYDRAMINE HCL 25 MG
25 CAPSULE ORAL ONCE
Status: CANCELLED
Start: 2018-10-24 | End: 2018-10-24

## 2018-10-24 RX ORDER — METHYLPREDNISOLONE SODIUM SUCCINATE 125 MG/2ML
100 INJECTION, POWDER, LYOPHILIZED, FOR SOLUTION INTRAMUSCULAR; INTRAVENOUS ONCE
Status: CANCELLED | OUTPATIENT
Start: 2018-10-24 | End: 2018-10-24

## 2018-10-24 RX ORDER — ACETAMINOPHEN 325 MG/1
650 TABLET ORAL ONCE
Status: COMPLETED | OUTPATIENT
Start: 2018-10-24 | End: 2018-10-24

## 2018-10-24 RX ORDER — ACETAMINOPHEN 325 MG/1
650 TABLET ORAL ONCE
Status: CANCELLED
Start: 2018-10-24 | End: 2018-10-24

## 2018-10-24 RX ADMIN — Medication 250 ML: at 14:44

## 2018-10-24 RX ADMIN — Medication 25 MG: at 14:31

## 2018-10-24 RX ADMIN — ACETAMINOPHEN 650 MG: 325 TABLET ORAL at 14:30

## 2018-10-24 ASSESSMENT — PAIN SCALES - GENERAL: PAINLEVEL: NO PAIN (0)

## 2018-10-24 NOTE — MR AVS SNAPSHOT
After Visit Summary   10/24/2018    Janice Mayfield    MRN: [id number]           Patient Information     Date Of Birth          1960        Visit Information        Provider Department      10/24/2018 2:00 PM BAY 1 INFUSION [facility]        Today's Diagnoses     Rheumatoid arthritis involving multiple sites with positive rheumatoid factor (H)    -  1       Follow-ups after your visit        Your next 10 appointments already scheduled     Nov 02, 2018  7:30 AM CDT   Return Visit with LO Parra CNP   [facility] ([facility])    [address] [place] [id number] [phone]            Nov 26, 2018  2:00 PM CST   Level 2 with BAY 6 INFUSION   [facility])    [address] [place] [id number] [phone]            Dec 26, 2018  2:00 PM CST   Level 2 with BAY 5 INFUSION   [facility])    [address] [place] [address] [id number] [phone]              Who to contact     If you have questions or need follow up information about today's clinic visit or your schedule please contact [facility] directly at [phone].  Normal or non-critical lab and imaging results will be communicated to you by MyChart, letter or phone within 4 business days after the clinic has received the results. If you do not hear from us within 7 days, please contact the clinic through MyChart or phone. If you have a critical or abnormal lab result, we will notify you by phone as soon as possible.  Submit refill requests through [employer] or call your pharmacy and they will forward the refill request to us. Please allow 3 business days for your refill to be completed.          Additional Information About Your Visit        [employer]o Information     [employer] gives you secure access to your electronic health  record. If you see a primary care provider, you can also send messages to your care team and make appointments. If you have questions, please call your primary care clinic.  If you do not have a primary care provider, please call [phone] and they will assist you.      [employer] is an electronic gateway that provides easy, online access to your medical records. With [employer], you can request a clinic appointment, read your test results, renew a prescription or communicate with your care team.     To access your existing account, please contact your [facility] or call [phone] for assistance.        Care EveryWhere ID     This is your Care EveryWhere ID. This could be used by other organizations to access your [place] medical records  [id number]        Your Vitals Were     Pulse Temperature Respirations Pulse Oximetry BMI (Body Mass Index)       79 98.3  F (36.8  C) (Oral) 18 99% 28.26 kg/m2        Blood Pressure from Last 3 Encounters:   10/24/18 117/73   10/22/18 102/64   09/26/18 120/72    Weight from Last 3 Encounters:   10/24/18 75.8 kg (167 lb 3.2 oz)   10/22/18 75.1 kg (165 lb 9.6 oz)   09/26/18 75.3 kg (166 lb 1.6 oz)              Today, you had the following     No orders found for display       Primary Care Provider Office Phone # Fax #    Marah Hare [phone]       [address] [facility] [place] [facility] [place]   [place] [facility] [address]        Equal Access to Services     [facility]: Hadii aad ku hadasho Soomaali, waaxda luqadaha, qaybta kaalmada adeegyada, waxay [place]. So [facility] [phone].    ATENCIÓN: Si habla español, tiene a kelly disposición servicios gratuitos de asistencia lingüística. Llame al [phone].    We comply with applicable federal civil rights laws and [place] laws. We do not discriminate on the basis of race, color, national origin, age, disability, sex, sexual orientation, or gender identity.             Thank you!     Thank you for choosing [facility]  for your care. Our goal is always to provide you with excellent care. Hearing back from our patients is one way we can continue to improve our services. Please take a few minutes to complete the written survey that you may receive in the mail after your visit with us. Thank you!             Your Updated Medication List - Protect others around you: Learn how to safely use, store and throw away your medicines at www.disposemymeds.org.          This list is accurate as of 10/24/18  4:24 PM.  Always use your most recent med list.                   Brand Name Dispense Instructions for use Diagnosis    BENADRYL PO      Take 25 mg by mouth as needed (Pre-medication for remicade)        folic acid 1 MG tablet    FOLVITE    180 tablet    Take 2 tablets (2 mg) by mouth daily    Rheumatoid arthritis involving multiple sites with positive rheumatoid factor (H), Chronic left shoulder pain, Other secondary osteoarthritis of multiple sites, Vitamin D deficiency, Long term current use of systemic steroids, Long-term use of immunosuppressant medication       IBUPROFEN PO      Take 200 mg by mouth as needed for moderate pain        methotrexate 2.5 MG tablet CHEMO     20 tablet    TAKE 5 TABLETS (12.5 MG) BY MOUTH ONCE A WEEK LABS IN 6 WEEKS    Rheumatoid arthritis involving multiple sites with positive rheumatoid factor (H), Chronic left shoulder pain, Other secondary osteoarthritis of multiple sites, Vitamin D deficiency, Long term current use of systemic steroids, Long-term use of immunosuppressant medication       predniSONE 1 MG tablet    DELTASONE    360 tablet    Take 4 tablets (4 mg) by mouth daily Try taper by 1/2 mg day every 2 month until off if able    Rheumatoid arthritis involving multiple sites with positive rheumatoid factor (H), Other secondary osteoarthritis of multiple sites, Vitamin D deficiency, Long term current use of systemic steroids,  Long-term use of immunosuppressant medication, Chronic left shoulder pain       REMICADE IV      Inject 100 mg into the vein every 28 days        TYLENOL PO      Take 650 mg by mouth once        Vitamin D (Cholecalciferol) 1000 units Caps     1 capsule    Take 2,000 Units by mouth daily
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
## 2018-11-09 ENCOUNTER — OFFICE VISIT (OUTPATIENT)
Dept: RHEUMATOLOGY | Facility: CLINIC | Age: 58
End: 2018-11-09
Payer: COMMERCIAL

## 2018-11-09 VITALS
SYSTOLIC BLOOD PRESSURE: 110 MMHG | BODY MASS INDEX: 27.38 KG/M2 | HEIGHT: 65 IN | WEIGHT: 164.3 LBS | HEART RATE: 66 BPM | TEMPERATURE: 97.1 F | DIASTOLIC BLOOD PRESSURE: 62 MMHG | OXYGEN SATURATION: 99 %

## 2018-11-09 DIAGNOSIS — M05.79 RHEUMATOID ARTHRITIS INVOLVING MULTIPLE SITES WITH POSITIVE RHEUMATOID FACTOR (H): Primary | Chronic | ICD-10-CM

## 2018-11-09 DIAGNOSIS — Z79.60 LONG-TERM USE OF IMMUNOSUPPRESSANT MEDICATION: ICD-10-CM

## 2018-11-09 DIAGNOSIS — D22.9 SKIN MOLE: ICD-10-CM

## 2018-11-09 DIAGNOSIS — Z79.52 LONG TERM CURRENT USE OF SYSTEMIC STEROIDS: ICD-10-CM

## 2018-11-09 DIAGNOSIS — M05.79 RHEUMATOID ARTHRITIS INVOLVING MULTIPLE SITES WITH POSITIVE RHEUMATOID FACTOR (H): Chronic | ICD-10-CM

## 2018-11-09 DIAGNOSIS — M15.3 OTHER SECONDARY OSTEOARTHRITIS OF MULTIPLE SITES: ICD-10-CM

## 2018-11-09 DIAGNOSIS — E55.9 VITAMIN D DEFICIENCY: ICD-10-CM

## 2018-11-09 LAB
ALBUMIN SERPL-MCNC: 3.6 G/DL (ref 3.4–5)
ALT SERPL W P-5'-P-CCNC: 21 U/L (ref 0–50)
AST SERPL W P-5'-P-CCNC: 13 U/L (ref 0–45)
CREAT SERPL-MCNC: 0.83 MG/DL (ref 0.52–1.04)
CRP SERPL-MCNC: <2.9 MG/L (ref 0–8)
ERYTHROCYTE [DISTWIDTH] IN BLOOD BY AUTOMATED COUNT: 13.1 % (ref 10–15)
GFR SERPL CREATININE-BSD FRML MDRD: 71 ML/MIN/1.7M2
HCT VFR BLD AUTO: 41.2 % (ref 35–47)
HGB BLD-MCNC: 12.9 G/DL (ref 11.7–15.7)
MCH RBC QN AUTO: 29.3 PG (ref 26.5–33)
MCHC RBC AUTO-ENTMCNC: 31.3 G/DL (ref 31.5–36.5)
MCV RBC AUTO: 94 FL (ref 78–100)
PLATELET # BLD AUTO: 285 10E9/L (ref 150–450)
RBC # BLD AUTO: 4.4 10E12/L (ref 3.8–5.2)
WBC # BLD AUTO: 6.8 10E9/L (ref 4–11)

## 2018-11-09 PROCEDURE — 82040 ASSAY OF SERUM ALBUMIN: CPT | Performed by: NURSE PRACTITIONER

## 2018-11-09 PROCEDURE — 99214 OFFICE O/P EST MOD 30 MIN: CPT | Performed by: NURSE PRACTITIONER

## 2018-11-09 PROCEDURE — 84450 TRANSFERASE (AST) (SGOT): CPT | Performed by: NURSE PRACTITIONER

## 2018-11-09 PROCEDURE — 82565 ASSAY OF CREATININE: CPT | Performed by: NURSE PRACTITIONER

## 2018-11-09 PROCEDURE — 85027 COMPLETE CBC AUTOMATED: CPT | Performed by: NURSE PRACTITIONER

## 2018-11-09 PROCEDURE — 86140 C-REACTIVE PROTEIN: CPT | Performed by: NURSE PRACTITIONER

## 2018-11-09 PROCEDURE — 84460 ALANINE AMINO (ALT) (SGPT): CPT | Performed by: NURSE PRACTITIONER

## 2018-11-09 PROCEDURE — 36415 COLL VENOUS BLD VENIPUNCTURE: CPT | Performed by: NURSE PRACTITIONER

## 2018-11-09 ASSESSMENT — CLINICAL DISEASE ACTIVITY INDEX (CDAI): TOTAL_SCORE: 2

## 2018-11-09 ASSESSMENT — PAIN SCALES - GENERAL: PAINLEVEL: NO PAIN (0)

## 2018-11-09 ASSESSMENT — DISEASE ACTIVITY SCORE (DAS28)
CRP_MG_PER_LITER: REMISSION <2.6
CRP_MG_PER_LITER: 1.59

## 2018-11-09 ASSESSMENT — JOINT PAIN: TOTAL NUMBER OF SWOLLEN JOINTS: 0

## 2018-11-09 ASSESSMENT — ROUTINE ASSESSMENT OF PATIENT INDEX DATA (RAPID3)
RAPID3 INTERPRETATION: LOW 3.1-6
TOTAL RAPID3 SCORE: 3.3

## 2018-11-09 NOTE — PROGRESS NOTES
The blood counts, liver, kidney and CRP inflammation labs are normal.     Pola BLANCHARD, CNP, MSN  11/9/2018  11:46 AM

## 2018-11-09 NOTE — PROGRESS NOTES
"[facility] - Rheumatology Clinic Visit    Janice Mayfield  is a 58 year old follow-up erosive RA +RF -CCP -ZANDER/C3/C4, -ANCA -HLA B27. Prior care at [facility] w/ Andry Jones.     Review: hydroxychloroquine, methotrexate (d/c liver enzymes elevation), enbrel (ineffective), humira *d/c (uticarial reaction), sulfites (anxiety) --so no sulfasalazine     HISTORY CARRIED FORWARD 4-: Went to [facility] in a wheelchair [\"knees were melons\", pads of feet, not use hands, wrists]. On remicade 5 mg/kg Q 4-5 week infusions with pre-meds once a month (Jan 2011) last infusion about 6 weeks ago, FA 1 mg/day, MTX 10 mg Q Sunday week and prednisone 4-5 mg day. Increased remicade was every 4-5 weeks as was wearing down rather then increase the dose. Tolerating and s/e. Mild foggy day after taking methotrexate (no SI, hairloss, diarrhea), FA 1 mg day   Eats healthy. Reports arthritis is controlled, functioning well and not in wheelchair. Weather affects her arthritis. Root canal 2 weeks ago due to tooth abscess \"thinning of your jaw\". No flaring with her RA and this treatment. shouilder and wrist, knee so mild compared to when dx but is noticing she is due. Hx right wrist injection 2 yr ago. Pain is 4 out 10.     Copy forward  May 11, 2018  On remicade 5 mg/kg Q 4-5 week infusions with pre-meds once a month (Jan 2011) last infusion 5-2018 tolerated well , FA 1 mg/day, MTX 10 mg Q Sunday week and prednisone 4 mg day, folic acid 2 mg day. Tolerating well. No infections. No RA flares or EAS. No joint pain or swelling. Did not do PT for her left shoulder, this is still limited with the ROM but she is wishing to do this now since she has more time. No swelling. Mild plan left shoulder and right wrist where the prior damage was. Mild \"delay\" in thinking day of MTX but tolerating and the higher dose of folic acid is working well. No infectious over the years.     November 9, 2018  On remicade 5 mg/kg Q 4-5 week " infusions with pre-meds once a month (Jan 2011) last infusion 10- tolerated well , FA 2 mg/day, MTX 12.5 mg Q Sunday week and prednisone 4 mg day tolerating. No RHEUMATOID ARTHRITIS (RA) flares, no EAS, and left shoulder is much improved even got her ROM back. Still some Physical Therapy sessions to do, she stopped when was having more stress. Some mild pain in there. 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 No pain or swelling in knees or wrists as she had before when initially dx. No infections. No NSAIDs use. Due to annual physical, and agrees to schedule this. Denies any fever, chills, SOB, BOYD, night sweats, or chest pain, or cough. Reports healthy. Denies having skin evaluation over the years, but use to tan in the sun when she was younger.     PMH:  Injuries-None  No Blood transfusions  Medical-RA, osteopenia (DEXA  in 2008 -0.6, postemenopausal, anemia, elevated liver enzymes (fatty liver, MR elastroplaty NL 2012 seen Dr. Anderson; initially hep C + but HCV RNA neg), polyclonal hypergammaglobuinemia, diverticulum,   History reviewed. No pertinent past medical history.  Patient Active Problem List    Diagnosis Date Noted     Other secondary osteoarthritis of multiple sites 05/11/2018     Priority: Medium     Long-term use of immunosuppressant medication 05/11/2018     Priority: Medium     Vitamin D deficiency 05/03/2017     Priority: Medium     Long term current use of systemic steroids 05/03/2017     Priority: Medium     Rheumatoid arthritis involving multiple sites with positive rheumatoid factor (H) 04/28/2017     Priority: Medium       Surgical-None     FH:  No autoimmune disorders, psoriasis, UC, crohn's, SLE, RA, PsA, gout, autoimmune thyroid.  No MS in family  Mother-Uterine CA-passed  Father-alive PPM and arrythmia  MGM-parkinsons, OP, possible arthritis-passed  GQS-JLR-sglbyr  PGM-brain CA, OP-passed  PGF-colon CA-passed  2 brothers-start to have  heart issue  2 sisters-healthy HTN  2 children  Common low BP and low HR, varicose vein, arrhythmia    Social-  No Alcohol. Never Smoking. 3 Children (youngest deformed pulmonary valve s/p OHS). NO IVDU or drug use. 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     [facility] personally reviewed and updated by me.    ROS:  Negative raynaud s phenomena, hairloss, sun sensitivities, keratoconjunctivitis sicca, pleurisy, inflammatory joint symptoms, significant rashes like malar, oral/nasal or ulcerations, inflammatory eye disease, inflammatory bowel disease, dactylitis, tenosynovitis, or enthespathy. Negative for miscarriages over 2 months into pregnancy, blood clots, gout, psoriasis, UC, crohn's. No temporal headache, no jaw claudication, no scalp tenderness, vision changes, carotidynia, cough. No STD or pregnancy symptoms. No Parotid swelling.   +see above   -SLE signs or symptoms  CONSTITUTIONAL: No fevers, night sweats or unintentional weight change. No acute distress, swollen glands  EYES: No vision change, diplopia, pain in eyes or red eyes   EARS, NOSE, MOUTH, THROAT: No tinnitus or hearing change, no epistaxis or nasal discharge, no oral lesions, throat clear. Normal saliva pool.  No drymouth. No thyroid  Enlargement.   CARDIOVASCULAR: No chest pain, palpitations, or pain with walking, no orthopnea or PND   RESPIRATORY: No dyspnea, cough, shortness of breath or wheezing. No pleurisy.   GI: No nausea, vomiting, diarrhea or constipation, no abdominal pain, or blood in stools.   : No change in urine, no dysuria or hematuria   MUSCKL: No swollen, tender, red or painful joints. No nodules. No enthesitis, plantar fascitis or heel pain.   INTEGUMENTARY: No concerning lesions or moles   NEURO: No loss of strength or sensation, no numbness or tingling, no tremor, no dizziness, no headache. No falls   ENDO: No polyuria or polydipsia, no temperature intolerance   HEME/LYMPH:No concerning bumps, bleeding problems,  "or swollen lymph nodes. No recent infections, hospitalizations or new illnesses.   ALLERGY: No environmental allergies   PSYCH:No depression or anxiety, no sleep problems.  Otherwise 14 point ROS obtained, reviewed and found negative.     Physical exam:  Vitals: Blood pressure 110/62, pulse 66, temperature 97.1  F (36.2  C), temperature source Oral, height 1.638 m (5' 4.5\"), weight 74.5 kg (164 lb 4.8 oz), SpO2 99 %.  Wt Readings from Last 4 Encounters:   11/09/18 74.5 kg (164 lb 4.8 oz)   10/24/18 75.8 kg (167 lb 3.2 oz)   10/22/18 75.1 kg (165 lb 9.6 oz)   09/26/18 75.3 kg (166 lb 1.6 oz)     Constitutional: WD-WN-WG cooperative  Eyes: nl EOM, PERRLA, vision, conjunctiva, sclera, No Unilateral or bilateral external ear inflammation   ENT: nl external ears, nose, hearing, lips, teeth, gums, throat. No saddle nose   Neck: no mass or thyroid enlargement  Resp: lungs clear to auscultation, nl to palpation, nl effort  CV: RRR, no murmurs, rubs or gallops, no edema  GI: no ABD mass or tenderness, no HSM  : not tested  Lymph: no cervical or epitrochlear nodes  MS: Right wrist drop wirh reduced flexion and extension, left shoulder posterior scapular swelling in soft tissue,no swelling in shoulder. right halgus valgus bunions All TMJ, neck, shoulder, elbow, wrist, hand, spine, hip, knee, ankle, and foot joints were examined and otherwise found normal. Normal  strength. No active synovitis or deformity. Full ROM. Normal prayer sign. Negative Negative Lhermitte's sign. No periuncle erythema  Skin: no nail pitting, alopecia, rash  Neuro: nl cranial nerves, strength, sensation, DTRs. +moles on body  Psych: nl judgement, orientation, memory, affect.      Labs/Imaging:  TSH Nl 2016  SPEP nl 2011  Anti-smooth, antimitochrondial, myelo AB amd PR3 NL in 2011  Lymes neg 2011    DEXA 5-2016  Osteopenia z-0.4 t-1  Xrays feet 2-2014 negative  2-2014 B/L hand joint space narrowing and erosive change on radiocarpal  2011 neg SI " x-ays     Endoscopy/colonoscopy 5-2016 single diverticulum dx diverticulosis in colon   Results for orders placed or performed in visit on 11/09/18   Albumin level   Result Value Ref Range    Albumin 3.6 3.4 - 5.0 g/dL   ALT   Result Value Ref Range    ALT 21 0 - 50 U/L   AST   Result Value Ref Range    AST 13 0 - 45 U/L   Creatinine   Result Value Ref Range    Creatinine 0.83 0.52 - 1.04 mg/dL    GFR Estimate 71 >60 mL/min/1.7m2    GFR Estimate If Black 86 >60 mL/min/1.7m2   CRP inflammation   Result Value Ref Range    CRP Inflammation <2.9 0.0 - 8.0 mg/L   CBC with platelets   Result Value Ref Range    WBC 6.8 4.0 - 11.0 10e9/L    RBC Count 4.40 3.8 - 5.2 10e12/L    Hemoglobin 12.9 11.7 - 15.7 g/dL    Hematocrit 41.2 35.0 - 47.0 %    MCV 94 78 - 100 fl    MCH 29.3 26.5 - 33.0 pg    MCHC 31.3 (L) 31.5 - 36.5 g/dL    RDW 13.1 10.0 - 15.0 %    Platelet Count 285 150 - 450 10e9/L       Impression/Plan:    1. Erosive (deforming as lost ROM left shoulder, right wrist) Rheumatoid Arthritis w/+RF (-CCP/ZANDER, x-rays severe degenerative changes RC b/l, Rt wrist total loss space between triquetrum and marginal erosions diffuse; left shoulder erosion/mild GH OA) RHEUMATOID ARTHRITIS (RA) remission controlled on infliximab 5 mg/kg every 4-6 week, methotrexate 12.5 mg week, prednisone 4 mg day no flaring. She has not tapered prednisone as I ordered but she agrees to now.  RA activity =remission. Labs today normal. Left shoulder ROM improving, some posterior upper scapular soft tissue swelling, she will ice, do Physical Therapy and follow-up  With PCP at annual. Discussed the long-term risks of prednisone and the need to get off this as this is not safe. I will repeat x-rays next visit. No sulfa--reactions to this.     2. Chronic left shoulder, Mild OA with erosive changes on x-rya . Seen ortho-return prn  3. Osteopenia. Chronic prednisone use 4 mg day. DEXA due next time last . Continue calcium and vitamin D. Taper  by 0.5-1 mg every 1 month until off. If flares, then will adjust methotrexate or consider alternatives    4. Immunosuppression, long-term risk medication. Will check labs every 8-12 weeks while on immunosuppresive therapy and hx elevated liver enzymes.     Declines pnuemonia vaccines   Risk skin cancer on immunosuppression and autoimmune disease and past sun exposure, will refer her to derm for full check      The patient understood the rationale for the diagnosis and treatment plan. Patient shared in the decision making. All questions were answered to best of my ability and the patient's satisfaction  Pola BLANCHARD, CNP, MSN  [facility]  Department of Rheumatology & Autoimmune Disorders    1. Immunization: Reviewed CDC guidelines with patient updated, information provided to patient based on CDC guidelines for vaccination recommendations, patient will discuss with primary provider  2. Bone Health:Educated on adequate calcium and vitamin D intake, Educated on exercise, Glucocorticoid education discussed risks, benefits & potential long term side effects from use  3. Discussed routine annual physicals, cancer screening, cardiac risk monitoring, bone health and primary care with primary care provider.   4. Educated on diagnosis, prognosis, labs, imaging, treatment options (including risks, benefits, risk of no treatment), medications (use, dose, side-effects, risks of medications including infection/cancer/bone marrow suppression, lab monitoring), infections what to do, plan of cares, goals of treatment.

## 2018-11-09 NOTE — MR AVS SNAPSHOT
After Visit Summary   11/9/2018    Janice Mayfield    MRN: [id number]           Patient Information     Date Of Birth          1960        Visit Information        Provider Department      11/9/2018 11:30 AM Pola Santana APRN CNP [facility]        Today's Diagnoses     Rheumatoid arthritis involving multiple sites with positive rheumatoid factor (H)    -  1    Skin mole        Long term current use of systemic steroids        Long-term use of immunosuppressant medication           Follow-ups after your visit        Additional Services     DERMATOLOGY REFERRAL       Your provider has referred you to: [facility]: [facility] [phone]   [email or web address]    Please be aware that coverage of these services is subject to the terms and limitations of your health insurance plan.  Call member services at your health plan with any benefit or coverage questions.      Please bring the following with you to your appointment:    (1) Any X-Rays, CTs or MRIs which have been performed.  Contact the facility where they were done to arrange for  prior to your scheduled appointment.    (2) List of current medications  (3) This referral request   (4) Any documents/labs given to you for this referral                  Follow-up notes from your care team     Return for Labs every 12 weeks, 6 month, Annual Physical Exam with Primary Care Provider.      Your next 10 appointments already scheduled     Nov 12, 2018  5:10 PM CST   New Visit with LO Perry CNP   [facility])    [address] [facility] [id number] [phone]            Nov 20, 2018  4:00 PM CST   New Visit with Kenzie Johnson MD   [facility] ([facility])    [address] [facility] [id number] [phone]             Nov 26, 2018  2:00 PM CST   Level 2 with BAY 6 INFUSION   [facility]    [address] [place] [address]   [phone]            Dec 26, 2018  2:00 PM CST   Level 2 with BAY 5 INFUSION   [facility]    [address] [place] [address]   [phone]            Feb 27, 2019  2:00 PM CST   LAB with LAB FIRST FLOOR [facility] ([facility])    [address] [place] [address]   [phone]           Please do not eat 10-12 hours before your appointment if you are coming in fasting for labs on lipids, cholesterol, or glucose (sugar). This does not apply to pregnant women. Water, hot tea and black coffee (with nothing added) are okay. Do not drink other fluids, diet soda or chew gum.            May 10, 2019 11:00 AM CDT   LAB with LAB FIRST FLOOR [facility] ([facility])    [address] [place] [id number]   [phone]           Please do not eat 10-12 hours before your appointment if you are coming in fasting for labs on lipids, cholesterol, or glucose (sugar). This does not apply to pregnant women. Water, hot tea and black coffee (with nothing added) are okay. Do not drink other fluids, diet soda or chew gum.            May 10, 2019 11:30 AM CDT   Return Visit with LO Parra CNP   [facility]    [address] [place] [id number] [phone]              Who to contact     If you have questions or need follow up information about today's clinic visit or your schedule please contact [facility] directly at [phone].  Normal or non-critical lab and imaging results will be communicated to you by MyChart, letter or phone within 4 business days after the clinic has received the results.  "If you do not hear from us within 7 days, please contact the clinic through [employer] or phone. If you have a critical or abnormal lab result, we will notify you by phone as soon as possible.  Submit refill requests through [employer] or call your pharmacy and they will forward the refill request to us. Please allow 3 business days for your refill to be completed.          Additional Information About Your Visit        [employer] Information     [employer] gives you secure access to your electronic health record. If you see a primary care provider, you can also send messages to your care team and make appointments. If you have questions, please call your primary care clinic.  If you do not have a primary care provider, please call [phone] and they will assist you.      [employer] is an electronic gateway that provides easy, online access to your medical records. With [employer], you can request a clinic appointment, read your test results, renew a prescription or communicate with your care team.     To access your existing account, please contact your [facility] or call [phone] for assistance.        Care EveryWhere ID     This is your Care EveryWhere ID. This could be used by other organizations to access your [facility] medical records  [id number]        Your Vitals Were     Pulse Temperature Height Pulse Oximetry BMI (Body Mass Index)       66 97.1  F (36.2  C) (Oral) 1.638 m (5' 4.5\") 99% 27.77 kg/m2        Blood Pressure from Last 3 Encounters:   11/09/18 110/62   10/24/18 117/73   10/22/18 102/64    Weight from Last 3 Encounters:   11/09/18 74.5 kg (164 lb 4.8 oz)   10/24/18 75.8 kg (167 lb 3.2 oz)   10/22/18 75.1 kg (165 lb 9.6 oz)              We Performed the Following     DERMATOLOGY REFERRAL        Primary Care Provider Office Phone # Fax #    Marah Hare [phone]       [address] PEREZ   [facility] [address]        Equal Access to " Services     [facility]: Hadii aad ku hadadaamadou Barton, wapeterda luqadaha, qaybta kayolacelia orellana, kadeem rojo . So [facility] [phone].    ATENCIÓN: Si aricla nino, tiene a kelly disposición servicios gratuitos de asistencia lingüística. Llame al [phone].    We comply with applicable federal civil rights laws and [place] laws. We do not discriminate on the basis of race, color, national origin, age, disability, sex, sexual orientation, or gender identity.            Thank you!     Thank you for choosing [facility]  for your care. Our goal is always to provide you with excellent care. Hearing back from our patients is one way we can continue to improve our services. Please take a few minutes to complete the written survey that you may receive in the mail after your visit with us. Thank you!             Your Updated Medication List - Protect others around you: Learn how to safely use, store and throw away your medicines at www.disposemymeds.org.          This list is accurate as of 11/9/18 11:51 AM.  Always use your most recent med list.                   Brand Name Dispense Instructions for use Diagnosis    BENADRYL PO      Take 25 mg by mouth as needed (Pre-medication for remicade)        folic acid 1 MG tablet    FOLVITE    180 tablet    Take 2 tablets (2 mg) by mouth daily    Rheumatoid arthritis involving multiple sites with positive rheumatoid factor (H), Chronic left shoulder pain, Other secondary osteoarthritis of multiple sites, Vitamin D deficiency, Long term current use of systemic steroids, Long-term use of immunosuppressant medication       IBUPROFEN PO      Take 200 mg by mouth as needed for moderate pain        methotrexate 2.5 MG tablet CHEMO     20 tablet    TAKE 5 TABLETS (12.5 MG) BY MOUTH ONCE A WEEK LABS IN 6 WEEKS    Rheumatoid arthritis involving multiple sites with positive rheumatoid factor (H), Chronic left shoulder pain, Other secondary  osteoarthritis of multiple sites, Vitamin D deficiency, Long term current use of systemic steroids, Long-term use of immunosuppressant medication       predniSONE 1 MG tablet    DELTASONE    360 tablet    Take 4 tablets (4 mg) by mouth daily Try taper by 1/2 mg day every 2 month until off if able    Rheumatoid arthritis involving multiple sites with positive rheumatoid factor (H), Other secondary osteoarthritis of multiple sites, Vitamin D deficiency, Long term current use of systemic steroids, Long-term use of immunosuppressant medication, Chronic left shoulder pain       REMICADE IV      Inject 100 mg into the vein every 28 days        TYLENOL PO      Take 650 mg by mouth once        Vitamin D (Cholecalciferol) 1000 units Caps     1 capsule    Take 2,000 Units by mouth daily

## 2018-11-09 NOTE — NURSING NOTE
"Janice Mayfield's goals for this visit include:   Chief Complaint   Patient presents with     RECHECK       She requests these members of her care team be copied on today's visit information: PCP    PCP: Marah Hare    Referring Provider:  Referred Self, MD  No address on file    /62 (BP Location: Left arm, Patient Position: Chair, Cuff Size: Adult Regular)  Pulse 66  Temp 97.1  F (36.2  C) (Oral)  Ht 1.638 m (5' 4.5\")  Wt 74.5 kg (164 lb 4.8 oz)  SpO2 99%  BMI 27.77 kg/m2    Do you need any medication refills at today's visit? None      Yvrose Arceo CMA      "

## 2018-11-12 ENCOUNTER — OFFICE VISIT (OUTPATIENT)
Dept: PEDIATRICS | Facility: CLINIC | Age: 58
End: 2018-11-12
Payer: COMMERCIAL

## 2018-11-12 VITALS
DIASTOLIC BLOOD PRESSURE: 60 MMHG | BODY MASS INDEX: 28.02 KG/M2 | OXYGEN SATURATION: 99 % | WEIGHT: 165.8 LBS | TEMPERATURE: 97.2 F | HEART RATE: 88 BPM | SYSTOLIC BLOOD PRESSURE: 100 MMHG

## 2018-11-12 DIAGNOSIS — Z12.4 SCREENING FOR MALIGNANT NEOPLASM OF CERVIX: ICD-10-CM

## 2018-11-12 DIAGNOSIS — E28.39 MENOPAUSE OVARIAN FAILURE: ICD-10-CM

## 2018-11-12 DIAGNOSIS — Z13.1 SCREENING FOR DIABETES MELLITUS: ICD-10-CM

## 2018-11-12 DIAGNOSIS — I83.93 VARICOSE VEINS OF BOTH LOWER EXTREMITIES, UNSPECIFIED WHETHER COMPLICATED: ICD-10-CM

## 2018-11-12 DIAGNOSIS — Z13.6 CARDIOVASCULAR SCREENING; LDL GOAL LESS THAN 160: ICD-10-CM

## 2018-11-12 DIAGNOSIS — L90.0 LICHEN SCLEROSUS: ICD-10-CM

## 2018-11-12 DIAGNOSIS — Z13.29 SCREENING FOR THYROID DISORDER: ICD-10-CM

## 2018-11-12 DIAGNOSIS — Z23 NEED FOR TDAP VACCINATION: ICD-10-CM

## 2018-11-12 DIAGNOSIS — Z00.00 ENCOUNTER FOR ROUTINE ADULT HEALTH EXAMINATION WITHOUT ABNORMAL FINDINGS: Primary | ICD-10-CM

## 2018-11-12 DIAGNOSIS — Z12.31 ENCOUNTER FOR SCREENING MAMMOGRAM FOR BREAST CANCER: ICD-10-CM

## 2018-11-12 PROCEDURE — 90471 IMMUNIZATION ADMIN: CPT | Performed by: NURSE PRACTITIONER

## 2018-11-12 PROCEDURE — G0145 SCR C/V CYTO,THINLAYER,RESCR: HCPCS | Performed by: NURSE PRACTITIONER

## 2018-11-12 PROCEDURE — 90715 TDAP VACCINE 7 YRS/> IM: CPT | Performed by: NURSE PRACTITIONER

## 2018-11-12 PROCEDURE — 87624 HPV HI-RISK TYP POOLED RSLT: CPT | Performed by: NURSE PRACTITIONER

## 2018-11-12 PROCEDURE — 99386 PREV VISIT NEW AGE 40-64: CPT | Mod: 25 | Performed by: NURSE PRACTITIONER

## 2018-11-12 NOTE — PATIENT INSTRUCTIONS
PLAN:   1.   Symptomatic therapy suggested: Increase calcium to 1000mg and 800iu Vit D  2.  Orders Placed This Encounter   Procedures     MA Screening Digital Bilateral     DX Hip/Pelvis/Spine     TDAP, IM (10 - 64 YRS) - Adacel     HPV High Risk Types DNA Cervical     Pap imaged thin layer screen with HPV - recommended age 30 - 65 years (select HPV order below)     OB/GYN REFERRAL     3. Patient needs to follow up in if no improvement,or sooner if worsening of symptoms or other symptoms develop.  FURTHER TESTING:       - DXA (bone density) scan       - mammogram  I will place order. Please call [phone] to schedule.  FUTURE LABS:       - Schedule a fasting blood draw   Will follow up and/or notify patient of  results via My Chart to determine further need for followup  Follow up office visit in one year for annual health maintenance exam, sooner PRN.    Preventive Health Recommendations  Female Ages 50 - 64    Yearly exam: See your health care provider every year in order to  o Review health changes.   o Discuss preventive care.    o Review your medicines if your doctor has prescribed any.      Get a Pap test every three years (unless you have an abnormal result and your provider advises testing more often).    If you get Pap tests with HPV test, you only need to test every 5 years, unless you have an abnormal result.     You do not need a Pap test if your uterus was removed (hysterectomy) and you have not had cancer.    You should be tested each year for STDs (sexually transmitted diseases) if you're at risk.     Have a mammogram every 1 to 2 years.    Have a colonoscopy at age 50, or have a yearly FIT test (stool test). These exams screen for colon cancer.      Have a cholesterol test every 5 years, or more often if advised.    Have a diabetes test (fasting glucose) every three years. If you are at risk for diabetes, you should have this test more often.     If you are at risk for osteoporosis (brittle bone  disease), think about having a bone density scan (DEXA).    Shots: Get a flu shot each year. Get a tetanus shot every 10 years.    Nutrition:     Eat at least 5 servings of fruits and vegetables each day.    Eat whole-grain bread, whole-wheat pasta and brown rice instead of white grains and rice.    Get adequate Calcium and Vitamin D.     Lifestyle    Exercise at least 150 minutes a week (30 minutes a day, 5 days a week). This will help you control your weight and prevent disease.    Limit alcohol to one drink per day.    No smoking.     Wear sunscreen to prevent skin cancer.     See your dentist every six months for an exam and cleaning.    See your eye doctor every 1 to 2 years.  It was a pleasure seeing you today at the [facility]. Thank you for allowing us to care for you today. We truly hope we provided you with the excellent service you deserve. Please let us know if there is anything else we can do for you so we can be sure you are leaving completley satisfied with your care experience.       General information about your clinic   Clinic Hours Lab Hours (Appointments are required)   Mon-Thurs: 7:30 AM - 7 PM Mon-Thurs: 7:30 AM - 7 PM   Fri: 7:30 AM - 5 PM Fri: 7:30 AM - 5 PM        After Hours Nurse Advise & Appts:  [facility]: [phone]  [facility] On Call: to make appointments anytime: [phone] On Call Physician: call [phone] and answering service will page the on call physician.        For urgent appointments, please call [phone] and ask for the triage nurse or your care team clinic nurse.  How to contact my care team:  MyChart: [email or web address]   Phone: [phone]   Fax: [phone]       [facility]:   Phone: [phone]  Hours: 8:00 AM - 6:00 PM  Medication Refills:  Call your pharmacy and they will forward the refill to us. Please allow 3 business days for your refills to be completed.       Normal or non-critical lab and  imaging results will be communicated to you by MyChart, letter or phone within 7 days.  If you do not hear from us within 10 days, please call the clinic. If you have a critical or abnormal lab result, we will notify you by phone as soon as possible.       We now have PWIC (Pediatric Walk in Care)  Monday-Friday from 7:30-4. Simply walk in and be seen for your urgent needs like cough, fever, rash, diarrhea or vomiting, pink eye, UTI. No appointments needed. Ask one of the team for more information      -Your Care Team:    Dr. Fletcher Juarez - Internal Medicine/Pediatrics   Dr. Scooby Montero - Family Medicine  Dr. Pao Jara - Pediatrics  Dr. Leigh Schaeffer - Pediatrics  Sunshine Harvey CNP - Family Practice Nurse Practitioner

## 2018-11-12 NOTE — NURSING NOTE
"Chief Complaint   Patient presents with     Physical     AFE     Establish Care       Initial /60 (BP Location: Right arm, Patient Position: Sitting, Cuff Size: Adult Regular)  Pulse 88  Temp 97.2  F (36.2  C) (Temporal)  Wt 165 lb 12.8 oz (75.2 kg)  SpO2 99%  Breastfeeding? No  BMI 28.02 kg/m2 Estimated body mass index is 28.02 kg/(m^2) as calculated from the following:    Height as of 11/9/18: 5' 4.5\" (1.638 m).    Weight as of this encounter: 165 lb 12.8 oz (75.2 kg).  Medication Reconciliation: complete      YENNY Mckeon      "

## 2018-11-12 NOTE — MR AVS SNAPSHOT
After Visit Summary   11/12/2018    Janice Mayfield    MRN: [id number]           Patient Information     Date Of Birth          1960        Visit Information        Provider Department      11/12/2018 5:10 PM Sunshine Harvey APRN CNP [facility]        Today's Diagnoses     Encounter for routine adult health examination without abnormal findings    -  1    Screening for malignant neoplasm of cervix        Encounter for screening mammogram for breast cancer        Need for Tdap vaccination        Postmenopausal status        CARDIOVASCULAR SCREENING; LDL GOAL LESS THAN 160        Screening for thyroid disorder        Screening for diabetes mellitus        Varicose veins of both lower extremities, unspecified whether complicated        Lichen sclerosus          Care Instructions    PLAN:   1.   Symptomatic therapy suggested: Increase calcium to 1000mg and 800iu Vit D  2.  Orders Placed This Encounter   Procedures     MA Screening Digital Bilateral     DX Hip/Pelvis/Spine     TDAP, IM (10 - 64 YRS) - Adacel     HPV High Risk Types DNA Cervical     Pap imaged thin layer screen with HPV - recommended age 30 - 65 years (select HPV order below)     OB/GYN REFERRAL     3. Patient needs to follow up in if no improvement,or sooner if worsening of symptoms or other symptoms develop.  FURTHER TESTING:       - DXA (bone density) scan       - mammogram  I will place order. Please call [phone] to schedule.  FUTURE LABS:       - Schedule a fasting blood draw   Will follow up and/or notify patient of  results via My Chart to determine further need for followup  Follow up office visit in one year for annual health maintenance exam, sooner PRN.    Preventive Health Recommendations  Female Ages 50 - 64    Yearly exam: See your health care provider every year in order to  o Review health changes.   o Discuss preventive care.    o Review your medicines if your doctor has prescribed  any.      Get a Pap test every three years (unless you have an abnormal result and your provider advises testing more often).    If you get Pap tests with HPV test, you only need to test every 5 years, unless you have an abnormal result.     You do not need a Pap test if your uterus was removed (hysterectomy) and you have not had cancer.    You should be tested each year for STDs (sexually transmitted diseases) if you're at risk.     Have a mammogram every 1 to 2 years.    Have a colonoscopy at age 50, or have a yearly FIT test (stool test). These exams screen for colon cancer.      Have a cholesterol test every 5 years, or more often if advised.    Have a diabetes test (fasting glucose) every three years. If you are at risk for diabetes, you should have this test more often.     If you are at risk for osteoporosis (brittle bone disease), think about having a bone density scan (DEXA).    Shots: Get a flu shot each year. Get a tetanus shot every 10 years.    Nutrition:     Eat at least 5 servings of fruits and vegetables each day.    Eat whole-grain bread, whole-wheat pasta and brown rice instead of white grains and rice.    Get adequate Calcium and Vitamin D.     Lifestyle    Exercise at least 150 minutes a week (30 minutes a day, 5 days a week). This will help you control your weight and prevent disease.    Limit alcohol to one drink per day.    No smoking.     Wear sunscreen to prevent skin cancer.     See your dentist every six months for an exam and cleaning.    See your eye doctor every 1 to 2 years.  It was a pleasure seeing you today at the [facility]. Thank you for allowing us to care for you today. We truly hope we provided you with the excellent service you deserve. Please let us know if there is anything else we can do for you so we can be sure you are leaving completley satisfied with your care experience.       General information about your clinic   Clinic Hours Lab  Hours (Appointments are required)   Mon-Thurs: 7:30 AM - 7 PM Mon-Thurs: 7:30 AM - 7 PM   Fri: 7:30 AM - 5 PM Fri: 7:30 AM - 5 PM        After Hours Nurse Advise & Appts:  [facility]: [phone]  [facility] On Call: to make appointments anytime: [phone] On Call Physician: call [phone] and answering service will page the on call physician.        For urgent appointments, please call [phone] and ask for the triage nurse or your care team clinic nurse.  How to contact my care team:  MyChart: [email or web address]   Phone: [phone]   Fax: [phone]       [facility]:   Phone: [phone]  Hours: 8:00 AM - 6:00 PM  Medication Refills:  Call your pharmacy and they will forward the refill to us. Please allow 3 business days for your refills to be completed.       Normal or non-critical lab and imaging results will be communicated to you by MyChart, letter or phone within 7 days.  If you do not hear from us within 10 days, please call the clinic. If you have a critical or abnormal lab result, we will notify you by phone as soon as possible.       We now have PWIC (Pediatric Walk in Care)  Monday-Friday from 7:30-4. Simply walk in and be seen for your urgent needs like cough, fever, rash, diarrhea or vomiting, pink eye, UTI. No appointments needed. Ask one of the team for more information      -Your Care Team:    Dr. Fletcher Juarez - Internal Medicine/Pediatrics   Dr. Scooby Montero - Family Medicine  Dr. Pao Jara - Pediatrics  Dr. Leigh Schaeffer - Pediatrics  Sunshine Harvey CNP - Family Practice Nurse Practitioner                         Follow-ups after your visit        Additional Services     OB/GYN REFERRAL       Your provider has referred you to:  [facility]: [facility] [phone]   [email or web address]     Please be aware that coverage of these services is subject to the terms and limitations of your health  insurance plan.  Call member services at your health plan with any benefit or coverage questions.      Please bring the following with you to your appointment:    (1) Any X-Rays, CTs or MRIs which have been performed.  Contact the facility where they were done to arrange for  prior to your scheduled appointment.   (2) List of current medications   (3) This referral request   (4) Any documents/labs given to you for this referral                  Your next 10 appointments already scheduled     Nov 20, 2018  4:00 PM CST   New Visit with Kenzie Johnson MD   [facility])    [address] [place] [id number]   [phone]            Nov 26, 2018  2:00 PM CST   Level 2 with BAY 6 INFUSION   [facility])    [id number] [place] [id number]   [phone]            Dec 26, 2018  2:00 PM CST   Level 2 with BAY 5 INFUSION   [facility])    [address] [place] [id number]   [phone]            Feb 27, 2019  2:00 PM CST   LAB with LAB FIRST FLOOR [facility])    [address] [place] [phone]   [phone]           Please do not eat 10-12 hours before your appointment if you are coming in fasting for labs on lipids, cholesterol, or glucose (sugar). This does not apply to pregnant women. Water, hot tea and black coffee (with nothing added) are okay. Do not drink other fluids, diet soda or chew gum.            May 10, 2019 11:00 AM CDT   LAB with LAB FIRST FLOOR Critical access hospital ([email or web address]    [address] [place] [phone]   [phone]           Please do not eat 10-12 hours before your appointment if you are coming in fasting for labs on lipids, cholesterol, or glucose (sugar). This does not apply to pregnant  women. Water, hot tea and black coffee (with nothing added) are okay. Do not drink other fluids, diet soda or chew gum.            May 10, 2019 11:30 AM CDT   [facility] with LO [facility]ra CNP   [facility]    [address] [place] [id number] [phone]              Future tests that were ordered for you today     Open Future Orders        Priority Expected Expires Ordered    DX Hip/Pelvis/Spine Routine  11/12/2019 11/12/2018    MA Screening Digital Bilateral Routine  11/12/2019 11/12/2018            Who to contact     If you have questions or need follow up information about today's clinic visit or your schedule please contact [facility] directly at [phone].  Normal or non-critical lab and imaging results will be communicated to you by [email or web address], letter or phone within 4 business days after the clinic has received the results. If you do not hear from us within 7 days, please contact the clinic through [employer] or phone. If you have a critical or abnormal lab result, we will notify you by phone as soon as possible.  Submit refill requests through Ide[employer] or call your pharmacy and they will forward the refill request to us. Please allow 3 business days for your refill to be completed.          Additional Information About Your Visit        Ideapod Information     Ide[employer]od gives you secure access to your electronic health record. If you see a primary care provider, you can also send messages to your care team and make appointments. If you have questions, please call your primary care clinic.  If you do not have a primary care provider, please call [phone] and they will assist you.      Ideapod is an electronic gateway that provides easy, online access to your medical records. With Ideapod, you can request a clinic appointment, read your test results, renew a prescription or communicate with your care team.     To  access your existing account, please contact your [facility] or call [phone] for assistance.        Care EveryWhere ID     This is your Care EveryWhere ID. This could be used by other organizations to access your [facility] [place] medical records  [id number]        Your Vitals Were     Pulse Temperature Pulse Oximetry Breastfeeding? BMI (Body Mass Index)       88 97.2  F (36.2  C) (Temporal) 99% No 28.02 kg/m2        Blood Pressure from Last 3 Encounters:   11/12/18 100/60   11/09/18 110/62   10/24/18 117/73    Weight from Last 3 Encounters:   11/12/18 165 lb 12.8 oz (75.2 kg)   11/09/18 164 lb 4.8 oz (74.5 kg)   10/24/18 167 lb 3.2 oz (75.8 kg)              We Performed the Following     HPV High Risk Types DNA Cervical     OB/GYN REFERRAL     Pap imaged thin layer screen with HPV - recommended age 30 - 65 years (select HPV order below)     TDAP, IM (10 - 64 YRS) - Adacel        Primary Care Provider Office Phone # Fax #    Marah Hare [phone]       [address] PEREZ   [facility] [place] [address]        Equal Access to Services     AUREA RUIZ : Hadii padmini silvero Soaddison, waaxda luqadaha, qaybta kaalmada adezacharyyada, kadeem del cid. So [facility] [phone].    ATENCIÓN: Si habla español, tiene a kelly disposición servicios gratuitos de asistencia lingüística. Llame al [phone].    We comply with applicable federal civil rights laws and [place] laws. We do not discriminate on the basis of race, color, national origin, age, disability, sex, sexual orientation, or gender identity.            Thank you!     Thank you for choosing [facility]  for your care. Our goal is always to provide you with excellent care. Hearing back from our patients is one way we can continue to improve our services. Please take a few minutes to complete the written survey that you may receive in the mail after your visit with us.  Thank you!             Your Updated Medication List - Protect others around you: Learn how to safely use, store and throw away your medicines at www.disposemymeds.org.          This list is accurate as of 11/12/18  6:21 PM.  Always use your most recent med list.                   Brand Name Dispense Instructions for use Diagnosis    BENADRYL PO      Take 25 mg by mouth as needed (Pre-medication for remicade)        folic acid 1 MG tablet    FOLVITE    180 tablet    Take 2 tablets (2 mg) by mouth daily    Rheumatoid arthritis involving multiple sites with positive rheumatoid factor (H), Chronic left shoulder pain, Other secondary osteoarthritis of multiple sites, Vitamin D deficiency, Long term current use of systemic steroids, Long-term use of immunosuppressant medication       IBUPROFEN PO      Take 200 mg by mouth as needed for moderate pain        methotrexate 2.5 MG tablet CHEMO     20 tablet    TAKE 5 TABLETS (12.5 MG) BY MOUTH ONCE A WEEK LABS IN 6 WEEKS    Rheumatoid arthritis involving multiple sites with positive rheumatoid factor (H), Chronic left shoulder pain, Other secondary osteoarthritis of multiple sites, Vitamin D deficiency, Long term current use of systemic steroids, Long-term use of immunosuppressant medication       predniSONE 1 MG tablet    DELTASONE    360 tablet    Take 4 tablets (4 mg) by mouth daily Try taper by 1/2 mg day every 2 month until off if able    Rheumatoid arthritis involving multiple sites with positive rheumatoid factor (H), Other secondary osteoarthritis of multiple sites, Vitamin D deficiency, Long term current use of systemic steroids, Long-term use of immunosuppressant medication, Chronic left shoulder pain       REMICADE IV      Inject 100 mg into the vein every 28 days        TYLENOL PO      Take 650 mg by mouth once        Vitamin D (Cholecalciferol) 1000 units Caps     1 capsule    Take 2,000 Units by mouth daily

## 2018-11-12 NOTE — PROGRESS NOTES
SUBJECTIVE:   CC: Janice Mayfield is an 58 year old woman who presents for preventive health visit.     Healthy Habits:    Do you get at least three servings of calcium containing foods daily (dairy, green leafy vegetables, etc.)? yes    Amount of exercise or daily activities, outside of work: active at home    Problems taking medications regularly No    Medication side effects: No    Have you had an eye exam in the past two years? yes    Do you see a dentist twice per year? Once a year    Do you have sleep apnea, excessive snoring or daytime drowsiness?no    Today's PHQ-2 Score:   PHQ-2 ( 1999 [employer]) 11/12/2018 5/11/2018   Q1: Little interest or pleasure in doing things 0 0   Q2: Feeling down, depressed or hopeless 0 0   PHQ-2 Score 0 0       Abuse: Current or Past(Physical, Sexual or Emotional)- No  Do you feel safe in your environment - Yes    Social History   Substance Use Topics     Smoking status: Never Smoker     Smokeless tobacco: Never Used     Alcohol use No     If you drink alcohol do you typically have >3 drinks per day or >7 drinks per week? No                     Reviewed orders with patient.  Reviewed health maintenance and updated orders accordingly - Yes  Labs reviewed in EPIC  BP Readings from Last 3 Encounters:   11/12/18 100/60   11/09/18 110/62   10/24/18 117/73    Wt Readings from Last 3 Encounters:   11/12/18 165 lb 12.8 oz (75.2 kg)   11/09/18 164 lb 4.8 oz (74.5 kg)   10/24/18 167 lb 3.2 oz (75.8 kg)                  Patient Active Problem List   Diagnosis     Rheumatoid arthritis involving multiple sites with positive rheumatoid factor (H)     Vitamin D deficiency     Long term current use of systemic steroids     Other secondary osteoarthritis of multiple sites     Long-term use of immunosuppressant medication     History reviewed. No pertinent surgical history.    Social History   Substance Use Topics     Smoking status: Never Smoker     Smokeless tobacco: Never Used      Alcohol use No     History reviewed. No pertinent family history.      Current Outpatient Prescriptions   Medication Sig Dispense Refill     Acetaminophen (TYLENOL PO) Take 650 mg by mouth once       DiphenhydrAMINE HCl (BENADRYL PO) Take 25 mg by mouth as needed (Pre-medication for remicade)       folic acid (FOLVITE) 1 MG tablet Take 2 tablets (2 mg) by mouth daily 180 tablet 3     IBUPROFEN PO Take 200 mg by mouth as needed for moderate pain       InFLIXimab (REMICADE IV) Inject 100 mg into the vein every 28 days        methotrexate 2.5 MG tablet CHEMO TAKE 5 TABLETS (12.5 MG) BY MOUTH ONCE A WEEK LABS IN 6 WEEKS 20 tablet 3     predniSONE (DELTASONE) 1 MG tablet Take 4 tablets (4 mg) by mouth daily Try taper by 1/2 mg day every 2 month until off if able 360 tablet 3     Vitamin D, Cholecalciferol, 1000 UNITS CAPS Take 2,000 Units by mouth daily 1 capsule 0     Allergies   Allergen Reactions     Adalimumab Hives     Humira      hives     Methotrexate      anxiety     Plaquenil [Hydroxychloroquine]      Elevated LFTs     Sulfa Drugs      delusions       Patient over age 50, mutual decision to screen reflected in health maintenance.    Pertinent mammograms are reviewed under the imaging tab.  History of abnormal Pap smear: NO - age 30- 65 PAP every 3 years recommended     Reviewed and updated as needed this visit by clinical staff  Tobacco  Allergies  Meds  Med Hx  Surg Hx  Fam Hx  Soc Hx        Reviewed and updated as needed this visit by Provider        Past Medical History:   Diagnosis Date     Long term current use of systemic steroids 5/3/2017     Long-term use of immunosuppressant medication 2018     Other secondary osteoarthritis of multiple sites 2018     Rheumatoid arthritis involving multiple sites with positive rheumatoid factor (H) 2017     Vitamin D deficiency 5/3/2017      Past Surgical History:   Procedure Laterality Date     C/SECTION, LOW TRANSVERSE      , Low  Transverse     HC TOOTH EXTRACTION W/FORCEP         ROS:  CONSTITUTIONAL:NEGATIVE for fever, chills, change in weight  INTEGUMENTARY/SKIN: NEGATIVE for non-healing lesion  and POSITIVE for lots of moles   EYES: NEGATIVE for vision changes or irritation  ENT: NEGATIVE for ear, mouth and throat problems  RESP:NEGATIVE for significant cough or SOB  BREAST: NEGATIVE for masses, tenderness or discharge  CV: NEGATIVE for chest pain, palpitations or peripheral edema  GI: NEGATIVE for nausea, abdominal pain, heartburn, or change in bowel habits   menopausal female: amenorrhea, no unusual urinary symptoms, no unusual vaginal symptoms and vaginal dryness  MUSCULOSKELETAL:POSITIVE  for arthralgias  and history of rheumatoid arthritis diagnosed about 10 years  and NEGATIVE for joint swelling  and joint warmth   NEURO: NEGATIVE for weakness, dizziness or paresthesias  ENDOCRINE: NEGATIVE for temperature intolerance, skin/hair changes  HEME/ALLERGY/IMMUNE: NEGATIVE for bleeding problems  PSYCHIATRIC: NEGATIVE for changes in mood or affect     OBJECTIVE:   /60 (BP Location: Right arm, Patient Position: Sitting, Cuff Size: Adult Regular)  Pulse 88  Temp 97.2  F (36.2  C) (Temporal)  Wt 165 lb 12.8 oz (75.2 kg)  SpO2 99%  Breastfeeding? No  BMI 28.02 kg/m2  EXAM:  GENERAL: healthy, alert and no distress  EYES: Eyes grossly normal to inspection, PERRL and conjunctivae and sclerae normal  HENT: ear canals and TM's normal, nose and mouth without ulcers or lesions  NECK: no adenopathy, no asymmetry, masses, or scars and thyroid normal to palpation  RESP: lungs clear to auscultation - no rales, rhonchi or wheezes  BREAST: normal without masses, tenderness or nipple discharge and no palpable axillary masses or adenopathy  CV: regular rates and rhythm, no murmur, click or rub, peripheral pulses strong and no peripheral edema  ABDOMEN: soft, nontender, no hepatosplenomegaly, no masses and bowel sounds normal   (female):  negative findings:  no abnormal discharge, cervix- no lesions, no cervical motion tenderness, adnexae- no masses, non-tender, Bartholin's glands, urethra, [place]'s glands negative, positive findings:  vaginal mucosa atrophy, appearance consistent with lichen sclerosis   MS: no gross musculoskeletal defects noted, no edema  SKIN: no suspicious lesions or rashes  EXTREMITIES: Good pulses. Good range of motion.  No edema. Normal reflexes. Diffuse, blanching varicosities bilateraly.  NEURO: Normal strength and tone, mentation intact and speech normal  PSYCH: mentation appears normal, affect normal/bright  LYMPH: no cervical, supraclavicular, axillary, or inguinal adenopathy    Diagnostic Test Results:  Pending orders and results         ASSESSMENT/PLAN:   Janice was seen today for physical and establish care.    Diagnoses and all orders for this visit:    Encounter for routine adult health examination without abnormal findings  -     **Comprehensive metabolic panel FUTURE anytime; Future  -     Lipid panel reflex to direct LDL Fasting; Future  -     **TSH with free T4 reflex FUTURE anytime; Future    Screening for malignant neoplasm of cervix  -     HPV High Risk Types DNA Cervical  -     Pap imaged thin layer screen with HPV - recommended age 30 - 65 years (select HPV order below)    Encounter for screening mammogram for breast cancer  -     MA Screening Digital Bilateral; Future    Need for Tdap vaccination  -     TDAP, IM (10 - 64 YRS) - Adacel    Postmenopausal status  -     DX Hip/Pelvis/Spine; Future    CARDIOVASCULAR SCREENING; LDL GOAL LESS THAN 160  -     Lipid panel reflex to direct LDL Fasting; Future    Screening for thyroid disorder  -     **TSH with free T4 reflex FUTURE anytime; Future    Screening for diabetes mellitus  -     **Comprehensive metabolic panel FUTURE anytime; Future    Varicose veins of both lower extremities, unspecified whether complicated  Further workup and treatment could be done if  "symptoms persist, worsen or new related symptoms occur. The patient will call in that eventuality.    Lichen sclerosus  -     OB/GYN REFERRAL    PLAN:   1.   Symptomatic therapy suggested: Increase calcium to 1000mg and 800iu Vit D  2.  Orders Placed This Encounter   Procedures     MA Screening Digital Bilateral     DX Hip/Pelvis/Spine     TDAP, IM (10 - 64 YRS) - Adacel     HPV High Risk Types DNA Cervical     Pap imaged thin layer screen with HPV - recommended age 30 - 65 years (select HPV order below)     OB/GYN REFERRAL     3. Patient needs to follow up in if no improvement,or sooner if worsening of symptoms or other symptoms develop.  FURTHER TESTING:       - DXA (bone density) scan       - mammogram  I will place order. Please call [phone] to schedule.  FUTURE LABS:       - Schedule a fasting blood draw   Will follow up and/or notify patient of  results via My Chart to determine further need for followup  Follow up office visit in one year for annual health maintenance exam, sooner PRN.    COUNSELING:   Reviewed preventive health counseling, as reflected in patient instructions  Special attention given to:        Regular exercise       Healthy diet/nutrition       Vision screening       Immunizations    Vaccinated for: TDAP             Osteoporosis Prevention/Bone Health       Colon cancer screening       The 10-year ASCVD risk score (Yazan ERVIN Jr, et al., 2013) is: 0.8%    Values used to calculate the score:      Age: 58 years      Sex: Female      Is Non- : No      Diabetic: No      Tobacco smoker: No      Systolic Blood Pressure: 113 mmHg      Is BP treated: No      HDL Cholesterol: 100 mg/dL      Total Cholesterol: 165 mg/dL       Advance Care Planning    BP Readings from Last 1 Encounters:   11/09/18 110/62     Estimated body mass index is 27.77 kg/(m^2) as calculated from the following:    Height as of 11/9/18: 5' 4.5\" (1.638 m).    Weight as of 11/9/18: 164 lb 4.8 oz (74.5 " kg).      Weight management plan: Discussed healthy diet and exercise guidelines and patient will follow up in 12 months in clinic to re-evaluate.     reports that she has never smoked. She has never used smokeless tobacco.      Counseling Resources:  ATP IV Guidelines  Pooled Cohorts Equation Calculator  Breast Cancer Risk Calculator  FRAX Risk Assessment  ICSI Preventive Guidelines  Dietary Guidelines for Americans, 2010  USDA's MyPlate  ASA Prophylaxis  Lung CA Screening    LO Perry [facility]

## 2018-11-13 NOTE — NURSING NOTE
Screening Questionnaire for Adult Immunization    Are you sick today?   No   Do you have allergies to medications, food, a vaccine component or latex?   No   Have you ever had a serious reaction after receiving a vaccination?   No   Do you have a long-term health problem with heart disease, lung disease, asthma, kidney disease, metabolic disease (e.g. diabetes), anemia, or other blood disorder?   No   Do you have cancer, leukemia, HIV/AIDS, or any other immune system problem?   No   In the past 3 months, have you taken medications that affect  your immune system, such as prednisone, other steroids, or anticancer drugs; drugs for the treatment of rheumatoid arthritis, Crohn s disease, or psoriasis; or have you had radiation treatments?   Yes   Have you had a seizure, or a brain or other nervous system problem?   No   During the past year, have you received a transfusion of blood or blood     products, or been given immune (gamma) globulin or antiviral drug?   No   For women: Are you pregnant or is there a chance you could become        pregnant during the next month?   No   Have you received any vaccinations in the past 4 weeks?   No     Immunization questionnaire was positive for at least one answer.  Notified prednisone medication.        Per orders of LO Marte CNP, injection of tdap given by Earline Arredondo. Patient instructed to remain in clinic for 15 minutes afterwards, and to report any adverse reaction to me immediately.       Screening performed by Earline Arredondo on 11/12/2018 at 6:40 PM.

## 2018-11-15 LAB
COPATH REPORT: NORMAL
PAP: NORMAL

## 2018-11-19 ENCOUNTER — OFFICE VISIT (OUTPATIENT)
Dept: OBGYN | Facility: CLINIC | Age: 58
End: 2018-11-19
Attending: NURSE PRACTITIONER
Payer: COMMERCIAL

## 2018-11-19 VITALS
WEIGHT: 167.6 LBS | HEART RATE: 85 BPM | SYSTOLIC BLOOD PRESSURE: 113 MMHG | DIASTOLIC BLOOD PRESSURE: 76 MMHG | OXYGEN SATURATION: 100 % | BODY MASS INDEX: 28.32 KG/M2

## 2018-11-19 DIAGNOSIS — N90.4 LICHEN SCLEROSUS ET ATROPHICUS OF THE VULVA: Primary | ICD-10-CM

## 2018-11-19 LAB
FINAL DIAGNOSIS: NORMAL
HPV HR 12 DNA CVX QL NAA+PROBE: NEGATIVE
HPV16 DNA SPEC QL NAA+PROBE: NEGATIVE
HPV18 DNA SPEC QL NAA+PROBE: NEGATIVE
SPECIMEN DESCRIPTION: NORMAL
SPECIMEN SOURCE CVX/VAG CYTO: NORMAL

## 2018-11-19 PROCEDURE — 99203 OFFICE O/P NEW LOW 30 MIN: CPT | Performed by: OBSTETRICS & GYNECOLOGY

## 2018-11-19 NOTE — MR AVS SNAPSHOT
After Visit Summary   11/19/2018    Janice Mayfield    MRN: [id number]           Patient Information     Date Of Birth          1960        Visit Information        Provider Department      11/19/2018 2:00 PM Jefferson Pena MD [facility]         Follow-ups after your visit        Your next 10 appointments already scheduled     Nov 20, 2018  4:00 PM CST   New Visit with Kenzie Johnson MD   [facility]    [address] [facility] [address]   [phone]            Nov 26, 2018  2:00 PM CST   Level 2 with BAY 6 INFUSION   [facility] ([facility]    [address] [address] [address]   [phone]            Dec 26, 2018  2:00 PM CST   Level 2 with BAY 5 INFUSION   [facility] ([facility]    [address] [address]   [phone]            Feb 27, 2019  2:00 PM CST   LAB with LAB FIRST FLOOR [facility] ([facility])    [address] [place] [address]   [phone]           Please do not eat 10-12 hours before your appointment if you are coming in fasting for labs on lipids, cholesterol, or glucose (sugar). This does not apply to pregnant women. Water, hot tea and black coffee (with nothing added) are okay. Do not drink other fluids, diet soda or chew gum.            May 10, 2019 11:00 AM CDT   LAB with LAB FIRST FLOOR [facility] ([facility])    [address] [place] [address]   [phone]           Please do not eat 10-12 hours before your appointment if you are coming in fasting for labs on lipids, cholesterol, or glucose (sugar). This does not apply to pregnant women. Water, hot tea and black coffee (with nothing added) are okay. Do not drink other fluids, diet soda or chew gum.             May 10, 2019 11:30 AM CDT   Return Visit with LO Parra CNP   [facility]    [address] [place] [address] [phone]              Who to contact     If you have questions or need follow up information about today's clinic visit or your schedule please contact [facility] directly at [phone].  Normal or non-critical lab and imaging results will be communicated to you by VCNChart, letter or phone within 4 business days after the clinic has received the results. If you do not hear from us within 7 days, please contact the clinic through VCNChart or phone. If you have a critical or abnormal lab result, we will notify you by phone as soon as possible.  Submit refill requests through Virtual DBS or call your pharmacy and they will forward the refill request to us. Please allow 3 business days for your refill to be completed.          Additional Information About Your Visit        Virtual DBS Information     Virtual DBS gives you secure access to your electronic health record. If you see a primary care provider, you can also send messages to your care team and make appointments. If you have questions, please call your primary care clinic.  If you do not have a primary care provider, please call [phone] and they will assist you.        Care EveryWhere ID     This is your Care EveryWhere ID. This could be used by other organizations to access your [facility] medical records  [id number]        Your Vitals Were     Pulse Pulse Oximetry BMI (Body Mass Index)             85 100% 28.32 kg/m2          Blood Pressure from Last 3 Encounters:   11/19/18 113/76   11/12/18 100/60   11/09/18 110/62    Weight from Last 3 Encounters:   11/19/18 167 lb 9.6 oz (76 kg)   11/12/18 165 lb 12.8 oz (75.2 kg)   11/09/18 164 lb 4.8 oz (74.5 kg)              Today, you had the following     No orders found for display       Primary Care Provider  Office Phone # Fax #    Marah Hare [phone]       [address]EREZ   [facility] [address]        Equal Access to Services     AUREA RUIZ : Eveline Barton, wapeterda luqadaha, qaybta kaalmada reyna, kadeem tamarin hayaajasper knoxzachary madisonruallison del cid. So [facility] [phone].    ATENCIÓN: Si habla español, tiene a kelly disposición servicios gratuitos de asistencia lingüística. Llame al [phone].    We comply with applicable federal civil rights laws and [place] laws. We do not discriminate on the basis of race, color, national origin, age, disability, sex, sexual orientation, or gender identity.            Thank you!     Thank you for choosing [facility]  for your care. Our goal is always to provide you with excellent care. Hearing back from our patients is one way we can continue to improve our services. Please take a few minutes to complete the written survey that you may receive in the mail after your visit with us. Thank you!             Your Updated Medication List - Protect others around you: Learn how to safely use, store and throw away your medicines at www.disposemymeds.org.          This list is accurate as of 11/19/18  2:16 PM.  Always use your most recent med list.                   Brand Name Dispense Instructions for use Diagnosis    BENADRYL PO      Take 25 mg by mouth as needed (Pre-medication for remicade)        folic acid 1 MG tablet    FOLVITE    180 tablet    Take 2 tablets (2 mg) by mouth daily    Rheumatoid arthritis involving multiple sites with positive rheumatoid factor (H), Chronic left shoulder pain, Other secondary osteoarthritis of multiple sites, Vitamin D deficiency, Long term current use of systemic steroids, Long-term use of immunosuppressant medication       IBUPROFEN PO      Take 200 mg by mouth as needed for moderate pain        methotrexate 2.5 MG tablet CHEMO     20 tablet    TAKE 5 TABLETS (12.5 MG) BY  MOUTH ONCE A WEEK LABS IN 6 WEEKS    Rheumatoid arthritis involving multiple sites with positive rheumatoid factor (H), Chronic left shoulder pain, Other secondary osteoarthritis of multiple sites, Vitamin D deficiency, Long term current use of systemic steroids, Long-term use of immunosuppressant medication       predniSONE 1 MG tablet    DELTASONE    360 tablet    Take 4 tablets (4 mg) by mouth daily Try taper by 1/2 mg day every 2 month until off if able    Rheumatoid arthritis involving multiple sites with positive rheumatoid factor (H), Other secondary osteoarthritis of multiple sites, Vitamin D deficiency, Long term current use of systemic steroids, Long-term use of immunosuppressant medication, Chronic left shoulder pain       REMICADE IV      Inject 100 mg into the vein every 28 days        TYLENOL PO      Take 650 mg by mouth once        Vitamin D (Cholecalciferol) 1000 units Caps     1 capsule    Take 2,000 Units by mouth daily

## 2018-11-20 ENCOUNTER — OFFICE VISIT (OUTPATIENT)
Dept: DERMATOLOGY | Facility: CLINIC | Age: 58
End: 2018-11-20
Payer: COMMERCIAL

## 2018-11-20 DIAGNOSIS — L57.8 SUN-DAMAGED SKIN: ICD-10-CM

## 2018-11-20 DIAGNOSIS — L81.4 SOLAR LENTIGO: ICD-10-CM

## 2018-11-20 DIAGNOSIS — D18.01 CHERRY ANGIOMA: ICD-10-CM

## 2018-11-20 DIAGNOSIS — L82.1 SEBORRHEIC KERATOSIS: ICD-10-CM

## 2018-11-20 DIAGNOSIS — D22.9 MULTIPLE BENIGN NEVI: ICD-10-CM

## 2018-11-20 DIAGNOSIS — D84.9 IMMUNOSUPPRESSION (H): Primary | ICD-10-CM

## 2018-11-20 PROCEDURE — 99203 OFFICE O/P NEW LOW 30 MIN: CPT | Performed by: DERMATOLOGY

## 2018-11-20 ASSESSMENT — PAIN SCALES - GENERAL: PAINLEVEL: NO PAIN (0)

## 2018-11-20 NOTE — NURSING NOTE
Janice Mayfield's goals for this visit include:   Chief Complaint   Patient presents with     Skin Check     No areas of concern. No personal hx of SC, family hx of BCC.      She requests these members of her care team be copied on today's visit information:     PCP: Marah Hare    Referring Provider:  LO Parra [facility]  [address] [address]    There were no vitals taken for this visit.    Do you need any medication refills at today's visit? No    Laurie Coats LPN

## 2018-11-20 NOTE — LETTER
11/20/2018         RE: Janice Mayfield  [address] [facility] [address]        Dear Colleague,    Thank you for referring your patient, Janice Mayfield, to the [facility]. Please see a copy of my visit note below.    [facility] Dermatology Note      Dermatology Problem List:  1. Patient on immunosuppressants for RA including methotrexate and infliximab currently  2. Irritated cherry angiomas, right nasal ala and right upper arm  -patient will follow up for removal if she desires in the future      Encounter Date: Nov 20, 2018    CC:  Chief Complaint   Patient presents with     Skin Check     No areas of concern. No personal hx of SC, family hx of BCC.          History of Present Illness:  Ms. Janice Mayfield is a 58 year old female who presents as a referral from Dr. Harvey for a skin check due to hx of immunosuppression. Patient has no personal history of skin cancer. Family history of NMSC in her father in his 80's. She has a spot on her nose that irritates her and can bleed. A similar one is on her upper right arm that catches on her sleeves and bleeds. No other concerning lesions. Denies any tender, rapidly growing skin lesions. She does try to be diligent with sun protection given her immunosuppression. No other concerns addressed today.      Past Medical History:   Patient Active Problem List   Diagnosis     Rheumatoid arthritis involving multiple sites with positive rheumatoid factor (H)     Vitamin D deficiency     Long term current use of systemic steroids     Other secondary osteoarthritis of multiple sites     Long-term use of immunosuppressant medication     Varicose veins of both lower extremities, unspecified whether complicated     Past Medical History:   Diagnosis Date     Long term current use of systemic steroids 5/3/2017     Long-term use of immunosuppressant medication 5/11/2018     Other secondary osteoarthritis of  multiple sites 2018     Rheumatoid arthritis involving multiple sites with positive rheumatoid factor (H) 2017     Vitamin D deficiency 5/3/2017     Past Surgical History:   Procedure Laterality Date     C/SECTION, LOW TRANSVERSE      , Low Transverse     HC TOOTH EXTRACTION W/FORCEP         Social History:  Patient  reports that she has never smoked. She has never used smokeless tobacco. She reports that she does not drink alcohol or use illicit drugs.  with three children.     Family History:  Family History   Problem Relation Age of Onset     Cancer Mother 72       Medications:  Current Outpatient Prescriptions   Medication Sig Dispense Refill     Acetaminophen (TYLENOL PO) Take 650 mg by mouth once       DiphenhydrAMINE HCl (BENADRYL PO) Take 25 mg by mouth as needed (Pre-medication for remicade)       folic acid (FOLVITE) 1 MG tablet Take 2 tablets (2 mg) by mouth daily 180 tablet 3     IBUPROFEN PO Take 200 mg by mouth as needed for moderate pain       InFLIXimab (REMICADE IV) Inject 100 mg into the vein every 28 days        methotrexate 2.5 MG tablet CHEMO TAKE 5 TABLETS (12.5 MG) BY MOUTH ONCE A WEEK LABS IN 6 WEEKS 20 tablet 3     predniSONE (DELTASONE) 1 MG tablet Take 4 tablets (4 mg) by mouth daily Try taper by 1/2 mg day every 2 month until off if able 360 tablet 3     Vitamin D, Cholecalciferol, 1000 UNITS CAPS Take 2,000 Units by mouth daily 1 capsule 0       Allergies   Allergen Reactions     Adalimumab Hives     Humira      hives     Methotrexate      anxiety     Plaquenil [Hydroxychloroquine]      Elevated LFTs     Sulfa Drugs      delusions       Review of Systems:  -Constitutional: Patient is otherwise feeling well, in usual state of health.   -Skin: As above in HPI. No additional skin concerns.    Physical exam:  Vitals: There were no vitals taken for this visit.  GEN: This is a well developed, well-nourished female in no acute distress, in a pleasant mood.    SKIN:  Total skin excluding the undergarment areas was performed. The exam included the head/face, neck, both arms, chest, back, abdomen, both legs, digits and/or nails and buttocks.   - Contreras Type II  - Scattered brown macules on sun exposed areas.  - Numerous dome shaped bright pink and purple papules on the trunk and face. Patient notes symptoms relates to lesions at the right alar crease and right upper arm  - There are a few waxy stuck on tan to brown papules on the trunk.  - Multiple regular brown pigmented macules and papules are identified on the trunk. All nevi less than 6mm in size. Some on the lower extremities darker brown in color but uniform.   - No other lesions of concern on areas examined.        Impression/Plan:  1. History of immunosuppression for rheumatoid arthritis:    Discussed theoretical increased risk for skin cancer.     Recommend sunscreens SPF #30 or greater, protective clothing and avoidance of tanning beds.    2. Sun damaged skin with solar lentigines    Recommend sunscreens SPF #30 or greater, protective clothing and avoidance of tanning beds.    3. Benign findings including: cherry angiomas, seborrheic keratosis    No further intervention required. Patient to report changes.     Patient reassured of the benign nature of these lesions.    4. Multiple clinically benign nevi    No further intervention required. Patient to report changes.     Patient reassured of the benign nature of these lesions.    5. Irritated cherry angiomas.     Discussed treatment with biopsy removal of the symptomatic lesions. Discussed pros and cons including scarring. Discussed that this is typically covered by insurance, but we cannot guarantee this. Patient aware that if she has not met her deductible, some of the cost may fall back on her. She will schedule an appointment if she wishes to remove these lesions in the future.       Follow-up 1 year for skin exam, sooner for lesions of concern.     Staff  Involved:  Scribe/Staff    Scribe Disclosure  I, Constance Araya, am serving as a scribe to document services personally performed by Dr. Kenzie Johnson MD, based on data collection and the provider's statements to me.     Provider Disclosure:   The documentation recorded by the scribe accurately reflects the services I personally performed and the decisions made by me.    Kenzie Johnson MD    Department of Dermatology  [facility]: Phone: [phone], Fax:[phone]  [facility]: Phone: [phone], Fax: [phone]              Again, thank you for allowing me to participate in the care of your patient.        Sincerely,        Kenzie Johnson MD

## 2018-11-20 NOTE — PROGRESS NOTES
[facility] Dermatology Note      Dermatology Problem List:  1. Patient on immunosuppressants for RA including methotrexate and infliximab currently  2. Irritated cherry angiomas, right nasal ala and right upper arm  -patient will follow up for removal if she desires in the future      Encounter Date: 2018    CC:  Chief Complaint   Patient presents with     Skin Check     No areas of concern. No personal hx of SC, family hx of BCC.          History of Present Illness:  Ms. Janice Mayfield is a 58 year old female who presents as a referral from Dr. Harvey for a skin check due to hx of immunosuppression. Patient has no personal history of skin cancer. Family history of NMSC in her father in his 80's. She has a spot on her nose that irritates her and can bleed. A similar one is on her upper right arm that catches on her sleeves and bleeds. No other concerning lesions. Denies any tender, rapidly growing skin lesions. She does try to be diligent with sun protection given her immunosuppression. No other concerns addressed today.      Past Medical History:   Patient Active Problem List   Diagnosis     Rheumatoid arthritis involving multiple sites with positive rheumatoid factor (H)     Vitamin D deficiency     Long term current use of systemic steroids     Other secondary osteoarthritis of multiple sites     Long-term use of immunosuppressant medication     Varicose veins of both lower extremities, unspecified whether complicated     Past Medical History:   Diagnosis Date     Long term current use of systemic steroids 5/3/2017     Long-term use of immunosuppressant medication 2018     Other secondary osteoarthritis of multiple sites 2018     Rheumatoid arthritis involving multiple sites with positive rheumatoid factor (H) 2017     Vitamin D deficiency 5/3/2017     Past Surgical History:   Procedure Laterality Date     C/SECTION, LOW TRANSVERSE      , Low Transverse      HC TOOTH EXTRACTION W/FORCEP         Social History:  Patient  reports that she has never smoked. She has never used smokeless tobacco. She reports that she does not drink alcohol or use illicit drugs.  with three children.     Family History:  Family History   Problem Relation Age of Onset     Cancer Mother 72       Medications:  Current Outpatient Prescriptions   Medication Sig Dispense Refill     Acetaminophen (TYLENOL PO) Take 650 mg by mouth once       DiphenhydrAMINE HCl (BENADRYL PO) Take 25 mg by mouth as needed (Pre-medication for remicade)       folic acid (FOLVITE) 1 MG tablet Take 2 tablets (2 mg) by mouth daily 180 tablet 3     IBUPROFEN PO Take 200 mg by mouth as needed for moderate pain       InFLIXimab (REMICADE IV) Inject 100 mg into the vein every 28 days        methotrexate 2.5 MG tablet CHEMO TAKE 5 TABLETS (12.5 MG) BY MOUTH ONCE A WEEK LABS IN 6 WEEKS 20 tablet 3     predniSONE (DELTASONE) 1 MG tablet Take 4 tablets (4 mg) by mouth daily Try taper by 1/2 mg day every 2 month until off if able 360 tablet 3     Vitamin D, Cholecalciferol, 1000 UNITS CAPS Take 2,000 Units by mouth daily 1 capsule 0       Allergies   Allergen Reactions     Adalimumab Hives     Humira      hives     Methotrexate      anxiety     Plaquenil [Hydroxychloroquine]      Elevated LFTs     Sulfa Drugs      delusions       Review of Systems:  -Constitutional: Patient is otherwise feeling well, in usual state of health.   -Skin: As above in HPI. No additional skin concerns.    Physical exam:  Vitals: There were no vitals taken for this visit.  GEN: This is a well developed, well-nourished female in no acute distress, in a pleasant mood.    SKIN: Total skin excluding the undergarment areas was performed. The exam included the head/face, neck, both arms, chest, back, abdomen, both legs, digits and/or nails and buttocks.   - Contreras Type II  - Scattered brown macules on sun exposed areas.  - Numerous dome shaped  bright pink and purple papules on the trunk and face. Patient notes symptoms relates to lesions at the right alar crease and right upper arm  - There are a few waxy stuck on tan to brown papules on the trunk.  - Multiple regular brown pigmented macules and papules are identified on the trunk. All nevi less than 6mm in size. Some on the lower extremities darker brown in color but uniform.   - No other lesions of concern on areas examined.        Impression/Plan:  1. History of immunosuppression for rheumatoid arthritis:    Discussed theoretical increased risk for skin cancer.     Recommend sunscreens SPF #30 or greater, protective clothing and avoidance of tanning beds.    2. Sun damaged skin with solar lentigines    Recommend sunscreens SPF #30 or greater, protective clothing and avoidance of tanning beds.    3. Benign findings including: cherry angiomas, seborrheic keratosis    No further intervention required. Patient to report changes.     Patient reassured of the benign nature of these lesions.    4. Multiple clinically benign nevi    No further intervention required. Patient to report changes.     Patient reassured of the benign nature of these lesions.    5. Irritated cherry angiomas.     Discussed treatment with biopsy removal of the symptomatic lesions. Discussed pros and cons including scarring. Discussed that this is typically covered by insurance, but we cannot guarantee this. Patient aware that if she has not met her deductible, some of the cost may fall back on her. She will schedule an appointment if she wishes to remove these lesions in the future.       Follow-up 1 year for skin exam, sooner for lesions of concern.     Staff Involved:  Scribe/Staff    Scribe Disclosure  I, Constance Araya, am serving as a scribe to document services personally performed by Dr. Kenzie Johnson MD, based on data collection and the provider's statements to me.     Provider Disclosure:   The documentation recorded by the  scribe accurately reflects the services I personally performed and the decisions made by me.    Kenzie Johnson MD    Department of Dermatology  [facility]: Phone: [phone], Fax:[phone]  [facility]: Phone: [phone], Fax: [phone]

## 2018-11-20 NOTE — MR AVS SNAPSHOT
After Visit Summary   11/20/2018    Janice Mayfield    MRN: [id number]           Patient Information     Date Of Birth          1960        Visit Information        Provider Department      11/20/2018 4:00 PM Kenzie Johnson MD [facility]         Follow-ups after your visit        Your next 10 appointments already scheduled     Nov 26, 2018  2:00 PM CST   Level 2 with BAY 6 INFUSION   [facility]    [address] [facility] [address]   [phone]            Dec 05, 2018  8:30 AM CST   Return Visit with Kenzie Johnson MD   [facility] ([facility]    [address] [place] [address]   [phone]            Dec 26, 2018  2:00 PM CST   Level 2 with BAY 5 INFUSION   [facility] ([facility]    [address] [place] [address]   [phone]            Feb 27, 2019  2:00 PM CST   LAB with LAB FIRST FLOOR On license of [facility] ([facility])    [address] [place] [address]   [phone]           Please do not eat 10-12 hours before your appointment if you are coming in fasting for labs on lipids, cholesterol, or glucose (sugar). This does not apply to pregnant women. Water, hot tea and black coffee (with nothing added) are okay. Do not drink other fluids, diet soda or chew gum.            May 10, 2019 11:00 AM CDT   LAB with LAB FIRST FLOOR On license of [facility] ([facility])    [address] [place] [address]   [phone]           Please do not eat 10-12 hours before your appointment if you are coming in fasting for labs on lipids, cholesterol, or glucose (sugar). This does not apply to pregnant women. Water, hot tea and black coffee (with nothing added) are okay. Do not drink other fluids, diet soda or chew gum.             May 10, 2019 11:30 AM CDT   Return Visit with LO Parra CNP   [facility] ([facility])    [address] [place] [address] [phone]              Who to contact     If you have questions or need follow up information about today's clinic visit or your schedule please contact [facility] directly at [phone].  Normal or non-critical lab and imaging results will be communicated to you by [employer], letter or phone within 4 business days after the clinic has received the results. If you do not hear from us within 7 days, please contact the clinic through [employer] or phone. If you have a critical or abnormal lab result, we will notify you by phone as soon as possible.  Submit refill requests through [employer] or call your pharmacy and they will forward the refill request to us. Please allow 3 business days for your refill to be completed.          Additional Information About Your Visit        [employer] Information     [employer] gives you secure access to your electronic health record. If you see a primary care provider, you can also send messages to your care team and make appointments. If you have questions, please call your primary care clinic.  If you do not have a primary care provider, please call [phone] and they will assist you.      [employer] is an electronic gateway that provides easy, online access to your medical records. With [employer], you can request a clinic appointment, read your test results, renew a prescription or communicate with your care team.     To access your existing account, please contact your [facility] or call [phone] for assistance.        Care EveryWhere ID     This is your Care EveryWhere ID. This could be used by other organizations to access your [facility] medical records  [id number]         Blood Pressure from Last 3 Encounters:   11/19/18 113/76   11/12/18  100/60   11/09/18 110/62    Weight from Last 3 Encounters:   11/19/18 76 kg (167 lb 9.6 oz)   11/12/18 75.2 kg (165 lb 12.8 oz)   11/09/18 74.5 kg (164 lb 4.8 oz)              Today, you had the following     No orders found for display       Primary Care Provider Office Phone # Fax #    Marah Hare [phone]       [address] PEREZ   [facility] [address]        Equal Access to Services     [facility]: Hadii aad ku hadasho Soomaali, waaxda luqadaha, qaybta kaalmada adeegyada, waxlaurence rojo . So [facility] [phone].    ATENCIÓN: Si habla español, tiene a kelly disposición servicios gratuitos de asistencia lingüística. Llame al [phone].    We comply with applicable federal civil rights laws and [place] laws. We do not discriminate on the basis of race, color, national origin, age, disability, sex, sexual orientation, or gender identity.            Thank you!     Thank you for choosing [facility]  for your care. Our goal is always to provide you with excellent care. Hearing back from our patients is one way we can continue to improve our services. Please take a few minutes to complete the written survey that you may receive in the mail after your visit with us. Thank you!             Your Updated Medication List - Protect others around you: Learn how to safely use, store and throw away your medicines at www.disposemymeds.org.          This list is accurate as of 11/20/18  4:26 PM.  Always use your most recent med list.                   Brand Name Dispense Instructions for use Diagnosis    BENADRYL PO      Take 25 mg by mouth as needed (Pre-medication for remicade)        folic acid 1 MG tablet    FOLVITE    180 tablet    Take 2 tablets (2 mg) by mouth daily    Rheumatoid arthritis involving multiple sites with positive rheumatoid factor (H), Chronic left shoulder pain, Other secondary osteoarthritis of multiple sites, Vitamin  D deficiency, Long term current use of systemic steroids, Long-term use of immunosuppressant medication       IBUPROFEN PO      Take 200 mg by mouth as needed for moderate pain        methotrexate 2.5 MG tablet CHEMO     20 tablet    TAKE 5 TABLETS (12.5 MG) BY MOUTH ONCE A WEEK LABS IN 6 WEEKS    Rheumatoid arthritis involving multiple sites with positive rheumatoid factor (H), Chronic left shoulder pain, Other secondary osteoarthritis of multiple sites, Vitamin D deficiency, Long term current use of systemic steroids, Long-term use of immunosuppressant medication       predniSONE 1 MG tablet    DELTASONE    360 tablet    Take 4 tablets (4 mg) by mouth daily Try taper by 1/2 mg day every 2 month until off if able    Rheumatoid arthritis involving multiple sites with positive rheumatoid factor (H), Other secondary osteoarthritis of multiple sites, Vitamin D deficiency, Long term current use of systemic steroids, Long-term use of immunosuppressant medication, Chronic left shoulder pain       REMICADE IV      Inject 100 mg into the vein every 28 days        TYLENOL PO      Take 650 mg by mouth once        Vitamin D (Cholecalciferol) 1000 units Caps     1 capsule    Take 2,000 Units by mouth daily

## 2018-11-21 PROBLEM — N90.4 LICHEN SCLEROSUS ET ATROPHICUS OF THE VULVA: Status: ACTIVE | Noted: 2018-11-21

## 2018-11-22 NOTE — PROGRESS NOTES
OB-GYN Problem-Oriented Visit or Consultation      Janice Mayfield is a 58 year old year old P 3 who presents with a chief complaint of dry vulva and possible lichen sclerosus.  Referred by Kenzie Harvey NP.  No LMP recorded. Patient is postmenopausal.    HPI:     Menopause x 8 yrs. No HT. Has history of past vulvar dystrophy. No recent follow-up until now. Some dryness of vulva but no itching. No bleeding. GI/ function are normal. Last sex 4 yrs ago. Mother  from metastatic cancer.     Past medical, obstetrical, surgical, family and social history reviewed and as noted or updated in chart.     Allergies, meds and supplements are as noted or updated in chart.      ROS:   Systems reviewed include:  constitutional, gastrointestinal, genitourinary, integumentary, psychological, hematologic/lymphatic and endocrine.    These systems were negative for significant symptoms except for the following additional: none; see HPI.    EXAM:  VS as noted. /76  Pulse 85  Wt 167 lb 9.6 oz (76 kg)  SpO2 100%  BMI 28.32 kg/m2  Constitutionally normal.     Pelvis was  normal or negative except for, or in particular noting, the following  pertinent findings: bilateral vulvar changes from above clitoris to below anus consistent with lichen sclerosus. No suspicious lesions now to biopsy. Some phimosis across clitoris. Vaginal atrophic changes.       Assessment:   Encounter Diagnoses   Name Primary?     Lichen sclerosus et atrophicus of the vulva Yes       I reviewed the condition, causes, differential diagnosis, prognosis, evaluation and management considerations and options.  Questions answered and information given. See orders.         Plan and Recommendations: Observe and recheck in 6 mo. If progressive symptoms or new lesions then biopsy needed. Discussed potential for cancer or precancerous  GIANA. May use Vaseline or Aquaphor now. Reserve Temovate for itching.     Total encounter time (physician together with  patient) = 20min. Direct counseling, education and care coordination time (physician together with patient) = 10min.    A/P:  Janice was seen today for consult.    Diagnoses and all orders for this visit:    Lichen sclerosus et atrophicus of the vulva        Jefferson Pena MD

## 2018-11-23 DIAGNOSIS — E55.9 VITAMIN D DEFICIENCY: ICD-10-CM

## 2018-11-23 DIAGNOSIS — M15.3 OTHER SECONDARY OSTEOARTHRITIS OF MULTIPLE SITES: ICD-10-CM

## 2018-11-23 DIAGNOSIS — M25.512 CHRONIC LEFT SHOULDER PAIN: ICD-10-CM

## 2018-11-23 DIAGNOSIS — Z79.60 LONG-TERM USE OF IMMUNOSUPPRESSANT MEDICATION: ICD-10-CM

## 2018-11-23 DIAGNOSIS — Z79.52 LONG TERM CURRENT USE OF SYSTEMIC STEROIDS: ICD-10-CM

## 2018-11-23 DIAGNOSIS — M05.79 RHEUMATOID ARTHRITIS INVOLVING MULTIPLE SITES WITH POSITIVE RHEUMATOID FACTOR (H): Chronic | ICD-10-CM

## 2018-11-23 DIAGNOSIS — G89.29 CHRONIC LEFT SHOULDER PAIN: ICD-10-CM

## 2018-11-23 NOTE — TELEPHONE ENCOUNTER
Methotrexate  Last Written Prescription Date: 10/28/2018  Last Fill Quantity: 20, # refills: 3  Last Office Visit with Rheumatology Provider:  11/9/2018  Next Labs 2/27/19  Next Rheum visit 5/10/2019 with labs    Creatinine   Date Value Ref Range Status   11/09/2018 0.83 0.52 - 1.04 mg/dL Final   ]  Lab Results   Component Value Date    AST 13 11/09/2018     Lab Results   Component Value Date    ALT 21 11/09/2018     Lab Results   Component Value Date    WBC 6.8 11/09/2018     Lab Results   Component Value Date    RBC 4.40 11/09/2018     Lab Results   Component Value Date    HGB 12.9 11/09/2018     Lab Results   Component Value Date    HCT 41.2 11/09/2018     No components found for: MCT  Lab Results   Component Value Date    MCV 94 11/09/2018     Lab Results   Component Value Date    MCH 29.3 11/09/2018     Lab Results   Component Value Date    MCHC 31.3 11/09/2018     Lab Results   Component Value Date    RDW 13.1 11/09/2018     Lab Results   Component Value Date     11/09/2018     No components found for: URINE

## 2018-11-26 ENCOUNTER — INFUSION THERAPY VISIT (OUTPATIENT)
Dept: INFUSION THERAPY | Facility: CLINIC | Age: 58
End: 2018-11-26
Payer: COMMERCIAL

## 2018-11-26 VITALS
WEIGHT: 167.6 LBS | HEART RATE: 74 BPM | TEMPERATURE: 97.8 F | DIASTOLIC BLOOD PRESSURE: 87 MMHG | OXYGEN SATURATION: 97 % | BODY MASS INDEX: 28.32 KG/M2 | RESPIRATION RATE: 16 BRPM | SYSTOLIC BLOOD PRESSURE: 129 MMHG

## 2018-11-26 DIAGNOSIS — M05.79 RHEUMATOID ARTHRITIS INVOLVING MULTIPLE SITES WITH POSITIVE RHEUMATOID FACTOR (H): Primary | ICD-10-CM

## 2018-11-26 PROCEDURE — 96413 CHEMO IV INFUSION 1 HR: CPT | Performed by: INTERNAL MEDICINE

## 2018-11-26 PROCEDURE — 99207 ZZC NO CHARGE LOS: CPT

## 2018-11-26 RX ORDER — DIPHENHYDRAMINE HCL 25 MG
25 CAPSULE ORAL ONCE
Status: COMPLETED | OUTPATIENT
Start: 2018-11-26 | End: 2018-11-26

## 2018-11-26 RX ORDER — ACETAMINOPHEN 325 MG/1
650 TABLET ORAL ONCE
Status: CANCELLED
Start: 2018-11-26 | End: 2018-11-26

## 2018-11-26 RX ORDER — METHYLPREDNISOLONE SODIUM SUCCINATE 125 MG/2ML
100 INJECTION, POWDER, LYOPHILIZED, FOR SOLUTION INTRAMUSCULAR; INTRAVENOUS ONCE
Status: CANCELLED | OUTPATIENT
Start: 2018-11-26 | End: 2018-11-26

## 2018-11-26 RX ORDER — DIPHENHYDRAMINE HCL 25 MG
25 CAPSULE ORAL ONCE
Status: CANCELLED
Start: 2018-11-26 | End: 2018-11-26

## 2018-11-26 RX ORDER — ACETAMINOPHEN 325 MG/1
650 TABLET ORAL ONCE
Status: COMPLETED | OUTPATIENT
Start: 2018-11-26 | End: 2018-11-26

## 2018-11-26 RX ADMIN — Medication 25 MG: at 14:22

## 2018-11-26 RX ADMIN — ACETAMINOPHEN 650 MG: 325 TABLET ORAL at 14:22

## 2018-11-26 RX ADMIN — Medication 250 ML: at 14:28

## 2018-11-26 ASSESSMENT — PAIN SCALES - GENERAL: PAINLEVEL: MILD PAIN (2)

## 2018-11-26 NOTE — MR AVS SNAPSHOT
After Visit Summary   11/26/2018    Janice Mayfield    MRN: [id number]           Patient Information     Date Of Birth          1960        Visit Information        Provider Department      11/26/2018 2:00 PM [address]        Today's Diagnoses     Rheumatoid arthritis involving multiple sites with positive rheumatoid factor (H)    -  1       Follow-ups after your visit        Your next 10 appointments already scheduled     Dec 05, 2018  8:30 AM CST   Return Visit with Kenzie Johnson MD   [facility])    [id number] [place] [id number]   [phone]            Dec 26, 2018  2:00 PM CST   Level 2 with [address])    [id number] [place] [id number]   [phone]            Feb 27, 2019  2:00 PM CST   LAB with LAB FIRST FLOOR [facility])    [address] [place] [id number]   [phone]           Please do not eat 10-12 hours before your appointment if you are coming in fasting for labs on lipids, cholesterol, or glucose (sugar). This does not apply to pregnant women. Water, hot tea and black coffee (with nothing added) are okay. Do not drink other fluids, diet soda or chew gum.            May 10, 2019 11:00 AM CDT   LAB with LAB FIRST FLOOR [facility])    [id number] [place] [id number]   [phone]           Please do not eat 10-12 hours before your appointment if you are coming in fasting for labs on lipids, cholesterol, or glucose (sugar). This does not apply to pregnant women. Water, hot tea and black coffee (with nothing added) are okay. Do not drink other fluids, diet soda or chew gum.            May 10, 2019 11:30 AM CDT   [facility]    [facility]    [address] [place] [id number] [phone]              Who to contact     If you have questions or need follow up information about today's clinic visit or your schedule please contact [facility] directly at [phone].  Normal or non-critical lab and imaging results will be communicated to you by MyChart, letter or phone within 4 business days after the clinic has received the results. If you do not hear from us within 7 days, please contact the clinic through [employer] or phone. If you have a critical or abnormal lab result, we will notify you by phone as soon as possible.  Submit refill requests through [employer] or call your pharmacy and they will forward the refill request to us. Please allow 3 business days for your refill to be completed.          Additional Information About Your Visit        Fan[employer] Information     [employer] gives you secure access to your electronic health record. If you see a primary care provider, you can also send messages to your care team and make appointments. If you have questions, please call your primary care clinic.  If you do not have a primary care provider, please call [phone] and they will assist you.      [employer] is an electronic gateway that provides easy, online access to your medical records. With [employer], you can request a clinic appointment, read your test results, renew a prescription or communicate with your care team.     To access your existing account, please contact your [facility] or call [phone] for assistance.        Care EveryWhere ID     This is your Care EveryWhere ID. This could be used by other organizations to access your [place] medical records  [id number]        Your Vitals Were     Pulse Temperature Respirations Pulse Oximetry BMI (Body Mass Index)       74 97.8  F (36.6  C) (Oral) 16 97% 28.32 kg/m2         Blood Pressure from Last 3 Encounters:   11/26/18 129/87   11/19/18 113/76   11/12/18 100/60    Weight from Last 3 Encounters:   11/26/18 76 kg (167 lb 9.6 oz)   11/19/18 76 kg (167 lb 9.6 oz)   11/12/18 75.2 kg (165 lb 12.8 oz)              Today, you had the following     No orders found for display       Primary Care Provider Office Phone # Fax #    Marah Hare [phone]       [address] PEREZ   [facility] [address]        Equal Access to Services     [facility]: Hadii padmini dia hadasho Soaddison, waaxda luqadaha, qaybta kaalmada adeazcharyyacelia, kadeem rojo . So [facility] [phone].    ATENCIÓN: Si habla español, tiene a kelly disposición servicios gratuitos de asistencia lingüística. Llame al [phone].    We comply with applicable federal civil rights laws and [place] laws. We do not discriminate on the basis of race, color, national origin, age, disability, sex, sexual orientation, or gender identity.            Thank you!     Thank you for choosing [facility]  for your care. Our goal is always to provide you with excellent care. Hearing back from our patients is one way we can continue to improve our services. Please take a few minutes to complete the written survey that you may receive in the mail after your visit with us. Thank you!             Your Updated Medication List - Protect others around you: Learn how to safely use, store and throw away your medicines at www.disposemymeds.org.          This list is accurate as of 11/26/18  3:38 PM.  Always use your most recent med list.                   Brand Name Dispense Instructions for use Diagnosis    BENADRYL PO      Take 25 mg by mouth as needed (Pre-medication for remicade)        folic acid 1 MG tablet    FOLVITE    180 tablet    Take 2 tablets (2 mg) by mouth daily    Rheumatoid arthritis involving multiple sites with positive rheumatoid factor (H), Chronic left  shoulder pain, Other secondary osteoarthritis of multiple sites, Vitamin D deficiency, Long term current use of systemic steroids, Long-term use of immunosuppressant medication       IBUPROFEN PO      Take 200 mg by mouth as needed for moderate pain        methotrexate 2.5 MG tablet CHEMO     20 tablet    TAKE 5 TABLETS (12.5 MG) BY MOUTH ONCE A WEEK LABS IN 6 WEEKS    Rheumatoid arthritis involving multiple sites with positive rheumatoid factor (H), Chronic left shoulder pain, Other secondary osteoarthritis of multiple sites, Vitamin D deficiency, Long term current use of systemic steroids, Long-term use of immunosuppressant medication       predniSONE 1 MG tablet    DELTASONE    360 tablet    Take 4 tablets (4 mg) by mouth daily Try taper by 1/2 mg day every 2 month until off if able    Rheumatoid arthritis involving multiple sites with positive rheumatoid factor (H), Other secondary osteoarthritis of multiple sites, Vitamin D deficiency, Long term current use of systemic steroids, Long-term use of immunosuppressant medication, Chronic left shoulder pain       REMICADE IV      Inject 100 mg into the vein every 28 days        TYLENOL PO      Take 650 mg by mouth once        Vitamin D (Cholecalciferol) 1000 units Caps     1 capsule    Take 2,000 Units by mouth daily

## 2018-12-26 ENCOUNTER — INFUSION THERAPY VISIT (OUTPATIENT)
Dept: INFUSION THERAPY | Facility: CLINIC | Age: 58
End: 2018-12-26
Payer: COMMERCIAL

## 2018-12-26 VITALS
WEIGHT: 171 LBS | RESPIRATION RATE: 16 BRPM | OXYGEN SATURATION: 97 % | DIASTOLIC BLOOD PRESSURE: 81 MMHG | TEMPERATURE: 98.7 F | HEART RATE: 67 BPM | BODY MASS INDEX: 28.9 KG/M2 | SYSTOLIC BLOOD PRESSURE: 128 MMHG

## 2018-12-26 DIAGNOSIS — M05.79 RHEUMATOID ARTHRITIS INVOLVING MULTIPLE SITES WITH POSITIVE RHEUMATOID FACTOR (H): Primary | ICD-10-CM

## 2018-12-26 PROCEDURE — 96413 CHEMO IV INFUSION 1 HR: CPT | Performed by: INTERNAL MEDICINE

## 2018-12-26 PROCEDURE — 99207 ZZC NO CHARGE LOS: CPT

## 2018-12-26 RX ORDER — ACETAMINOPHEN 325 MG/1
650 TABLET ORAL ONCE
Status: CANCELLED
Start: 2018-12-26 | End: 2018-12-26

## 2018-12-26 RX ORDER — DIPHENHYDRAMINE HCL 25 MG
25 CAPSULE ORAL ONCE
Status: CANCELLED
Start: 2018-12-26 | End: 2018-12-26

## 2018-12-26 RX ORDER — DIPHENHYDRAMINE HCL 25 MG
25 CAPSULE ORAL ONCE
Status: COMPLETED | OUTPATIENT
Start: 2018-12-26 | End: 2018-12-26

## 2018-12-26 RX ORDER — METHYLPREDNISOLONE SODIUM SUCCINATE 125 MG/2ML
100 INJECTION, POWDER, LYOPHILIZED, FOR SOLUTION INTRAMUSCULAR; INTRAVENOUS ONCE
Status: CANCELLED | OUTPATIENT
Start: 2018-12-26 | End: 2018-12-26

## 2018-12-26 RX ORDER — ACETAMINOPHEN 325 MG/1
650 TABLET ORAL ONCE
Status: COMPLETED | OUTPATIENT
Start: 2018-12-26 | End: 2018-12-26

## 2018-12-26 RX ADMIN — Medication 25 MG: at 14:25

## 2018-12-26 RX ADMIN — Medication 250 ML: at 14:39

## 2018-12-26 RX ADMIN — ACETAMINOPHEN 650 MG: 325 TABLET ORAL at 14:24

## 2018-12-26 NOTE — PROGRESS NOTES
Infusion Nursing Note:  Janice Mayfield presents today for rapid Remicade.    Patient seen by provider today: No   present during visit today: Not Applicable.    Note: N/A.    Intravenous Access:  Peripheral IV placed.    Treatment Conditions:  Biological Infusion Checklist:    ~~~ NOTE: If the patient answers yes to any of the questions below, hold the infusion and contact ordering provider or on-call provider.    1. Have you recently had an elevated temperature, fever, chills, productive cough, coughing for 3 weeks or longer or hemoptysis,  abnormal vital signs, night sweats,  chest pain or have you noticed a decrease in your appetite, unexplained weight loss or fatigue? No  2. Do you have any open wounds or new incisions? No  3. Do you have any recent or upcoming hospitalizations, surgeries or dental procedures? No  4. Do you currently have or recently have had any signs of illness or infection or are you on any antibiotics? No  5. Have you had any new, sudden or worsening abdominal pain? No  6. Have you or anyone in your household received a live vaccination in the past 4 weeks? Please note:  No live vaccines while on biologic/chemotherapy until 6 months after the last treatment.  Patient can receive the flu vaccine (shot only) and the pneumovax.  It is optimal for the patient to get these vaccines mid cycle, but they can be given at any time as long as it is not on the day of the infusion. No  7. Have you recently been diagnosed with any new nervous system diseases (ie. Multiple sclerosis, Guillain [place], seizures, neurological changes) or cancer diagnosis? Are you on any form of radiation or chemotherapy? No  8. Are you pregnant or breast feeding or do you have plans of pregnancy in the future? No  9. Have there been any other new onset medical symptoms? No        Post Infusion Assessment:  Patient tolerated infusion without incident.  Blood return noted pre and post infusion.  Site patent  and intact, free from redness, edema or discomfort.  No evidence of extravasations.  Access discontinued per protocol.  Biologic Infusion Post Education: Call the triage nurse at your clinic or seek medical attention if you have chills and/or temperature greater than or equal to 100.5, uncontrolled nausea/vomiting, diarrhea, constipation, dizziness, shortness of breath, chest pain, heart palpitations, weakness or any other new or concerning symptoms, questions or concerns.  You cannot have any live virus vaccines prior to or during treatment or up to 6 months post infusion.  If you have an upcoming surgery, medical procedure or dental procedure during treatment, this should be discussed with your ordering physician and your surgeon/dentist.  If you are having any concerning symptom, if you are unsure if you should get your next infusion or wish to speak to a provider before your next infusion, please call your care coordinator or triage nurse at your clinic to notify them so we can adequately serve you.    Discharge Plan:   Patient will return 1/23/19 for next appointment.   Patient discharged in stable condition accompanied by: self.  Departure Mode: Ambulatory.    Marianna Thompson RN

## 2019-01-29 ENCOUNTER — INFUSION THERAPY VISIT (OUTPATIENT)
Dept: INFUSION THERAPY | Facility: CLINIC | Age: 59
End: 2019-01-29
Payer: COMMERCIAL

## 2019-01-29 VITALS
BODY MASS INDEX: 28.97 KG/M2 | SYSTOLIC BLOOD PRESSURE: 115 MMHG | HEART RATE: 75 BPM | OXYGEN SATURATION: 99 % | DIASTOLIC BLOOD PRESSURE: 76 MMHG | RESPIRATION RATE: 16 BRPM | TEMPERATURE: 98.2 F | WEIGHT: 171.4 LBS

## 2019-01-29 DIAGNOSIS — M15.3 OTHER SECONDARY OSTEOARTHRITIS OF MULTIPLE SITES: ICD-10-CM

## 2019-01-29 DIAGNOSIS — Z79.60 LONG-TERM USE OF IMMUNOSUPPRESSANT MEDICATION: ICD-10-CM

## 2019-01-29 DIAGNOSIS — M05.79 RHEUMATOID ARTHRITIS INVOLVING MULTIPLE SITES WITH POSITIVE RHEUMATOID FACTOR (H): Primary | ICD-10-CM

## 2019-01-29 DIAGNOSIS — E55.9 VITAMIN D DEFICIENCY: ICD-10-CM

## 2019-01-29 DIAGNOSIS — Z79.52 LONG TERM CURRENT USE OF SYSTEMIC STEROIDS: ICD-10-CM

## 2019-01-29 DIAGNOSIS — M05.79 RHEUMATOID ARTHRITIS INVOLVING MULTIPLE SITES WITH POSITIVE RHEUMATOID FACTOR (H): Chronic | ICD-10-CM

## 2019-01-29 LAB
ALBUMIN SERPL-MCNC: 3.4 G/DL (ref 3.4–5)
ALT SERPL W P-5'-P-CCNC: 28 U/L (ref 0–50)
AST SERPL W P-5'-P-CCNC: 16 U/L (ref 0–45)
CREAT SERPL-MCNC: 0.71 MG/DL (ref 0.52–1.04)
CRP SERPL-MCNC: <2.9 MG/L (ref 0–8)
ERYTHROCYTE [DISTWIDTH] IN BLOOD BY AUTOMATED COUNT: 12.6 % (ref 10–15)
GFR SERPL CREATININE-BSD FRML MDRD: >90 ML/MIN/{1.73_M2}
HCT VFR BLD AUTO: 36 % (ref 35–47)
HGB BLD-MCNC: 11.6 G/DL (ref 11.7–15.7)
MCH RBC QN AUTO: 29.7 PG (ref 26.5–33)
MCHC RBC AUTO-ENTMCNC: 32.2 G/DL (ref 31.5–36.5)
MCV RBC AUTO: 92 FL (ref 78–100)
PLATELET # BLD AUTO: 238 10E9/L (ref 150–450)
RBC # BLD AUTO: 3.91 10E12/L (ref 3.8–5.2)
WBC # BLD AUTO: 7.6 10E9/L (ref 4–11)

## 2019-01-29 PROCEDURE — 96413 CHEMO IV INFUSION 1 HR: CPT | Performed by: INTERNAL MEDICINE

## 2019-01-29 PROCEDURE — 84450 TRANSFERASE (AST) (SGOT): CPT | Performed by: NURSE PRACTITIONER

## 2019-01-29 PROCEDURE — 82565 ASSAY OF CREATININE: CPT | Performed by: NURSE PRACTITIONER

## 2019-01-29 PROCEDURE — 86140 C-REACTIVE PROTEIN: CPT | Performed by: NURSE PRACTITIONER

## 2019-01-29 PROCEDURE — 99207 ZZC NO CHARGE LOS: CPT

## 2019-01-29 PROCEDURE — 36415 COLL VENOUS BLD VENIPUNCTURE: CPT | Performed by: NURSE PRACTITIONER

## 2019-01-29 PROCEDURE — 85027 COMPLETE CBC AUTOMATED: CPT | Performed by: NURSE PRACTITIONER

## 2019-01-29 PROCEDURE — 84460 ALANINE AMINO (ALT) (SGPT): CPT | Performed by: NURSE PRACTITIONER

## 2019-01-29 PROCEDURE — 82040 ASSAY OF SERUM ALBUMIN: CPT | Performed by: NURSE PRACTITIONER

## 2019-01-29 RX ORDER — ACETAMINOPHEN 325 MG/1
650 TABLET ORAL ONCE
Status: COMPLETED | OUTPATIENT
Start: 2019-01-29 | End: 2019-01-29

## 2019-01-29 RX ORDER — METHYLPREDNISOLONE SODIUM SUCCINATE 125 MG/2ML
100 INJECTION, POWDER, LYOPHILIZED, FOR SOLUTION INTRAMUSCULAR; INTRAVENOUS ONCE
Status: CANCELLED | OUTPATIENT
Start: 2019-01-29 | End: 2019-01-29

## 2019-01-29 RX ORDER — ACETAMINOPHEN 325 MG/1
650 TABLET ORAL ONCE
Status: CANCELLED
Start: 2019-01-29 | End: 2019-01-29

## 2019-01-29 RX ORDER — DIPHENHYDRAMINE HCL 25 MG
25 CAPSULE ORAL ONCE
Status: CANCELLED
Start: 2019-01-29 | End: 2019-01-29

## 2019-01-29 RX ORDER — DIPHENHYDRAMINE HCL 25 MG
25 CAPSULE ORAL ONCE
Status: COMPLETED | OUTPATIENT
Start: 2019-01-29 | End: 2019-01-29

## 2019-01-29 RX ADMIN — Medication 250 ML: at 14:46

## 2019-01-29 RX ADMIN — Medication 25 MG: at 14:26

## 2019-01-29 RX ADMIN — ACETAMINOPHEN 650 MG: 325 TABLET ORAL at 14:26

## 2019-01-29 ASSESSMENT — PAIN SCALES - GENERAL: PAINLEVEL: NO PAIN (0)

## 2019-01-29 NOTE — PROGRESS NOTES
Infusion Nursing Note:  Janice Mayfield presents today for Remicade.    Patient seen by provider today: No   present during visit today: Not Applicable.    Note: N/A.    Intravenous Access:  Peripheral IV placed.    Treatment Conditions:  Biological Infusion Checklist:    ~~~ NOTE: If the patient answers yes to any of the questions below, hold the infusion and contact ordering provider or on-call provider.    1. Have you recently had an elevated temperature, fever, chills, productive cough, coughing for 3 weeks or longer or hemoptysis,  abnormal vital signs, night sweats,  chest pain or have you noticed a decrease in your appetite, unexplained weight loss or fatigue? No  2. Do you have any open wounds or new incisions? No  3. Do you have any recent or upcoming hospitalizations, surgeries or dental procedures? No  4. Do you currently have or recently have had any signs of illness or infection or are you on any antibiotics? No  5. Have you had any new, sudden or worsening abdominal pain? No  6. Have you or anyone in your household received a live vaccination in the past 4 weeks? Please note:  No live vaccines while on biologic/chemotherapy until 6 months after the last treatment.  Patient can receive the flu vaccine (shot only) and the pneumovax.  It is optimal for the patient to get these vaccines mid cycle, but they can be given at any time as long as it is not on the day of the infusion. No  7. Have you recently been diagnosed with any new nervous system diseases (ie. Multiple sclerosis, Guillain [place], seizures, neurological changes) or cancer diagnosis? Are you on any form of radiation or chemotherapy? No  8. Are you pregnant or breast feeding or do you have plans of pregnancy in the future? No  9. Have you been having any signs of worsening depression or suicidal ideations?  (benlysta only) No  10. Have there been any other new onset medical symptoms? No    Post Infusion Assessment:  Patient  tolerated infusion without incident.  Site patent and intact, free from redness, edema or discomfort.  No evidence of extravasations.    Discharge Plan:   Patient and/or family verbalized understanding of discharge instructions and all questions answered.  Patient discharged in stable condition accompanied by: self.  Departure Mode: Ambulatory.    Lucrecia Kaur RN

## 2019-01-30 DIAGNOSIS — D64.9 ANEMIA, UNSPECIFIED TYPE: Primary | ICD-10-CM

## 2019-01-30 DIAGNOSIS — Z79.60 LONG-TERM USE OF IMMUNOSUPPRESSANT MEDICATION: ICD-10-CM

## 2019-01-30 DIAGNOSIS — M05.79 RHEUMATOID ARTHRITIS INVOLVING MULTIPLE SITES WITH POSITIVE RHEUMATOID FACTOR (H): Chronic | ICD-10-CM

## 2019-01-30 NOTE — RESULT ENCOUNTER NOTE
The blood counts, liver, kidney and CRP inflammation labs are normal but hemoglobin on the low normal. Advise follow-up  With PCP     Pola BLANCHARD CNP, MSN  1/30/2019  1:28 PM

## 2019-02-26 ENCOUNTER — INFUSION THERAPY VISIT (OUTPATIENT)
Dept: INFUSION THERAPY | Facility: CLINIC | Age: 59
End: 2019-02-26
Payer: COMMERCIAL

## 2019-02-26 VITALS
TEMPERATURE: 97.9 F | HEART RATE: 66 BPM | BODY MASS INDEX: 29.42 KG/M2 | SYSTOLIC BLOOD PRESSURE: 121 MMHG | OXYGEN SATURATION: 100 % | DIASTOLIC BLOOD PRESSURE: 80 MMHG | WEIGHT: 174.1 LBS | RESPIRATION RATE: 16 BRPM

## 2019-02-26 DIAGNOSIS — M05.79 RHEUMATOID ARTHRITIS INVOLVING MULTIPLE SITES WITH POSITIVE RHEUMATOID FACTOR (H): Primary | ICD-10-CM

## 2019-02-26 PROCEDURE — 99207 ZZC NO CHARGE NURSE ONLY: CPT

## 2019-02-26 PROCEDURE — 96413 CHEMO IV INFUSION 1 HR: CPT | Performed by: NURSE PRACTITIONER

## 2019-02-26 RX ORDER — ACETAMINOPHEN 325 MG/1
650 TABLET ORAL ONCE
Status: CANCELLED
Start: 2019-02-26 | End: 2019-02-26

## 2019-02-26 RX ORDER — DIPHENHYDRAMINE HCL 25 MG
25 CAPSULE ORAL ONCE
Status: COMPLETED | OUTPATIENT
Start: 2019-02-26 | End: 2019-02-26

## 2019-02-26 RX ORDER — DIPHENHYDRAMINE HCL 25 MG
25 CAPSULE ORAL ONCE
Status: CANCELLED
Start: 2019-02-26 | End: 2019-02-26

## 2019-02-26 RX ORDER — ACETAMINOPHEN 325 MG/1
650 TABLET ORAL ONCE
Status: COMPLETED | OUTPATIENT
Start: 2019-02-26 | End: 2019-02-26

## 2019-02-26 RX ORDER — METHYLPREDNISOLONE SODIUM SUCCINATE 125 MG/2ML
100 INJECTION, POWDER, LYOPHILIZED, FOR SOLUTION INTRAMUSCULAR; INTRAVENOUS ONCE
Status: CANCELLED | OUTPATIENT
Start: 2019-02-26 | End: 2019-02-26

## 2019-02-26 RX ADMIN — ACETAMINOPHEN 650 MG: 325 TABLET ORAL at 14:37

## 2019-02-26 RX ADMIN — Medication 25 MG: at 14:37

## 2019-02-26 RX ADMIN — Medication 250 ML: at 14:49

## 2019-02-26 ASSESSMENT — PAIN SCALES - GENERAL: PAINLEVEL: MILD PAIN (2)

## 2019-02-26 NOTE — PROGRESS NOTES
Infusion Nursing Note:  Janice Mayfield presents today for rapid remicade .    Patient seen by provider today: No   present during visit today: Not Applicable.    Intravenous Access:  Peripheral IV placed.    Treatment Conditions:  Biological Infusion Checklist:    ~~~ NOTE: If the patient answers yes to any of the questions below, hold the infusion and contact ordering provider or on-call provider.    1. Have you recently had an elevated temperature, fever, chills, productive cough, coughing for 3 weeks or longer or hemoptysis,  abnormal vital signs, night sweats,  chest pain or have you noticed a decrease in your appetite, unexplained weight loss or fatigue? No  2. Do you have any open wounds or new incisions? No  3. Do you have any recent or upcoming hospitalizations, surgeries or dental procedures? No  4. Do you currently have or recently have had any signs of illness or infection or are you on any antibiotics? No  5. Have you had any new, sudden or worsening abdominal pain? No  6. Have you or anyone in your household received a live vaccination in the past 4 weeks? Please note:  No live vaccines while on biologic/chemotherapy until 6 months after the last treatment.  Patient can receive the flu vaccine (shot only) and the pneumovax.  It is optimal for the patient to get these vaccines mid cycle, but they can be given at any time as long as it is not on the day of the infusion. No  7. Have you recently been diagnosed with any new nervous system diseases (ie. Multiple sclerosis, Guillain [place], seizures, neurological changes) or cancer diagnosis? Are you on any form of radiation or chemotherapy? No  8. Are you pregnant or breast feeding or do you have plans of pregnancy in the future? No  9. Have you been having any signs of worsening depression or suicidal ideations?  (benlysta only) No  10. Have there been any other new onset medical symptoms? No        Post Infusion Assessment:  Patient  tolerated infusion without incident.  Blood return noted pre and post infusion.  Site patent and intact, free from redness, edema or discomfort.  No evidence of extravasations.  Access discontinued per protocol.    Discharge Plan:   Patient discharged in stable condition accompanied by: self.  Departure Mode: Ambulatory.  Patient knows that she needs to schedule future appointments.    Luli Newman RN

## 2019-03-29 ENCOUNTER — INFUSION THERAPY VISIT (OUTPATIENT)
Dept: INFUSION THERAPY | Facility: CLINIC | Age: 59
End: 2019-03-29
Payer: COMMERCIAL

## 2019-03-29 VITALS
RESPIRATION RATE: 18 BRPM | TEMPERATURE: 97.9 F | SYSTOLIC BLOOD PRESSURE: 144 MMHG | DIASTOLIC BLOOD PRESSURE: 81 MMHG | BODY MASS INDEX: 29.44 KG/M2 | OXYGEN SATURATION: 99 % | HEART RATE: 77 BPM | WEIGHT: 174.2 LBS

## 2019-03-29 DIAGNOSIS — M05.79 RHEUMATOID ARTHRITIS INVOLVING MULTIPLE SITES WITH POSITIVE RHEUMATOID FACTOR (H): Primary | ICD-10-CM

## 2019-03-29 PROCEDURE — 99207 ZZC NO CHARGE LOS: CPT

## 2019-03-29 PROCEDURE — 96413 CHEMO IV INFUSION 1 HR: CPT | Performed by: INTERNAL MEDICINE

## 2019-03-29 RX ORDER — ACETAMINOPHEN 325 MG/1
650 TABLET ORAL ONCE
Status: COMPLETED | OUTPATIENT
Start: 2019-03-29 | End: 2019-03-29

## 2019-03-29 RX ORDER — METHYLPREDNISOLONE SODIUM SUCCINATE 125 MG/2ML
100 INJECTION, POWDER, LYOPHILIZED, FOR SOLUTION INTRAMUSCULAR; INTRAVENOUS ONCE
Status: CANCELLED | OUTPATIENT
Start: 2019-03-29 | End: 2019-03-29

## 2019-03-29 RX ORDER — DIPHENHYDRAMINE HCL 25 MG
25 CAPSULE ORAL ONCE
Status: CANCELLED
Start: 2019-03-29 | End: 2019-03-29

## 2019-03-29 RX ORDER — ACETAMINOPHEN 325 MG/1
650 TABLET ORAL ONCE
Status: CANCELLED
Start: 2019-03-29 | End: 2019-03-29

## 2019-03-29 RX ORDER — DIPHENHYDRAMINE HCL 25 MG
25 CAPSULE ORAL ONCE
Status: COMPLETED | OUTPATIENT
Start: 2019-03-29 | End: 2019-03-29

## 2019-03-29 RX ADMIN — Medication 25 MG: at 12:18

## 2019-03-29 RX ADMIN — ACETAMINOPHEN 650 MG: 325 TABLET ORAL at 12:18

## 2019-03-29 RX ADMIN — Medication 250 ML: at 12:33

## 2019-03-29 NOTE — PROGRESS NOTES
Infusion Nursing Note:  Janice Mayfield presents today for Remicade.    Patient seen by provider today: No   present during visit today: Not Applicable.    Note: N/A.    Intravenous Access:  Peripheral IV placed.    Treatment Conditions:  Biological Infusion Checklist:    ~~~ NOTE: If the patient answers yes to any of the questions below, hold the infusion and contact ordering provider or on-call provider.    1. Have you recently had an elevated temperature, fever, chills, productive cough, coughing for 3 weeks or longer or hemoptysis,  abnormal vital signs, night sweats,  chest pain or have you noticed a decrease in your appetite, unexplained weight loss or fatigue? No  2. Do you have any open wounds or new incisions? No  3. Do you have any recent or upcoming hospitalizations, surgeries or dental procedures? No  4. Do you currently have or recently have had any signs of illness or infection or are you on any antibiotics? No  5. Have you had any new, sudden or worsening abdominal pain? No  6. Have you or anyone in your household received a live vaccination in the past 4 weeks? Please note:  No live vaccines while on biologic/chemotherapy until 6 months after the last treatment.  Patient can receive the flu vaccine (shot only) and the pneumovax.  It is optimal for the patient to get these vaccines mid cycle, but they can be given at any time as long as it is not on the day of the infusion. No  7. Have you recently been diagnosed with any new nervous system diseases (ie. Multiple sclerosis, Guillain [place], seizures, neurological changes) or cancer diagnosis? Are you on any form of radiation or chemotherapy? No  8. Are you pregnant or breast feeding or do you have plans of pregnancy in the future? No  9. Have you been having any signs of worsening depression or suicidal ideations?  (benlysta only) No  Have there been any other new onset medical symptoms? No      Post Infusion Assessment:  Patient  tolerated infusion without incident.  Site patent and intact, free from redness, edema or discomfort.  Access discontinued per protocol.       Discharge Plan:    Patient will return 4/26/19 for next appointment.   Patient discharged in stable condition accompanied by: self.  Departure Mode: Ambulatory.    Evelia Can RN

## 2019-04-19 RX ORDER — ACETAMINOPHEN 325 MG/1
650 TABLET ORAL ONCE
Status: CANCELLED
Start: 2019-04-19

## 2019-04-19 RX ORDER — METHYLPREDNISOLONE SODIUM SUCCINATE 125 MG/2ML
125 INJECTION, POWDER, LYOPHILIZED, FOR SOLUTION INTRAMUSCULAR; INTRAVENOUS ONCE
Status: CANCELLED | OUTPATIENT
Start: 2019-04-19

## 2019-04-19 RX ORDER — DIPHENHYDRAMINE HCL 25 MG
25 CAPSULE ORAL ONCE
Status: CANCELLED
Start: 2019-04-19

## 2019-04-26 ENCOUNTER — INFUSION THERAPY VISIT (OUTPATIENT)
Dept: INFUSION THERAPY | Facility: CLINIC | Age: 59
End: 2019-04-26
Payer: COMMERCIAL

## 2019-04-26 VITALS
BODY MASS INDEX: 29.39 KG/M2 | RESPIRATION RATE: 16 BRPM | OXYGEN SATURATION: 99 % | TEMPERATURE: 98.4 F | HEART RATE: 72 BPM | DIASTOLIC BLOOD PRESSURE: 85 MMHG | SYSTOLIC BLOOD PRESSURE: 125 MMHG | WEIGHT: 173.9 LBS

## 2019-04-26 DIAGNOSIS — M15.3 OTHER SECONDARY OSTEOARTHRITIS OF MULTIPLE SITES: ICD-10-CM

## 2019-04-26 DIAGNOSIS — M05.79 RHEUMATOID ARTHRITIS INVOLVING MULTIPLE SITES WITH POSITIVE RHEUMATOID FACTOR (H): Chronic | ICD-10-CM

## 2019-04-26 DIAGNOSIS — Z79.60 LONG-TERM USE OF IMMUNOSUPPRESSANT MEDICATION: ICD-10-CM

## 2019-04-26 DIAGNOSIS — Z79.52 LONG TERM CURRENT USE OF SYSTEMIC STEROIDS: ICD-10-CM

## 2019-04-26 DIAGNOSIS — M05.79 RHEUMATOID ARTHRITIS INVOLVING MULTIPLE SITES WITH POSITIVE RHEUMATOID FACTOR (H): Primary | ICD-10-CM

## 2019-04-26 DIAGNOSIS — E55.9 VITAMIN D DEFICIENCY: ICD-10-CM

## 2019-04-26 LAB
ALBUMIN SERPL-MCNC: 3.9 G/DL (ref 3.4–5)
ALT SERPL W P-5'-P-CCNC: 38 U/L (ref 0–50)
AST SERPL W P-5'-P-CCNC: 24 U/L (ref 0–45)
CREAT SERPL-MCNC: 0.74 MG/DL (ref 0.52–1.04)
CRP SERPL-MCNC: <2.9 MG/L (ref 0–8)
ERYTHROCYTE [DISTWIDTH] IN BLOOD BY AUTOMATED COUNT: 12.9 % (ref 10–15)
GFR SERPL CREATININE-BSD FRML MDRD: >90 ML/MIN/{1.73_M2}
HCT VFR BLD AUTO: 37.6 % (ref 35–47)
HGB BLD-MCNC: 12 G/DL (ref 11.7–15.7)
MCH RBC QN AUTO: 29.7 PG (ref 26.5–33)
MCHC RBC AUTO-ENTMCNC: 31.9 G/DL (ref 31.5–36.5)
MCV RBC AUTO: 93 FL (ref 78–100)
PLATELET # BLD AUTO: 250 10E9/L (ref 150–450)
RBC # BLD AUTO: 4.04 10E12/L (ref 3.8–5.2)
WBC # BLD AUTO: 7.6 10E9/L (ref 4–11)

## 2019-04-26 PROCEDURE — 99207 ZZC NO CHARGE LOS: CPT

## 2019-04-26 PROCEDURE — 84460 ALANINE AMINO (ALT) (SGPT): CPT | Performed by: NURSE PRACTITIONER

## 2019-04-26 PROCEDURE — 84450 TRANSFERASE (AST) (SGOT): CPT | Performed by: NURSE PRACTITIONER

## 2019-04-26 PROCEDURE — 82040 ASSAY OF SERUM ALBUMIN: CPT | Performed by: NURSE PRACTITIONER

## 2019-04-26 PROCEDURE — 85027 COMPLETE CBC AUTOMATED: CPT | Performed by: NURSE PRACTITIONER

## 2019-04-26 PROCEDURE — 82565 ASSAY OF CREATININE: CPT | Performed by: NURSE PRACTITIONER

## 2019-04-26 PROCEDURE — 36415 COLL VENOUS BLD VENIPUNCTURE: CPT | Performed by: NURSE PRACTITIONER

## 2019-04-26 PROCEDURE — 96413 CHEMO IV INFUSION 1 HR: CPT | Performed by: PHYSICIAN ASSISTANT

## 2019-04-26 PROCEDURE — 86140 C-REACTIVE PROTEIN: CPT | Performed by: NURSE PRACTITIONER

## 2019-04-26 RX ORDER — ACETAMINOPHEN 325 MG/1
650 TABLET ORAL ONCE
Status: COMPLETED | OUTPATIENT
Start: 2019-04-26 | End: 2019-04-26

## 2019-04-26 RX ORDER — DIPHENHYDRAMINE HCL 25 MG
25 CAPSULE ORAL ONCE
Status: COMPLETED | OUTPATIENT
Start: 2019-04-26 | End: 2019-04-26

## 2019-04-26 RX ORDER — DIPHENHYDRAMINE HCL 25 MG
25 CAPSULE ORAL ONCE
Status: CANCELLED
Start: 2019-06-07

## 2019-04-26 RX ORDER — METHYLPREDNISOLONE SODIUM SUCCINATE 125 MG/2ML
125 INJECTION, POWDER, LYOPHILIZED, FOR SOLUTION INTRAMUSCULAR; INTRAVENOUS ONCE
Status: CANCELLED | OUTPATIENT
Start: 2019-06-07

## 2019-04-26 RX ORDER — ACETAMINOPHEN 325 MG/1
650 TABLET ORAL ONCE
Status: CANCELLED
Start: 2019-06-07

## 2019-04-26 RX ADMIN — Medication 250 ML: at 15:16

## 2019-04-26 RX ADMIN — ACETAMINOPHEN 650 MG: 325 TABLET ORAL at 14:55

## 2019-04-26 RX ADMIN — Medication 25 MG: at 14:55

## 2019-04-26 ASSESSMENT — PAIN SCALES - GENERAL: PAINLEVEL: NO PAIN (0)

## 2019-04-26 NOTE — RESULT ENCOUNTER NOTE
The blood counts, liver, kidney and CRP inflammation labs are normal.     Pola BLANCHARD, CNP, MSN  4/26/2019  2:58 PM

## 2019-04-26 NOTE — PROGRESS NOTES
Infusion Nursing Note:  Janice Mayfield presents today for rapid Remicade.    Patient seen by provider today: No   present during visit today: Not Applicable.    Note: N/A.    Intravenous Access:  Peripheral IV placed.    Treatment Conditions:  Biological Infusion Checklist:  ~~~ NOTE: If the patient answers yes to any of the questions below, hold the infusion and contact ordering provider or on-call provider.    1. Have you recently had an elevated temperature, fever, chills, productive cough, coughing for 3 weeks or longer or hemoptysis, abnormal vital signs, night sweats,  chest pain or have you noticed a decrease in your appetite, unexplained weight loss or fatigue? No  2. Do you have any open wounds or new incisions? No  3. Do you have any recent or upcoming hospitalizations, surgeries or dental procedures? No  4. Do you currently have or recently have had any signs of illness or infection or are you on any antibiotics? No  5. Have you had any new, sudden or worsening abdominal pain? No  6. Have you or anyone in your household received a live vaccination in the past 4 weeks? Please note:  No live vaccines while on biologic/chemotherapy until 6 months after the last treatment.  Patient can receive the flu vaccine (shot only) and the pneumovax.  It is optimal for the patient to get these vaccines mid cycle, but they can be given at any time as long as it is not on the day of the infusion. No  7. Have you recently been diagnosed with any new nervous system diseases (ie. Multiple sclerosis, Guillain [place], seizures, neurological changes) or cancer diagnosis? No  8. Are you on any form of radiation or chemotherapy? No  9. Are you pregnant or breast feeding or do you have plans of pregnancy in the future? No  10. Have there been any other new onset medical symptoms? No        Post Infusion Assessment:  Patient tolerated infusion without incident.  Blood return noted pre and post infusion.  Site  patent and intact, free from redness, edema or discomfort.  No evidence of extravasations.  Access discontinued per protocol.  Biologic Infusion Post Education: Call the triage nurse at your clinic or seek medical attention if you have chills and/or temperature greater than or equal to 100.5, uncontrolled nausea/vomiting, diarrhea, constipation, dizziness, shortness of breath, chest pain, heart palpitations, weakness or any other new or concerning symptoms, questions or concerns.  You cannot have any live virus vaccines prior to or during treatment or up to 6 months post infusion.  If you have an upcoming surgery, medical procedure or dental procedure during treatment, this should be discussed with your ordering physician and your surgeon/dentist.  If you are having any concerning symptom, if you are unsure if you should get your next infusion or wish to speak to a provider before your next infusion, please call your care coordinator or triage nurse at your clinic to notify them so we can adequately serve you.       Discharge Plan:   Patient will return 5/24/19 for next appointment.   Patient discharged in stable condition accompanied by: self.  Departure Mode: Ambulatory.    Marianna Thompson RN

## 2019-04-27 NOTE — PROGRESS NOTES
Discharge Planner OT   Patient plan for discharge: to son's apartment when he can manage stairs  Current status: OT eval and treatment initiated. Pt below baseline with ADLS and mobility, aware of post op precautions. Completes supine<>Sit min A. Sit<>Stand CGA. Limited this session by desaturation on 2L O2. 90s at rest, drops to 80-85% with movement and requires extended time to recover. RN notified.   Barriers to return to prior living situation: stairs, level of assist, medical status  Recommendations for discharge: TCU  Rationale for recommendations: pt requires ongoing skilled therapy, unable to manage stairs to enter home, requires A for transfers and ADLs       Entered by: Luz Cerda 04/27/2019 1:15 PM        Infusion Nursing Note:  Janice Mayfield presents today for  Rapid Remicade.    Patient seen by provider today: No   present during visit today: Not Applicable.    Note: N/A.    Intravenous Access:  Peripheral IV placed.    Treatment Conditions:  Biological Infusion Checklist:    ~~~ NOTE: If the patient answers yes to any of the questions below, hold the infusion and contact ordering provider or on-call provider.    1. Have you recently had an elevated temperature, fever, chills, productive cough, coughing for 3 weeks or longer or hemoptysis,  abnormal vital signs, night sweats,  chest pain or have you noticed a decrease in your appetite, unexplained weight loss or fatigue? No  2. Do you have any open wounds or new incisions? No  3. Do you have any recent or upcoming hospitalizations, surgeries or dental procedures? No  4. Do you currently have or recently have had any signs of illness or infection or are you on any antibiotics? No  5. Have you had any new, sudden or worsening abdominal pain? No  6. Have you or anyone in your household received a live vaccination in the past 4 weeks? Please note:  No live vaccines while on biologic/chemotherapy until 6 months after the last treatment.  Patient can receive the flu vaccine (shot only) and the pneumovax.  It is optimal for the patient to get these vaccines mid cycle, but they can be given at any time as long as it is not on the day of the infusion. No  7. Have you recently been diagnosed with any new nervous system diseases (ie. Multiple sclerosis, Guillain [place], seizures, neurological changes) or cancer diagnosis? Are you on any form of radiation or chemotherapy? No  8. Are you pregnant or breast feeding or do you have plans of pregnancy in the future? No  9. Have you been having any signs of worsening depression or suicidal ideations?  (benlysta only) No  10. Have there been any other new onset medical symptoms? No        Post Infusion  Assessment:  Patient tolerated infusion without incident.  No evidence of extravasations.  Access discontinued per protocol.  Biologic Infusion Post Education: Call the triage nurse at your clinic or seek medical attention if you have chills and/or temperature greater than or equal to 100.5, uncontrolled nausea/vomiting, diarrhea, constipation, dizziness, shortness of breath, chest pain, heart palpitations, weakness or any other new or concerning symptoms, questions or concerns.  You cannot have any live virus vaccines prior to or during treatment or up to 6 months post infusion.  If you have an upcoming surgery, medical procedure or dental procedure during treatment, this should be discussed with your ordering physician and your surgeon/dentist.  If you are having any concerning symptom, if you are unsure if you should get your next infusion or wish to speak to a provider before your next infusion, please call your care coordinator or triage nurse at your clinic to notify them so we can adequately serve you.    Discharge Plan:   Discharge instructions reviewed with: Patient.  Patient and/or family verbalized understanding of discharge instructions and all questions answered.  Patient discharged in stable condition accompanied by: self.  Departure Mode: Ambulatory.    Shanique Nair RN

## 2019-05-17 ENCOUNTER — TELEPHONE (OUTPATIENT)
Dept: RHEUMATOLOGY | Facility: CLINIC | Age: 59
End: 2019-05-17

## 2019-05-17 DIAGNOSIS — Z79.60 LONG-TERM USE OF IMMUNOSUPPRESSANT MEDICATION: ICD-10-CM

## 2019-05-17 DIAGNOSIS — M05.79 RHEUMATOID ARTHRITIS INVOLVING MULTIPLE SITES WITH POSITIVE RHEUMATOID FACTOR (H): Primary | ICD-10-CM

## 2019-05-17 NOTE — TELEPHONE ENCOUNTER
Standing lab orders have . Set up and routed to provider.     Called patient and left voicemail to see if she would like to make a follow up appointment as she is due. Offered appts either before or after her infusion next Friday and asked her to call me back.     Mary Carmen Christina, VIKASN, RN   Rheumatology Care Coordinator   [facility]

## 2019-05-18 DIAGNOSIS — M05.79 RHEUMATOID ARTHRITIS INVOLVING MULTIPLE SITES WITH POSITIVE RHEUMATOID FACTOR (H): Chronic | ICD-10-CM

## 2019-05-18 DIAGNOSIS — M15.3 OTHER SECONDARY OSTEOARTHRITIS OF MULTIPLE SITES: ICD-10-CM

## 2019-05-18 DIAGNOSIS — Z79.60 LONG-TERM USE OF IMMUNOSUPPRESSANT MEDICATION: ICD-10-CM

## 2019-05-18 DIAGNOSIS — E55.9 VITAMIN D DEFICIENCY: ICD-10-CM

## 2019-05-18 DIAGNOSIS — Z79.52 LONG TERM CURRENT USE OF SYSTEMIC STEROIDS: ICD-10-CM

## 2019-05-18 DIAGNOSIS — M25.512 CHRONIC LEFT SHOULDER PAIN: ICD-10-CM

## 2019-05-18 DIAGNOSIS — G89.29 CHRONIC LEFT SHOULDER PAIN: ICD-10-CM

## 2019-05-20 RX ORDER — PREDNISONE 1 MG/1
4 TABLET ORAL DAILY
Qty: 360 TABLET | Refills: 1 | Status: SHIPPED | OUTPATIENT
Start: 2019-05-20 | End: 2019-11-13

## 2019-05-20 NOTE — TELEPHONE ENCOUNTER
Medication/Dose: predniSONE (DELTASONE) 1 MG tablet  Sig - Route: Take 4 tablets (4 mg) by mouth daily Try taper by 1/2 mg day every 2 month until off if able - Oral  Last Written Prescription Date: 5/118/2018  Last Fill Quantity: 360 tab, # refills: 3  Last Office Visit with Rheumatology Provider:  Pola Santana, APRN, CNP          WBC   Date Value Ref Range Status   04/26/2019 7.6 4.0 - 11.0 10e9/L Final     RBC Count   Date Value Ref Range Status   04/26/2019 4.04 3.8 - 5.2 10e12/L Final     Hemoglobin   Date Value Ref Range Status   04/26/2019 12.0 11.7 - 15.7 g/dL Final     Hematocrit   Date Value Ref Range Status   04/26/2019 37.6 35.0 - 47.0 % Final     MCV   Date Value Ref Range Status   04/26/2019 93 78 - 100 fl Final     MCH   Date Value Ref Range Status   04/26/2019 29.7 26.5 - 33.0 pg Final     MCHC   Date Value Ref Range Status   04/26/2019 31.9 31.5 - 36.5 g/dL Final     RDW   Date Value Ref Range Status   04/26/2019 12.9 10.0 - 15.0 % Final     Platelet Count   Date Value Ref Range Status   04/26/2019 250 150 - 450 10e9/L Final     AST   Date Value Ref Range Status   04/26/2019 24 0 - 45 U/L Final     ALT   Date Value Ref Range Status   04/26/2019 38 0 - 50 U/L Final     Creatinine   Date Value Ref Range Status   04/26/2019 0.74 0.52 - 1.04 mg/dL Final     Albumin   Date Value Ref Range Status   04/26/2019 3.9 3.4 - 5.0 g/dL Final     Color Urine (no units)   Date Value   04/28/2017 Yellow     Appearance Urine (no units)   Date Value   04/28/2017 Clear     Glucose Urine (mg/dL)   Date Value   04/28/2017 Negative     Bilirubin Urine (no units)   Date Value   04/28/2017 Negative     Ketones Urine (mg/dL)   Date Value   04/28/2017 Negative     Specific Gravity Urine (no units)   Date Value   04/28/2017 1.016     pH Urine (pH)   Date Value   04/28/2017 6.0     Protein Albumin Urine (mg/dL)   Date Value   04/28/2017 Negative     Nitrite Urine (no units)   Date Value   04/28/2017 Negative     Leukocyte  Esterase Urine (no units)   Date Value   04/28/2017 Moderate (A)             Prescription approved per Rheumatology Refill Protocol for 90 day supply and 1 refills.    Mary Carmen Christina, BSN, RN   Rheumatology Care Coordinator   [facility]

## 2019-05-24 ENCOUNTER — INFUSION THERAPY VISIT (OUTPATIENT)
Dept: INFUSION THERAPY | Facility: CLINIC | Age: 59
End: 2019-05-24
Payer: COMMERCIAL

## 2019-05-24 VITALS
TEMPERATURE: 98 F | WEIGHT: 173.4 LBS | BODY MASS INDEX: 29.3 KG/M2 | SYSTOLIC BLOOD PRESSURE: 116 MMHG | RESPIRATION RATE: 18 BRPM | OXYGEN SATURATION: 100 % | DIASTOLIC BLOOD PRESSURE: 77 MMHG | HEART RATE: 62 BPM

## 2019-05-24 DIAGNOSIS — M05.79 RHEUMATOID ARTHRITIS INVOLVING MULTIPLE SITES WITH POSITIVE RHEUMATOID FACTOR (H): Primary | ICD-10-CM

## 2019-05-24 PROCEDURE — 99207 ZZC NO CHARGE LOS: CPT

## 2019-05-24 PROCEDURE — 96413 CHEMO IV INFUSION 1 HR: CPT | Performed by: INTERNAL MEDICINE

## 2019-05-24 RX ORDER — DIPHENHYDRAMINE HCL 25 MG
25 CAPSULE ORAL ONCE
Status: COMPLETED | OUTPATIENT
Start: 2019-05-24 | End: 2019-05-24

## 2019-05-24 RX ORDER — ACETAMINOPHEN 325 MG/1
650 TABLET ORAL ONCE
Status: CANCELLED
Start: 2019-06-07

## 2019-05-24 RX ORDER — ACETAMINOPHEN 325 MG/1
650 TABLET ORAL ONCE
Status: COMPLETED | OUTPATIENT
Start: 2019-05-24 | End: 2019-05-24

## 2019-05-24 RX ORDER — DIPHENHYDRAMINE HCL 25 MG
25 CAPSULE ORAL ONCE
Status: CANCELLED
Start: 2019-06-07

## 2019-05-24 RX ORDER — METHYLPREDNISOLONE SODIUM SUCCINATE 125 MG/2ML
125 INJECTION, POWDER, LYOPHILIZED, FOR SOLUTION INTRAMUSCULAR; INTRAVENOUS ONCE
Status: CANCELLED | OUTPATIENT
Start: 2019-06-07

## 2019-05-24 RX ADMIN — Medication 250 ML: at 13:28

## 2019-05-24 RX ADMIN — Medication 25 MG: at 13:17

## 2019-05-24 RX ADMIN — ACETAMINOPHEN 650 MG: 325 TABLET ORAL at 13:17

## 2019-05-24 ASSESSMENT — PAIN SCALES - GENERAL: PAINLEVEL: NO PAIN (0)

## 2019-05-24 NOTE — PROGRESS NOTES
Infusion Nursing Note:  Janice Mayfield presents today for Remicade infusion.    Patient seen by provider today: No   present during visit today: Not Applicable.    Note: N/A.    Intravenous Access:  Peripheral IV placed.    Treatment Conditions:  Biological Infusion Checklist:  ~~~ NOTE: If the patient answers yes to any of the questions below, hold the infusion and contact ordering provider or on-call provider.    1. Have you recently had an elevated temperature, fever, chills, productive cough, coughing for 3 weeks or longer or hemoptysis, abnormal vital signs, night sweats,  chest pain or have you noticed a decrease in your appetite, unexplained weight loss or fatigue? No  2. Do you have any open wounds or new incisions? No  3. Do you have any recent or upcoming hospitalizations, surgeries or dental procedures? No  4. Do you currently have or recently have had any signs of illness or infection or are you on any antibiotics? No  5. Have you had any new, sudden or worsening abdominal pain? No  6. Have you or anyone in your household received a live vaccination in the past 4 weeks? Please note:  No live vaccines while on biologic/chemotherapy until 6 months after the last treatment.  Patient can receive the flu vaccine (shot only) and the pneumovax.  It is optimal for the patient to get these vaccines mid cycle, but they can be given at any time as long as it is not on the day of the infusion. No  7. Have you recently been diagnosed with any new nervous system diseases (ie. Multiple sclerosis, Guillain [place], seizures, neurological changes) or cancer diagnosis? No  8. Are you on any form of radiation or chemotherapy? No  9. Are you pregnant or breast feeding or do you have plans of pregnancy in the future? No  10. Have there been any other new onset medical symptoms? No        Post Infusion Assessment:  Patient tolerated infusion without incident.  Blood return noted pre and post infusion.  Site  patent and intact, free from redness, edema or discomfort.  No evidence of extravasations.  Access discontinued per protocol.       Discharge Plan:   Patient has next appt scheduled.  Patient discharged in stable condition accompanied by: self.  Departure Mode: Ambulatory.    Marianna Thompson RN

## 2019-06-21 ENCOUNTER — INFUSION THERAPY VISIT (OUTPATIENT)
Dept: INFUSION THERAPY | Facility: CLINIC | Age: 59
End: 2019-06-21
Payer: COMMERCIAL

## 2019-06-21 VITALS
HEART RATE: 78 BPM | RESPIRATION RATE: 16 BRPM | SYSTOLIC BLOOD PRESSURE: 107 MMHG | BODY MASS INDEX: 29.27 KG/M2 | WEIGHT: 173.2 LBS | TEMPERATURE: 98 F | DIASTOLIC BLOOD PRESSURE: 76 MMHG | OXYGEN SATURATION: 98 %

## 2019-06-21 DIAGNOSIS — M05.79 RHEUMATOID ARTHRITIS INVOLVING MULTIPLE SITES WITH POSITIVE RHEUMATOID FACTOR (H): Primary | ICD-10-CM

## 2019-06-21 PROCEDURE — 96413 CHEMO IV INFUSION 1 HR: CPT | Performed by: INTERNAL MEDICINE

## 2019-06-21 PROCEDURE — 99207 ZZC NO CHARGE LOS: CPT

## 2019-06-21 RX ORDER — DIPHENHYDRAMINE HCL 25 MG
25 CAPSULE ORAL ONCE
Status: COMPLETED | OUTPATIENT
Start: 2019-06-21 | End: 2019-06-21

## 2019-06-21 RX ORDER — ACETAMINOPHEN 325 MG/1
650 TABLET ORAL ONCE
Status: CANCELLED
Start: 2019-08-02

## 2019-06-21 RX ORDER — ACETAMINOPHEN 325 MG/1
650 TABLET ORAL ONCE
Status: COMPLETED | OUTPATIENT
Start: 2019-06-21 | End: 2019-06-21

## 2019-06-21 RX ORDER — METHYLPREDNISOLONE SODIUM SUCCINATE 125 MG/2ML
125 INJECTION, POWDER, LYOPHILIZED, FOR SOLUTION INTRAMUSCULAR; INTRAVENOUS ONCE
Status: CANCELLED | OUTPATIENT
Start: 2019-08-02

## 2019-06-21 RX ORDER — DIPHENHYDRAMINE HCL 25 MG
25 CAPSULE ORAL ONCE
Status: CANCELLED
Start: 2019-08-02

## 2019-06-21 RX ADMIN — ACETAMINOPHEN 650 MG: 325 TABLET ORAL at 12:56

## 2019-06-21 RX ADMIN — Medication 25 MG: at 12:57

## 2019-06-21 RX ADMIN — Medication 250 ML: at 13:05

## 2019-06-21 ASSESSMENT — PAIN SCALES - GENERAL: PAINLEVEL: SEVERE PAIN (6)

## 2019-06-21 NOTE — PROGRESS NOTES
Infusion Nursing Note:  Janice Mayfield presents today for Rapid Remicade .    Patient seen by provider today: No   present during visit today: Not Applicable.    Note: N/A.    Intravenous Access:  Peripheral IV placed.    Treatment Conditions:  Biological Infusion Checklist:  ~~~ NOTE: If the patient answers yes to any of the questions below, hold the infusion and contact ordering provider or on-call provider.    1. Have you recently had an elevated temperature, fever, chills, productive cough, coughing for 3 weeks or longer or hemoptysis, abnormal vital signs, night sweats,  chest pain or have you noticed a decrease in your appetite, unexplained weight loss or fatigue? No  2. Do you have any open wounds or new incisions? No  3. Do you have any recent or upcoming hospitalizations, surgeries or dental procedures? No  4. Do you currently have or recently have had any signs of illness or infection or are you on any antibiotics? No  5. Have you had any new, sudden or worsening abdominal pain? No  6. Have you or anyone in your household received a live vaccination in the past 4 weeks? Please note:  No live vaccines while on biologic/chemotherapy until 6 months after the last treatment.  Patient can receive the flu vaccine (shot only) and the pneumovax.  It is optimal for the patient to get these vaccines mid cycle, but they can be given at any time as long as it is not on the day of the infusion. No  7. Have you recently been diagnosed with any new nervous system diseases (ie. Multiple sclerosis, Guillain [place], seizures, neurological changes) or cancer diagnosis? No  8. Are you on any form of radiation or chemotherapy? No  9. Are you pregnant or breast feeding or do you have plans of pregnancy in the future? No  10. Have you been having any signs of worsening depression or suicidal ideations?  (benlysta only) No  11. Have there been any other new onset medical symptoms? No        Post Infusion  Assessment:  Patient tolerated infusion without incident.  No evidence of extravasations.  Access discontinued per protocol.  Biologic Infusion Post Education: Call the triage nurse at your clinic or seek medical attention if you have chills and/or temperature greater than or equal to 100.5, uncontrolled nausea/vomiting, diarrhea, constipation, dizziness, shortness of breath, chest pain, heart palpitations, weakness or any other new or concerning symptoms, questions or concerns.  You cannot have any live virus vaccines prior to or during treatment or up to 6 months post infusion.  If you have an upcoming surgery, medical procedure or dental procedure during treatment, this should be discussed with your ordering physician and your surgeon/dentist.  If you are having any concerning symptom, if you are unsure if you should get your next infusion or wish to speak to a provider before your next infusion, please call your care coordinator or triage nurse at your clinic to notify them so we can adequately serve you.       Discharge Plan:   Discharge instructions reviewed with: Patient.  Patient and/or family verbalized understanding of discharge instructions and all questions answered.  Patient discharged in stable condition accompanied by: self.  Departure Mode: Ambulatory.    Shanique Nair RN

## 2019-07-18 DIAGNOSIS — M05.79 RHEUMATOID ARTHRITIS INVOLVING MULTIPLE SITES WITH POSITIVE RHEUMATOID FACTOR (H): Chronic | ICD-10-CM

## 2019-07-18 DIAGNOSIS — E55.9 VITAMIN D DEFICIENCY: ICD-10-CM

## 2019-07-18 DIAGNOSIS — Z79.60 LONG-TERM USE OF IMMUNOSUPPRESSANT MEDICATION: ICD-10-CM

## 2019-07-18 DIAGNOSIS — M15.3 OTHER SECONDARY OSTEOARTHRITIS OF MULTIPLE SITES: ICD-10-CM

## 2019-07-18 DIAGNOSIS — G89.29 CHRONIC LEFT SHOULDER PAIN: ICD-10-CM

## 2019-07-18 DIAGNOSIS — M25.512 CHRONIC LEFT SHOULDER PAIN: ICD-10-CM

## 2019-07-18 DIAGNOSIS — Z79.52 LONG TERM CURRENT USE OF SYSTEMIC STEROIDS: ICD-10-CM

## 2019-07-18 NOTE — TELEPHONE ENCOUNTER
Methotrexate listed as an allergy and it is unclear from last note if patient is taking. Called to clarify. Left detailed message explaining this and asked her to call the clinic back. Patient is also due for and OV will assist in scheduling.     VIKAS GonzalezN, RN   Rheumatology Care Coordinator   [facility]

## 2019-07-19 ENCOUNTER — INFUSION THERAPY VISIT (OUTPATIENT)
Dept: INFUSION THERAPY | Facility: CLINIC | Age: 59
End: 2019-07-19
Payer: COMMERCIAL

## 2019-07-19 VITALS
OXYGEN SATURATION: 96 % | HEART RATE: 77 BPM | SYSTOLIC BLOOD PRESSURE: 115 MMHG | DIASTOLIC BLOOD PRESSURE: 75 MMHG | TEMPERATURE: 98.4 F | BODY MASS INDEX: 29.41 KG/M2 | WEIGHT: 174 LBS

## 2019-07-19 DIAGNOSIS — M05.79 RHEUMATOID ARTHRITIS INVOLVING MULTIPLE SITES WITH POSITIVE RHEUMATOID FACTOR (H): Primary | ICD-10-CM

## 2019-07-19 PROCEDURE — 99207 ZZC NO CHARGE NURSE ONLY: CPT

## 2019-07-19 PROCEDURE — 96413 CHEMO IV INFUSION 1 HR: CPT | Performed by: INTERNAL MEDICINE

## 2019-07-19 RX ORDER — ACETAMINOPHEN 325 MG/1
650 TABLET ORAL ONCE
Status: COMPLETED | OUTPATIENT
Start: 2019-07-19 | End: 2019-07-19

## 2019-07-19 RX ORDER — METHYLPREDNISOLONE SODIUM SUCCINATE 125 MG/2ML
125 INJECTION, POWDER, LYOPHILIZED, FOR SOLUTION INTRAMUSCULAR; INTRAVENOUS ONCE
Status: CANCELLED | OUTPATIENT
Start: 2019-09-27

## 2019-07-19 RX ORDER — DIPHENHYDRAMINE HCL 25 MG
25 CAPSULE ORAL ONCE
Status: COMPLETED | OUTPATIENT
Start: 2019-07-19 | End: 2019-07-19

## 2019-07-19 RX ORDER — DIPHENHYDRAMINE HCL 25 MG
25 CAPSULE ORAL ONCE
Status: CANCELLED
Start: 2019-09-27

## 2019-07-19 RX ORDER — ACETAMINOPHEN 325 MG/1
650 TABLET ORAL ONCE
Status: CANCELLED
Start: 2019-09-27

## 2019-07-19 RX ADMIN — Medication 25 MG: at 13:05

## 2019-07-19 RX ADMIN — ACETAMINOPHEN 650 MG: 325 TABLET ORAL at 13:05

## 2019-07-19 RX ADMIN — Medication 250 ML: at 15:02

## 2019-07-19 ASSESSMENT — PAIN SCALES - GENERAL: PAINLEVEL: NO PAIN (0)

## 2019-07-19 NOTE — PROGRESS NOTES
Infusion Nursing Note:  Janice Mayfield presents today for Rapid Remicade.    Patient seen by provider today: No   present during visit today: Not Applicable.    Note: Pt c/o generalized body aches but no new medical complaints, see flow sheet for assessment .    Intravenous Access:  Peripheral IV placed.    Treatment Conditions:  Biological Infusion Checklist:  ~~~ NOTE: If the patient answers yes to any of the questions below, hold the infusion and contact ordering provider or on-call provider.    1. Have you recently had an elevated temperature, fever, chills, productive cough, coughing for 3 weeks or longer or hemoptysis, abnormal vital signs, night sweats,  chest pain or have you noticed a decrease in your appetite, unexplained weight loss or fatigue? No  2. Do you have any open wounds or new incisions? No  3. Do you have any recent or upcoming hospitalizations, surgeries or dental procedures? No  4. Do you currently have or recently have had any signs of illness or infection or are you on any antibiotics? No  5. Have you had any new, sudden or worsening abdominal pain? No  6. Have you or anyone in your household received a live vaccination in the past 4 weeks? Please note:  No live vaccines while on biologic/chemotherapy until 6 months after the last treatment.  Patient can receive the flu vaccine (shot only) and the pneumovax.  It is optimal for the patient to get these vaccines mid cycle, but they can be given at any time as long as it is not on the day of the infusion. No  7. Have you recently been diagnosed with any new nervous system diseases (ie. Multiple sclerosis, Guillain [place], seizures, neurological changes) or cancer diagnosis? No  8. Are you on any form of radiation or chemotherapy? No  9. Are you pregnant or breast feeding or do you have plans of pregnancy in the future? No  10. Have you been having any signs of worsening depression or suicidal ideations?  (benlysta only)  No  11. Have there been any other new onset medical symptoms? No        Post Infusion Assessment:  Patient tolerated infusion without incident.       Discharge Plan:   Patient discharged in stable condition accompanied by: self.  Departure Mode: Ambulatory.  Pt will RTC 8/16/19 for Rapid Remicade.    Pravin Blanco RN

## 2019-07-26 ENCOUNTER — TELEPHONE (OUTPATIENT)
Dept: PEDIATRICS | Facility: CLINIC | Age: 59
End: 2019-07-26

## 2019-07-26 ENCOUNTER — OFFICE VISIT (OUTPATIENT)
Dept: PSYCHOLOGY | Facility: CLINIC | Age: 59
End: 2019-07-26
Payer: COMMERCIAL

## 2019-07-26 ENCOUNTER — OFFICE VISIT (OUTPATIENT)
Dept: RHEUMATOLOGY | Facility: CLINIC | Age: 59
End: 2019-07-26
Payer: COMMERCIAL

## 2019-07-26 VITALS
BODY MASS INDEX: 29.19 KG/M2 | WEIGHT: 172.7 LBS | SYSTOLIC BLOOD PRESSURE: 105 MMHG | HEART RATE: 79 BPM | DIASTOLIC BLOOD PRESSURE: 79 MMHG | OXYGEN SATURATION: 98 %

## 2019-07-26 DIAGNOSIS — R21 SKIN RASH: ICD-10-CM

## 2019-07-26 DIAGNOSIS — F43.20 ADJUSTMENT DISORDER: Primary | ICD-10-CM

## 2019-07-26 DIAGNOSIS — Z79.52 LONG TERM CURRENT USE OF SYSTEMIC STEROIDS: ICD-10-CM

## 2019-07-26 DIAGNOSIS — Z79.60 LONG-TERM USE OF IMMUNOSUPPRESSANT MEDICATION: ICD-10-CM

## 2019-07-26 DIAGNOSIS — M05.79 RHEUMATOID ARTHRITIS INVOLVING MULTIPLE SITES WITH POSITIVE RHEUMATOID FACTOR (H): Primary | ICD-10-CM

## 2019-07-26 DIAGNOSIS — M15.3 OTHER SECONDARY OSTEOARTHRITIS OF MULTIPLE SITES: ICD-10-CM

## 2019-07-26 DIAGNOSIS — E55.9 VITAMIN D DEFICIENCY: ICD-10-CM

## 2019-07-26 DIAGNOSIS — M05.79 RHEUMATOID ARTHRITIS INVOLVING MULTIPLE SITES WITH POSITIVE RHEUMATOID FACTOR (H): ICD-10-CM

## 2019-07-26 LAB
ALBUMIN SERPL-MCNC: 3.6 G/DL (ref 3.4–5)
ALT SERPL W P-5'-P-CCNC: 20 U/L (ref 0–50)
AST SERPL W P-5'-P-CCNC: 16 U/L (ref 0–45)
CREAT SERPL-MCNC: 0.75 MG/DL (ref 0.52–1.04)
CRP SERPL-MCNC: <2.9 MG/L (ref 0–8)
ERYTHROCYTE [DISTWIDTH] IN BLOOD BY AUTOMATED COUNT: 12.5 % (ref 10–15)
GFR SERPL CREATININE-BSD FRML MDRD: 89 ML/MIN/{1.73_M2}
HCT VFR BLD AUTO: 39.1 % (ref 35–47)
HGB BLD-MCNC: 12.4 G/DL (ref 11.7–15.7)
MCH RBC QN AUTO: 29.1 PG (ref 26.5–33)
MCHC RBC AUTO-ENTMCNC: 31.7 G/DL (ref 31.5–36.5)
MCV RBC AUTO: 92 FL (ref 78–100)
PLATELET # BLD AUTO: 261 10E9/L (ref 150–450)
RBC # BLD AUTO: 4.26 10E12/L (ref 3.8–5.2)
WBC # BLD AUTO: 5.7 10E9/L (ref 4–11)

## 2019-07-26 PROCEDURE — 82040 ASSAY OF SERUM ALBUMIN: CPT | Performed by: NURSE PRACTITIONER

## 2019-07-26 PROCEDURE — 85027 COMPLETE CBC AUTOMATED: CPT | Performed by: NURSE PRACTITIONER

## 2019-07-26 PROCEDURE — 36415 COLL VENOUS BLD VENIPUNCTURE: CPT | Performed by: NURSE PRACTITIONER

## 2019-07-26 PROCEDURE — 86140 C-REACTIVE PROTEIN: CPT | Performed by: NURSE PRACTITIONER

## 2019-07-26 PROCEDURE — 84450 TRANSFERASE (AST) (SGOT): CPT | Performed by: NURSE PRACTITIONER

## 2019-07-26 PROCEDURE — 99214 OFFICE O/P EST MOD 30 MIN: CPT | Performed by: NURSE PRACTITIONER

## 2019-07-26 PROCEDURE — 84460 ALANINE AMINO (ALT) (SGPT): CPT | Performed by: NURSE PRACTITIONER

## 2019-07-26 PROCEDURE — 82565 ASSAY OF CREATININE: CPT | Performed by: NURSE PRACTITIONER

## 2019-07-26 ASSESSMENT — DISEASE ACTIVITY SCORE (DAS28): CRP_MG_PER_LITER: 1.8

## 2019-07-26 ASSESSMENT — PAIN SCALES - GENERAL: PAINLEVEL: NO PAIN (0)

## 2019-07-26 NOTE — RESULT ENCOUNTER NOTE
The blood counts, liver, kidney and CRP inflammation labs are normal.     Pola BLANCHARD, CNP, MSN  7/26/2019  8:00 AM

## 2019-07-26 NOTE — PROGRESS NOTES
"[facility] - Rheumatology Clinic Visit    Janice Mayfield  is a 59 year old follow-up erosive RA +RF -CCP -ZANDER/C3/C4, -ANCA -HLA B27. -lymes. XR  left shoulder humeral head erosions, DJD; 4-2017 severe degenerative changes RC joints bilateral and marginal erosions. Prior care at [facility] w/ Andry Jones.     Review: hydroxychloroquine, methotrexate (d/c liver enzymes elevation), enbrel (ineffective), humira *d/c (uticarial reaction), sulfites (anxiety) --so no sulfasalazine, remicade since 1-2011; diverticulum in colon would avoid tofacitinib (xeljantz) due to risk perforated bowel      HISTORY CARRIED FORWARD 4-: Went to [facility] in a wheelchair [\"knees were melons\", pads of feet, not use hands, wrists]. On remicade 5 mg/kg Q 4-5 week infusions with pre-meds once a month (Jan 2011) last infusion about 6 weeks ago, FA 1 mg/day, MTX 10 mg Q Sunday week and prednisone 4-5 mg day. Increased remicade was every 4-5 weeks as was wearing down rather then increase the dose. Tolerating and s/e. Mild foggy day after taking methotrexate (no SI, hairloss, diarrhea), FA 1 mg day Eats healthy. Reports arthritis is controlled, functioning well and not in wheelchair. Weather affects her arthritis. Root canal 2 weeks ago due to tooth abscess \"thinning of your jaw\". No flaring with her RA and this treatment. shouilder and wrist, knee so mild compared to when dx but is noticing she is due. Hx right wrist injection 2 yr ago. Pain is 4 out 10.     Copy forward  May 11, 2018  On remicade 5 mg/kg Q 4-5 week infusions with pre-meds once a month (Jan 2011) last infusion 5-2018 tolerated well , FA 1 mg/day, MTX 10 mg Q Sunday week and prednisone 4 mg day, folic acid 2 mg day. Tolerating well. No infections. No RA flares or EAS. No joint pain or swelling. Did not do PT for her left shoulder, this is still limited with the ROM but she is wishing to do this now since she has more time. No swelling. Mild " "plan left shoulder and right wrist where the prior damage was. Mild \"delay\" in thinking day of MTX but tolerating and the higher dose of folic acid is working well. No infectious over the years.     Copy forward  November 9, 2018  On remicade 5 mg/kg Q 4-5 week infusions with pre-meds once a month (Jan 2011) last infusion 10- tolerated well , FA 2 mg/day, methotrexate  12.5 mg Q Sunday week and prednisone 4 mg day tolerating. No RHEUMATOID ARTHRITIS (RA) flares, no EAS, and left shoulder is much improved even got her ROM back. Still some Physical Therapy sessions to do, she stopped when was having more stress. Some mild pain in there. 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 No pain or swelling in knees or wrists as she had before when initially dx. No infections. No NSAIDs use. Due to annual physical, and agrees to schedule this. Denies any fever, chills, SOB, BOYD, night sweats, or chest pain, or cough. Reports healthy. Denies having skin evaluation over the years, but use to tan in the sun when she was younger.     July 26, 2019  Rash itchy red on base of scalp and left outer upper arm started about a month ago. No itching. Does have dermatologist whom she seen in Nov and is due back.     Rheumatoid arthritis (RA) is controlled with current plan but we adjusted the methotrexate without success on getting her off the prednisone. No joint pain or swelling. No EAS. No SLE symptoms. Notices joints with weather changes. Left shoulder is improved, not worse. Due for DEXA as ordered by her PCP. Knees are good      In grieving classes since her mom passed last summer, now caring for her dad who is at [facility] for AVR replacement. Daughter lives upstairs of her home.     On remicade 5 mg/kg Q 4-5 week infusions with pre-meds once a month (Jan 2011) last infusion 10- tolerated well , FA 2 mg/day, methotrexate  12.5 mg Q Sunday week (mild brain fog the next day, mild and " tolerable) and prednisone 4 mg day tolerating. She weaned down the prednisone for several months then readjusted due to reports of diffuse joints pains (denies fibromyalagia or swelling of joints)    PMH:  Injuries-None  No Blood transfusions  Medical-osteopenia (DEXA  in 2008 -0.6, postemenopausal, anemia, elevated liver enzymes (fatty liver, MR elastroplaty NL 2012 seen Dr. Anderson; initially hep C + but HCV RNA neg), polyclonal hypergammaglobuinemia, diverticulum, lichen sclerosus vulva, osteoarthritis  multiple sites, rheumatoid arthritis (RA) , vitamin d defiency, varicose veins     Surgical-None     FH:  No autoimmune disorders, psoriasis, UC, crohn's, SLE, RA, PsA, gout, autoimmune thyroid.  No MS in family  Mother-Uterine CA-passed  Father-alive PPM and arrythmia  MGM-parkinsons, OP, possible arthritis-passed  CFF-BCS-zlqidy  PGM-brain CA, OP-passed  PGF-colon CA-passed  2 brothers-start to have heart issue  2 sisters-healthy HTN  2 children  Common low BP and low HR, varicose vein, arrhythmia    Social-  No Alcohol. Never Smoking. 3 Children (youngest deformed pulmonary valve s/p OHS). NO IVDU or drug use. 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     PMSH personally reviewed and updated by me.    ROS:  Negative raynaud s phenomena, hairloss, sun sensitivities, keratoconjunctivitis sicca, pleurisy, inflammatory joint symptoms, significant rashes like malar, oral/nasal or ulcerations, inflammatory eye disease, inflammatory bowel disease, dactylitis, tenosynovitis, or enthespathy. Negative for miscarriages over 2 months into pregnancy, blood clots, gout, psoriasis, UC, crohn's. No temporal headache, no jaw claudication, no scalp tenderness, vision changes, carotidynia, cough. No STD or pregnancy symptoms. No Parotid swelling.   +see above   -SLE signs or symptoms  CONSTITUTIONAL: No fevers, night sweats or unintentional weight change. No acute distress, swollen glands  EYES: No vision  change, diplopia, pain in eyes or red eyes   EARS, NOSE, MOUTH, THROAT: No tinnitus or hearing change, no epistaxis or nasal discharge, no oral lesions, throat clear. Normal saliva pool.  No drymouth. No thyroid Enlargement.   CARDIOVASCULAR: No chest pain, palpitations, or pain with walking, no orthopnea or PND   RESPIRATORY: No dyspnea, cough, shortness of breath or wheezing. No pleurisy.   GI: No nausea, vomiting, diarrhea or constipation, no abdominal pain, or blood in stools.   : No change in urine, no dysuria or hematuria   MUSCKL: No swollen, tender, red or painful joints. No nodules. No enthesitis, plantar fascitis or heel pain.   INTEGUMENTARY: +see above   NEURO: No loss of strength or sensation, no numbness or tingling, no tremor, no dizziness, no headache. No falls   ENDO: No polyuria or polydipsia, no temperature intolerance   HEME/LYMPH:No concerning bumps, bleeding problems, or swollen lymph nodes. No recent infections, hospitalizations or new illnesses.   ALLERGY: No environmental allergies   PSYCH: Met with Dr. Boyer today   Otherwise 14 point ROS obtained, reviewed and found negative.     Physical exam:  Vitals: Blood pressure 105/79, pulse 79, weight 78.3 kg (172 lb 11.2 oz), SpO2 98 %, not currently breastfeeding.  Wt Readings from Last 4 Encounters:   07/26/19 78.3 kg (172 lb 11.2 oz)   07/19/19 78.9 kg (174 lb)   06/21/19 78.6 kg (173 lb 3.2 oz)   05/24/19 78.7 kg (173 lb 6.4 oz)     Constitutional: WD-WN-WG cooperative  Eyes: nl EOM, PERRLA, vision, conjunctiva, sclera, No Unilateral or bilateral external ear inflammation   ENT: nl external ears, nose, hearing, lips, teeth, gums, throat. No saddle nose   Neck: no mass or thyroid enlargement  Resp: lungs clear to auscultation, nl to palpation, nl effort  CV: RRR, no murmurs, rubs or gallops, no edema  GI: no ABD mass or tenderness, no HSM  : not tested  Lymph: no cervical or epitrochlear nodes  MS: Right wrist drop wirh reduced flexion and  extension, right halgus valgus bunions All TMJ, neck, shoulder, elbow, wrist, hand, spine, hip, knee, ankle, and foot joints were examined and otherwise found normal. Normal  strength. No active synovitis or deformity. Full ROM. Normal prayer sign. Negative Lhermitte's sign. No periuncle erythema  Skin: no nail pitting, alopecia. +see photos   Neuro: nl cranial nerves, strength, sensation, DTRs.   Psych: nl judgement, orientation, memory, affect.      Labs/Imaging:  Anti-smooth, antimitochrondial, myelo AB amd PR3 NL in 2011  Lymes neg 2011    DEXA 5-2016  Osteopenia z-0.4 t-1  Xrays feet 2-2014 negative  2-2014 B/L hand joint space narrowing and erosive change on radiocarpal  2011 neg SI x-ays     Endoscopy/colonoscopy 5-2016 single diverticulum dx diverticulosis in colon   Results for orders placed or performed in visit on 07/26/19   Albumin level   Result Value Ref Range    Albumin 3.6 3.4 - 5.0 g/dL   AST   Result Value Ref Range    AST 16 0 - 45 U/L   CBC with platelets   Result Value Ref Range    WBC 5.7 4.0 - 11.0 10e9/L    RBC Count 4.26 3.8 - 5.2 10e12/L    Hemoglobin 12.4 11.7 - 15.7 g/dL    Hematocrit 39.1 35.0 - 47.0 %    MCV 92 78 - 100 fl    MCH 29.1 26.5 - 33.0 pg    MCHC 31.7 31.5 - 36.5 g/dL    RDW 12.5 10.0 - 15.0 %    Platelet Count 261 150 - 450 10e9/L   ALT   Result Value Ref Range    ALT 20 0 - 50 U/L   Creatinine   Result Value Ref Range    Creatinine 0.75 0.52 - 1.04 mg/dL    GFR Estimate 89 >60 mL/min/[1.73_m2]    GFR Estimate If Black >90 >60 mL/min/[1.73_m2]   CRP inflammation   Result Value Ref Range    CRP Inflammation <2.9 0.0 - 8.0 mg/L       Impression/Plan:    1. Erosive (deforming as lost ROM left shoulder, right wrist) Rheumatoid Arthritis w/+RF (-CCP/ZANDER, x-rays severe degenerative changes RC b/l, Rt wrist total loss space between triquetrum and marginal erosions diffuse; left shoulder erosion/mild GH OA). Rheumatoid arthritis (RA) remission, but not able to wean off  prednisone on infliximab 5 mg/kg every 4-6 week, methotrexate 12.5 mg week, prednisone 4 mg day. See #2. Tolerating no side-effects. Will send message to pharmacy liason to see if abatacept (orencia) pen injection (she agrees, if cant get off prednisone) is an option in her in the future. I will repeat her autoimmune serologies, should repeat x-rays in future and will RTC 4 months with Dr. Meehan to co-manage with her    2. Immunosuppression, long-term risk medication. Will check labs every 8-12 weeks while on immunosuppresive therapy and hx elevated liver enzymes. Normal today   3. Chronic steroids. We had a long discussion of the risks of this adrenal insuffiency, AVN, ASCVD, bone health, increased infection risks, emotional link to prednisone and some feel mentally struggle to wean, if not flaring to continue the wean (no swelling or loss ROM) and discussed to try alternative measures for chronic pain (holistic, met with Dr. Boyer for mental health). She will wean, if not will adjust the methotrexate to 15 mg every week. I explained I am concerned of long-term use of steroids in a young patient which she was on when I met her  4. Rash, not indicative of SLE. Photos (she consented) and see dermatology for evaluation and tx. If this is psoriasis or other autoimmune, then will ask them to contact rheumatology (risk of DI lupus and psoriasis with anti-TNF) then would need to switch out of class consider abatacept (orencia)   5. Chronic left shoulder, Mild OA with erosive changes on x-rya . Seen ortho-return prn  6. Osteopenia. Chronic prednisone use 4 mg day. DEXA due (schedule as ordered by PCP). Continue calcium and vitamin D. Taper by 0.5-1 mg every 1 month until off.      The patient understood the rationale for the diagnosis and treatment plan. Patient shared in the decision making. All questions were answered to best of my ability and the patient's satisfaction  Pola Santana APRN, CNP, MSN  [facility]  Department of Rheumatology & Autoimmune Disorders    1. Immunization: Reviewed CDC guidelines with patient updated, information provided to patient based on CDC guidelines for vaccination recommendations, patient will discuss with primary provider  2. Bone Health:Educated on adequate calcium and vitamin D intake, Educated on exercise, Glucocorticoid education discussed risks, benefits & potential long term side effects from use  3. Discussed routine annual physicals, cancer screening, cardiac risk monitoring, bone health and primary care with primary care provider.   4. Educated on diagnosis, prognosis, labs, imaging, treatment options (including risks, benefits, risk of no treatment), medications (use, dose, side-effects, risks of medications including infection/cancer/bone marrow suppression, lab monitoring), infections what to do, plan of cares, goals of treatment.

## 2019-07-26 NOTE — NURSING NOTE
Janice Mayfield's goals for this visit include:   Chief Complaint   Patient presents with     RECHECK     6 month recheck- rash on arm and head        She requests these members of her care team be copied on today's visit information: yes    PCP: Marah Hare    Referring Provider:  No referring provider defined for this encounter.    /79   Pulse 79   Wt 78.3 kg (172 lb 11.2 oz)   SpO2 98%   BMI 29.19 kg/m      Do you need any medication refills at today's visit? No     Amorrae JAMEEL Sims

## 2019-07-26 NOTE — PROGRESS NOTES
Patient had appointment with her specialty provider, LO Cavazos, CNP. [facility] services were offered. No immediate safety/risk issues were reported or identified. Explained the role of the [facility] and provided contact information for the [facility]. Pt reported recent losses in her family (mother and mother in law) and is assisting with the care of her elderly father. She expressed interest in learning skills to manage stress and pain differently. Intake appointment scheduled for 7/30/2019.      Andry Boyer PsyD, LP  Behavioral Health Clinician

## 2019-07-26 NOTE — TELEPHONE ENCOUNTER
----- Message from Alessia Arredondo sent at 7/26/2019  9:51 AM CDT -----  Regarding: Dx code for Dexa scan  Good Morning Kenzie,    The Dx code of Z78.0 is not meeting insurance guidelines. Please review these other codes and see if they apply.       age related osteoporosis w/o current pathology finding M81.0, bone disorder M89.9, estrogen deficiency E28.39, Hyperparathyroidism E21.3, other specified disorders of the bone density and structures, other sites M85.88  all pass Medicare guidelines.     If they do apply please update the order with the new code and remove the old code. For future reference the Z78.0 code is not longer covered/meeting guidelines with insurance.     Thank you,  Alessia Arredondo  Senior Intake Financial Counselor  [facility] [place]  [address] [place] [address]  Ph: [phone]  Fax: [phone]

## 2019-07-30 ENCOUNTER — ANCILLARY PROCEDURE (OUTPATIENT)
Dept: BONE DENSITY | Facility: CLINIC | Age: 59
End: 2019-07-30
Attending: NURSE PRACTITIONER
Payer: COMMERCIAL

## 2019-07-30 ENCOUNTER — OFFICE VISIT (OUTPATIENT)
Dept: PSYCHOLOGY | Facility: CLINIC | Age: 59
End: 2019-07-30
Payer: COMMERCIAL

## 2019-07-30 DIAGNOSIS — F43.20 ADJUSTMENT DISORDER: Primary | ICD-10-CM

## 2019-07-30 DIAGNOSIS — E28.39 MENOPAUSE OVARIAN FAILURE: ICD-10-CM

## 2019-07-30 PROCEDURE — 90791 PSYCH DIAGNOSTIC EVALUATION: CPT | Performed by: PSYCHOLOGIST

## 2019-07-30 PROCEDURE — 77080 DXA BONE DENSITY AXIAL: CPT | Performed by: RADIOLOGY
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
## 2019-07-31 NOTE — RESULT ENCOUNTER NOTE
Mackenzie Mayfield,  Attached are your test results.  Your Dexa Bone Density test showed:    Osteopenia - Some loss of bone mineral density is indicated.  Exercise and Calcium/Vitamin D supplements are generally recommended.  . This is an intermediate category that is in between normal and osteoporosis.  People with osteopenia should work on taking in 0308-6149 mg of calcium with vitamin D daily. They should also be getting daily weight bearing exercise (walking works)       Please contact us if you have any questions.    Sunshine Harvey, CNP

## 2019-08-16 ENCOUNTER — INFUSION THERAPY VISIT (OUTPATIENT)
Dept: INFUSION THERAPY | Facility: CLINIC | Age: 59
End: 2019-08-16
Payer: COMMERCIAL

## 2019-08-16 VITALS
TEMPERATURE: 97.9 F | RESPIRATION RATE: 16 BRPM | OXYGEN SATURATION: 98 % | HEART RATE: 59 BPM | BODY MASS INDEX: 28.95 KG/M2 | DIASTOLIC BLOOD PRESSURE: 74 MMHG | SYSTOLIC BLOOD PRESSURE: 130 MMHG | WEIGHT: 171.3 LBS

## 2019-08-16 DIAGNOSIS — M05.79 RHEUMATOID ARTHRITIS INVOLVING MULTIPLE SITES WITH POSITIVE RHEUMATOID FACTOR (H): Primary | ICD-10-CM

## 2019-08-16 PROCEDURE — 99207 ZZC NO CHARGE NURSE ONLY: CPT

## 2019-08-16 PROCEDURE — 96413 CHEMO IV INFUSION 1 HR: CPT | Performed by: NURSE PRACTITIONER

## 2019-08-16 RX ORDER — DIPHENHYDRAMINE HCL 25 MG
25 CAPSULE ORAL ONCE
Status: COMPLETED | OUTPATIENT
Start: 2019-08-16 | End: 2019-08-16

## 2019-08-16 RX ORDER — ACETAMINOPHEN 325 MG/1
650 TABLET ORAL ONCE
Status: COMPLETED | OUTPATIENT
Start: 2019-08-16 | End: 2019-08-16

## 2019-08-16 RX ORDER — METHYLPREDNISOLONE SODIUM SUCCINATE 125 MG/2ML
125 INJECTION, POWDER, LYOPHILIZED, FOR SOLUTION INTRAMUSCULAR; INTRAVENOUS ONCE
Status: CANCELLED | OUTPATIENT
Start: 2019-11-22

## 2019-08-16 RX ORDER — DIPHENHYDRAMINE HCL 25 MG
25 CAPSULE ORAL ONCE
Status: CANCELLED
Start: 2019-11-22

## 2019-08-16 RX ORDER — ACETAMINOPHEN 325 MG/1
650 TABLET ORAL ONCE
Status: CANCELLED
Start: 2019-11-22

## 2019-08-16 RX ADMIN — Medication 25 MG: at 10:56

## 2019-08-16 RX ADMIN — ACETAMINOPHEN 650 MG: 325 TABLET ORAL at 10:56

## 2019-08-16 RX ADMIN — Medication 250 ML: at 11:08

## 2019-08-16 ASSESSMENT — PAIN SCALES - GENERAL: PAINLEVEL: NO PAIN (0)

## 2019-08-16 NOTE — PROGRESS NOTES
Infusion Nursing Note:  Janice Mayfield presents today for Rapid Remicade.    Patient seen by provider today: No   present during visit today: Not Applicable.    Note: N/A.    Intravenous Access:  Peripheral IV placed.    Treatment Conditions:  Biological Infusion Checklist:  ~~~ NOTE: If the patient answers yes to any of the questions below, hold the infusion and contact ordering provider or on-call provider.    1. Have you recently had an elevated temperature, fever, chills, productive cough, coughing for 3 weeks or longer or hemoptysis, abnormal vital signs, night sweats,  chest pain or have you noticed a decrease in your appetite, unexplained weight loss or fatigue? No  2. Do you have any open wounds or new incisions? No  3. Do you have any recent or upcoming hospitalizations, surgeries or dental procedures? No  4. Do you currently have or recently have had any signs of illness or infection or are you on any antibiotics? No  5. Have you had any new, sudden or worsening abdominal pain? No  6. Have you or anyone in your household received a live vaccination in the past 4 weeks? Please note:  No live vaccines while on biologic/chemotherapy until 6 months after the last treatment.  Patient can receive the flu vaccine (shot only) and the pneumovax.  It is optimal for the patient to get these vaccines mid cycle, but they can be given at any time as long as it is not on the day of the infusion. No  7. Have you recently been diagnosed with any new nervous system diseases (ie. Multiple sclerosis, Guillain [place], seizures, neurological changes) or cancer diagnosis? No  8. Are you on any form of radiation or chemotherapy? No  9. Are you pregnant or breast feeding or do you have plans of pregnancy in the future? No  10. Have you been having any signs of worsening depression or suicidal ideations?  (benlysta only) No  11. Have there been any other new onset medical symptoms? No        Post Infusion  Assessment:  Patient tolerated infusion without incident.  Blood return noted pre and post infusion.  Site patent and intact, free from redness, edema or discomfort.  No evidence of extravasations.  Access discontinued per protocol.       Discharge Plan:   Patient will return 9/13 at 12:30 for next appointment.   Patient discharged in stable condition accompanied by: self.  Departure Mode: Ambulatory.    Sulma Riggs RN

## 2019-08-20 DIAGNOSIS — Z79.60 LONG-TERM USE OF IMMUNOSUPPRESSANT MEDICATION: ICD-10-CM

## 2019-08-20 DIAGNOSIS — E55.9 VITAMIN D DEFICIENCY: ICD-10-CM

## 2019-08-20 DIAGNOSIS — M05.79 RHEUMATOID ARTHRITIS INVOLVING MULTIPLE SITES WITH POSITIVE RHEUMATOID FACTOR (H): Chronic | ICD-10-CM

## 2019-08-20 DIAGNOSIS — M15.3 OTHER SECONDARY OSTEOARTHRITIS OF MULTIPLE SITES: ICD-10-CM

## 2019-08-20 DIAGNOSIS — G89.29 CHRONIC LEFT SHOULDER PAIN: ICD-10-CM

## 2019-08-20 DIAGNOSIS — Z79.52 LONG TERM CURRENT USE OF SYSTEMIC STEROIDS: ICD-10-CM

## 2019-08-20 DIAGNOSIS — M25.512 CHRONIC LEFT SHOULDER PAIN: ICD-10-CM

## 2019-08-20 RX ORDER — FOLIC ACID 1 MG/1
2 TABLET ORAL DAILY
Qty: 180 TABLET | Refills: 3 | Status: SHIPPED | OUTPATIENT
Start: 2019-08-20 | End: 2019-11-21

## 2019-08-20 NOTE — TELEPHONE ENCOUNTER
Filled per protocol.     VIKAS GonzalezN, RN   Rheumatology Care Coordinator   [facility]

## 2019-09-13 ENCOUNTER — INFUSION THERAPY VISIT (OUTPATIENT)
Dept: INFUSION THERAPY | Facility: CLINIC | Age: 59
End: 2019-09-13
Payer: COMMERCIAL

## 2019-09-13 VITALS
DIASTOLIC BLOOD PRESSURE: 77 MMHG | TEMPERATURE: 98.4 F | OXYGEN SATURATION: 98 % | WEIGHT: 175.4 LBS | BODY MASS INDEX: 29.64 KG/M2 | SYSTOLIC BLOOD PRESSURE: 114 MMHG | HEART RATE: 79 BPM | RESPIRATION RATE: 16 BRPM

## 2019-09-13 DIAGNOSIS — M05.79 RHEUMATOID ARTHRITIS INVOLVING MULTIPLE SITES WITH POSITIVE RHEUMATOID FACTOR (H): Primary | ICD-10-CM

## 2019-09-13 PROCEDURE — 99207 ZZC NO CHARGE LOS: CPT

## 2019-09-13 PROCEDURE — 96413 CHEMO IV INFUSION 1 HR: CPT | Performed by: INTERNAL MEDICINE

## 2019-09-13 RX ORDER — METHYLPREDNISOLONE SODIUM SUCCINATE 125 MG/2ML
125 INJECTION, POWDER, LYOPHILIZED, FOR SOLUTION INTRAMUSCULAR; INTRAVENOUS ONCE
Status: CANCELLED | OUTPATIENT
Start: 2020-01-17

## 2019-09-13 RX ORDER — DIPHENHYDRAMINE HCL 25 MG
25 CAPSULE ORAL ONCE
Status: CANCELLED
Start: 2020-01-17

## 2019-09-13 RX ORDER — ACETAMINOPHEN 325 MG/1
650 TABLET ORAL ONCE
Status: COMPLETED | OUTPATIENT
Start: 2019-09-13 | End: 2019-09-13

## 2019-09-13 RX ORDER — DIPHENHYDRAMINE HCL 25 MG
25 CAPSULE ORAL ONCE
Status: COMPLETED | OUTPATIENT
Start: 2019-09-13 | End: 2019-09-13

## 2019-09-13 RX ORDER — ACETAMINOPHEN 325 MG/1
650 TABLET ORAL ONCE
Status: CANCELLED
Start: 2020-01-17

## 2019-09-13 RX ADMIN — Medication 25 MG: at 12:56

## 2019-09-13 RX ADMIN — ACETAMINOPHEN 650 MG: 325 TABLET ORAL at 12:56

## 2019-09-13 RX ADMIN — Medication 250 ML: at 13:13

## 2019-09-13 ASSESSMENT — PAIN SCALES - GENERAL: PAINLEVEL: NO PAIN (0)

## 2019-09-13 NOTE — PROGRESS NOTES
Infusion Nursing Note:  Janice Mayfield presents today for rapid Remicade.    Patient seen by provider today: No   present during visit today: Not Applicable.    Note: N/A.    Intravenous Access:  Peripheral IV placed.    Treatment Conditions:  Biological Infusion Checklist:  ~~~ NOTE: If the patient answers yes to any of the questions below, hold the infusion and contact ordering provider or on-call provider.    1. Have you recently had an elevated temperature, fever, chills, productive cough, coughing for 3 weeks or longer or hemoptysis, abnormal vital signs, night sweats,  chest pain or have you noticed a decrease in your appetite, unexplained weight loss or fatigue? No  2. Do you have any open wounds or new incisions? No  3. Do you have any recent or upcoming hospitalizations, surgeries or dental procedures? No  4. Do you currently have or recently have had any signs of illness or infection or are you on any antibiotics? No  5. Have you had any new, sudden or worsening abdominal pain? No  6. Have you or anyone in your household received a live vaccination in the past 4 weeks? Please note:  No live vaccines while on biologic/chemotherapy until 6 months after the last treatment.  Patient can receive the flu vaccine (shot only) and the pneumovax.  It is optimal for the patient to get these vaccines mid cycle, but they can be given at any time as long as it is not on the day of the infusion. No  7. Have you recently been diagnosed with any new nervous system diseases (ie. Multiple sclerosis, Guillain [place], seizures, neurological changes) or cancer diagnosis? No  8. Are you on any form of radiation or chemotherapy? No  9. Are you pregnant or breast feeding or do you have plans of pregnancy in the future? No  10. Have there been any other new onset medical symptoms? No        Post Infusion Assessment:  Patient tolerated infusion without incident.  Blood return noted pre and post infusion.  Site  patent and intact, free from redness, edema or discomfort.  No evidence of extravasations.  Access discontinued per protocol.       Discharge Plan:   atient will return 10/11/19  for next appointment.   Patient discharged in stable condition accompanied by: self.  Departure Mode: Ambulatory.    Marianna Thompson, RN, RN

## 2019-09-13 NOTE — PROGRESS NOTES
Infusion Nursing Note:  Janice Mayfield presents today for rapid Remicade.    Patient seen by provider today: No   present during visit today: Not Applicable.    Note: N/A.    Intravenous Access:  Peripheral IV placed.    Treatment Conditions:  Biological Infusion Checklist:  ~~~ NOTE: If the patient answers yes to any of the questions below, hold the infusion and contact ordering provider or on-call provider.    1. Have you recently had an elevated temperature, fever, chills, productive cough, coughing for 3 weeks or longer or hemoptysis, abnormal vital signs, night sweats,  chest pain or have you noticed a decrease in your appetite, unexplained weight loss or fatigue? No  2. Do you have any open wounds or new incisions? No  3. Do you have any recent or upcoming hospitalizations, surgeries or dental procedures? No  4. Do you currently have or recently have had any signs of illness or infection or are you on any antibiotics? No  5. Have you had any new, sudden or worsening abdominal pain? No  6. Have you or anyone in your household received a live vaccination in the past 4 weeks? Please note:  No live vaccines while on biologic/chemotherapy until 6 months after the last treatment.  Patient can receive the flu vaccine (shot only) and the pneumovax.  It is optimal for the patient to get these vaccines mid cycle, but they can be given at any time as long as it is not on the day of the infusion. No  7. Have you recently been diagnosed with any new nervous system diseases (ie. Multiple sclerosis, Guillain [place], seizures, neurological changes) or cancer diagnosis? No  8. Are you on any form of radiation or chemotherapy? No  9. Are you pregnant or breast feeding or do you have plans of pregnancy in the future? No  10. Have there been any other new onset medical symptoms? No        Post Infusion Assessment:  Patient tolerated infusion without incident.  Blood return noted pre and post infusion.  Site  patent and intact, free from redness, edema or discomfort.  No evidence of extravasations.  Access discontinued per protocol.       Discharge Plan:   atient will return 10/11/19 for next appointment.   Patient discharged in stable condition accompanied by: self.  Departure Mode: Ambulatory.    Marianna Thompson, RN, RN

## 2019-11-08 ENCOUNTER — INFUSION THERAPY VISIT (OUTPATIENT)
Dept: INFUSION THERAPY | Facility: CLINIC | Age: 59
End: 2019-11-08
Payer: COMMERCIAL

## 2019-11-08 VITALS
OXYGEN SATURATION: 98 % | BODY MASS INDEX: 29.81 KG/M2 | DIASTOLIC BLOOD PRESSURE: 73 MMHG | HEART RATE: 60 BPM | WEIGHT: 176.4 LBS | SYSTOLIC BLOOD PRESSURE: 122 MMHG | TEMPERATURE: 99.1 F | RESPIRATION RATE: 16 BRPM

## 2019-11-08 DIAGNOSIS — Z13.29 SCREENING FOR THYROID DISORDER: ICD-10-CM

## 2019-11-08 DIAGNOSIS — M05.79 RHEUMATOID ARTHRITIS INVOLVING MULTIPLE SITES WITH POSITIVE RHEUMATOID FACTOR (H): ICD-10-CM

## 2019-11-08 DIAGNOSIS — Z13.1 SCREENING FOR DIABETES MELLITUS: ICD-10-CM

## 2019-11-08 DIAGNOSIS — M05.79 RHEUMATOID ARTHRITIS INVOLVING MULTIPLE SITES WITH POSITIVE RHEUMATOID FACTOR (H): Primary | ICD-10-CM

## 2019-11-08 DIAGNOSIS — Z00.00 ENCOUNTER FOR ROUTINE ADULT HEALTH EXAMINATION WITHOUT ABNORMAL FINDINGS: ICD-10-CM

## 2019-11-08 DIAGNOSIS — Z79.60 LONG-TERM USE OF IMMUNOSUPPRESSANT MEDICATION: ICD-10-CM

## 2019-11-08 LAB
ALBUMIN SERPL-MCNC: 3.9 G/DL (ref 3.4–5)
ALP SERPL-CCNC: 92 U/L (ref 40–150)
ALT SERPL W P-5'-P-CCNC: 24 U/L (ref 0–50)
ANION GAP SERPL CALCULATED.3IONS-SCNC: 5 MMOL/L (ref 3–14)
AST SERPL W P-5'-P-CCNC: 18 U/L (ref 0–45)
BILIRUB SERPL-MCNC: 0.3 MG/DL (ref 0.2–1.3)
BUN SERPL-MCNC: 12 MG/DL (ref 7–30)
CALCIUM SERPL-MCNC: 9.3 MG/DL (ref 8.5–10.1)
CHLORIDE SERPL-SCNC: 105 MMOL/L (ref 94–109)
CO2 SERPL-SCNC: 28 MMOL/L (ref 20–32)
CREAT SERPL-MCNC: 0.76 MG/DL (ref 0.52–1.04)
CRP SERPL-MCNC: <2.9 MG/L (ref 0–8)
ERYTHROCYTE [DISTWIDTH] IN BLOOD BY AUTOMATED COUNT: 13.1 % (ref 10–15)
GFR SERPL CREATININE-BSD FRML MDRD: 86 ML/MIN/{1.73_M2}
GLUCOSE SERPL-MCNC: 96 MG/DL (ref 70–99)
HCT VFR BLD AUTO: 40.7 % (ref 35–47)
HGB BLD-MCNC: 13 G/DL (ref 11.7–15.7)
MCH RBC QN AUTO: 29.4 PG (ref 26.5–33)
MCHC RBC AUTO-ENTMCNC: 31.9 G/DL (ref 31.5–36.5)
MCV RBC AUTO: 92 FL (ref 78–100)
PLATELET # BLD AUTO: 308 10E9/L (ref 150–450)
POTASSIUM SERPL-SCNC: 3.7 MMOL/L (ref 3.4–5.3)
PROT SERPL-MCNC: 8.4 G/DL (ref 6.8–8.8)
RBC # BLD AUTO: 4.42 10E12/L (ref 3.8–5.2)
SODIUM SERPL-SCNC: 138 MMOL/L (ref 133–144)
TSH SERPL DL<=0.005 MIU/L-ACNC: 2.62 MU/L (ref 0.4–4)
WBC # BLD AUTO: 6.4 10E9/L (ref 4–11)

## 2019-11-08 PROCEDURE — 86481 TB AG RESPONSE T-CELL SUSP: CPT | Performed by: NURSE PRACTITIONER

## 2019-11-08 PROCEDURE — 86200 CCP ANTIBODY: CPT | Performed by: NURSE PRACTITIONER

## 2019-11-08 PROCEDURE — 96413 CHEMO IV INFUSION 1 HR: CPT | Performed by: NURSE PRACTITIONER

## 2019-11-08 PROCEDURE — 84443 ASSAY THYROID STIM HORMONE: CPT | Performed by: NURSE PRACTITIONER

## 2019-11-08 PROCEDURE — 86140 C-REACTIVE PROTEIN: CPT | Performed by: NURSE PRACTITIONER

## 2019-11-08 PROCEDURE — 86431 RHEUMATOID FACTOR QUANT: CPT | Performed by: NURSE PRACTITIONER

## 2019-11-08 PROCEDURE — 80053 COMPREHEN METABOLIC PANEL: CPT | Performed by: NURSE PRACTITIONER

## 2019-11-08 PROCEDURE — 85027 COMPLETE CBC AUTOMATED: CPT | Performed by: NURSE PRACTITIONER

## 2019-11-08 PROCEDURE — 36415 COLL VENOUS BLD VENIPUNCTURE: CPT | Performed by: NURSE PRACTITIONER

## 2019-11-08 PROCEDURE — 99207 ZZC NO CHARGE LOS: CPT

## 2019-11-08 PROCEDURE — 86038 ANTINUCLEAR ANTIBODIES: CPT | Performed by: NURSE PRACTITIONER

## 2019-11-08 RX ORDER — ACETAMINOPHEN 325 MG/1
650 TABLET ORAL ONCE
Status: CANCELLED
Start: 2020-01-17

## 2019-11-08 RX ORDER — DIPHENHYDRAMINE HCL 25 MG
25 CAPSULE ORAL ONCE
Status: COMPLETED | OUTPATIENT
Start: 2019-11-08 | End: 2019-11-08

## 2019-11-08 RX ORDER — METHYLPREDNISOLONE SODIUM SUCCINATE 125 MG/2ML
125 INJECTION, POWDER, LYOPHILIZED, FOR SOLUTION INTRAMUSCULAR; INTRAVENOUS ONCE
Status: CANCELLED | OUTPATIENT
Start: 2020-01-17

## 2019-11-08 RX ORDER — ACETAMINOPHEN 325 MG/1
650 TABLET ORAL ONCE
Status: COMPLETED | OUTPATIENT
Start: 2019-11-08 | End: 2019-11-08

## 2019-11-08 RX ORDER — DIPHENHYDRAMINE HCL 25 MG
25 CAPSULE ORAL ONCE
Status: CANCELLED
Start: 2020-01-17

## 2019-11-08 RX ADMIN — Medication 250 ML: at 14:39

## 2019-11-08 RX ADMIN — ACETAMINOPHEN 650 MG: 325 TABLET ORAL at 14:40

## 2019-11-08 RX ADMIN — Medication 25 MG: at 14:40

## 2019-11-08 ASSESSMENT — PAIN SCALES - GENERAL: PAINLEVEL: MODERATE PAIN (4)

## 2019-11-08 NOTE — LETTER
Patient:  Janice Mayfield  :   1960  MRN:     [id number]      Ms.Krista coral Mayfield  [address] [address]      Dear Ms.klefsaas Mayfield,      We are writing to inform you of your test results. Your hepatitis B and C labs are non-reactive, and you have not been exposed to hepatitis B or tuberculosis. These are your recent test results including blood counts (WBC, hemoglobin, plateletsand complete blood counts), liver tests (albumin, ALT, AST), kidney tests (creatinine, GFR) and inflammatory markers (CRP inflammation).    Your kidney and liver tests are normal. Your white blood cell, hemoglobin and blood counts are normal. Your inflammation marker CRP is normal. Your rheumatoid arthritis (RA) serologies rheumatoid factor and cyclic citrullinated peptide are negative, indicating this to be a seronegative rheumatoid arthritis (RA).     The ZANDER is elevated, this can be not very specific but Dr. Bryan will review this with you and ask more questions. I will order more labs on this     It was a pleasure seeing you at your recent visit. Please contact our clinic if you have any questions     Pola BLANCHARD, CNP, MSN  [facility]  Department of Rheumatology & Autoimmune Disorders          Results for orders placed or performed in visit on 19   Rheumatoid factor     Status: None   Result Value Ref Range    Rheumatoid Factor <20 <20 IU/mL   Cyclic Citrullinated Peptide Antibody IgG     Status: None   Result Value Ref Range    Cyclic Citrullinated Peptide Antibody, IgG 3 <7 U/mL   Anti Nuclear Colleen IgG by IFA with Reflex     Status: Abnormal   Result Value Ref Range    ZANDER interpretation Positive (A) NEG^Negative    ZANDER pattern 1 HOMOGENEOUS     ZANDER titer 1 >1:1,280    CRP inflammation     Status: None   Result Value Ref Range    CRP Inflammation <2.9 0.0 - 8.0 mg/L   CBC with platelets     Status: None   Result Value Ref Range    WBC 6.4 4.0 - 11.0  10e9/L    RBC Count 4.42 3.8 - 5.2 10e12/L    Hemoglobin 13.0 11.7 - 15.7 g/dL    Hematocrit 40.7 35.0 - 47.0 %    MCV 92 78 - 100 fl    MCH 29.4 26.5 - 33.0 pg    MCHC 31.9 31.5 - 36.5 g/dL    RDW 13.1 10.0 - 15.0 %    Platelet Count 308 150 - 450 10e9/L   **TSH with free T4 reflex FUTURE anytime     Status: None   Result Value Ref Range    TSH 2.62 0.40 - 4.00 mU/L   **Comprehensive metabolic panel FUTURE anytime     Status: None   Result Value Ref Range    Sodium 138 133 - 144 mmol/L    Potassium 3.7 3.4 - 5.3 mmol/L    Chloride 105 94 - 109 mmol/L    Carbon Dioxide 28 20 - 32 mmol/L    Anion Gap 5 3 - 14 mmol/L    Glucose 96 70 - 99 mg/dL    Urea Nitrogen 12 7 - 30 mg/dL    Creatinine 0.76 0.52 - 1.04 mg/dL    GFR Estimate 86 >60 mL/min/[1.73_m2]    GFR Estimate If Black >90 >60 mL/min/[1.73_m2]    Calcium 9.3 8.5 - 10.1 mg/dL    Bilirubin Total 0.3 0.2 - 1.3 mg/dL    Albumin 3.9 3.4 - 5.0 g/dL    Protein Total 8.4 6.8 - 8.8 g/dL    Alkaline Phosphatase 92 40 - 150 U/L    ALT 24 0 - 50 U/L    AST 18 0 - 45 U/L   Quantiferon TB Gold Plus     Status: None   Result Value Ref Range    Quantiferon-TB Gold Plus Result Negative NEG^Negative    TB1 Ag minus Nil Value 0.00 IU/mL    TB2 Ag minus Nil Value 0.00 IU/mL    Mitogen minus Nil Result 3.49 IU/mL    Nil Result 0.04 IU/mL     '

## 2019-11-08 NOTE — PROGRESS NOTES
Infusion Nursing Note:  Janice Mayfield presents today for Rapid Remicade.    Patient seen by provider today: No   present during visit today: Not Applicable.    Note: N/A.    Intravenous Access:  Peripheral IV placed.    Treatment Conditions:  Biological Infusion Checklist:  ~~~ NOTE: If the patient answers yes to any of the questions below, hold the infusion and contact ordering provider or on-call provider.    1. Have you recently had an elevated temperature, fever, chills, productive cough, coughing for 3 weeks or longer or hemoptysis, abnormal vital signs, night sweats,  chest pain or have you noticed a decrease in your appetite, unexplained weight loss or fatigue? No  2. Do you have any open wounds or new incisions? No  3. Do you have any recent or upcoming hospitalizations, surgeries or dental procedures? No  4. Do you currently have or recently have had any signs of illness or infection or are you on any antibiotics? No  5. Have you had any new, sudden or worsening abdominal pain? No  6. Have you or anyone in your household received a live vaccination in the past 4 weeks? Please note:  No live vaccines while on biologic/chemotherapy until 6 months after the last treatment.  Patient can receive the flu vaccine (shot only) and the pneumovax.  It is optimal for the patient to get these vaccines mid cycle, but they can be given at any time as long as it is not on the day of the infusion. No  7. Have you recently been diagnosed with any new nervous system diseases (ie. Multiple sclerosis, Guillain [place], seizures, neurological changes) or cancer diagnosis? No  8. Are you on any form of radiation or chemotherapy? No  9. Are you pregnant or breast feeding or do you have plans of pregnancy in the future? No  10. Have you been having any signs of worsening depression or suicidal ideations?  (benlysta only) No  11. Have there been any other new onset medical symptoms? No    Post Infusion  Assessment:  Patient tolerated infusion without incident.  Site patent and intact, free from redness, edema or discomfort.  No evidence of extravasations.  Access discontinued per protocol.       Discharge Plan:   Patient directed to scheduling.  Patient discharged in stable condition accompanied by: self.  Departure Mode: Ambulatory.    Lucrecia Kaur RN

## 2019-11-10 NOTE — RESULT ENCOUNTER NOTE
Mackenzie Mayfield,    Attached are your test results.  -Liver and gallbladder tests are normal (ALT,AST, Alk phos, bilirubin), kidney function is normal (Cr, GFR), sodium is normal, potassium is normal, calcium is normal, glucose is normal.  -TSH (thyroid stimulating hormone) level is normal which indicates normal thyroid function.   Please contact us if you have any questions.    Sunshine Harvey, CNP

## 2019-11-11 LAB
ANA PAT SER IF-IMP: ABNORMAL
ANA SER QL IF: POSITIVE
ANA TITR SER IF: ABNORMAL {TITER}
CCP AB SER IA-ACNC: 3 U/ML
GAMMA INTERFERON BACKGROUND BLD IA-ACNC: 0.04 IU/ML
M TB IFN-G BLD-IMP: NEGATIVE
M TB IFN-G CD4+ BCKGRND COR BLD-ACNC: 3.49 IU/ML
MITOGEN IGNF BCKGRD COR BLD-ACNC: 0 IU/ML
MITOGEN IGNF BCKGRD COR BLD-ACNC: 0 IU/ML
RHEUMATOID FACT SER NEPH-ACNC: <20 IU/ML (ref 0–20)

## 2019-11-13 DIAGNOSIS — M25.512 CHRONIC LEFT SHOULDER PAIN: ICD-10-CM

## 2019-11-13 DIAGNOSIS — G89.29 CHRONIC LEFT SHOULDER PAIN: ICD-10-CM

## 2019-11-13 DIAGNOSIS — M05.79 RHEUMATOID ARTHRITIS INVOLVING MULTIPLE SITES WITH POSITIVE RHEUMATOID FACTOR (H): Chronic | ICD-10-CM

## 2019-11-13 DIAGNOSIS — Z79.52 LONG TERM CURRENT USE OF SYSTEMIC STEROIDS: ICD-10-CM

## 2019-11-13 DIAGNOSIS — Z79.60 LONG-TERM USE OF IMMUNOSUPPRESSANT MEDICATION: ICD-10-CM

## 2019-11-13 DIAGNOSIS — M15.3 OTHER SECONDARY OSTEOARTHRITIS OF MULTIPLE SITES: ICD-10-CM

## 2019-11-13 DIAGNOSIS — E55.9 VITAMIN D DEFICIENCY: ICD-10-CM

## 2019-11-13 RX ORDER — PREDNISONE 1 MG/1
4 TABLET ORAL DAILY
Qty: 360 TABLET | Refills: 1 | Status: SHIPPED | OUTPATIENT
Start: 2019-11-13 | End: 2020-03-27

## 2019-11-13 NOTE — TELEPHONE ENCOUNTER
Medication/Dose: predniSONE (DELTASONE) 1 MG tablet  Sig - Route: TAKE 4 TABLETS (4 MG) BY MOUTH DAILY TRY TAPER BY 1/2 MG DAY EVERY 2 MONTH UNTIL OFF IF ABLE - Oral  Last Written Prescription Date: 5/20/19  Last Fill Quantity: 360, # refills: 1  Last Office Visit with Rheumatology Provider:  7/26/19  Next 5 appointments (look out 90 days)    Nov 14, 2019  7:30 AM CST  PHYSICAL with LO Perry CNP  [facility] ([facility] [address] [address] [place] [id number] [phone]             WBC   Date Value Ref Range Status   11/08/2019 6.4 4.0 - 11.0 10e9/L Final     RBC Count   Date Value Ref Range Status   11/08/2019 4.42 3.8 - 5.2 10e12/L Final     Hemoglobin   Date Value Ref Range Status   11/08/2019 13.0 11.7 - 15.7 g/dL Final     Hematocrit   Date Value Ref Range Status   11/08/2019 40.7 35.0 - 47.0 % Final     MCV   Date Value Ref Range Status   11/08/2019 92 78 - 100 fl Final     MCH   Date Value Ref Range Status   11/08/2019 29.4 26.5 - 33.0 pg Final     MCHC   Date Value Ref Range Status   11/08/2019 31.9 31.5 - 36.5 g/dL Final     RDW   Date Value Ref Range Status   11/08/2019 13.1 10.0 - 15.0 % Final     Platelet Count   Date Value Ref Range Status   11/08/2019 308 150 - 450 10e9/L Final     AST   Date Value Ref Range Status   11/08/2019 18 0 - 45 U/L Final     ALT   Date Value Ref Range Status   11/08/2019 24 0 - 50 U/L Final     Creatinine   Date Value Ref Range Status   11/08/2019 0.76 0.52 - 1.04 mg/dL Final     Albumin   Date Value Ref Range Status   11/08/2019 3.9 3.4 - 5.0 g/dL Final     Color Urine (no units)   Date Value   04/28/2017 Yellow     Appearance Urine (no units)   Date Value   04/28/2017 Clear     Glucose Urine (mg/dL)   Date Value   04/28/2017 Negative     Bilirubin Urine (no units)   Date Value   04/28/2017 Negative     Ketones Urine (mg/dL)   Date Value   04/28/2017 Negative     Specific Gravity Urine (no units)   Date Value    04/28/2017 1.016     pH Urine (pH)   Date Value   04/28/2017 6.0     Protein Albumin Urine (mg/dL)   Date Value   04/28/2017 Negative     Nitrite Urine (no units)   Date Value   04/28/2017 Negative     Leukocyte Esterase Urine (no units)   Date Value   04/28/2017 Moderate (A)             Prescription approved per Rheumatology Refill Protocol for 90 day supply and 1 refills.    VIKAS GonzalezN, RN   Rheumatology Care Coordinator   [facility]

## 2019-11-14 ENCOUNTER — OFFICE VISIT (OUTPATIENT)
Dept: PEDIATRICS | Facility: CLINIC | Age: 59
End: 2019-11-14
Payer: COMMERCIAL

## 2019-11-14 VITALS
DIASTOLIC BLOOD PRESSURE: 70 MMHG | TEMPERATURE: 97.9 F | SYSTOLIC BLOOD PRESSURE: 100 MMHG | BODY MASS INDEX: 28.54 KG/M2 | HEART RATE: 72 BPM | WEIGHT: 171.3 LBS | HEIGHT: 65 IN | OXYGEN SATURATION: 99 %

## 2019-11-14 DIAGNOSIS — Z12.4 SCREENING FOR MALIGNANT NEOPLASM OF CERVIX: ICD-10-CM

## 2019-11-14 DIAGNOSIS — Z00.00 ROUTINE GENERAL MEDICAL EXAMINATION AT A HEALTH CARE FACILITY: Primary | ICD-10-CM

## 2019-11-14 DIAGNOSIS — E55.9 VITAMIN D DEFICIENCY: ICD-10-CM

## 2019-11-14 DIAGNOSIS — Z12.31 ENCOUNTER FOR SCREENING MAMMOGRAM FOR BREAST CANCER: ICD-10-CM

## 2019-11-14 DIAGNOSIS — M05.79 RHEUMATOID ARTHRITIS INVOLVING MULTIPLE SITES WITH POSITIVE RHEUMATOID FACTOR (H): ICD-10-CM

## 2019-11-14 PROCEDURE — 87624 HPV HI-RISK TYP POOLED RSLT: CPT | Performed by: NURSE PRACTITIONER

## 2019-11-14 PROCEDURE — 99396 PREV VISIT EST AGE 40-64: CPT | Performed by: NURSE PRACTITIONER

## 2019-11-14 PROCEDURE — G0145 SCR C/V CYTO,THINLAYER,RESCR: HCPCS | Performed by: NURSE PRACTITIONER

## 2019-11-14 ASSESSMENT — ANXIETY QUESTIONNAIRES
2. NOT BEING ABLE TO STOP OR CONTROL WORRYING: NOT AT ALL
6. BECOMING EASILY ANNOYED OR IRRITABLE: NOT AT ALL
GAD7 TOTAL SCORE: 1
7. FEELING AFRAID AS IF SOMETHING AWFUL MIGHT HAPPEN: NOT AT ALL
1. FEELING NERVOUS, ANXIOUS, OR ON EDGE: NOT AT ALL
3. WORRYING TOO MUCH ABOUT DIFFERENT THINGS: NOT AT ALL
5. BEING SO RESTLESS THAT IT IS HARD TO SIT STILL: NOT AT ALL
IF YOU CHECKED OFF ANY PROBLEMS ON THIS QUESTIONNAIRE, HOW DIFFICULT HAVE THESE PROBLEMS MADE IT FOR YOU TO DO YOUR WORK, TAKE CARE OF THINGS AT HOME, OR GET ALONG WITH OTHER PEOPLE: SOMEWHAT DIFFICULT

## 2019-11-14 ASSESSMENT — PATIENT HEALTH QUESTIONNAIRE - PHQ9
5. POOR APPETITE OR OVEREATING: SEVERAL DAYS
SUM OF ALL RESPONSES TO PHQ QUESTIONS 1-9: 1

## 2019-11-14 ASSESSMENT — MIFFLIN-ST. JEOR: SCORE: 1362.89

## 2019-11-14 NOTE — PATIENT INSTRUCTIONS
PLAN:   1.   Symptomatic therapy suggested:   Increase calcium to 1000mg and 1000iu Vit D  2.  Orders Placed This Encounter   Procedures     *MA Screening Digital Bilateral     Pap imaged thin layer screen with HPV - recommended age 30 - 65 years (select HPV order below)     HPV High Risk Types DNA Cervical       3. Patient needs to follow up in if no improvement,or sooner if worsening of symptoms or other symptoms develop.  Follow up office visit in one year for annual health maintenance exam, sooner PRN.    Preventive Health Recommendations  Female Ages 50 - 64    Yearly exam: See your health care provider every year in order to  o Review health changes.   o Discuss preventive care.    o Review your medicines if your doctor has prescribed any.      Get a Pap test every three years (unless you have an abnormal result and your provider advises testing more often).    If you get Pap tests with HPV test, you only need to test every 5 years, unless you have an abnormal result.     You do not need a Pap test if your uterus was removed (hysterectomy) and you have not had cancer.    You should be tested each year for STDs (sexually transmitted diseases) if you're at risk.     Have a mammogram every 1 to 2 years.    Have a colonoscopy at age 50, or have a yearly FIT test (stool test). These exams screen for colon cancer.      Have a cholesterol test every 5 years, or more often if advised.    Have a diabetes test (fasting glucose) every three years. If you are at risk for diabetes, you should have this test more often.     If you are at risk for osteoporosis (brittle bone disease), think about having a bone density scan (DEXA).    Shots: Get a flu shot each year. Get a tetanus shot every 10 years.    Nutrition:     Eat at least 5 servings of fruits and vegetables each day.    Eat whole-grain bread, whole-wheat pasta and brown rice instead of white grains and rice.    Get adequate Calcium and Vitamin D.      Lifestyle    Exercise at least 150 minutes a week (30 minutes a day, 5 days a week). This will help you control your weight and prevent disease.    Limit alcohol to one drink per day.    No smoking.     Wear sunscreen to prevent skin cancer.     See your dentist every six months for an exam and cleaning.    See your eye doctor every 1 to 2 years.

## 2019-11-14 NOTE — NURSING NOTE
"Chief Complaint   Patient presents with     Physical     AFE-not fasting today       Initial /70 (BP Location: Right arm, Patient Position: Sitting, Cuff Size: Adult Regular)   Pulse 72   Temp 97.9  F (36.6  C) (Temporal)   Ht 1.651 m (5' 5\")   Wt 77.7 kg (171 lb 4.8 oz)   SpO2 99%   Breastfeeding No   BMI 28.51 kg/m   Estimated body mass index is 28.51 kg/m  as calculated from the following:    Height as of this encounter: 1.651 m (5' 5\").    Weight as of this encounter: 77.7 kg (171 lb 4.8 oz).  Medication Reconciliation: complete      YENNY Mckeon      "

## 2019-11-14 NOTE — PROGRESS NOTES
SUBJECTIVE:   CC: Janice Mayfield is an 59 year old woman who presents for preventive health visit.     Healthy Habits:    Do you get at least three servings of calcium containing foods daily (dairy, green leafy vegetables, etc.)? yes    Amount of exercise or daily activities, outside of work: 5-6 day(s) per week    Problems taking medications regularly No    Medication side effects: No    Have you had an eye exam in the past two years? yes    Do you see a dentist twice per year? Yes-once a year    Do you have sleep apnea, excessive snoring or daytime drowsiness?no        Today's PHQ-2 Score:   PHQ-2 ( 1999 [employer]) 11/14/2019 11/12/2018   Q1: Little interest or pleasure in doing things 0 0   Q2: Feeling down, depressed or hopeless 0 0   PHQ-2 Score 0 0       Abuse: Current or Past(Physical, Sexual or Emotional)- No  Do you feel safe in your environment? Yes    Have you ever done Advance Care Planning? (For example, a Health Directive, POLST, or a discussion with a medical provider or your loved ones about your wishes): No, advance care planning information given to patient to review.  Patient plans to discuss their wishes with loved ones or provider.      Social History     Tobacco Use     Smoking status: Never Smoker     Smokeless tobacco: Never Used   Substance Use Topics     Alcohol use: No     If you drink alcohol do you typically have >3 drinks per day or >7 drinks per week? No                     Reviewed orders with patient.  Reviewed health maintenance and updated orders accordingly - Yes  Lab work is in process  Labs reviewed in EPIC  BP Readings from Last 3 Encounters:   11/14/19 100/70   11/08/19 122/73   09/13/19 114/77    Wt Readings from Last 3 Encounters:   11/14/19 77.7 kg (171 lb 4.8 oz)   11/08/19 80 kg (176 lb 6.4 oz)   09/13/19 79.6 kg (175 lb 6.4 oz)                  Patient Active Problem List   Diagnosis     Rheumatoid arthritis involving multiple sites with positive rheumatoid  factor (H)     Vitamin D deficiency     Long term current use of systemic steroids     Other secondary osteoarthritis of multiple sites     Long-term use of immunosuppressant medication     Varicose veins of both lower extremities, unspecified whether complicated     Lichen sclerosus et atrophicus of the vulva     Past Surgical History:   Procedure Laterality Date     C/SECTION, LOW TRANSVERSE      , Low Transverse     HC TOOTH EXTRACTION W/FORCEP         Social History     Tobacco Use     Smoking status: Never Smoker     Smokeless tobacco: Never Used   Substance Use Topics     Alcohol use: No     Family History   Problem Relation Age of Onset     Cancer Mother 72     Skin Cancer Father          Current Outpatient Medications   Medication Sig Dispense Refill     Acetaminophen (TYLENOL PO) Take 650 mg by mouth once       DiphenhydrAMINE HCl (BENADRYL PO) Take 25 mg by mouth as needed (Pre-medication for remicade)       folic acid (FOLVITE) 1 MG tablet TAKE 2 TABLETS (2 MG) BY MOUTH DAILY 180 tablet 3     IBUPROFEN PO Take 200 mg by mouth as needed for moderate pain       InFLIXimab (REMICADE IV) Inject 100 mg into the vein every 28 days        methotrexate 2.5 MG tablet Take 5 tablets (12.5 mg) by mouth every 7 days Labs every 8-12 week 20 tablet 2     predniSONE (DELTASONE) 1 MG tablet TAKE 4 TABLETS (4 MG) BY MOUTH DAILY TRY TAPER BY 1/2 MG DAY EVERY 2 MONTH UNTIL OFF IF ABLE 360 tablet 1     Vitamin D, Cholecalciferol, 1000 UNITS CAPS Take 2,000 Units by mouth daily 1 capsule 0     Allergies   Allergen Reactions     Adalimumab Hives     Humira      hives     Methotrexate      Anxiety. No allergic signs.      Plaquenil [Hydroxychloroquine]      Elevated LFTs     Sulfa Drugs      delusions       Mammogram Screening: Patient over age 50, mutual decision to screen reflected in health maintenance.    Pertinent mammograms are reviewed under the imaging tab.  History of abnormal Pap smear:   Last 3 Pap and HPV  Results:   PAP / HPV Latest Ref Rng & Units 2019   PAP - NIL NIL   HPV 16 DNA NEG:Negative Negative Negative   HPV 18 DNA NEG:Negative Negative Negative   OTHER HR HPV NEG:Negative Negative Negative       Reviewed and updated as needed this visit by clinical staff  Tobacco  Allergies  Meds  Med Hx  Surg Hx  Fam Hx  Soc Hx        Reviewed and updated as needed this visit by Provider        Past Medical History:   Diagnosis Date     Lichen sclerosus et atrophicus of the vulva 2018     Long term current use of systemic steroids 5/3/2017     Long-term use of immunosuppressant medication 2018     Other secondary osteoarthritis of multiple sites 2018     Rheumatoid arthritis involving multiple sites with positive rheumatoid factor (H) 2017     Vitamin D deficiency 5/3/2017      Past Surgical History:   Procedure Laterality Date     C/SECTION, LOW TRANSVERSE      , Low Transverse     HC TOOTH EXTRACTION W/FORCEP         ROS:  CONSTITUTIONAL:NEGATIVE for fever, chills, change in weight  INTEGUMENTARY/SKIN: NEGATIVE for worrisome rashes, moles or lesions   EYES: NEGATIVE for vision changes or irritation  ENT: NEGATIVE for ear, mouth and throat problems  RESP:NEGATIVE for significant cough or SOB  BREAST: NEGATIVE for masses, tenderness or discharge  CV: NEGATIVE for chest pain, palpitations or peripheral edema  GI: NEGATIVE for nausea, abdominal pain, heartburn, or change in bowel habits   menopausal female: amenorrhea, no unusual urinary symptoms and no unusual vaginal symptoms. Has some lichen sclerosis   MUSCULOSKELETAL:POSITIVE  for arthralgias with hx of RA  and NEGATIVE for joint swelling  and joint warmth   NEURO: NEGATIVE for weakness, dizziness or paresthesias  ENDOCRINE: NEGATIVE for temperature intolerance, skin/hair changes  HEME/ALLERGY/IMMUNE: NEGATIVE for bleeding problems  PSYCHIATRIC: NEGATIVE for changes in mood or affect     OBJECTIVE:   /70 (BP  "Location: Right arm, Patient Position: Sitting, Cuff Size: Adult Regular)   Pulse 72   Temp 97.9  F (36.6  C) (Temporal)   Ht 1.651 m (5' 5\")   Wt 77.7 kg (171 lb 4.8 oz)   SpO2 99%   Breastfeeding No   BMI 28.51 kg/m     Wt Readings from Last 4 Encounters:   11/14/19 77.7 kg (171 lb 4.8 oz)   11/08/19 80 kg (176 lb 6.4 oz)   09/13/19 79.6 kg (175 lb 6.4 oz)   08/16/19 77.7 kg (171 lb 4.8 oz)       EXAM:  GENERAL APPEARANCE: healthy, alert and no distress  EYES: Eyes grossly normal to inspection and conjunctivae and sclerae normal  HENT: ear canals and TM's normal, nose and mouth without ulcers or lesions, oropharynx clear and oral mucous membranes moist  NECK: no adenopathy, no asymmetry, masses, or scars and thyroid normal to palpation  RESP: lungs clear to auscultation - no rales, rhonchi or wheezes  BREAST: normal without masses, tenderness or nipple discharge and no palpable axillary masses or adenopathy  CV: regular rates and rhythm, no murmur, click or rub, no peripheral edema and peripheral pulses strong  ABDOMEN: soft, nontender, no hepatosplenomegaly, no masses and bowel sounds normal   (female): negative findings:  cervix- no lesions, no cervical motion tenderness, adnexae- no masses, non-tender, Bartholin's glands, urethra, [place]'s glands negative, positive findings:  vaginal mucosa atrophy, pale perineal tissue appearance of lichen sclerosis   MS: no musculoskeletal defects are noted and gait is age appropriate without ataxia  SKIN: no suspicious lesions or rashes  NEURO: Normal strength and tone, sensory exam grossly normal, mentation intact and speech normal  PSYCH: mentation appears normal and affect normal/bright    Diagnostic Test Results:  Results for orders placed or performed in visit on 11/14/19   Pap imaged thin layer screen with HPV - recommended age 30 - 65 years (select HPV order below)     Status: None   Result Value Ref Range    PAP NIL     Copath Report         Patient Name: " CHARLI PORRAS  MR#: [id number]  Specimen #: [id number]  Collected: 11/14/2019  Received: 11/15/2019  Reported: 11/21/2019 09:15  Ordering Phy(s): ERASMO GODDARD    For improved result formatting, select 'View Enhanced Report Format' under   Linked Documents section.    SPECIMEN/STAIN PROCESS:  Pap imaged thin layer prep screening (Surepath, FocalPoint with guided   screening)       Pap-Cyto x 1, HPV ordered x 1    SOURCE: Cervical, endocervical  ----------------------------------------------------------------   Pap imaged thin layer prep screening (Surepath, FocalPoint with guided   screening)  SPECIMEN ADEQUACY:  Satisfactory for evaluation.  -Transformation zone component present.    CYTOLOGIC INTERPRETATION:    Negative for intraepithelial lesion or malignancy    Electronically signed out by:  LUCIAN Smith (ASCP)    CLINICAL HISTORY:    Post Menopausal, A previous normal pap  Date of Last Pap: 11/12/2018,    Papanicolaou Test Limitations:  Cer vical cytology is a screening test with   limited sensitivity; regular  screening is critical for cancer prevention; Pap tests are primarily   effective for the diagnosis/prevention of  squamous cell carcinoma, not adenocarcinomas or other cancers.    The technical component of this testing was completed at the [facility], with the professional component performed   at the [facility], [address] [place] [id number] ([phone])    COLLECTION SITE:  Client:  [facility]  [place] [facility] [place]       HPV High Risk Types DNA Cervical     Status: None   Result Value Ref Range    HPV Source SurePath     HPV 16 DNA Negative NEG^Negative    HPV 18 DNA Negative NEG^Negative    Other HR HPV Negative NEG^Negative    Final Diagnosis This patient's sample is negative for HPV DNA.     Specimen  "Description Cervical Cells        ASSESSMENT/PLAN:   Janice was seen today for physical.    Diagnoses and all orders for this visit:    Routine general medical examination at a health care facility    Vitamin D deficiency    Rheumatoid arthritis involving multiple sites with positive rheumatoid factor (H)  FOLLOW UP WITH SPECIALIST :Rheumatology    Screening for malignant neoplasm of cervix  -     Pap imaged thin layer screen with HPV - recommended age 30 - 65 years (select HPV order below)  -     HPV High Risk Types DNA Cervical    Encounter for screening mammogram for breast cancer  -     *MA Screening Digital Bilateral; Future    PLAN:   . Patient needs to follow up in if no improvement,or sooner if worsening of symptoms or other symptoms develop.  Follow up office visit in one year for annual health maintenance exam, sooner PRN.    COUNSELING:   Reviewed preventive health counseling, as reflected in patient instructions  Special attention given to:        Regular exercise       Healthy diet/nutrition       Vision screening       Osteoporosis Prevention/Bone Health       Colon cancer screening       Consider Hep C screening for patients born between 1945 and 1965       HIV screeninx in teen years, 1x in adult years, and at intervals if high risk       The ASCVD Risk score (Madisonlori MUELLER Jr., et al., 2013) failed to calculate for the following reasons:    Cannot find a previous HDL lab    Cannot find a previous total cholesterol lab    Estimated body mass index is 28.51 kg/m  as calculated from the following:    Height as of this encounter: 1.651 m (5' 5\").    Weight as of this encounter: 77.7 kg (171 lb 4.8 oz).    Weight management plan: Discussed healthy diet and exercise guidelines     reports that she has never smoked. She has never used smokeless tobacco.      Counseling Resources:  ATP IV Guidelines  Pooled Cohorts Equation Calculator  Breast Cancer Risk Calculator  FRAX Risk Assessment  ICSI Preventive " Guidelines  Dietary Guidelines for Americans, 2010  USDA's MyPlate  ASA Prophylaxis  Lung CA Screening    LO Perry CNP  [facility]

## 2019-11-15 DIAGNOSIS — M06.09 SERONEGATIVE RHEUMATOID ARTHRITIS OF MULTIPLE SITES (H): Primary | ICD-10-CM

## 2019-11-15 DIAGNOSIS — R76.8 POSITIVE ANA (ANTINUCLEAR ANTIBODY): ICD-10-CM

## 2019-11-15 ASSESSMENT — ANXIETY QUESTIONNAIRES: GAD7 TOTAL SCORE: 1

## 2019-11-21 ENCOUNTER — OFFICE VISIT (OUTPATIENT)
Dept: RHEUMATOLOGY | Facility: CLINIC | Age: 59
End: 2019-11-21
Payer: COMMERCIAL

## 2019-11-21 VITALS
DIASTOLIC BLOOD PRESSURE: 72 MMHG | BODY MASS INDEX: 28.49 KG/M2 | HEIGHT: 65 IN | SYSTOLIC BLOOD PRESSURE: 115 MMHG | WEIGHT: 171 LBS | HEART RATE: 77 BPM

## 2019-11-21 DIAGNOSIS — E55.9 VITAMIN D DEFICIENCY: ICD-10-CM

## 2019-11-21 DIAGNOSIS — Z79.52 LONG TERM CURRENT USE OF SYSTEMIC STEROIDS: ICD-10-CM

## 2019-11-21 DIAGNOSIS — M25.512 CHRONIC LEFT SHOULDER PAIN: ICD-10-CM

## 2019-11-21 DIAGNOSIS — M05.79 RHEUMATOID ARTHRITIS INVOLVING MULTIPLE SITES WITH POSITIVE RHEUMATOID FACTOR (H): ICD-10-CM

## 2019-11-21 DIAGNOSIS — G89.29 CHRONIC LEFT SHOULDER PAIN: ICD-10-CM

## 2019-11-21 DIAGNOSIS — M15.3 OTHER SECONDARY OSTEOARTHRITIS OF MULTIPLE SITES: ICD-10-CM

## 2019-11-21 DIAGNOSIS — Z79.60 LONG-TERM USE OF IMMUNOSUPPRESSANT MEDICATION: ICD-10-CM

## 2019-11-21 LAB
COPATH REPORT: NORMAL
PAP: NORMAL

## 2019-11-21 PROCEDURE — 99214 OFFICE O/P EST MOD 30 MIN: CPT | Performed by: STUDENT IN AN ORGANIZED HEALTH CARE EDUCATION/TRAINING PROGRAM

## 2019-11-21 RX ORDER — FOLIC ACID 1 MG/1
2 TABLET ORAL DAILY
Qty: 180 TABLET | Refills: 3 | Status: SHIPPED | OUTPATIENT
Start: 2019-11-21 | End: 2020-12-21

## 2019-11-21 ASSESSMENT — ROUTINE ASSESSMENT OF PATIENT INDEX DATA (RAPID3)
RAPID3 INTERPRETATION: MODERATE 6.1-12.0
TOTAL RAPID3 SCORE: 7.7

## 2019-11-21 ASSESSMENT — MIFFLIN-ST. JEOR: SCORE: 1361.53

## 2019-11-21 ASSESSMENT — PAIN SCALES - GENERAL: PAINLEVEL: MILD PAIN (3)

## 2019-11-21 NOTE — NURSING NOTE
"Janice Mayfield's goals for this visit include: Return   She requests these members of her care team be copied on today's visit information:     PCP: Sunshine Harvey    Referring Provider:  LO Parra [address] [address]    /72   Pulse 77   Ht 1.651 m (5' 5\")   Wt 77.6 kg (171 lb)   BMI 28.46 kg/m       "

## 2019-11-21 NOTE — PROGRESS NOTES
"[facility] - Rheumatology Clinic Visit    Janice Mayfield  is a 59 year old follow-up erosive RA +RF -CCP -ZANDER/C3/C4, -ANCA -HLA B27. -lymes. XR  left shoulder humeral head erosions, DJD; 4-2017 severe degenerative changes RC joints bilateral and marginal erosions. Prior care at [place] w/ Andry Jones.     Review: hydroxychloroquine, methotrexate (d/c liver enzymes elevation), enbrel (ineffective), humira *d/c (uticarial reaction), sulfites (anxiety) --so no sulfasalazine, remicade since 1-2011; diverticulum in colon would avoid tofacitinib (xeljantz) due to risk perforated bowel      HISTORY CARRIED FORWARD 4-: Went to [place] in a wheelchair [\"knees were melons\", pads of feet, not use hands, wrists]. On remicade 5 mg/kg Q 4-5 week infusions with pre-meds once a month (Jan 2011) last infusion about 6 weeks ago, FA 1 mg/day, MTX 10 mg Q Sunday week and prednisone 4-5 mg day. Increased remicade was every 4-5 weeks as was wearing down rather then increase the dose. Tolerating and s/e. Mild foggy day after taking methotrexate (no SI, hairloss, diarrhea), FA 1 mg day Eats healthy. Reports arthritis is controlled, functioning well and not in wheelchair. Weather affects her arthritis. Root canal 2 weeks ago due to tooth abscess \"thinning of your jaw\". No flaring with her RA and this treatment. shouilder and wrist, knee so mild compared to when dx but is noticing she is due. Hx right wrist injection 2 yr ago. Pain is 4 out 10.     Copy forward  May 11, 2018  On remicade 5 mg/kg Q 4-5 week infusions with pre-meds once a month (Jan 2011) last infusion 5-2018 tolerated well , FA 1 mg/day, MTX 10 mg Q Sunday week and prednisone 4 mg day, folic acid 2 mg day. Tolerating well. No infections. No RA flares or EAS. No joint pain or swelling. Did not do PT for her left shoulder, this is still limited with the ROM but she is wishing to do this now since she has more time. No swelling. Mild " "plan left shoulder and right wrist where the prior damage was. Mild \"delay\" in thinking day of MTX but tolerating and the higher dose of folic acid is working well. No infectious over the years.     Copy forward  November 9, 2018  On remicade 5 mg/kg Q 4-5 week infusions with pre-meds once a month (Jan 2011) last infusion 10- tolerated well , FA 2 mg/day, methotrexate  12.5 mg Q Sunday week and prednisone 4 mg day tolerating. No RHEUMATOID ARTHRITIS (RA) flares, no EAS, and left shoulder is much improved even got her ROM back. Still some Physical Therapy sessions to do, she stopped when was having more stress. Some mild pain in there. 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 No pain or swelling in knees or wrists as she had before when initially dx. No infections. No NSAIDs use. Due to annual physical, and agrees to schedule this. Denies any fever, chills, SOB, BOYD, night sweats, or chest pain, or cough. Reports healthy. Denies having skin evaluation over the years, but use to tan in the sun when she was younger.     July 26, 2019  Rash itchy red on base of scalp and left outer upper arm started about a month ago. No itching. Does have dermatologist whom she seen in Nov and is due back.     Rheumatoid arthritis (RA) is controlled with current plan but we adjusted the methotrexate without success on getting her off the prednisone. No joint pain or swelling. No EAS. No SLE symptoms. Notices joints with weather changes. Left shoulder is improved, not worse. Due for DEXA as ordered by her PCP. Knees are good      In grieving classes since her mom passed last summer, now caring for her dad who is at [facility] for AVR replacement. Daughter lives upstairs of her home.     On remicade 5 mg/kg Q 4-5 week infusions with pre-meds once a month (Jan 2011) last infusion 10- tolerated well , FA 2 mg/day, methotrexate  12.5 mg Q Sunday week (mild brain fog the next day, mild and " tolerable) and prednisone 4 mg day tolerating. She weaned down the prednisone for several months then readjusted due to reports of diffuse joints pains (denies fibromyalagia or swelling of joints)    November 21, 2019 - She is on Remicade and her last infusion was in 11/2019. She always have trouble with Methotrexate ( 4 tabs once a week ). She is on 4 mg prednisone and tried to go down on the dose but noticed worsening of her symptoms.     PMH:  Injuries-None  No Blood transfusions  Medical-osteopenia (DEXA  in 2008 -0.6, postemenopausal, anemia, elevated liver enzymes (fatty liver, MR elastroplaty NL 2012 seen Dr. Anderson; initially hep C + but HCV RNA neg), polyclonal hypergammaglobuinemia, diverticulum, lichen sclerosus vulva, osteoarthritis  multiple sites, rheumatoid arthritis (RA) , vitamin d defiency, varicose veins     Surgical-None     FH:  No autoimmune disorders, psoriasis, UC, crohn's, SLE, RA, PsA, gout, autoimmune thyroid.  No MS in family  Mother-Uterine CA-passed  Father-alive PPM and arrythmia  MGM-parkinsons, OP, possible arthritis-passed  RFC-LHW-pidavj  PGM-brain CA, OP-passed  PGF-colon CA-passed  2 brothers-start to have heart issue  2 sisters-healthy HTN  2 children  Common low BP and low HR, varicose vein, arrhythmia    Social-  No Alcohol. Never Smoking. 3 Children (youngest deformed pulmonary valve s/p OHS). NO IVDU or drug use. 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     PMSH personally reviewed and updated by me.    ROS:  Negative raynaud s phenomena, hairloss, sun sensitivities, keratoconjunctivitis sicca, pleurisy, inflammatory joint symptoms, significant rashes like malar, oral/nasal or ulcerations, inflammatory eye disease, inflammatory bowel disease, dactylitis, tenosynovitis, or enthespathy. Negative for miscarriages over 2 months into pregnancy, blood clots, gout, psoriasis, UC, crohn's. No temporal headache, no jaw claudication, no scalp tenderness, vision  "changes, carotidynia, cough. No STD or pregnancy symptoms. No Parotid swelling.   +see above   -SLE signs or symptoms  CONSTITUTIONAL: No fevers, night sweats or unintentional weight change. No acute distress, swollen glands  EYES: No vision change, diplopia, pain in eyes or red eyes   EARS, NOSE, MOUTH, THROAT: No tinnitus or hearing change, no epistaxis or nasal discharge, no oral lesions, throat clear. Normal saliva pool.  No drymouth. No thyroid Enlargement.   CARDIOVASCULAR: No chest pain, palpitations, or pain with walking, no orthopnea or PND   RESPIRATORY: No dyspnea, cough, shortness of breath or wheezing. No pleurisy.   GI: No nausea, vomiting, diarrhea or constipation, no abdominal pain, or blood in stools.   : No change in urine, no dysuria or hematuria   MUSCKL: No swollen, tender, red or painful joints. No nodules. No enthesitis, plantar fascitis or heel pain.   INTEGUMENTARY: +see above   NEURO: No loss of strength or sensation, no numbness or tingling, no tremor, no dizziness, no headache. No falls   ENDO: No polyuria or polydipsia, no temperature intolerance   HEME/LYMPH:No concerning bumps, bleeding problems, or swollen lymph nodes. No recent infections, hospitalizations or new illnesses.   ALLERGY: No environmental allergies   PSYCH: Met with Dr. Boyer today   Otherwise 14 point ROS obtained, reviewed and found negative.     Physical exam:  Vitals: Blood pressure 115/72, pulse 77, height 1.651 m (5' 5\"), weight 77.6 kg (171 lb), not currently breastfeeding.  Wt Readings from Last 4 Encounters:   11/21/19 77.6 kg (171 lb)   11/14/19 77.7 kg (171 lb 4.8 oz)   11/08/19 80 kg (176 lb 6.4 oz)   09/13/19 79.6 kg (175 lb 6.4 oz)     Constitutional: WD-WN-WG cooperative  Eyes: nl EOM, PERRLA, vision, conjunctiva, sclera, No Unilateral or bilateral external ear inflammation   ENT: nl external ears, nose, hearing, lips, teeth, gums, throat. No saddle nose   Neck: no mass or thyroid enlargement  Resp: " lungs clear to auscultation, nl to palpation, nl effort  CV: RRR, no murmurs, rubs or gallops, no edema  GI: no ABD mass or tenderness, no HSM  : not tested  Lymph: no cervical or epitrochlear nodes  MS: Right wrist drop wirh reduced flexion and extension, right halgus valgus bunions All TMJ, neck, shoulder, elbow, wrist, hand, spine, hip, knee, ankle, and foot joints were examined and otherwise found normal. Normal  strength. No active synovitis or deformity. Full ROM. Normal prayer sign. Negative Lhermitte's sign. No periuncle erythema  Skin: no nail pitting, alopecia. +see photos   Neuro: nl cranial nerves, strength, sensation, DTRs.   Psych: nl judgement, orientation, memory, affect.      Labs/Imaging:  Anti-smooth, antimitochrondial, myelo AB amd PR3 NL in 2011  Lymes neg 2011    DEXA 5-2016  Osteopenia z-0.4 t-1  Xrays feet 2-2014 negative  2-2014 B/L hand joint space narrowing and erosive change on radiocarpal  2011 neg SI x-ays     Endoscopy/colonoscopy 5-2016 single diverticulum dx diverticulosis in colon   No results found for any visits on 11/21/19.    Impression/Plan:    1. Erosive (deforming as lost ROM left shoulder, right wrist) Rheumatoid Arthritis w/+RF (-CCP/ZANDER, x-rays severe degenerative changes RC b/l, Rt wrist total loss space between triquetrum and marginal erosions diffuse; left shoulder erosion/mild GH OA). Rheumatoid arthritis (RA) remission, but not able to wean off prednisone on infliximab 5 mg/kg every 4-6 week, methotrexate 10 mg week, prednisone 4 mg day.    2. Immunosuppression, long-term risk medication. Will check labs every 8-12 weeks while on immunosuppresive therapy and hx elevated liver enzymes. Normal Labs on 11/8/19.     3. Chronic steroids. We had a long discussion of the risks of this adrenal insuffiency, AVN, ASCVD, bone health, increased infection risks, emotional link to prednisone and some feel mentally struggle to wean, if not flaring to continue the wean (no  swelling or loss ROM) and discussed to try alternative measures for chronic pain (holistic, met with Dr. Boyer for mental health).  4. Rash, not indicative of SLE. Photos (she consented) and see dermatology for evaluation and tx. If this is psoriasis or other autoimmune, then will ask them to contact rheumatology (risk of DI lupus and psoriasis with anti-TNF) then would need to switch out of class consider abatacept (orencia)   5. Chronic left shoulder, Mild OA with erosive changes on x-rya . Seen ortho-return prn    6. Osteopenia. Chronic prednisone use 4 mg day. DEXA due (schedule as ordered by PCP). Continue calcium and vitamin D. Taper by 0.5-1 mg every 1 month until off.      7. RTC in 6 months with Pola Jones.     The patient understood the rationale for the diagnosis and treatment plan. Patient shared in the decision making. All questions were answered to best of my ability and the patient's satisfaction  Herminia Reid MD  [facility]  Department of Rheumatology & Autoimmune Disorders  [facility]: [phone]  Pager - [phone]       1. Immunization: Reviewed CDC guidelines with patient updated, information provided to patient based on CDC guidelines for vaccination recommendations, patient will discuss with primary provider  2. Bone Health:Educated on adequate calcium and vitamin D intake, Educated on exercise, Glucocorticoid education discussed risks, benefits & potential long term side effects from use  3. Discussed routine annual physicals, cancer screening, cardiac risk monitoring, bone health and primary care with primary care provider.   4. Educated on diagnosis, prognosis, labs, imaging, treatment options (including risks, benefits, risk of no treatment), medications (use, dose, side-effects, risks of medications including infection/cancer/bone marrow suppression, lab monitoring), infections what to do, plan of cares, goals of treatment.

## 2019-11-21 NOTE — PATIENT INSTRUCTIONS
-- Will continue on Methotrexate 4 tab once a week + Remicade infusions     -- Encourage her to take calcium and Vitamin D supplements.     -- RTC in 6 months with Enrique

## 2019-12-06 ENCOUNTER — INFUSION THERAPY VISIT (OUTPATIENT)
Dept: INFUSION THERAPY | Facility: CLINIC | Age: 59
End: 2019-12-06
Payer: COMMERCIAL

## 2019-12-06 VITALS
DIASTOLIC BLOOD PRESSURE: 69 MMHG | OXYGEN SATURATION: 98 % | RESPIRATION RATE: 16 BRPM | SYSTOLIC BLOOD PRESSURE: 124 MMHG | TEMPERATURE: 97.5 F | BODY MASS INDEX: 29.54 KG/M2 | WEIGHT: 177.5 LBS | HEART RATE: 84 BPM

## 2019-12-06 DIAGNOSIS — M05.79 RHEUMATOID ARTHRITIS INVOLVING MULTIPLE SITES WITH POSITIVE RHEUMATOID FACTOR (H): Primary | ICD-10-CM

## 2019-12-06 PROCEDURE — 99207 ZZC NO CHARGE LOS: CPT

## 2019-12-06 PROCEDURE — 96413 CHEMO IV INFUSION 1 HR: CPT | Performed by: INTERNAL MEDICINE

## 2019-12-06 RX ORDER — ACETAMINOPHEN 325 MG/1
650 TABLET ORAL ONCE
Status: COMPLETED | OUTPATIENT
Start: 2019-12-06 | End: 2019-12-06

## 2019-12-06 RX ORDER — DIPHENHYDRAMINE HCL 25 MG
25 CAPSULE ORAL ONCE
Status: CANCELLED
Start: 2020-01-17

## 2019-12-06 RX ORDER — ACETAMINOPHEN 325 MG/1
650 TABLET ORAL ONCE
Status: CANCELLED
Start: 2020-01-17

## 2019-12-06 RX ORDER — METHYLPREDNISOLONE SODIUM SUCCINATE 125 MG/2ML
125 INJECTION, POWDER, LYOPHILIZED, FOR SOLUTION INTRAMUSCULAR; INTRAVENOUS ONCE
Status: CANCELLED | OUTPATIENT
Start: 2020-01-17

## 2019-12-06 RX ORDER — DIPHENHYDRAMINE HCL 25 MG
25 CAPSULE ORAL ONCE
Status: COMPLETED | OUTPATIENT
Start: 2019-12-06 | End: 2019-12-06

## 2019-12-06 RX ADMIN — Medication 250 ML: at 15:11

## 2019-12-06 RX ADMIN — Medication 25 MG: at 14:55

## 2019-12-06 RX ADMIN — ACETAMINOPHEN 650 MG: 325 TABLET ORAL at 14:55

## 2019-12-06 ASSESSMENT — PAIN SCALES - GENERAL: PAINLEVEL: EXTREME PAIN (8)

## 2019-12-06 NOTE — PROGRESS NOTES
Infusion Nursing Note:  Janice Mayfield presents today for Rapid Remicade.    Patient seen by provider today: No   present during visit today: Not Applicable.    Note: N/A.    Intravenous Access:  Peripheral IV placed.    Treatment Conditions:  Biological Infusion Checklist:  ~~~ NOTE: If the patient answers yes to any of the questions below, hold the infusion and contact ordering provider or on-call provider.    1. Have you recently had an elevated temperature, fever, chills, productive cough, coughing for 3 weeks or longer or hemoptysis, abnormal vital signs, night sweats,  chest pain or have you noticed a decrease in your appetite, unexplained weight loss or fatigue? No  2. Do you have any open wounds or new incisions? No  3. Do you have any recent or upcoming hospitalizations, surgeries or dental procedures? No  4. Do you currently have or recently have had any signs of illness or infection or are you on any antibiotics? No  5. Have you had any new, sudden or worsening abdominal pain? No  6. Have you or anyone in your household received a live vaccination in the past 4 weeks? Please note:  No live vaccines while on biologic/chemotherapy until 6 months after the last treatment.  Patient can receive the flu vaccine (shot only) and the pneumovax.  It is optimal for the patient to get these vaccines mid cycle, but they can be given at any time as long as it is not on the day of the infusion. No  7. Have you recently been diagnosed with any new nervous system diseases (ie. Multiple sclerosis, Guillain [place], seizures, neurological changes) or cancer diagnosis? No  8. Are you on any form of radiation or chemotherapy? No  9. Are you pregnant or breast feeding or do you have plans of pregnancy in the future? No  10. Have you been having any signs of worsening depression or suicidal ideations?  (benlysta only) No  11. Have there been any other new onset medical symptoms? No        Post Infusion  Assessment:  Patient tolerated infusion without incident.  Site patent and intact, free from redness, edema or discomfort.  No evidence of extravasations.  Access discontinued per protocol.       Discharge Plan:   Discharge instructions reviewed with: Patient.  Patient and/or family verbalized understanding of discharge instructions and all questions answered.  Patient discharged in stable condition accompanied by: self.  Departure Mode: Ambulatory.    Shanique Nair RN

## 2020-01-31 ENCOUNTER — INFUSION THERAPY VISIT (OUTPATIENT)
Dept: INFUSION THERAPY | Facility: CLINIC | Age: 60
End: 2020-01-31
Payer: COMMERCIAL

## 2020-01-31 VITALS
RESPIRATION RATE: 18 BRPM | HEART RATE: 81 BPM | TEMPERATURE: 98.2 F | BODY MASS INDEX: 29.44 KG/M2 | WEIGHT: 176.9 LBS | DIASTOLIC BLOOD PRESSURE: 76 MMHG | SYSTOLIC BLOOD PRESSURE: 124 MMHG

## 2020-01-31 DIAGNOSIS — M05.79 RHEUMATOID ARTHRITIS INVOLVING MULTIPLE SITES WITH POSITIVE RHEUMATOID FACTOR (H): Primary | ICD-10-CM

## 2020-01-31 PROCEDURE — 96413 CHEMO IV INFUSION 1 HR: CPT | Performed by: INTERNAL MEDICINE

## 2020-01-31 PROCEDURE — 99207 ZZC NO CHARGE NURSE ONLY: CPT

## 2020-01-31 RX ORDER — ACETAMINOPHEN 325 MG/1
650 TABLET ORAL ONCE
Status: COMPLETED | OUTPATIENT
Start: 2020-01-31 | End: 2020-01-31

## 2020-01-31 RX ORDER — DIPHENHYDRAMINE HCL 25 MG
25 CAPSULE ORAL ONCE
Status: COMPLETED | OUTPATIENT
Start: 2020-01-31 | End: 2020-01-31

## 2020-01-31 RX ORDER — ACETAMINOPHEN 325 MG/1
650 TABLET ORAL ONCE
Status: CANCELLED
Start: 2020-03-13

## 2020-01-31 RX ORDER — DIPHENHYDRAMINE HCL 25 MG
25 CAPSULE ORAL ONCE
Status: CANCELLED
Start: 2020-03-13

## 2020-01-31 RX ORDER — METHYLPREDNISOLONE SODIUM SUCCINATE 125 MG/2ML
125 INJECTION, POWDER, LYOPHILIZED, FOR SOLUTION INTRAMUSCULAR; INTRAVENOUS ONCE
Status: CANCELLED | OUTPATIENT
Start: 2020-03-13

## 2020-01-31 RX ADMIN — ACETAMINOPHEN 650 MG: 325 TABLET ORAL at 14:25

## 2020-01-31 RX ADMIN — Medication 25 MG: at 14:25

## 2020-01-31 RX ADMIN — Medication 250 ML: at 14:45

## 2020-01-31 NOTE — PROGRESS NOTES
Infusion Nursing Note:  Janice Mayfield presents today for remicade.    Patient seen by provider today: No   present during visit today: Not Applicable.    Note: N/A.    Intravenous Access:  Peripheral IV placed.    Treatment Conditions:  Biological Infusion Checklist:  ~~~ NOTE: If the patient answers yes to any of the questions below, hold the infusion and contact ordering provider or on-call provider.    1. Have you recently had an elevated temperature, fever, chills, productive cough, coughing for 3 weeks or longer or hemoptysis, abnormal vital signs, night sweats,  chest pain or have you noticed a decrease in your appetite, unexplained weight loss or fatigue? No  2. Do you have any open wounds or new incisions? No  3. Do you have any recent or upcoming hospitalizations, surgeries or dental procedures? No  4. Do you currently have or recently have had any signs of illness or infection or are you on any antibiotics? No  5. Have you had any new, sudden or worsening abdominal pain? No  6. Have you or anyone in your household received a live vaccination in the past 4 weeks? Please note:  No live vaccines while on biologic/chemotherapy until 6 months after the last treatment.  Patient can receive the flu vaccine (shot only) and the pneumovax.  It is optimal for the patient to get these vaccines mid cycle, but they can be given at any time as long as it is not on the day of the infusion. No  7. Have you recently been diagnosed with any new nervous system diseases (ie. Multiple sclerosis, Guillain [place], seizures, neurological changes) or cancer diagnosis? No  8. Are you on any form of radiation or chemotherapy? No  9. Are you pregnant or breast feeding or do you have plans of pregnancy in the future? No  10. Have you been having any signs of worsening depression or suicidal ideations?  (benlysta only) No  11. Have there been any other new onset medical symptoms? No        Post Infusion  Assessment:  Patient tolerated infusion without incident.  Site patent and intact, free from redness, edema or discomfort.  No evidence of extravasations.  Access discontinued per protocol.       Discharge Plan:   Patient and/or family verbalized understanding of discharge instructions and all questions answered.    Jaja Eaton RN

## 2020-03-10 ENCOUNTER — HEALTH MAINTENANCE LETTER (OUTPATIENT)
Age: 60
End: 2020-03-10

## 2020-03-26 ENCOUNTER — INFUSION THERAPY VISIT (OUTPATIENT)
Dept: INFUSION THERAPY | Facility: CLINIC | Age: 60
End: 2020-03-26
Payer: COMMERCIAL

## 2020-03-26 VITALS
BODY MASS INDEX: 28.76 KG/M2 | HEART RATE: 60 BPM | OXYGEN SATURATION: 98 % | RESPIRATION RATE: 16 BRPM | TEMPERATURE: 97.1 F | WEIGHT: 172.8 LBS

## 2020-03-26 DIAGNOSIS — M05.79 RHEUMATOID ARTHRITIS INVOLVING MULTIPLE SITES WITH POSITIVE RHEUMATOID FACTOR (H): ICD-10-CM

## 2020-03-26 DIAGNOSIS — M05.79 RHEUMATOID ARTHRITIS INVOLVING MULTIPLE SITES WITH POSITIVE RHEUMATOID FACTOR (H): Primary | ICD-10-CM

## 2020-03-26 LAB
ALBUMIN SERPL-MCNC: 3.4 G/DL (ref 3.4–5)
ALT SERPL W P-5'-P-CCNC: 16 U/L (ref 0–50)
AST SERPL W P-5'-P-CCNC: 13 U/L (ref 0–45)
CREAT SERPL-MCNC: 0.74 MG/DL (ref 0.52–1.04)
CRP SERPL-MCNC: <2.9 MG/L (ref 0–8)
ERYTHROCYTE [DISTWIDTH] IN BLOOD BY AUTOMATED COUNT: 12.4 % (ref 10–15)
GFR SERPL CREATININE-BSD FRML MDRD: 89 ML/MIN/{1.73_M2}
HCT VFR BLD AUTO: 40.2 % (ref 35–47)
HGB BLD-MCNC: 12.6 G/DL (ref 11.7–15.7)
MCH RBC QN AUTO: 28.9 PG (ref 26.5–33)
MCHC RBC AUTO-ENTMCNC: 31.3 G/DL (ref 31.5–36.5)
MCV RBC AUTO: 92 FL (ref 78–100)
PLATELET # BLD AUTO: 290 10E9/L (ref 150–450)
RBC # BLD AUTO: 4.36 10E12/L (ref 3.8–5.2)
WBC # BLD AUTO: 5.1 10E9/L (ref 4–11)

## 2020-03-26 PROCEDURE — 99207 ZZC NO CHARGE NURSE ONLY: CPT

## 2020-03-26 PROCEDURE — 82040 ASSAY OF SERUM ALBUMIN: CPT | Performed by: NURSE PRACTITIONER

## 2020-03-26 PROCEDURE — 84460 ALANINE AMINO (ALT) (SGPT): CPT | Performed by: NURSE PRACTITIONER

## 2020-03-26 PROCEDURE — 36415 COLL VENOUS BLD VENIPUNCTURE: CPT | Performed by: NURSE PRACTITIONER

## 2020-03-26 PROCEDURE — 85027 COMPLETE CBC AUTOMATED: CPT | Performed by: NURSE PRACTITIONER

## 2020-03-26 PROCEDURE — 84450 TRANSFERASE (AST) (SGOT): CPT | Performed by: NURSE PRACTITIONER

## 2020-03-26 PROCEDURE — 96413 CHEMO IV INFUSION 1 HR: CPT | Performed by: NURSE PRACTITIONER

## 2020-03-26 PROCEDURE — 86140 C-REACTIVE PROTEIN: CPT | Performed by: NURSE PRACTITIONER

## 2020-03-26 PROCEDURE — 82565 ASSAY OF CREATININE: CPT | Performed by: NURSE PRACTITIONER

## 2020-03-26 RX ORDER — ACETAMINOPHEN 325 MG/1
650 TABLET ORAL ONCE
Status: COMPLETED | OUTPATIENT
Start: 2020-03-26 | End: 2020-03-26

## 2020-03-26 RX ORDER — METHYLPREDNISOLONE SODIUM SUCCINATE 125 MG/2ML
125 INJECTION, POWDER, LYOPHILIZED, FOR SOLUTION INTRAMUSCULAR; INTRAVENOUS ONCE
Status: CANCELLED | OUTPATIENT
Start: 2020-05-08

## 2020-03-26 RX ORDER — ACETAMINOPHEN 325 MG/1
650 TABLET ORAL ONCE
Status: CANCELLED
Start: 2020-05-08

## 2020-03-26 RX ORDER — DIPHENHYDRAMINE HCL 25 MG
25 CAPSULE ORAL ONCE
Status: CANCELLED
Start: 2020-05-08

## 2020-03-26 RX ORDER — DIPHENHYDRAMINE HCL 25 MG
25 CAPSULE ORAL ONCE
Status: COMPLETED | OUTPATIENT
Start: 2020-03-26 | End: 2020-03-26

## 2020-03-26 RX ADMIN — Medication 25 MG: at 07:39

## 2020-03-26 RX ADMIN — Medication 250 ML: at 07:50

## 2020-03-26 RX ADMIN — ACETAMINOPHEN 650 MG: 325 TABLET ORAL at 07:39

## 2020-03-26 ASSESSMENT — PAIN SCALES - GENERAL: PAINLEVEL: SEVERE PAIN (6)

## 2020-03-26 NOTE — RESULT ENCOUNTER NOTE
The blood counts, liver, kidney and CRP inflammation labs are normal.     Pola BLANCHARD, CNP, MSN  3/26/2020  10:55 AM

## 2020-03-26 NOTE — PROGRESS NOTES
Infusion Nursing Note:  Janice Mayfield presents today for rapid remicade.    Patient seen by provider today: No   present during visit today: Not Applicable.    Intravenous Access:  Peripheral IV placed.    Treatment Conditions:  Biological Infusion Checklist:  ~~~ NOTE: If the patient answers yes to any of the questions below, hold the infusion and contact ordering provider or on-call provider.    1. Have you recently had an elevated temperature, fever, chills, productive cough, coughing for 3 weeks or longer or hemoptysis, abnormal vital signs, night sweats,  chest pain or have you noticed a decrease in your appetite, unexplained weight loss or fatigue? No  2. Do you have any open wounds or new incisions? No  3. Do you have any recent or upcoming hospitalizations, surgeries or dental procedures? No  4. Do you currently have or recently have had any signs of illness or infection or are you on any antibiotics? No  5. Have you had any new, sudden or worsening abdominal pain? No  6. Have you or anyone in your household received a live vaccination in the past 4 weeks? Please note:  No live vaccines while on biologic/chemotherapy until 6 months after the last treatment.  Patient can receive the flu vaccine (shot only) and the pneumovax.  It is optimal for the patient to get these vaccines mid cycle, but they can be given at any time as long as it is not on the day of the infusion. No  7. Have you recently been diagnosed with any new nervous system diseases (ie. Multiple sclerosis, Guillain [place], seizures, neurological changes) or cancer diagnosis? No  8. Are you on any form of radiation or chemotherapy? No  9. Are you pregnant or breast feeding or do you have plans of pregnancy in the future? No  10. Have you been having any signs of worsening depression or suicidal ideations?  (benlysta only) No  11. Have there been any other new onset medical symptoms? No        Post Infusion Assessment:  Patient  tolerated infusion without incident.  Blood return noted pre and post infusion.  Site patent and intact, free from redness, edema or discomfort.  No evidence of extravasations.  Access discontinued per protocol.       Discharge Plan:   Copy of AVS reviewed with patient and/or family.  Patient will return 4/23/20 for next appointment.  Patient discharged in stable condition accompanied by: self.  Departure Mode: Ambulatory.    Luli Newman RN

## 2020-03-27 ENCOUNTER — VIRTUAL VISIT (OUTPATIENT)
Dept: RHEUMATOLOGY | Facility: CLINIC | Age: 60
End: 2020-03-27
Payer: COMMERCIAL

## 2020-03-27 ENCOUNTER — TELEPHONE (OUTPATIENT)
Dept: RHEUMATOLOGY | Facility: CLINIC | Age: 60
End: 2020-03-27

## 2020-03-27 DIAGNOSIS — Z79.60 LONG-TERM USE OF IMMUNOSUPPRESSANT MEDICATION: ICD-10-CM

## 2020-03-27 DIAGNOSIS — M25.512 CHRONIC LEFT SHOULDER PAIN: ICD-10-CM

## 2020-03-27 DIAGNOSIS — M05.79 RHEUMATOID ARTHRITIS INVOLVING MULTIPLE SITES WITH POSITIVE RHEUMATOID FACTOR (H): ICD-10-CM

## 2020-03-27 DIAGNOSIS — M15.3 OTHER SECONDARY OSTEOARTHRITIS OF MULTIPLE SITES: ICD-10-CM

## 2020-03-27 DIAGNOSIS — Z79.52 LONG TERM CURRENT USE OF SYSTEMIC STEROIDS: ICD-10-CM

## 2020-03-27 DIAGNOSIS — E55.9 VITAMIN D DEFICIENCY: ICD-10-CM

## 2020-03-27 DIAGNOSIS — G89.29 CHRONIC LEFT SHOULDER PAIN: ICD-10-CM

## 2020-03-27 PROCEDURE — 99214 OFFICE O/P EST MOD 30 MIN: CPT | Mod: TEL | Performed by: NURSE PRACTITIONER

## 2020-03-27 RX ORDER — PREDNISONE 1 MG/1
4 TABLET ORAL DAILY
Qty: 360 TABLET | Refills: 1 | Status: SHIPPED | OUTPATIENT
Start: 2020-03-27 | End: 2021-05-13

## 2020-03-27 RX ORDER — PREDNISONE 1 MG/1
4 TABLET ORAL DAILY
Qty: 360 TABLET | Refills: 1 | Status: SHIPPED | OUTPATIENT
Start: 2020-03-27 | End: 2020-03-27

## 2020-03-27 NOTE — TELEPHONE ENCOUNTER
Attempted to contact pt.  No answer.  Message left to return call.    Calling to assist pt with scheduling Return appt's, following Telephone Visit with Pola Santana RN, CNP.    ~Labs every 12weeks  ~6 mo Return with Dr. Bryan  ~1yr Return with Tyrese Santana RN, CNP  ~give pt Mary Carmen Christina RN name for any concerns    Clemencia Wilcox LPN

## 2020-03-27 NOTE — PROGRESS NOTES
"Janice Mayfield is a 59 year old female who is being evaluated via a billable telephone visit.      The patient has been notified of following:     \"This telephone visit will be conducted via a call between you and your physician/provider. We have found that certain health care needs can be provided without the need for a physical exam.  This service lets us provide the care you need with a short phone conversation.  If a prescription is necessary we can send it directly to your pharmacy.  If lab work is needed we can place an order for that and you can then stop by our lab to have the test done at a later time.    If during the course of the call the physician/provider feels a telephone visit is not appropriate, you will not be charged for this service.\"     Clemencia Wilcox LPN      Physician has received verbal consent for a Telephone Visit from the patient? Yes    Janice Mayfield complains of    Chief Complaint   Patient presents with     RECHECK       I have reviewed and updated the patient's Past Medical History, Social History, Family History and Medication List.    ALLERGIES  Adalimumab; Humira; Methotrexate; Plaquenil [hydroxychloroquine]; and Sulfa drugs    Additional provider notes:    [facility] - Rheumatology Clinic Visit     Janice Mayfield  is a 59 year old follow-up erosive RA +RF -CCP -ZANDER/C3/C4, -ANCA -HLA B27. -lymes. XR  left shoulder humeral head erosions, DJD; 4-2017 severe degenerative changes RC joints bilateral and marginal erosions. Prior care at [facility] w/ Andry Jones.      Review: hydroxychloroquine, methotrexate (d/c liver enzymes elevation), enbrel (ineffective), humira *d/c (uticarial reaction), sulfites (anxiety) --so no sulfasalazine, remicade since 1-2011; diverticulum in colon would avoid tofacitinib (xeljantz) due to risk perforated bowel      HISTORY CARRIED FORWARD 4-: Went to [facility] in a wheelchair [\"knees were melons\", " "pads of feet, not use hands, wrists]. On remicade 5 mg/kg Q 4-5 week infusions with pre-meds once a month (Jan 2011) last infusion about 6 weeks ago, FA 1 mg/day, MTX 10 mg Q Sunday week and prednisone 4-5 mg day. Increased remicade was every 4-5 weeks as was wearing down rather then increase the dose. Tolerating and s/e. Mild foggy day after taking methotrexate (no SI, hairloss, diarrhea), FA 1 mg day Eats healthy. Reports arthritis is controlled, functioning well and not in wheelchair. Weather affects her arthritis. Root canal 2 weeks ago due to tooth abscess \"thinning of your jaw\". No flaring with her RA and this treatment. shouilder and wrist, knee so mild compared to when dx but is noticing she is due. Hx right wrist injection 2 yr ago. Pain is 4 out 10.      Copy forward  May 11, 2018  On remicade 5 mg/kg Q 4-5 week infusions with pre-meds once a month (Jan 2011) last infusion 5-2018 tolerated well , FA 1 mg/day, MTX 10 mg Q Sunday week and prednisone 4 mg day, folic acid 2 mg day. Tolerating well. No infections. No RA flares or EAS. No joint pain or swelling. Did not do PT for her left shoulder, this is still limited with the ROM but she is wishing to do this now since she has more time. No swelling. Mild plan left shoulder and right wrist where the prior damage was. Mild \"delay\" in thinking day of MTX but tolerating and the higher dose of folic acid is working well. No infectious over the years.      Copy forward  November 9, 2018  On remicade 5 mg/kg Q 4-5 week infusions with pre-meds once a month (Jan 2011) last infusion 10- tolerated well , FA 2 mg/day, methotrexate  12.5 mg Q Sunday week and prednisone 4 mg day tolerating. No RHEUMATOID ARTHRITIS (RA) flares, no EAS, and left shoulder is much improved even got her ROM back. Still some Physical Therapy sessions to do, she stopped when was having more stress. Some mild pain in there. 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 No pain or swelling in knees or wrists as she had before when initially dx. No infections. No NSAIDs use. Due to annual physical, and agrees to schedule this. Denies any fever, chills, SOB, BOYD, night sweats, or chest pain, or cough. Reports healthy. Denies having skin evaluation over the years, but use to tan in the sun when she was younger.      Copy forward  July 26, 2019  Rash itchy red on base of scalp and left outer upper arm started about a month ago. No itching. Does have dermatologist whom she seen in Nov and is due back.      Rheumatoid arthritis (RA) is controlled with current plan but we adjusted the methotrexate without success on getting her off the prednisone. No joint pain or swelling. No EAS. No SLE symptoms. Notices joints with weather changes. Left shoulder is improved, not worse. Due for DEXA as ordered by her PCP. Knees are good       In grieving classes since her mom passed last summer, now caring for her dad who is at [employer] for AVR replacement. Daughter lives upstairs of her home.      On remicade 5 mg/kg Q 4-5 week infusions with pre-meds once a month (Jan 2011) last infusion 10- tolerated well , FA 2 mg/day, methotrexate  12.5 mg Q Sunday week (mild brain fog the next day, mild and tolerable) and prednisone 4 mg day tolerating. She weaned down the prednisone for several months then readjusted due to reports of diffuse joints pains (denies fibromyalagia or swelling of joints)    March 27, 2020  Interruption of Infliximab (remicade) in Jan due family ill with flu, ,she did not get ill. With delay rheumatoid arthritis (RA) flares with in all her joints associated with fatigue, at dx was in her knees and wrist they were \"melons\". Left shoulder doing HEP, would like to follow-up  Ortho in few months. \"a million times better\" after Infliximab (remicade) and even energy is great.it was headlice last year. dtr and her family live upper part of house. 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 " She is doing social distancing.. When gets meds consistently, feels great. Would like to try to reduce prednisone this sujmmer.   Denies any fever, chills, SOB, BOYD, night sweats, or chest pain, high fever, cough, travel in last 14 days or exposure to covid-19 (coronavirus). Reports healthy    On remicade 5 mg/kg Q 4-5 week infusions with pre-meds once a month (Jan 2011) last infusion 3-  tolerated well takes benedryl prior to , FA 2 mg/day, methotrexate  10 mg Q Sunday week (mild brain fog the next day, mild and tolerable) and prednisone 4 mg day tolerating not able to tolerate 4 alternate with 3 mg every other day, ibuprofen 200 mg at bedtime during last month due to delay of infusions. Delay as came back from [place] 2 weeks ago so had to reschedule    PMH:  Injuries-None  No Blood transfusions  Medical-osteopenia (DEXA  in 2008 -0.6 then 7-2019 T-1.6 right fem, postemenopausal, anemia, elevated liver enzymes (fatty liver, MR elastroplaty NL 2012 seen Dr. Anderson; initially hep C + but HCV RNA neg), polyclonal hypergammaglobuinemia, diverticulum, lichen sclerosus vulva, osteoarthritis  multiple sites, rheumatoid arthritis (RA) , vitamin d defiency, varicose veins      Surgical-None      FH:  No autoimmune disorders, psoriasis, UC, crohn's, SLE, RA, PsA, gout, autoimmune thyroid.  No MS in family  Mother-Uterine CA-passed  Father-alive PPM and arrythmia  MGM-parkinsons, OP, possible arthritis-passed  RKE-DSC-owtvjg  PGM-brain CA, OP-passed  PGF-colon CA-passed  2 brothers-start to have heart issue  2 sisters-healthy HTN  2 children  Common low BP and low HR, varicose vein, arrhythmia     Social-  No Alcohol. Never Smoking. 3 Children (youngest deformed pulmonary valve s/p OHS). NO IVDU or drug use. 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      PMSH personally reviewed and updated by me.     ROS:  Negative raynaud s phenomena, hairloss, sun sensitivities, keratoconjunctivitis sicca,  pleurisy, inflammatory joint symptoms, significant rashes like malar, oral/nasal or ulcerations, inflammatory eye disease, inflammatory bowel disease, dactylitis, tenosynovitis, or enthespathy. Negative for miscarriages over 2 months into pregnancy, blood clots, gout, psoriasis, UC, crohn's. No temporal headache, no jaw claudication, no scalp tenderness, vision changes, carotidynia, cough. No STD or pregnancy symptoms. No Parotid swelling.     +see above   -SLE signs or symptoms  CONSTITUTIONAL: No fevers, night sweats or unintentional weight change. No acute distress, swollen glands  EYES: No vision change, diplopia, pain in eyes or red eyes   EARS, NOSE, MOUTH, THROAT: No tinnitus or hearing change, no epistaxis or nasal discharge, no oral lesions, throat clear. Normal saliva pool.  No drymouth. No thyroid Enlargement.   CARDIOVASCULAR: No chest pain, palpitations, or pain with walking, no orthopnea or PND   RESPIRATORY: No dyspnea, cough, shortness of breath or wheezing. No pleurisy.   GI: No nausea, vomiting, diarrhea or constipation, no abdominal pain, or blood in stools.   : No change in urine, no dysuria or hematuria   MUSCKL: No swollen, tender, red or painful joints. No nodules. No enthesitis, plantar fascitis or heel pain.   INTEGUMENTARY: +see above   NEURO: No loss of strength or sensation, no numbness or tingling, no tremor, no dizziness, no headache. No falls   ENDO: No polyuria or polydipsia, no temperature intolerance   HEME/LYMPH:No concerning bumps, bleeding problems, or swollen lymph nodes. No recent infections, hospitalizations or new illnesses.   ALLERGY: No environmental allergies   PSYCH: Met with Dr. Boyer today   Otherwise 14 point ROS obtained, reviewed and found negative.      Labs/Imaging:  Results for orders placed or performed in visit on 03/26/20 (from the past 48 hour(s))   Albumin level   Result Value Ref Range    Albumin 3.4 3.4 - 5.0 g/dL   AST   Result Value Ref Range    AST 13  0 - 45 U/L   CBC with platelets   Result Value Ref Range    WBC 5.1 4.0 - 11.0 10e9/L    RBC Count 4.36 3.8 - 5.2 10e12/L    Hemoglobin 12.6 11.7 - 15.7 g/dL    Hematocrit 40.2 35.0 - 47.0 %    MCV 92 78 - 100 fl    MCH 28.9 26.5 - 33.0 pg    MCHC 31.3 (L) 31.5 - 36.5 g/dL    RDW 12.4 10.0 - 15.0 %    Platelet Count 290 150 - 450 10e9/L   ALT   Result Value Ref Range    ALT 16 0 - 50 U/L   Creatinine   Result Value Ref Range    Creatinine 0.74 0.52 - 1.04 mg/dL    GFR Estimate 89 >60 mL/min/[1.73_m2]    GFR Estimate If Black >90 >60 mL/min/[1.73_m2]   CRP inflammation   Result Value Ref Range    CRP Inflammation <2.9 0.0 - 8.0 mg/L       Assessment/Plan:  1. Erosive (deforming as lost ROM left shoulder, right wrist) Rheumatoid Arthritis w/+RF (-CCP/ZANDER, x-rays severe degenerative changes RC b/l, Rt wrist total loss space between triquetrum and marginal erosions diffuse; left shoulder erosion/mild GH OA). Rheumatoid arthritis (RA) remission when Infliximab (remicade) not interrrupted, infliximab 5 mg/kg every 4-6 week, methotrexate 10 mg week, prednisone 4 mg day.  RTC 6 months with Dr. Meehan to co-manage with her    2. Immunosuppression, long-term risk medication. Normal CBC, liver, kidney tests. Advised flu and pneumonia vaccines to reduce chance of infections  hx elevated liver enzymes. Normal today   3. Chronic steroids. We had a long discussion of the risks of this adrenal insuffiency, AVN, ASCVD, bone health, increased infection risks, emotional link to prednisone and some feel mentally struggle to wean, if not flaring to continue the wean (no swelling or loss ROM) and discussed to try alternative measures for chronic pain (holistic, met with Dr. Boyer for mental health). She will wean, if not will adjust the methotrexate to 15 mg every week. I explained I am concerned of long-term use of steroids in a young patient which she was on when I met her  4. Chronic left shoulder, Mild OA with erosive changes on  x-rya . Seen ortho-return prn would like to return this summer   5. Osteopenia. Chronic prednisone use 4 mg day. Chris with vitamin D, This summer taper to 4 alternate 3.5 mg every day      The patient understood the rationale for the diagnosis and treatment plan. Patient shared in the decision making. All questions were answered to best of my ability and the patient's satisfaction    Time of call 740 AM  End call 810AM  30 min     Pola BLANCHARD, CNP, MSN  [facility]  Department of Rheumatology & Autoimmune Disorders    Education:   1. Immunization: Reviewed CDC guidelines with patient updated, information provided to patient based on CDC guidelines for vaccination recommendations, patient will discuss with primary provider  2. Bone Health:Educated on adequate calcium and vitamin D intake, Educated on exercise, Glucocorticoid education discussed risks, benefits & potential long term side effects from use per primary provider  3. Discussed routine annual physicals, cancer screening, cardiac risk monitoring, bone health and primary care with primary care provider. Also, educated glucocorticoid increase change of infections including shingles.   4. Educated on diagnosis, prognosis, labs, imaging, treatment options (including risks, benefits, risk of no treatment), medications (use, dose, side-effects, risks of medications including infection/cancer/bone marrow suppression, lab monitoring), infections what to do, plan of cares, goals of treatment. Clinic cards given. Websites given for education and resources.   5. Educated in Rheumatology we do not prescribe narcotics or manage chronic pain, the patient will need to discuss with primary care or go to a pain clinic for management.   6. Discussed corovid-19 prevention, CDC guidelines/resources and what to do for exposure signs or symptoms and where to go

## 2020-04-23 ENCOUNTER — INFUSION THERAPY VISIT (OUTPATIENT)
Dept: INFUSION THERAPY | Facility: CLINIC | Age: 60
End: 2020-04-23
Payer: COMMERCIAL

## 2020-04-23 VITALS
BODY MASS INDEX: 29.01 KG/M2 | OXYGEN SATURATION: 100 % | DIASTOLIC BLOOD PRESSURE: 85 MMHG | SYSTOLIC BLOOD PRESSURE: 131 MMHG | TEMPERATURE: 98 F | RESPIRATION RATE: 18 BRPM | HEART RATE: 69 BPM | WEIGHT: 174.3 LBS

## 2020-04-23 DIAGNOSIS — M05.79 RHEUMATOID ARTHRITIS INVOLVING MULTIPLE SITES WITH POSITIVE RHEUMATOID FACTOR (H): Primary | ICD-10-CM

## 2020-04-23 PROCEDURE — 96413 CHEMO IV INFUSION 1 HR: CPT | Performed by: NURSE PRACTITIONER

## 2020-04-23 PROCEDURE — 99207 ZZC NO CHARGE NURSE ONLY: CPT

## 2020-04-23 RX ORDER — DIPHENHYDRAMINE HCL 25 MG
25 CAPSULE ORAL ONCE
Status: COMPLETED | OUTPATIENT
Start: 2020-04-23 | End: 2020-04-23

## 2020-04-23 RX ORDER — ACETAMINOPHEN 325 MG/1
650 TABLET ORAL ONCE
Status: COMPLETED | OUTPATIENT
Start: 2020-04-23 | End: 2020-04-23

## 2020-04-23 RX ORDER — DIPHENHYDRAMINE HCL 25 MG
25 CAPSULE ORAL ONCE
Status: CANCELLED
Start: 2020-05-08

## 2020-04-23 RX ORDER — METHYLPREDNISOLONE SODIUM SUCCINATE 125 MG/2ML
125 INJECTION, POWDER, LYOPHILIZED, FOR SOLUTION INTRAMUSCULAR; INTRAVENOUS ONCE
Status: CANCELLED | OUTPATIENT
Start: 2020-05-08

## 2020-04-23 RX ORDER — ACETAMINOPHEN 325 MG/1
650 TABLET ORAL ONCE
Status: CANCELLED
Start: 2020-05-08

## 2020-04-23 RX ADMIN — Medication 250 ML: at 13:03

## 2020-04-23 RX ADMIN — ACETAMINOPHEN 650 MG: 325 TABLET ORAL at 12:51

## 2020-04-23 RX ADMIN — Medication 25 MG: at 12:52

## 2020-04-23 ASSESSMENT — PAIN SCALES - GENERAL: PAINLEVEL: NO PAIN (0)

## 2020-04-23 NOTE — PROGRESS NOTES
Infusion Nursing Note:  Janice Mayfield presents today for Rapid Remicade.   Patient seen by provider today: No   present during visit today: Not Applicable.    Note: Pt states she is doing well, denies any problems with her last infusion or any reason to hold therapy today.    Intravenous Access:  Peripheral IV placed.    Treatment Conditions:  Biological Infusion Checklist:  ~~~ NOTE: If the patient answers yes to any of the questions below, hold the infusion and contact ordering provider or on-call provider.    1. Have you recently had an elevated temperature, fever, chills, productive cough, coughing for 3 weeks or longer or hemoptysis, abnormal vital signs, night sweats,  chest pain or have you noticed a decrease in your appetite, unexplained weight loss or fatigue? No  2. Do you have any open wounds or new incisions? No  3. Do you have any recent or upcoming hospitalizations, surgeries or dental procedures? No  4. Do you currently have or recently have had any signs of illness or infection or are you on any antibiotics? No  5. Have you had any new, sudden or worsening abdominal pain? No  6. Have you or anyone in your household received a live vaccination in the past 4 weeks? Please note:  No live vaccines while on biologic/chemotherapy until 6 months after the last treatment.  Patient can receive the flu vaccine (shot only) and the pneumovax.  It is optimal for the patient to get these vaccines mid cycle, but they can be given at any time as long as it is not on the day of the infusion. No  7. Have you recently been diagnosed with any new nervous system diseases (ie. Multiple sclerosis, Guillain [place], seizures, neurological changes) or cancer diagnosis? No  8. Are you on any form of radiation or chemotherapy? No  9. Are you pregnant or breast feeding or do you have plans of pregnancy in the future? No  10. Have you been having any signs of worsening depression or suicidal ideations?   (benlysta only) No  11. Have there been any other new onset medical symptoms? No    Post Infusion Assessment:  Patient tolerated infusion without incident.  Blood return noted pre and post infusion.  Site patent and intact, free from redness, edema or discomfort.  No evidence of extravasations.  Access discontinued per protocol.  Biologic Infusion Post Education: Call the triage nurse at your clinic or seek medical attention if you have chills and/or temperature greater than or equal to 100.5, uncontrolled nausea/vomiting, diarrhea, constipation, dizziness, shortness of breath, chest pain, heart palpitations, weakness or any other new or concerning symptoms, questions or concerns.  You cannot have any live virus vaccines prior to or during treatment or up to 6 months post infusion.  If you have an upcoming surgery, medical procedure or dental procedure during treatment, this should be discussed with your ordering physician and your surgeon/dentist.  If you are having any concerning symptom, if you are unsure if you should get your next infusion or wish to speak to a provider before your next infusion, please call your care coordinator or triage nurse at your clinic to notify them so we can adequately serve you.       Discharge Plan:   Return 05/15/2020 for Rapid Remicade.  Discharge instructions reviewed with: Patient.  Patient and/or family verbalized understanding of discharge instructions and all questions answered.  Patient discharged in stable condition accompanied by: self.  Departure Mode: Ambulatory.    Analisa Bhardwaj RN

## 2020-04-27 NOTE — TELEPHONE ENCOUNTER
Chart reviewed.  Procedure  sent pt a [employer] Message regarding this encounter.  Clemencia Wilcox LPN

## 2020-05-22 ENCOUNTER — INFUSION THERAPY VISIT (OUTPATIENT)
Dept: INFUSION THERAPY | Facility: CLINIC | Age: 60
End: 2020-05-22
Payer: COMMERCIAL

## 2020-05-22 VITALS
RESPIRATION RATE: 18 BRPM | SYSTOLIC BLOOD PRESSURE: 122 MMHG | HEART RATE: 76 BPM | BODY MASS INDEX: 29.49 KG/M2 | OXYGEN SATURATION: 100 % | TEMPERATURE: 98.4 F | WEIGHT: 177.2 LBS | DIASTOLIC BLOOD PRESSURE: 77 MMHG

## 2020-05-22 DIAGNOSIS — M05.79 RHEUMATOID ARTHRITIS INVOLVING MULTIPLE SITES WITH POSITIVE RHEUMATOID FACTOR (H): Primary | ICD-10-CM

## 2020-05-22 PROCEDURE — 99207 ZZC NO CHARGE LOS: CPT

## 2020-05-22 PROCEDURE — 96413 CHEMO IV INFUSION 1 HR: CPT | Performed by: INTERNAL MEDICINE

## 2020-05-22 RX ORDER — ACETAMINOPHEN 325 MG/1
650 TABLET ORAL ONCE
Status: COMPLETED | OUTPATIENT
Start: 2020-05-22 | End: 2020-05-22

## 2020-05-22 RX ORDER — DIPHENHYDRAMINE HCL 25 MG
25 CAPSULE ORAL ONCE
Status: CANCELLED
Start: 2020-07-03

## 2020-05-22 RX ORDER — ACETAMINOPHEN 325 MG/1
650 TABLET ORAL ONCE
Status: CANCELLED
Start: 2020-07-03

## 2020-05-22 RX ORDER — DIPHENHYDRAMINE HCL 25 MG
25 CAPSULE ORAL ONCE
Status: COMPLETED | OUTPATIENT
Start: 2020-05-22 | End: 2020-05-22

## 2020-05-22 RX ORDER — METHYLPREDNISOLONE SODIUM SUCCINATE 125 MG/2ML
125 INJECTION, POWDER, LYOPHILIZED, FOR SOLUTION INTRAMUSCULAR; INTRAVENOUS ONCE
Status: CANCELLED | OUTPATIENT
Start: 2020-07-03

## 2020-05-22 RX ADMIN — ACETAMINOPHEN 650 MG: 325 TABLET ORAL at 14:31

## 2020-05-22 RX ADMIN — Medication 250 ML: at 14:28

## 2020-05-22 RX ADMIN — Medication 25 MG: at 14:31

## 2020-05-22 ASSESSMENT — PAIN SCALES - GENERAL: PAINLEVEL: NO PAIN (0)

## 2020-05-22 NOTE — PROGRESS NOTES
Infusion Nursing Note:  Janice aMyfield presents today for rapid Remicade infusion.    Patient seen by provider today: No   present during visit today: Not Applicable.    Note: N/A.      Intravenous Access:  Peripheral IV placed.    Treatment Conditions:  Biological Infusion Checklist:  ~~~ NOTE: If the patient answers yes to any of the questions below, hold the infusion and contact ordering provider or on-call provider.    1. Have you recently had an elevated temperature, fever, chills, productive cough, coughing for 3 weeks or longer or hemoptysis, abnormal vital signs, night sweats,  chest pain or have you noticed a decrease in your appetite, unexplained weight loss or fatigue? No  2. Do you have any open wounds or new incisions? No  3. Do you have any recent or upcoming hospitalizations, surgeries or dental procedures? No  4. Do you currently have or recently have had any signs of illness or infection or are you on any antibiotics? No  5. Have you had any new, sudden or worsening abdominal pain? No  6. Have you or anyone in your household received a live vaccination in the past 4 weeks? Please note:  No live vaccines while on biologic/chemotherapy until 6 months after the last treatment.  Patient can receive the flu vaccine (shot only) and the pneumovax.  It is optimal for the patient to get these vaccines mid cycle, but they can be given at any time as long as it is not on the day of the infusion. No  7. Have you recently been diagnosed with any new nervous system diseases (ie. Multiple sclerosis, Guillain [place], seizures, neurological changes) or cancer diagnosis? No  8. Are you on any form of radiation or chemotherapy? No  9. Are you pregnant or breast feeding or do you have plans of pregnancy in the future? No  10. Have there been any other new onset medical symptoms? No        Post Infusion Assessment:  Patient tolerated infusion without incident.  Blood return noted pre and post  infusion.  Site patent and intact, free from redness, edema or discomfort.  No evidence of extravasations.  Access discontinued per protocol.  Biologic Infusion Post Education: Call the triage nurse at your clinic or seek medical attention if you have chills and/or temperature greater than or equal to 100.5, uncontrolled nausea/vomiting, diarrhea, constipation, dizziness, shortness of breath, chest pain, heart palpitations, weakness or any other new or concerning symptoms, questions or concerns.  You cannot have any live virus vaccines prior to or during treatment or up to 6 months post infusion.  If you have an upcoming surgery, medical procedure or dental procedure during treatment, this should be discussed with your ordering physician and your surgeon/dentist.  If you are having any concerning symptom, if you are unsure if you should get your next infusion or wish to speak to a provider before your next infusion, please call your care coordinator or triage nurse at your clinic to notify them so we can adequately serve you.       Discharge Plan:   Patient will schedule next appointment before leaving today.  Patient discharged in stable condition accompanied by: self.  Departure Mode: Ambulatory.    Marianna Thompson RN

## 2020-06-19 ENCOUNTER — INFUSION THERAPY VISIT (OUTPATIENT)
Dept: INFUSION THERAPY | Facility: CLINIC | Age: 60
End: 2020-06-19
Payer: COMMERCIAL

## 2020-06-19 VITALS
HEART RATE: 74 BPM | SYSTOLIC BLOOD PRESSURE: 120 MMHG | DIASTOLIC BLOOD PRESSURE: 81 MMHG | TEMPERATURE: 98.6 F | BODY MASS INDEX: 29.04 KG/M2 | WEIGHT: 174.5 LBS | RESPIRATION RATE: 16 BRPM | OXYGEN SATURATION: 98 %

## 2020-06-19 DIAGNOSIS — M05.79 RHEUMATOID ARTHRITIS INVOLVING MULTIPLE SITES WITH POSITIVE RHEUMATOID FACTOR (H): Primary | ICD-10-CM

## 2020-06-19 PROCEDURE — 99207 ZZC NO CHARGE LOS: CPT

## 2020-06-19 PROCEDURE — 96413 CHEMO IV INFUSION 1 HR: CPT | Performed by: INTERNAL MEDICINE

## 2020-06-19 RX ORDER — ACETAMINOPHEN 325 MG/1
650 TABLET ORAL ONCE
Status: COMPLETED | OUTPATIENT
Start: 2020-06-19 | End: 2020-06-19

## 2020-06-19 RX ORDER — DIPHENHYDRAMINE HCL 25 MG
25 CAPSULE ORAL ONCE
Status: CANCELLED
Start: 2020-07-03

## 2020-06-19 RX ORDER — ACETAMINOPHEN 325 MG/1
650 TABLET ORAL ONCE
Status: CANCELLED
Start: 2020-07-03

## 2020-06-19 RX ORDER — METHYLPREDNISOLONE SODIUM SUCCINATE 125 MG/2ML
125 INJECTION, POWDER, LYOPHILIZED, FOR SOLUTION INTRAMUSCULAR; INTRAVENOUS ONCE
Status: CANCELLED | OUTPATIENT
Start: 2020-07-03

## 2020-06-19 RX ORDER — DIPHENHYDRAMINE HCL 25 MG
25 CAPSULE ORAL ONCE
Status: COMPLETED | OUTPATIENT
Start: 2020-06-19 | End: 2020-06-19

## 2020-06-19 RX ADMIN — Medication 25 MG: at 13:22

## 2020-06-19 RX ADMIN — Medication 250 ML: at 13:34

## 2020-06-19 RX ADMIN — ACETAMINOPHEN 650 MG: 325 TABLET ORAL at 13:22

## 2020-06-19 ASSESSMENT — PAIN SCALES - GENERAL: PAINLEVEL: MILD PAIN (2)

## 2020-06-19 NOTE — PROGRESS NOTES
Infusion Nursing Note:  Janice Mayfield presents today for Rapid Remicade.    Patient seen by provider today: No   present during visit today: Not Applicable.    Note: No new symptoms or concerns today, feels her body is ready for Remicade. VSS.     Intravenous Access:  Peripheral IV placed.    Treatment Conditions:  Biological Infusion Checklist:  ~~~ NOTE: If the patient answers yes to any of the questions below, hold the infusion and contact ordering provider or on-call provider.    1. Have you recently had an elevated temperature, fever, chills, productive cough, coughing for 3 weeks or longer or hemoptysis, abnormal vital signs, night sweats,  chest pain or have you noticed a decrease in your appetite, unexplained weight loss or fatigue? No  2. Do you have any open wounds or new incisions? No  3. Do you have any recent or upcoming hospitalizations, surgeries or dental procedures? No  4. Do you currently have or recently have had any signs of illness or infection or are you on any antibiotics? No  5. Have you had any new, sudden or worsening abdominal pain? No  6. Have you or anyone in your household received a live vaccination in the past 4 weeks? Please note:  No live vaccines while on biologic/chemotherapy until 6 months after the last treatment.  Patient can receive the flu vaccine (shot only) and the pneumovax.  It is optimal for the patient to get these vaccines mid cycle, but they can be given at any time as long as it is not on the day of the infusion. No  7. Have you recently been diagnosed with any new nervous system diseases (ie. Multiple sclerosis, Guillain [place], seizures, neurological changes) or cancer diagnosis? No  8. Are you on any form of radiation or chemotherapy? No  9. Are you pregnant or breast feeding or do you have plans of pregnancy in the future? No  10. Have you been having any signs of worsening depression or suicidal ideations?  (benlysta only) N/A   11. Have  there been any other new onset medical symptoms? No        Post Infusion Assessment:  Patient tolerated infusion without incident.  Blood return noted pre and post infusion.  Site patent and intact, free from redness, edema or discomfort.  No evidence of extravasations.  Peripheral IV access discontinued per protocol.    Biologic Infusion Post Education: Call the triage nurse at your clinic or seek medical attention if you have chills and/or temperature greater than or equal to 100.5, uncontrolled nausea/vomiting, diarrhea, constipation, dizziness, shortness of breath, chest pain, heart palpitations, weakness or any other new or concerning symptoms, questions or concerns.  You cannot have any live virus vaccines prior to or during treatment or up to 6 months post infusion.  If you have an upcoming surgery, medical procedure or dental procedure during treatment, this should be discussed with your ordering physician and your surgeon/dentist.  If you are having any concerning symptom, if you are unsure if you should get your next infusion or wish to speak to a provider before your next infusion, please call your care coordinator or triage nurse at your clinic to notify them so we can adequately serve you.       Discharge Plan:   Copy of AVS reviewed with patient and/or family.  Patient will return 7/17 for next appointment.  Patient discharged in stable condition accompanied by: self.  Departure Mode: Ambulatory.    Digna Forrest RN

## 2020-06-22 ENCOUNTER — TELEPHONE (OUTPATIENT)
Dept: RHEUMATOLOGY | Facility: CLINIC | Age: 60
End: 2020-06-22

## 2020-06-22 DIAGNOSIS — Z79.60 LONG-TERM USE OF IMMUNOSUPPRESSANT MEDICATION: ICD-10-CM

## 2020-06-22 DIAGNOSIS — M05.79 RHEUMATOID ARTHRITIS INVOLVING MULTIPLE SITES WITH POSITIVE RHEUMATOID FACTOR (H): Primary | ICD-10-CM

## 2020-06-22 DIAGNOSIS — Z79.52 LONG TERM CURRENT USE OF SYSTEMIC STEROIDS: ICD-10-CM

## 2020-06-22 NOTE — TELEPHONE ENCOUNTER
----- Message from Digna Forrest, RN sent at 6/19/2020  4:18 PM CDT -----  Regarding: Standing lab orders  Good afternoon,     Janice needs current standing labs placed for her therapy plan. She will be due for these at next infusion. Today they did not get drawn. Please call if any questions. Thanks, [phone]  [facility]

## 2020-07-17 ENCOUNTER — INFUSION THERAPY VISIT (OUTPATIENT)
Dept: INFUSION THERAPY | Facility: CLINIC | Age: 60
End: 2020-07-17
Payer: COMMERCIAL

## 2020-07-17 VITALS
DIASTOLIC BLOOD PRESSURE: 75 MMHG | WEIGHT: 168 LBS | HEART RATE: 78 BPM | BODY MASS INDEX: 27.96 KG/M2 | SYSTOLIC BLOOD PRESSURE: 114 MMHG | RESPIRATION RATE: 16 BRPM | OXYGEN SATURATION: 100 % | TEMPERATURE: 98 F

## 2020-07-17 DIAGNOSIS — M05.79 RHEUMATOID ARTHRITIS INVOLVING MULTIPLE SITES WITH POSITIVE RHEUMATOID FACTOR (H): ICD-10-CM

## 2020-07-17 DIAGNOSIS — Z79.60 LONG-TERM USE OF IMMUNOSUPPRESSANT MEDICATION: ICD-10-CM

## 2020-07-17 DIAGNOSIS — Z79.52 LONG TERM CURRENT USE OF SYSTEMIC STEROIDS: ICD-10-CM

## 2020-07-17 DIAGNOSIS — M05.79 RHEUMATOID ARTHRITIS INVOLVING MULTIPLE SITES WITH POSITIVE RHEUMATOID FACTOR (H): Primary | ICD-10-CM

## 2020-07-17 LAB
ALBUMIN SERPL-MCNC: 3.6 G/DL (ref 3.4–5)
ALT SERPL W P-5'-P-CCNC: 22 U/L (ref 0–50)
AST SERPL W P-5'-P-CCNC: 16 U/L (ref 0–45)
CREAT SERPL-MCNC: 0.97 MG/DL (ref 0.52–1.04)
CRP SERPL-MCNC: <2.9 MG/L (ref 0–8)
ERYTHROCYTE [DISTWIDTH] IN BLOOD BY AUTOMATED COUNT: 12.8 % (ref 10–15)
GFR SERPL CREATININE-BSD FRML MDRD: 64 ML/MIN/{1.73_M2}
HCT VFR BLD AUTO: 39.7 % (ref 35–47)
HGB BLD-MCNC: 12.7 G/DL (ref 11.7–15.7)
MCH RBC QN AUTO: 29.3 PG (ref 26.5–33)
MCHC RBC AUTO-ENTMCNC: 32 G/DL (ref 31.5–36.5)
MCV RBC AUTO: 92 FL (ref 78–100)
PLATELET # BLD AUTO: 254 10E9/L (ref 150–450)
RBC # BLD AUTO: 4.33 10E12/L (ref 3.8–5.2)
WBC # BLD AUTO: 6.3 10E9/L (ref 4–11)

## 2020-07-17 PROCEDURE — 86140 C-REACTIVE PROTEIN: CPT | Performed by: NURSE PRACTITIONER

## 2020-07-17 PROCEDURE — 96413 CHEMO IV INFUSION 1 HR: CPT | Performed by: INTERNAL MEDICINE

## 2020-07-17 PROCEDURE — 82565 ASSAY OF CREATININE: CPT | Performed by: NURSE PRACTITIONER

## 2020-07-17 PROCEDURE — 82040 ASSAY OF SERUM ALBUMIN: CPT | Performed by: NURSE PRACTITIONER

## 2020-07-17 PROCEDURE — 84450 TRANSFERASE (AST) (SGOT): CPT | Performed by: NURSE PRACTITIONER

## 2020-07-17 PROCEDURE — 85027 COMPLETE CBC AUTOMATED: CPT | Performed by: NURSE PRACTITIONER

## 2020-07-17 PROCEDURE — 36415 COLL VENOUS BLD VENIPUNCTURE: CPT | Performed by: NURSE PRACTITIONER

## 2020-07-17 PROCEDURE — 99207 ZZC NO CHARGE LOS: CPT

## 2020-07-17 PROCEDURE — 84460 ALANINE AMINO (ALT) (SGPT): CPT | Performed by: NURSE PRACTITIONER

## 2020-07-17 RX ORDER — DIPHENHYDRAMINE HCL 25 MG
25 CAPSULE ORAL ONCE
Status: CANCELLED
Start: 2020-08-28

## 2020-07-17 RX ORDER — METHYLPREDNISOLONE SODIUM SUCCINATE 125 MG/2ML
125 INJECTION, POWDER, LYOPHILIZED, FOR SOLUTION INTRAMUSCULAR; INTRAVENOUS ONCE
Status: CANCELLED | OUTPATIENT
Start: 2020-08-28

## 2020-07-17 RX ORDER — ACETAMINOPHEN 325 MG/1
650 TABLET ORAL ONCE
Status: COMPLETED | OUTPATIENT
Start: 2020-07-17 | End: 2020-07-17

## 2020-07-17 RX ORDER — DIPHENHYDRAMINE HCL 25 MG
25 CAPSULE ORAL ONCE
Status: COMPLETED | OUTPATIENT
Start: 2020-07-17 | End: 2020-07-17

## 2020-07-17 RX ORDER — ACETAMINOPHEN 325 MG/1
650 TABLET ORAL ONCE
Status: CANCELLED
Start: 2020-08-28

## 2020-07-17 RX ADMIN — ACETAMINOPHEN 650 MG: 325 TABLET ORAL at 13:47

## 2020-07-17 RX ADMIN — Medication 25 MG: at 13:47

## 2020-07-17 RX ADMIN — Medication 250 ML: at 13:45

## 2020-07-17 ASSESSMENT — PAIN SCALES - GENERAL: PAINLEVEL: NO PAIN (0)

## 2020-07-17 NOTE — RESULT ENCOUNTER NOTE
The blood counts, liver, kidney and CRP inflammation labs are normal.     Pola BLANCHARD, CNP, MSN  7/17/2020  2:42 PM

## 2020-07-17 NOTE — PROGRESS NOTES
Infusion Nursing Note:  Janice Mayfield presents today for Rapid Remicade.    Patient seen by provider today: No   present during visit today: Not Applicable.    Note: N/A.    Intravenous Access:  Peripheral IV placed.    Treatment Conditions:  Biological Infusion Checklist:  ~~~ NOTE: If the patient answers yes to any of the questions below, hold the infusion and contact ordering provider or on-call provider.    1. Have you recently had an elevated temperature, fever, chills, productive cough, coughing for 3 weeks or longer or hemoptysis, abnormal vital signs, night sweats,  chest pain or have you noticed a decrease in your appetite, unexplained weight loss or fatigue? No  2. Do you have any open wounds or new incisions? No  3. Do you have any recent or upcoming hospitalizations, surgeries or dental procedures? No  4. Do you currently have or recently have had any signs of illness or infection or are you on any antibiotics? No  5. Have you had any new, sudden or worsening abdominal pain? No  6. Have you or anyone in your household received a live vaccination in the past 4 weeks? Please note:  No live vaccines while on biologic/chemotherapy until 6 months after the last treatment.  Patient can receive the flu vaccine (shot only) and the pneumovax.  It is optimal for the patient to get these vaccines mid cycle, but they can be given at any time as long as it is not on the day of the infusion. No  7. Have you recently been diagnosed with any new nervous system diseases (ie. Multiple sclerosis, Guillain [place], seizures, neurological changes) or cancer diagnosis? No  8. Are you on any form of radiation or chemotherapy? No  9. Are you pregnant or breast feeding or do you have plans of pregnancy in the future? No  10. Have you been having any signs of worsening depression or suicidal ideations?  (benlysta only) No  11. Have there been any other new onset medical symptoms? No    Post Infusion  Assessment:  Patient tolerated infusion without incident.  Site patent and intact, free from redness, edema or discomfort.  No evidence of extravasations.  Access discontinued per protocol.       Discharge Plan:   Patient directed to scheduling.  Patient discharged in stable condition accompanied by: self.  Departure Mode: Ambulatory.    Lucrecia Kaur RN

## 2020-08-18 ENCOUNTER — INFUSION THERAPY VISIT (OUTPATIENT)
Dept: INFUSION THERAPY | Facility: CLINIC | Age: 60
End: 2020-08-18
Payer: COMMERCIAL

## 2020-08-18 VITALS
BODY MASS INDEX: 27.56 KG/M2 | DIASTOLIC BLOOD PRESSURE: 78 MMHG | OXYGEN SATURATION: 99 % | TEMPERATURE: 98.6 F | RESPIRATION RATE: 18 BRPM | WEIGHT: 165.6 LBS | SYSTOLIC BLOOD PRESSURE: 125 MMHG | HEART RATE: 73 BPM

## 2020-08-18 DIAGNOSIS — M05.79 RHEUMATOID ARTHRITIS INVOLVING MULTIPLE SITES WITH POSITIVE RHEUMATOID FACTOR (H): Primary | ICD-10-CM

## 2020-08-18 PROCEDURE — 96413 CHEMO IV INFUSION 1 HR: CPT | Performed by: NURSE PRACTITIONER

## 2020-08-18 PROCEDURE — 99207 ZZC NO CHARGE LOS: CPT

## 2020-08-18 RX ORDER — ACETAMINOPHEN 325 MG/1
650 TABLET ORAL ONCE
Status: COMPLETED | OUTPATIENT
Start: 2020-08-18 | End: 2020-08-18

## 2020-08-18 RX ORDER — DIPHENHYDRAMINE HCL 25 MG
25 CAPSULE ORAL ONCE
Status: COMPLETED | OUTPATIENT
Start: 2020-08-18 | End: 2020-08-18

## 2020-08-18 RX ORDER — DIPHENHYDRAMINE HCL 25 MG
25 CAPSULE ORAL ONCE
Status: CANCELLED
Start: 2020-08-28

## 2020-08-18 RX ORDER — ACETAMINOPHEN 325 MG/1
650 TABLET ORAL ONCE
Status: CANCELLED
Start: 2020-08-28

## 2020-08-18 RX ORDER — METHYLPREDNISOLONE SODIUM SUCCINATE 125 MG/2ML
125 INJECTION, POWDER, LYOPHILIZED, FOR SOLUTION INTRAMUSCULAR; INTRAVENOUS ONCE
Status: CANCELLED | OUTPATIENT
Start: 2020-08-28

## 2020-08-18 RX ADMIN — ACETAMINOPHEN 650 MG: 325 TABLET ORAL at 14:28

## 2020-08-18 RX ADMIN — Medication 250 ML: at 14:44

## 2020-08-18 RX ADMIN — Medication 25 MG: at 14:28

## 2020-08-18 NOTE — PROGRESS NOTES
Infusion Nursing Note:  Janice Mayfield presents today for Remicade.    Patient seen by provider today: No   present during visit today: Not Applicable.    Note: N/A.    Intravenous Access:  Peripheral IV placed.    Treatment Conditions:  Biological Infusion Checklist:  ~~~ NOTE: If the patient answers yes to any of the questions below, hold the infusion and contact ordering provider or on-call provider.    1. Have you recently had an elevated temperature, fever, chills, productive cough, coughing for 3 weeks or longer or hemoptysis, abnormal vital signs, night sweats,  chest pain or have you noticed a decrease in your appetite, unexplained weight loss or fatigue? No  2. Do you have any open wounds or new incisions? No  3. Do you have any recent or upcoming hospitalizations, surgeries or dental procedures? No  4. Do you currently have or recently have had any signs of illness or infection or are you on any antibiotics? No  5. Have you had any new, sudden or worsening abdominal pain? No  6. Have you or anyone in your household received a live vaccination in the past 4 weeks? Please note:  No live vaccines while on biologic/chemotherapy until 6 months after the last treatment.  Patient can receive the flu vaccine (shot only) and the pneumovax.  It is optimal for the patient to get these vaccines mid cycle, but they can be given at any time as long as it is not on the day of the infusion. No  7. Have you recently been diagnosed with any new nervous system diseases (ie. Multiple sclerosis, Guillain [place], seizures, neurological changes) or cancer diagnosis? No  8. Are you on any form of radiation or chemotherapy? No  9. Are you pregnant or breast feeding or do you have plans of pregnancy in the future? No  10. Have you been having any signs of worsening depression or suicidal ideations?  (benlysta only) No  11. Have there been any other new onset medical symptoms? No         Post Infusion  Assessment:  Patient tolerated infusion without incident.  Access discontinued per protocol.       Discharge Plan:   Patient discharged in stable condition accompanied by: self.    Evelia Can RN

## 2020-09-15 ENCOUNTER — INFUSION THERAPY VISIT (OUTPATIENT)
Dept: INFUSION THERAPY | Facility: CLINIC | Age: 60
End: 2020-09-15
Payer: COMMERCIAL

## 2020-09-15 VITALS
TEMPERATURE: 98.5 F | OXYGEN SATURATION: 98 % | HEART RATE: 74 BPM | WEIGHT: 164 LBS | DIASTOLIC BLOOD PRESSURE: 67 MMHG | RESPIRATION RATE: 16 BRPM | SYSTOLIC BLOOD PRESSURE: 115 MMHG | BODY MASS INDEX: 27.29 KG/M2

## 2020-09-15 DIAGNOSIS — M05.79 RHEUMATOID ARTHRITIS INVOLVING MULTIPLE SITES WITH POSITIVE RHEUMATOID FACTOR (H): ICD-10-CM

## 2020-09-15 DIAGNOSIS — Z79.52 LONG TERM CURRENT USE OF SYSTEMIC STEROIDS: ICD-10-CM

## 2020-09-15 DIAGNOSIS — M05.79 RHEUMATOID ARTHRITIS INVOLVING MULTIPLE SITES WITH POSITIVE RHEUMATOID FACTOR (H): Primary | ICD-10-CM

## 2020-09-15 DIAGNOSIS — Z79.60 LONG-TERM USE OF IMMUNOSUPPRESSANT MEDICATION: ICD-10-CM

## 2020-09-15 LAB
ALBUMIN SERPL-MCNC: 3.4 G/DL (ref 3.4–5)
ALT SERPL W P-5'-P-CCNC: 22 U/L (ref 0–50)
AST SERPL W P-5'-P-CCNC: 23 U/L (ref 0–45)
CREAT SERPL-MCNC: 0.91 MG/DL (ref 0.52–1.04)
CRP SERPL-MCNC: <2.9 MG/L (ref 0–8)
ERYTHROCYTE [DISTWIDTH] IN BLOOD BY AUTOMATED COUNT: 13 % (ref 10–15)
GFR SERPL CREATININE-BSD FRML MDRD: 69 ML/MIN/{1.73_M2}
HCT VFR BLD AUTO: 37.5 % (ref 35–47)
HGB BLD-MCNC: 12.1 G/DL (ref 11.7–15.7)
MCH RBC QN AUTO: 29.5 PG (ref 26.5–33)
MCHC RBC AUTO-ENTMCNC: 32.3 G/DL (ref 31.5–36.5)
MCV RBC AUTO: 92 FL (ref 78–100)
PLATELET # BLD AUTO: 230 10E9/L (ref 150–450)
RBC # BLD AUTO: 4.1 10E12/L (ref 3.8–5.2)
WBC # BLD AUTO: 6.4 10E9/L (ref 4–11)

## 2020-09-15 PROCEDURE — 86140 C-REACTIVE PROTEIN: CPT | Performed by: NURSE PRACTITIONER

## 2020-09-15 PROCEDURE — 82565 ASSAY OF CREATININE: CPT | Performed by: NURSE PRACTITIONER

## 2020-09-15 PROCEDURE — 84450 TRANSFERASE (AST) (SGOT): CPT | Performed by: NURSE PRACTITIONER

## 2020-09-15 PROCEDURE — 84460 ALANINE AMINO (ALT) (SGPT): CPT | Performed by: NURSE PRACTITIONER

## 2020-09-15 PROCEDURE — 96413 CHEMO IV INFUSION 1 HR: CPT | Performed by: NURSE PRACTITIONER

## 2020-09-15 PROCEDURE — 36415 COLL VENOUS BLD VENIPUNCTURE: CPT | Performed by: NURSE PRACTITIONER

## 2020-09-15 PROCEDURE — 82040 ASSAY OF SERUM ALBUMIN: CPT | Performed by: NURSE PRACTITIONER

## 2020-09-15 PROCEDURE — 99207 ZZC NO CHARGE LOS: CPT

## 2020-09-15 PROCEDURE — 85027 COMPLETE CBC AUTOMATED: CPT | Performed by: NURSE PRACTITIONER

## 2020-09-15 RX ORDER — DIPHENHYDRAMINE HCL 25 MG
25 CAPSULE ORAL ONCE
Status: COMPLETED | OUTPATIENT
Start: 2020-09-15 | End: 2020-09-15

## 2020-09-15 RX ORDER — DIPHENHYDRAMINE HCL 25 MG
25 CAPSULE ORAL ONCE
Status: CANCELLED
Start: 2020-10-23

## 2020-09-15 RX ORDER — ACETAMINOPHEN 325 MG/1
650 TABLET ORAL ONCE
Status: COMPLETED | OUTPATIENT
Start: 2020-09-15 | End: 2020-09-15

## 2020-09-15 RX ORDER — METHYLPREDNISOLONE SODIUM SUCCINATE 125 MG/2ML
125 INJECTION, POWDER, LYOPHILIZED, FOR SOLUTION INTRAMUSCULAR; INTRAVENOUS ONCE
Status: CANCELLED | OUTPATIENT
Start: 2020-10-23

## 2020-09-15 RX ORDER — ACETAMINOPHEN 325 MG/1
650 TABLET ORAL ONCE
Status: CANCELLED
Start: 2020-10-23

## 2020-09-15 RX ADMIN — ACETAMINOPHEN 650 MG: 325 TABLET ORAL at 14:19

## 2020-09-15 RX ADMIN — Medication 250 ML: at 14:18

## 2020-09-15 RX ADMIN — Medication 25 MG: at 14:19

## 2020-09-15 ASSESSMENT — PAIN SCALES - GENERAL: PAINLEVEL: NO PAIN (0)

## 2020-09-15 NOTE — PROGRESS NOTES
Infusion Nursing Note:  Janice Mayfield presents today for Remicade.    Patient seen by provider today: No   present during visit today: Not Applicable.    Note: N/A.    Intravenous Access:  Peripheral IV placed.    Treatment Conditions:  Biological Infusion Checklist:  ~~~ NOTE: If the patient answers yes to any of the questions below, hold the infusion and contact ordering provider or on-call provider.    1. Have you recently had an elevated temperature, fever, chills, productive cough, coughing for 3 weeks or longer or hemoptysis, abnormal vital signs, night sweats,  chest pain or have you noticed a decrease in your appetite, unexplained weight loss or fatigue? No  2. Do you have any open wounds or new incisions? No  3. Do you have any recent or upcoming hospitalizations, surgeries or dental procedures? No  4. Do you currently have or recently have had any signs of illness or infection or are you on any antibiotics? No  5. Have you had any new, sudden or worsening abdominal pain? No  6. Have you or anyone in your household received a live vaccination in the past 4 weeks? Please note:  No live vaccines while on biologic/chemotherapy until 6 months after the last treatment.  Patient can receive the flu vaccine (shot only) and the pneumovax.  It is optimal for the patient to get these vaccines mid cycle, but they can be given at any time as long as it is not on the day of the infusion. No  7. Have you recently been diagnosed with any new nervous system diseases (ie. Multiple sclerosis, Guillain [place], seizures, neurological changes) or cancer diagnosis? No  8. Are you on any form of radiation or chemotherapy? No  9. Are you pregnant or breast feeding or do you have plans of pregnancy in the future? No  10. Have you been having any signs of worsening depression or suicidal ideations?  (benlysta only) No  11. Have there been any other new onset medical symptoms? No    Post Infusion  Assessment:  Patient tolerated infusion without incident.  Site patent and intact, free from redness, edema or discomfort.  No evidence of extravasations.  Access discontinued per protocol.       Discharge Plan:   Patient will return 10/13 for next appointment.   Patient discharged in stable condition accompanied by: self.  Departure Mode: Ambulatory.    Lucrecia Kaur RN

## 2020-09-16 NOTE — RESULT ENCOUNTER NOTE
The blood counts, liver, kidney and CRP inflammation labs are normal.     Pola BLANCHARD, CNP, MSN  9/16/2020  9:18 AM

## 2020-10-13 ENCOUNTER — INFUSION THERAPY VISIT (OUTPATIENT)
Dept: INFUSION THERAPY | Facility: CLINIC | Age: 60
End: 2020-10-13
Payer: COMMERCIAL

## 2020-10-13 VITALS
WEIGHT: 162.6 LBS | BODY MASS INDEX: 27.06 KG/M2 | OXYGEN SATURATION: 97 % | TEMPERATURE: 98.7 F | SYSTOLIC BLOOD PRESSURE: 127 MMHG | DIASTOLIC BLOOD PRESSURE: 78 MMHG | HEART RATE: 76 BPM

## 2020-10-13 DIAGNOSIS — M05.79 RHEUMATOID ARTHRITIS INVOLVING MULTIPLE SITES WITH POSITIVE RHEUMATOID FACTOR (H): Primary | ICD-10-CM

## 2020-10-13 PROCEDURE — 96413 CHEMO IV INFUSION 1 HR: CPT | Performed by: INTERNAL MEDICINE

## 2020-10-13 PROCEDURE — 99207 PR NO CHARGE LOS: CPT

## 2020-10-13 RX ORDER — METHYLPREDNISOLONE SODIUM SUCCINATE 125 MG/2ML
125 INJECTION, POWDER, LYOPHILIZED, FOR SOLUTION INTRAMUSCULAR; INTRAVENOUS ONCE
Status: CANCELLED | OUTPATIENT
Start: 2020-10-23 | End: 2020-10-23

## 2020-10-13 RX ORDER — ACETAMINOPHEN 325 MG/1
650 TABLET ORAL ONCE
Status: CANCELLED
Start: 2020-10-23 | End: 2020-10-23

## 2020-10-13 RX ORDER — ACETAMINOPHEN 325 MG/1
650 TABLET ORAL ONCE
Status: COMPLETED | OUTPATIENT
Start: 2020-10-13 | End: 2020-10-13

## 2020-10-13 RX ORDER — DIPHENHYDRAMINE HCL 25 MG
25 CAPSULE ORAL ONCE
Status: COMPLETED | OUTPATIENT
Start: 2020-10-13 | End: 2020-10-13

## 2020-10-13 RX ORDER — DIPHENHYDRAMINE HCL 25 MG
25 CAPSULE ORAL ONCE
Status: CANCELLED
Start: 2020-10-23 | End: 2020-10-23

## 2020-10-13 RX ADMIN — Medication 25 MG: at 13:55

## 2020-10-13 RX ADMIN — ACETAMINOPHEN 650 MG: 325 TABLET ORAL at 13:55

## 2020-10-13 RX ADMIN — Medication 250 ML: at 14:27

## 2020-10-13 ASSESSMENT — PAIN SCALES - GENERAL: PAINLEVEL: NO PAIN (0)

## 2020-10-13 NOTE — PROGRESS NOTES
Infusion Nursing Note:  Janice Mayfield presents today for Rapid Remicade.    Patient seen by provider today: No   present during visit today: Not Applicable.    Note: Pt test positive for Covid 10/1/20, cleared 10/11/20 by the CDC.  Pt denies any symptoms today except for loss of taste and smell.  Per Dr. Reid, ok to proceed with remicade infusion.    Intravenous Access:  Peripheral IV placed.    Treatment Conditions:  Biological Infusion Checklist:  ~~~ NOTE: If the patient answers yes to any of the questions below, hold the infusion and contact ordering provider or on-call provider.    1. Have you recently had an elevated temperature, fever, chills, productive cough, coughing for 3 weeks or longer or hemoptysis, abnormal vital signs, night sweats,  chest pain or have you noticed a decrease in your appetite, unexplained weight loss or fatigue? No  2. Do you have any open wounds or new incisions? No  3. Do you have any recent or upcoming hospitalizations, surgeries or dental procedures? No  4. Do you currently have or recently have had any signs of illness or infection or are you on any antibiotics? No  5. Have you had any new, sudden or worsening abdominal pain? No  6. Have you or anyone in your household received a live vaccination in the past 4 weeks? Please note:  No live vaccines while on biologic/chemotherapy until 6 months after the last treatment.  Patient can receive the flu vaccine (shot only) and the pneumovax.  It is optimal for the patient to get these vaccines mid cycle, but they can be given at any time as long as it is not on the day of the infusion. No  7. Have you recently been diagnosed with any new nervous system diseases (ie. Multiple sclerosis, Guillain [place], seizures, neurological changes) or cancer diagnosis? No  8. Are you on any form of radiation or chemotherapy? No  9. Are you pregnant or breast feeding or do you have plans of pregnancy in the future? No  10. Have  you been having any signs of worsening depression or suicidal ideations?  (benlysta only) No  11. Have there been any other new onset medical symptoms? No        Post Infusion Assessment:  Patient tolerated infusion without incident.  Blood return noted pre and post infusion.  Site patent and intact, free from redness, edema or discomfort.  No evidence of extravasations.  Access discontinued per protocol.       Discharge Plan:   Patient discharged in stable condition accompanied by: self.  Departure Mode: Ambulatory.  Pt will RTC 11/10/20 for Rapid Remicade .    Pravin Blanco RN

## 2020-11-10 ENCOUNTER — INFUSION THERAPY VISIT (OUTPATIENT)
Dept: INFUSION THERAPY | Facility: CLINIC | Age: 60
End: 2020-11-10
Payer: COMMERCIAL

## 2020-11-10 VITALS
HEART RATE: 63 BPM | DIASTOLIC BLOOD PRESSURE: 79 MMHG | RESPIRATION RATE: 18 BRPM | TEMPERATURE: 97.5 F | OXYGEN SATURATION: 100 % | SYSTOLIC BLOOD PRESSURE: 118 MMHG | BODY MASS INDEX: 26.64 KG/M2 | WEIGHT: 160.1 LBS

## 2020-11-10 DIAGNOSIS — M05.79 RHEUMATOID ARTHRITIS INVOLVING MULTIPLE SITES WITH POSITIVE RHEUMATOID FACTOR (H): Primary | ICD-10-CM

## 2020-11-10 PROCEDURE — 99207 PR NO CHARGE LOS: CPT

## 2020-11-10 PROCEDURE — 96413 CHEMO IV INFUSION 1 HR: CPT | Performed by: NURSE PRACTITIONER

## 2020-11-10 RX ORDER — ACETAMINOPHEN 325 MG/1
650 TABLET ORAL ONCE
Status: CANCELLED
Start: 2020-12-18 | End: 2020-12-18

## 2020-11-10 RX ORDER — DIPHENHYDRAMINE HCL 25 MG
25 CAPSULE ORAL ONCE
Status: COMPLETED | OUTPATIENT
Start: 2020-11-10 | End: 2020-11-10

## 2020-11-10 RX ORDER — METHYLPREDNISOLONE SODIUM SUCCINATE 125 MG/2ML
125 INJECTION, POWDER, LYOPHILIZED, FOR SOLUTION INTRAMUSCULAR; INTRAVENOUS ONCE
Status: CANCELLED | OUTPATIENT
Start: 2020-12-18 | End: 2020-12-18

## 2020-11-10 RX ORDER — ACETAMINOPHEN 325 MG/1
650 TABLET ORAL ONCE
Status: COMPLETED | OUTPATIENT
Start: 2020-11-10 | End: 2020-11-10

## 2020-11-10 RX ORDER — DIPHENHYDRAMINE HCL 25 MG
25 CAPSULE ORAL ONCE
Status: CANCELLED
Start: 2020-12-18 | End: 2020-12-18

## 2020-11-10 RX ADMIN — ACETAMINOPHEN 650 MG: 325 TABLET ORAL at 08:06

## 2020-11-10 RX ADMIN — Medication 25 MG: at 08:06

## 2020-11-10 ASSESSMENT — PAIN SCALES - GENERAL: PAINLEVEL: NO PAIN (0)

## 2020-11-10 NOTE — PROGRESS NOTES
Infusion Nursing Note:  Janice Mayfield presents today for Rapid Remicade.    Patient seen by provider today: No   present during visit today: Not Applicable.    Note: Pt states she is doing well, denies any reason to hold therapy today.  Takes Benadryl and Tylenol prior, tolerating 1 hour infusion.    Intravenous Access:  Peripheral IV placed.    Treatment Conditions:  Biological Infusion Checklist:  ~~~ NOTE: If the patient answers yes to any of the questions below, hold the infusion and contact ordering provider or on-call provider.    1. Have you recently had an elevated temperature, fever, chills, productive cough, coughing for 3 weeks or longer or hemoptysis, abnormal vital signs, night sweats,  chest pain or have you noticed a decrease in your appetite, unexplained weight loss or fatigue? No  2. Do you have any open wounds or new incisions? No  3. Do you have any recent or upcoming hospitalizations, surgeries or dental procedures? No  4. Do you currently have or recently have had any signs of illness or infection or are you on any antibiotics? No  5. Have you had any new, sudden or worsening abdominal pain? No  6. Have you or anyone in your household received a live vaccination in the past 4 weeks? Please note:  No live vaccines while on biologic/chemotherapy until 6 months after the last treatment.  Patient can receive the flu vaccine (shot only) and the pneumovax.  It is optimal for the patient to get these vaccines mid cycle, but they can be given at any time as long as it is not on the day of the infusion. No  7. Have you recently been diagnosed with any new nervous system diseases (ie. Multiple sclerosis, Guillain [place], seizures, neurological changes) or cancer diagnosis? No  8. Are you on any form of radiation or chemotherapy? No  9. Are you pregnant or breast feeding or do you have plans of pregnancy in the future? No  10. Have you been having any signs of worsening depression or  suicidal ideations?  (benlysta only) No  11. Have there been any other new onset medical symptoms? No    Post Infusion Assessment:  Patient tolerated infusion without incident.  Blood return noted pre and post infusion.  Site patent and intact, free from redness, edema or discomfort.  No evidence of extravasations.  Access discontinued per protocol.  Biologic Infusion Post Education: Call the triage nurse at your clinic or seek medical attention if you have chills and/or temperature greater than or equal to 100.5, uncontrolled nausea/vomiting, diarrhea, constipation, dizziness, shortness of breath, chest pain, heart palpitations, weakness or any other new or concerning symptoms, questions or concerns.  You cannot have any live virus vaccines prior to or during treatment or up to 6 months post infusion.  If you have an upcoming surgery, medical procedure or dental procedure during treatment, this should be discussed with your ordering physician and your surgeon/dentist.  If you are having any concerning symptom, if you are unsure if you should get your next infusion or wish to speak to a provider before your next infusion, please call your care coordinator or triage nurse at your clinic to notify them so we can adequately serve you.       Discharge Plan:   Return 12/08/2020  Discharge instructions reviewed with: Patient.  Patient and/or family verbalized understanding of discharge instructions and all questions answered.  Patient discharged in stable condition accompanied by: self.  Departure Mode: Ambulatory.    Analisa Bhardwaj RN

## 2020-12-08 ENCOUNTER — INFUSION THERAPY VISIT (OUTPATIENT)
Dept: INFUSION THERAPY | Facility: CLINIC | Age: 60
End: 2020-12-08
Payer: COMMERCIAL

## 2020-12-08 VITALS
DIASTOLIC BLOOD PRESSURE: 75 MMHG | SYSTOLIC BLOOD PRESSURE: 126 MMHG | BODY MASS INDEX: 26.71 KG/M2 | TEMPERATURE: 97.5 F | RESPIRATION RATE: 16 BRPM | OXYGEN SATURATION: 99 % | HEART RATE: 74 BPM | WEIGHT: 160.5 LBS

## 2020-12-08 DIAGNOSIS — Z79.60 LONG-TERM USE OF IMMUNOSUPPRESSANT MEDICATION: ICD-10-CM

## 2020-12-08 DIAGNOSIS — M05.79 RHEUMATOID ARTHRITIS INVOLVING MULTIPLE SITES WITH POSITIVE RHEUMATOID FACTOR (H): ICD-10-CM

## 2020-12-08 DIAGNOSIS — M05.79 RHEUMATOID ARTHRITIS INVOLVING MULTIPLE SITES WITH POSITIVE RHEUMATOID FACTOR (H): Primary | ICD-10-CM

## 2020-12-08 DIAGNOSIS — Z79.52 LONG TERM CURRENT USE OF SYSTEMIC STEROIDS: ICD-10-CM

## 2020-12-08 LAB
ALBUMIN SERPL-MCNC: 3.6 G/DL (ref 3.4–5)
ALT SERPL W P-5'-P-CCNC: 24 U/L (ref 0–50)
AST SERPL W P-5'-P-CCNC: 15 U/L (ref 0–45)
CREAT SERPL-MCNC: 0.74 MG/DL (ref 0.52–1.04)
CRP SERPL-MCNC: <2.9 MG/L (ref 0–8)
ERYTHROCYTE [DISTWIDTH] IN BLOOD BY AUTOMATED COUNT: 12.9 % (ref 10–15)
GFR SERPL CREATININE-BSD FRML MDRD: 89 ML/MIN/{1.73_M2}
HCT VFR BLD AUTO: 40.9 % (ref 35–47)
HGB BLD-MCNC: 13.1 G/DL (ref 11.7–15.7)
MCH RBC QN AUTO: 29.8 PG (ref 26.5–33)
MCHC RBC AUTO-ENTMCNC: 32 G/DL (ref 31.5–36.5)
MCV RBC AUTO: 93 FL (ref 78–100)
PLATELET # BLD AUTO: 250 10E9/L (ref 150–450)
RBC # BLD AUTO: 4.4 10E12/L (ref 3.8–5.2)
WBC # BLD AUTO: 6 10E9/L (ref 4–11)

## 2020-12-08 PROCEDURE — 86140 C-REACTIVE PROTEIN: CPT | Performed by: NURSE PRACTITIONER

## 2020-12-08 PROCEDURE — 36415 COLL VENOUS BLD VENIPUNCTURE: CPT | Performed by: NURSE PRACTITIONER

## 2020-12-08 PROCEDURE — 82565 ASSAY OF CREATININE: CPT | Performed by: NURSE PRACTITIONER

## 2020-12-08 PROCEDURE — 96413 CHEMO IV INFUSION 1 HR: CPT | Performed by: NURSE PRACTITIONER

## 2020-12-08 PROCEDURE — 85027 COMPLETE CBC AUTOMATED: CPT | Performed by: NURSE PRACTITIONER

## 2020-12-08 PROCEDURE — 99207 PR NO CHARGE LOS: CPT

## 2020-12-08 PROCEDURE — 84450 TRANSFERASE (AST) (SGOT): CPT | Performed by: NURSE PRACTITIONER

## 2020-12-08 PROCEDURE — 84460 ALANINE AMINO (ALT) (SGPT): CPT | Performed by: NURSE PRACTITIONER

## 2020-12-08 PROCEDURE — 82040 ASSAY OF SERUM ALBUMIN: CPT | Performed by: NURSE PRACTITIONER

## 2020-12-08 RX ORDER — METHYLPREDNISOLONE SODIUM SUCCINATE 125 MG/2ML
125 INJECTION, POWDER, LYOPHILIZED, FOR SOLUTION INTRAMUSCULAR; INTRAVENOUS ONCE
Status: CANCELLED | OUTPATIENT
Start: 2021-02-12 | End: 2021-02-12

## 2020-12-08 RX ORDER — DIPHENHYDRAMINE HCL 25 MG
25 CAPSULE ORAL ONCE
Status: CANCELLED
Start: 2021-02-12 | End: 2021-02-12

## 2020-12-08 RX ORDER — ACETAMINOPHEN 325 MG/1
650 TABLET ORAL ONCE
Status: CANCELLED
Start: 2021-02-12 | End: 2021-02-12

## 2020-12-08 RX ORDER — ACETAMINOPHEN 325 MG/1
650 TABLET ORAL ONCE
Status: COMPLETED | OUTPATIENT
Start: 2020-12-08 | End: 2020-12-08

## 2020-12-08 RX ORDER — DIPHENHYDRAMINE HCL 25 MG
25 CAPSULE ORAL ONCE
Status: COMPLETED | OUTPATIENT
Start: 2020-12-08 | End: 2020-12-08

## 2020-12-08 RX ADMIN — Medication 250 ML: at 08:14

## 2020-12-08 RX ADMIN — ACETAMINOPHEN 650 MG: 325 TABLET ORAL at 08:07

## 2020-12-08 RX ADMIN — Medication 25 MG: at 08:08

## 2020-12-08 ASSESSMENT — PAIN SCALES - GENERAL: PAINLEVEL: NO PAIN (0)

## 2020-12-08 NOTE — PROGRESS NOTES
Infusion Nursing Note:  Janice Curranjuno Mayfield presents today for rapid remicade.    Patient seen by provider today: No   present during visit today: Not Applicable.    Intravenous Access:  Peripheral IV placed.    Treatment Conditions:  Biological Infusion Checklist:  ~~~ NOTE: If the patient answers yes to any of the questions below, hold the infusion and contact ordering provider or on-call provider.    1. Have you recently had an elevated temperature, fever, chills, productive cough, coughing for 3 weeks or longer or hemoptysis, abnormal vital signs, night sweats,  chest pain or have you noticed a decrease in your appetite, unexplained weight loss or fatigue? No  2. Do you have any open wounds or new incisions? No  3. Do you have any recent or upcoming hospitalizations, surgeries or dental procedures? No  4. Do you currently have or recently have had any signs of illness or infection or are you on any antibiotics? No  5. Have you had any new, sudden or worsening abdominal pain? No  6. Have you or anyone in your household received a live vaccination in the past 4 weeks? Please note:  No live vaccines while on biologic/chemotherapy until 6 months after the last treatment.  Patient can receive the flu vaccine (shot only) and the pneumovax.  It is optimal for the patient to get these vaccines mid cycle, but they can be given at any time as long as it is not on the day of the infusion. No  7. Have you recently been diagnosed with any new nervous system diseases (ie. Multiple sclerosis, Guillain [place], seizures, neurological changes) or cancer diagnosis? No  8. Are you on any form of radiation or chemotherapy? No  9. Are you pregnant or breast feeding or do you have plans of pregnancy in the future? No  10. Have you been having any signs of worsening depression or suicidal ideations?  (benlysta only) No  11. Have there been any other new onset medical symptoms? No        Post Infusion  Assessment:  Patient tolerated infusion without incident.  Blood return noted pre and post infusion.  Site patent and intact, free from redness, edema or discomfort.  No evidence of extravasations.  Access discontinued per protocol.       Discharge Plan:   Patient discharged in stable condition accompanied by: self.  Departure Mode: Ambulatory.  Verbally reviewed next infusion appointment on 1/5/21.    Luli Newman RN

## 2020-12-08 NOTE — LETTER
December 11, 2020      Janice Mayfield  [address]  [place] [address]        Dear Ms.Klefsaas Mayfield,    We are writing to inform you of your test results.    The blood counts, liver, kidney and CRP inflammation labs are normal. Schedule with Dr. Dayton Reid as due    Resulted Orders   Creatinine   Result Value Ref Range    Creatinine 0.74 0.52 - 1.04 mg/dL    GFR Estimate 89 >60 mL/min/[1.73_m2]      Comment:      Non  GFR Calc  Starting 12/18/2018, serum creatinine based estimated GFR (eGFR) will be   calculated using the Chronic Kidney Disease Epidemiology Collaboration   (CKD-EPI) equation.      GFR Estimate If Black >90 >60 mL/min/[1.73_m2]      Comment:       GFR Calc  Starting 12/18/2018, serum creatinine based estimated GFR (eGFR) will be   calculated using the Chronic Kidney Disease Epidemiology Collaboration   (CKD-EPI) equation.     CRP inflammation   Result Value Ref Range    CRP Inflammation <2.9 0.0 - 8.0 mg/L   Albumin level   Result Value Ref Range    Albumin 3.6 3.4 - 5.0 g/dL   ALT   Result Value Ref Range    ALT 24 0 - 50 U/L   AST   Result Value Ref Range    AST 15 0 - 45 U/L   CBC with platelets   Result Value Ref Range    WBC 6.0 4.0 - 11.0 10e9/L    RBC Count 4.40 3.8 - 5.2 10e12/L    Hemoglobin 13.1 11.7 - 15.7 g/dL    Hematocrit 40.9 35.0 - 47.0 %    MCV 93 78 - 100 fl    MCH 29.8 26.5 - 33.0 pg    MCHC 32.0 31.5 - 36.5 g/dL    RDW 12.9 10.0 - 15.0 %    Platelet Count 250 150 - 450 10e9/L       If you have any questions or concerns, please call the clinic at the number listed above.       Sincerely,      LO Parra CNP

## 2020-12-09 NOTE — RESULT ENCOUNTER NOTE
The blood counts, liver, kidney and CRP inflammation labs are normal. Schedule with Dr. Dayton Reid as due     Pola BLANCHARD, CNP, MSN  12/9/2020  6:35 AM

## 2020-12-16 DIAGNOSIS — G89.29 CHRONIC LEFT SHOULDER PAIN: ICD-10-CM

## 2020-12-16 DIAGNOSIS — M05.79 RHEUMATOID ARTHRITIS INVOLVING MULTIPLE SITES WITH POSITIVE RHEUMATOID FACTOR (H): ICD-10-CM

## 2020-12-16 DIAGNOSIS — M25.512 CHRONIC LEFT SHOULDER PAIN: ICD-10-CM

## 2020-12-16 DIAGNOSIS — E55.9 VITAMIN D DEFICIENCY: ICD-10-CM

## 2020-12-16 DIAGNOSIS — Z79.60 LONG-TERM USE OF IMMUNOSUPPRESSANT MEDICATION: ICD-10-CM

## 2020-12-16 DIAGNOSIS — Z79.52 LONG TERM CURRENT USE OF SYSTEMIC STEROIDS: ICD-10-CM

## 2020-12-16 DIAGNOSIS — M15.3 OTHER SECONDARY OSTEOARTHRITIS OF MULTIPLE SITES: ICD-10-CM

## 2020-12-21 RX ORDER — FOLIC ACID 1 MG/1
2 TABLET ORAL DAILY
Qty: 180 TABLET | Refills: 0 | Status: SHIPPED | OUTPATIENT
Start: 2020-12-21 | End: 2021-03-22

## 2020-12-27 ENCOUNTER — HEALTH MAINTENANCE LETTER (OUTPATIENT)
Age: 60
End: 2020-12-27

## 2021-01-05 ENCOUNTER — INFUSION THERAPY VISIT (OUTPATIENT)
Dept: INFUSION THERAPY | Facility: CLINIC | Age: 61
End: 2021-01-05
Payer: COMMERCIAL

## 2021-01-05 VITALS
SYSTOLIC BLOOD PRESSURE: 104 MMHG | OXYGEN SATURATION: 98 % | HEART RATE: 76 BPM | BODY MASS INDEX: 27.06 KG/M2 | DIASTOLIC BLOOD PRESSURE: 71 MMHG | TEMPERATURE: 97.8 F | WEIGHT: 162.6 LBS

## 2021-01-05 DIAGNOSIS — M05.79 RHEUMATOID ARTHRITIS INVOLVING MULTIPLE SITES WITH POSITIVE RHEUMATOID FACTOR (H): Primary | ICD-10-CM

## 2021-01-05 PROCEDURE — 99207 PR NO CHARGE LOS: CPT

## 2021-01-05 PROCEDURE — 96413 CHEMO IV INFUSION 1 HR: CPT | Performed by: NURSE PRACTITIONER

## 2021-01-05 RX ORDER — DIPHENHYDRAMINE HCL 25 MG
25 CAPSULE ORAL ONCE
Status: CANCELLED
Start: 2021-02-12 | End: 2021-02-12

## 2021-01-05 RX ORDER — ACETAMINOPHEN 325 MG/1
650 TABLET ORAL ONCE
Status: COMPLETED | OUTPATIENT
Start: 2021-01-05 | End: 2021-01-05

## 2021-01-05 RX ORDER — METHYLPREDNISOLONE SODIUM SUCCINATE 125 MG/2ML
125 INJECTION, POWDER, LYOPHILIZED, FOR SOLUTION INTRAMUSCULAR; INTRAVENOUS ONCE
Status: CANCELLED | OUTPATIENT
Start: 2021-02-12 | End: 2021-02-12

## 2021-01-05 RX ORDER — ACETAMINOPHEN 325 MG/1
650 TABLET ORAL ONCE
Status: CANCELLED
Start: 2021-02-12 | End: 2021-02-12

## 2021-01-05 RX ORDER — DIPHENHYDRAMINE HCL 25 MG
25 CAPSULE ORAL ONCE
Status: COMPLETED | OUTPATIENT
Start: 2021-01-05 | End: 2021-01-05

## 2021-01-05 RX ADMIN — Medication 250 ML: at 15:08

## 2021-01-05 RX ADMIN — ACETAMINOPHEN 650 MG: 325 TABLET ORAL at 14:56

## 2021-01-05 RX ADMIN — Medication 25 MG: at 14:56

## 2021-01-05 ASSESSMENT — PAIN SCALES - GENERAL: PAINLEVEL: NO PAIN (0)

## 2021-01-05 NOTE — PROGRESS NOTES
Infusion Nursing Note:  Janice SIDNEY Taimargot Mayfield presents today for rapid Remicade.    Patient seen by provider today: No   present during visit today: Not Applicable.    Note: Declined Solumedrol premed.    Intravenous Access:  Peripheral IV placed.    Treatment Conditions:  Biological Infusion Checklist:  ~~~ NOTE: If the patient answers yes to any of the questions below, hold the infusion and contact ordering provider or on-call provider.    1. Have you recently had an elevated temperature, fever, chills, productive cough, coughing for 3 weeks or longer or hemoptysis, abnormal vital signs, night sweats,  chest pain or have you noticed a decrease in your appetite, unexplained weight loss or fatigue? No  2. Do you have any open wounds or new incisions? No  3. Do you have any recent or upcoming hospitalizations, surgeries or dental procedures? No  4. Do you currently have or recently have had any signs of illness or infection or are you on any antibiotics? No  5. Have you had any new, sudden or worsening abdominal pain? No  6. Have you or anyone in your household received a live vaccination in the past 4 weeks? Please note:  No live vaccines while on biologic/chemotherapy until 6 months after the last treatment.  Patient can receive the flu vaccine (shot only) and the pneumovax.  It is optimal for the patient to get these vaccines mid cycle, but they can be given at any time as long as it is not on the day of the infusion. No  7. Have you recently been diagnosed with any new nervous system diseases (ie. Multiple sclerosis, Guillain [place], seizures, neurological changes) or cancer diagnosis? No  8. Are you on any form of radiation or chemotherapy? No  9. Are you pregnant or breast feeding or do you have plans of pregnancy in the future? No  10. Have there been any other new onset medical symptoms? No      Post Infusion Assessment:  Patient tolerated infusion without incident.  Blood return noted pre and  post infusion.  Site patent and intact, free from redness, edema or discomfort.  No evidence of extravasations.  Access discontinued per protocol.  Biologic Infusion Post Education: Call the triage nurse at your clinic or seek medical attention if you have chills and/or temperature greater than or equal to 100.5, uncontrolled nausea/vomiting, diarrhea, constipation, dizziness, shortness of breath, chest pain, heart palpitations, weakness or any other new or concerning symptoms, questions or concerns.  You cannot have any live virus vaccines prior to or during treatment or up to 6 months post infusion.  If you have an upcoming surgery, medical procedure or dental procedure during treatment, this should be discussed with your ordering physician and your surgeon/dentist.  If you are having any concerning symptom, if you are unsure if you should get your next infusion or wish to speak to a provider before your next infusion, please call your care coordinator or triage nurse at your clinic to notify them so we can adequately serve you.       Discharge Plan:   AVS to patient via [employer].  Patient will return 2/2/2020 for next appointment.   Patient discharged in stable condition accompanied by: self.  Departure Mode: Ambulatory.    Marianna Thompson RN

## 2021-01-15 ENCOUNTER — HEALTH MAINTENANCE LETTER (OUTPATIENT)
Age: 61
End: 2021-01-15

## 2021-01-28 DIAGNOSIS — E55.9 VITAMIN D DEFICIENCY: ICD-10-CM

## 2021-01-28 DIAGNOSIS — M15.3 OTHER SECONDARY OSTEOARTHRITIS OF MULTIPLE SITES: ICD-10-CM

## 2021-01-28 DIAGNOSIS — Z79.60 LONG-TERM USE OF IMMUNOSUPPRESSANT MEDICATION: ICD-10-CM

## 2021-01-28 DIAGNOSIS — Z79.52 LONG TERM CURRENT USE OF SYSTEMIC STEROIDS: ICD-10-CM

## 2021-01-28 DIAGNOSIS — M05.79 RHEUMATOID ARTHRITIS INVOLVING MULTIPLE SITES WITH POSITIVE RHEUMATOID FACTOR (H): ICD-10-CM

## 2021-01-29 NOTE — TELEPHONE ENCOUNTER
methotrexate 2.5 MG tablet  Last Written Prescription Date:  3/27/20  Last Fill Quantity: 48,   # refills: 0  Last Office Visit: 3/27/20  Future Office visit:  *RTC 6 months with Dr. Meehan to co-manage with her  3/26/2021      CBC RESULTS:   Recent Labs   Lab Test 12/08/20  0750   WBC 6.0   RBC 4.40   HGB 13.1   HCT 40.9   MCV 93   MCH 29.8   MCHC 32.0   RDW 12.9          Creatinine   Date Value Ref Range Status   12/08/2020 0.74 0.52 - 1.04 mg/dL Final   ]    Liver Function Studies -   Recent Labs   Lab Test 12/08/20  0750 11/08/19  1403 11/08/19  1403   PROTTOTAL  --   --  8.4   ALBUMIN 3.6   < > 3.9   BILITOTAL  --   --  0.3   ALKPHOS  --   --  92   AST 15   < > 18   ALT 24   < > 24    < > = values in this interval not displayed.       Routing refill request to provider for review/approval because:  DMARd

## 2021-02-02 ENCOUNTER — INFUSION THERAPY VISIT (OUTPATIENT)
Dept: INFUSION THERAPY | Facility: CLINIC | Age: 61
End: 2021-02-02
Payer: COMMERCIAL

## 2021-02-02 VITALS
RESPIRATION RATE: 16 BRPM | SYSTOLIC BLOOD PRESSURE: 131 MMHG | HEART RATE: 66 BPM | BODY MASS INDEX: 26.51 KG/M2 | OXYGEN SATURATION: 99 % | WEIGHT: 159.3 LBS | TEMPERATURE: 98.3 F | DIASTOLIC BLOOD PRESSURE: 86 MMHG

## 2021-02-02 DIAGNOSIS — Z79.60 LONG-TERM USE OF IMMUNOSUPPRESSANT MEDICATION: ICD-10-CM

## 2021-02-02 DIAGNOSIS — M05.79 RHEUMATOID ARTHRITIS INVOLVING MULTIPLE SITES WITH POSITIVE RHEUMATOID FACTOR (H): ICD-10-CM

## 2021-02-02 DIAGNOSIS — Z79.52 LONG TERM CURRENT USE OF SYSTEMIC STEROIDS: ICD-10-CM

## 2021-02-02 DIAGNOSIS — M05.79 RHEUMATOID ARTHRITIS INVOLVING MULTIPLE SITES WITH POSITIVE RHEUMATOID FACTOR (H): Primary | ICD-10-CM

## 2021-02-02 LAB
ALBUMIN SERPL-MCNC: 3.7 G/DL (ref 3.4–5)
ALT SERPL W P-5'-P-CCNC: 22 U/L (ref 0–50)
AST SERPL W P-5'-P-CCNC: 10 U/L (ref 0–45)
CREAT SERPL-MCNC: 0.76 MG/DL (ref 0.52–1.04)
CRP SERPL-MCNC: <2.9 MG/L (ref 0–8)
ERYTHROCYTE [DISTWIDTH] IN BLOOD BY AUTOMATED COUNT: 12.3 % (ref 10–15)
GFR SERPL CREATININE-BSD FRML MDRD: 86 ML/MIN/{1.73_M2}
HCT VFR BLD AUTO: 43.7 % (ref 35–47)
HGB BLD-MCNC: 14 G/DL (ref 11.7–15.7)
MCH RBC QN AUTO: 29.5 PG (ref 26.5–33)
MCHC RBC AUTO-ENTMCNC: 32 G/DL (ref 31.5–36.5)
MCV RBC AUTO: 92 FL (ref 78–100)
PLATELET # BLD AUTO: 264 10E9/L (ref 150–450)
RBC # BLD AUTO: 4.74 10E12/L (ref 3.8–5.2)
WBC # BLD AUTO: 4.8 10E9/L (ref 4–11)

## 2021-02-02 PROCEDURE — 84460 ALANINE AMINO (ALT) (SGPT): CPT | Performed by: NURSE PRACTITIONER

## 2021-02-02 PROCEDURE — 85027 COMPLETE CBC AUTOMATED: CPT | Performed by: NURSE PRACTITIONER

## 2021-02-02 PROCEDURE — 96413 CHEMO IV INFUSION 1 HR: CPT | Performed by: NURSE PRACTITIONER

## 2021-02-02 PROCEDURE — 99207 PR NO CHARGE LOS: CPT

## 2021-02-02 PROCEDURE — 36415 COLL VENOUS BLD VENIPUNCTURE: CPT | Performed by: NURSE PRACTITIONER

## 2021-02-02 PROCEDURE — 84450 TRANSFERASE (AST) (SGOT): CPT | Performed by: NURSE PRACTITIONER

## 2021-02-02 PROCEDURE — 86140 C-REACTIVE PROTEIN: CPT | Performed by: NURSE PRACTITIONER

## 2021-02-02 PROCEDURE — 82040 ASSAY OF SERUM ALBUMIN: CPT | Performed by: NURSE PRACTITIONER

## 2021-02-02 PROCEDURE — 82565 ASSAY OF CREATININE: CPT | Performed by: NURSE PRACTITIONER

## 2021-02-02 RX ORDER — ACETAMINOPHEN 325 MG/1
650 TABLET ORAL ONCE
Status: CANCELLED
Start: 2021-04-09 | End: 2021-04-09

## 2021-02-02 RX ORDER — ACETAMINOPHEN 325 MG/1
650 TABLET ORAL ONCE
Status: COMPLETED | OUTPATIENT
Start: 2021-02-02 | End: 2021-02-02

## 2021-02-02 RX ORDER — METHYLPREDNISOLONE SODIUM SUCCINATE 125 MG/2ML
125 INJECTION, POWDER, LYOPHILIZED, FOR SOLUTION INTRAMUSCULAR; INTRAVENOUS ONCE
Status: CANCELLED | OUTPATIENT
Start: 2021-04-09 | End: 2021-04-09

## 2021-02-02 RX ORDER — DIPHENHYDRAMINE HCL 25 MG
25 CAPSULE ORAL ONCE
Status: COMPLETED | OUTPATIENT
Start: 2021-02-02 | End: 2021-02-02

## 2021-02-02 RX ORDER — DIPHENHYDRAMINE HCL 25 MG
25 CAPSULE ORAL ONCE
Status: CANCELLED
Start: 2021-04-09 | End: 2021-04-09

## 2021-02-02 RX ADMIN — Medication 250 ML: at 08:36

## 2021-02-02 RX ADMIN — ACETAMINOPHEN 650 MG: 325 TABLET ORAL at 08:23

## 2021-02-02 RX ADMIN — Medication 25 MG: at 08:23

## 2021-02-02 ASSESSMENT — PAIN SCALES - GENERAL: PAINLEVEL: NO PAIN (0)

## 2021-02-02 NOTE — PROGRESS NOTES
Infusion Nursing Note:  Janice Curranjuno Mayfield presents today for Rapid Remicade.    Patient seen by provider today: No   present during visit today: Not Applicable.    Note: N/A.    Intravenous Access:  Peripheral IV placed.    Treatment Conditions:  Biological Infusion Checklist:  ~~~ NOTE: If the patient answers yes to any of the questions below, hold the infusion and contact ordering provider or on-call provider.    1. Have you recently had an elevated temperature, fever, chills, productive cough, coughing for 3 weeks or longer or hemoptysis, abnormal vital signs, night sweats,  chest pain or have you noticed a decrease in your appetite, unexplained weight loss or fatigue? No  2. Do you have any open wounds or new incisions? No  3. Do you have any recent or upcoming hospitalizations, surgeries or dental procedures? No  4. Do you currently have or recently have had any signs of illness or infection or are you on any antibiotics? No  5. Have you had any new, sudden or worsening abdominal pain? No  6. Have you or anyone in your household received a live vaccination in the past 4 weeks? Please note:  No live vaccines while on biologic/chemotherapy until 6 months after the last treatment.  Patient can receive the flu vaccine (shot only) and the pneumovax.  It is optimal for the patient to get these vaccines mid cycle, but they can be given at any time as long as it is not on the day of the infusion. No  7. Have you recently been diagnosed with any new nervous system diseases (ie. Multiple sclerosis, Guillain [place], seizures, neurological changes) or cancer diagnosis? No  8. Are you on any form of radiation or chemotherapy? No  9. Are you pregnant or breast feeding or do you have plans of pregnancy in the future? No  10. Have you been having any signs of worsening depression or suicidal ideations?  (benlysta only) No  11. Have there been any other new onset medical symptoms? No      Post Infusion  Assessment:  Patient tolerated infusion without incident.  Blood return noted pre and post infusion.  Site patent and intact, free from redness, edema or discomfort.  No evidence of extravasations.  Access discontinued per protocol.       Discharge Plan:   Patient will return 3/2/2021 for next appointment.   Patient discharged in stable condition accompanied by: self.  Departure Mode: Ambulatory.    Anna London RN-BSN, PHN, OCN  [facility]

## 2021-02-03 NOTE — RESULT ENCOUNTER NOTE
The blood counts, liver, kidney and CRP inflammation labs are normal.     Pola BLANCHARD, CNP, MSN  2/3/2021  8:34 AM

## 2021-02-15 DIAGNOSIS — Z79.52 LONG TERM CURRENT USE OF SYSTEMIC STEROIDS: ICD-10-CM

## 2021-02-15 DIAGNOSIS — G89.29 CHRONIC LEFT SHOULDER PAIN: ICD-10-CM

## 2021-02-15 DIAGNOSIS — M05.79 RHEUMATOID ARTHRITIS INVOLVING MULTIPLE SITES WITH POSITIVE RHEUMATOID FACTOR (H): ICD-10-CM

## 2021-02-15 DIAGNOSIS — Z79.60 LONG-TERM USE OF IMMUNOSUPPRESSANT MEDICATION: ICD-10-CM

## 2021-02-15 DIAGNOSIS — E55.9 VITAMIN D DEFICIENCY: ICD-10-CM

## 2021-02-15 DIAGNOSIS — M15.3 OTHER SECONDARY OSTEOARTHRITIS OF MULTIPLE SITES: ICD-10-CM

## 2021-02-15 DIAGNOSIS — M25.512 CHRONIC LEFT SHOULDER PAIN: ICD-10-CM

## 2021-02-16 ENCOUNTER — MYC MEDICAL ADVICE (OUTPATIENT)
Dept: RHEUMATOLOGY | Facility: CLINIC | Age: 61
End: 2021-02-16

## 2021-02-16 NOTE — TELEPHONE ENCOUNTER
predniSONE (DELTASONE) 1 MG tablet  Last Written Prescription Date:  3/27/20  Last Fill Quantity: 360,   # refills: 1  Last Office Visit : 3/27/20  MG  Future Office visit:  3/26/21   MG    Routing refill request to provider for review/approval because: not sure where pt is on taper.

## 2021-03-02 ENCOUNTER — INFUSION THERAPY VISIT (OUTPATIENT)
Dept: INFUSION THERAPY | Facility: CLINIC | Age: 61
End: 2021-03-02
Payer: COMMERCIAL

## 2021-03-02 VITALS
TEMPERATURE: 97.9 F | DIASTOLIC BLOOD PRESSURE: 82 MMHG | WEIGHT: 161.6 LBS | OXYGEN SATURATION: 98 % | RESPIRATION RATE: 18 BRPM | HEART RATE: 84 BPM | BODY MASS INDEX: 26.89 KG/M2 | SYSTOLIC BLOOD PRESSURE: 123 MMHG

## 2021-03-02 DIAGNOSIS — M05.79 RHEUMATOID ARTHRITIS INVOLVING MULTIPLE SITES WITH POSITIVE RHEUMATOID FACTOR (H): Primary | ICD-10-CM

## 2021-03-02 PROCEDURE — 99207 PR NO CHARGE LOS: CPT

## 2021-03-02 PROCEDURE — 96413 CHEMO IV INFUSION 1 HR: CPT | Performed by: NURSE PRACTITIONER

## 2021-03-02 RX ORDER — DIPHENHYDRAMINE HCL 25 MG
25 CAPSULE ORAL ONCE
Status: CANCELLED
Start: 2021-04-09 | End: 2021-04-09

## 2021-03-02 RX ORDER — DIPHENHYDRAMINE HCL 25 MG
25 CAPSULE ORAL ONCE
Status: COMPLETED | OUTPATIENT
Start: 2021-03-02 | End: 2021-03-02

## 2021-03-02 RX ORDER — ACETAMINOPHEN 325 MG/1
650 TABLET ORAL ONCE
Status: CANCELLED
Start: 2021-04-09 | End: 2021-04-09

## 2021-03-02 RX ORDER — METHYLPREDNISOLONE SODIUM SUCCINATE 125 MG/2ML
125 INJECTION, POWDER, LYOPHILIZED, FOR SOLUTION INTRAMUSCULAR; INTRAVENOUS ONCE
Status: CANCELLED | OUTPATIENT
Start: 2021-04-09 | End: 2021-04-09

## 2021-03-02 RX ORDER — ACETAMINOPHEN 325 MG/1
650 TABLET ORAL ONCE
Status: COMPLETED | OUTPATIENT
Start: 2021-03-02 | End: 2021-03-02

## 2021-03-02 RX ADMIN — ACETAMINOPHEN 650 MG: 325 TABLET ORAL at 08:31

## 2021-03-02 RX ADMIN — Medication 250 ML: at 08:42

## 2021-03-02 RX ADMIN — Medication 25 MG: at 08:31

## 2021-03-02 NOTE — PROGRESS NOTES
Infusion Nursing Note:  Janice SIDNEY Taimargot Mayfield presents today for Remicade.    Patient seen by provider today: No   present during visit today: Not Applicable.    Note: N/A.  Patient declined the covid-19 test.    Intravenous Access:  Peripheral IV placed.    Treatment Conditions:  Biological Infusion Checklist:  ~~~ NOTE: If the patient answers yes to any of the questions below, hold the infusion and contact ordering provider or on-call provider.    1. Have you recently had an elevated temperature, fever, chills, productive cough, coughing for 3 weeks or longer or hemoptysis, abnormal vital signs, night sweats,  chest pain or have you noticed a decrease in your appetite, unexplained weight loss or fatigue? No  2. Do you have any open wounds or new incisions? No  3. Do you have any recent or upcoming hospitalizations, surgeries or dental procedures? No  4. Do you currently have or recently have had any signs of illness or infection or are you on any antibiotics? No  5. Have you had any new, sudden or worsening abdominal pain? No  6. Have you or anyone in your household received a live vaccination in the past 4 weeks? Please note:  No live vaccines while on biologic/chemotherapy until 6 months after the last treatment.  Patient can receive the flu vaccine (shot only) and the pneumovax.  It is optimal for the patient to get these vaccines mid cycle, but they can be given at any time as long as it is not on the day of the infusion. No  7. Have you recently been diagnosed with any new nervous system diseases (ie. Multiple sclerosis, Guillain [place], seizures, neurological changes) or cancer diagnosis? No  8. Are you on any form of radiation or chemotherapy? No  9. Are you pregnant or breast feeding or do you have plans of pregnancy in the future? No  10. Have you been having any signs of worsening depression or suicidal ideations?  (benlysta only) No  11. Have there been any other new onset medical  symptoms? No        Post Infusion Assessment:  Patient tolerated infusion without incident.       Discharge Plan:   Patient discharged in stable condition accompanied by: self.    Evelia Can RN

## 2021-03-08 ENCOUNTER — MEDICAL CORRESPONDENCE (OUTPATIENT)
Dept: HEALTH INFORMATION MANAGEMENT | Facility: CLINIC | Age: 61
End: 2021-03-08

## 2021-03-08 RX ORDER — PREDNISONE 1 MG/1
4 TABLET ORAL DAILY
Qty: 360 TABLET | Refills: 1 | OUTPATIENT
Start: 2021-03-08

## 2021-03-18 DIAGNOSIS — M15.3 OTHER SECONDARY OSTEOARTHRITIS OF MULTIPLE SITES: ICD-10-CM

## 2021-03-18 DIAGNOSIS — Z79.60 LONG-TERM USE OF IMMUNOSUPPRESSANT MEDICATION: ICD-10-CM

## 2021-03-18 DIAGNOSIS — G89.29 CHRONIC LEFT SHOULDER PAIN: ICD-10-CM

## 2021-03-18 DIAGNOSIS — M25.512 CHRONIC LEFT SHOULDER PAIN: ICD-10-CM

## 2021-03-18 DIAGNOSIS — M05.79 RHEUMATOID ARTHRITIS INVOLVING MULTIPLE SITES WITH POSITIVE RHEUMATOID FACTOR (H): ICD-10-CM

## 2021-03-18 DIAGNOSIS — Z79.52 LONG TERM CURRENT USE OF SYSTEMIC STEROIDS: ICD-10-CM

## 2021-03-18 DIAGNOSIS — E55.9 VITAMIN D DEFICIENCY: ICD-10-CM

## 2021-03-22 RX ORDER — FOLIC ACID 1 MG/1
2 TABLET ORAL DAILY
Qty: 60 TABLET | Refills: 0 | Status: SHIPPED | OUTPATIENT
Start: 2021-03-22 | End: 2021-04-23

## 2021-03-25 ENCOUNTER — TELEPHONE (OUTPATIENT)
Dept: RHEUMATOLOGY | Facility: CLINIC | Age: 61
End: 2021-03-25

## 2021-03-25 NOTE — TELEPHONE ENCOUNTER
[facility] Call Center    Phone Message    May a detailed message be left on voicemail: yes, [phone]    Reason for Call: Other: Pt called and would like to get her labs and the other tests that she gets done yearly. She would like to do these done an appointment. She canceled her appointment with Pola Santana for 3/26. Pt would also like to see someone in person. She does not want to go back to Dr Maherura would like to see Dr Osborne.      Action Taken: Message routed to:  Adult Clinics: Rheumatology p [id number]    Travel Screening: Not Applicable

## 2021-03-26 NOTE — TELEPHONE ENCOUNTER
Called patient and left generic vm asking them to call the clinic when they received the message. Call back number provided.    VIKAS GonzalezN, RN   Rheumatology Care Coordinator   [facility]

## 2021-03-29 ENCOUNTER — TELEPHONE (OUTPATIENT)
Dept: RHEUMATOLOGY | Facility: CLINIC | Age: 61
End: 2021-03-29

## 2021-03-29 RX ORDER — DIPHENHYDRAMINE HCL 25 MG
25 CAPSULE ORAL ONCE
Status: CANCELLED
Start: 2021-03-29 | End: 2021-03-29

## 2021-03-29 RX ORDER — METHYLPREDNISOLONE SODIUM SUCCINATE 125 MG/2ML
125 INJECTION, POWDER, LYOPHILIZED, FOR SOLUTION INTRAMUSCULAR; INTRAVENOUS ONCE
Status: CANCELLED
Start: 2021-03-29 | End: 2021-03-29

## 2021-03-29 RX ORDER — ACETAMINOPHEN 325 MG/1
650 TABLET ORAL ONCE
Status: CANCELLED
Start: 2021-03-29 | End: 2021-03-29

## 2021-03-29 RX ORDER — HEPARIN SODIUM,PORCINE 10 UNIT/ML
5 VIAL (ML) INTRAVENOUS
Status: CANCELLED | OUTPATIENT
Start: 2021-03-29

## 2021-03-29 RX ORDER — HEPARIN SODIUM (PORCINE) LOCK FLUSH IV SOLN 100 UNIT/ML 100 UNIT/ML
5 SOLUTION INTRAVENOUS
Status: CANCELLED | OUTPATIENT
Start: 2021-03-29

## 2021-03-30 ENCOUNTER — MYC MEDICAL ADVICE (OUTPATIENT)
Dept: ORTHOPEDICS | Facility: CLINIC | Age: 61
End: 2021-03-30

## 2021-03-30 ENCOUNTER — INFUSION THERAPY VISIT (OUTPATIENT)
Dept: INFUSION THERAPY | Facility: CLINIC | Age: 61
End: 2021-03-30
Payer: COMMERCIAL

## 2021-03-30 VITALS
RESPIRATION RATE: 16 BRPM | HEART RATE: 75 BPM | BODY MASS INDEX: 27.46 KG/M2 | SYSTOLIC BLOOD PRESSURE: 119 MMHG | OXYGEN SATURATION: 99 % | DIASTOLIC BLOOD PRESSURE: 76 MMHG | TEMPERATURE: 97.7 F | WEIGHT: 165 LBS

## 2021-03-30 DIAGNOSIS — M05.79 RHEUMATOID ARTHRITIS INVOLVING MULTIPLE SITES WITH POSITIVE RHEUMATOID FACTOR (H): Primary | ICD-10-CM

## 2021-03-30 DIAGNOSIS — Z79.60 LONG-TERM USE OF IMMUNOSUPPRESSANT MEDICATION: ICD-10-CM

## 2021-03-30 DIAGNOSIS — M05.79 RHEUMATOID ARTHRITIS INVOLVING MULTIPLE SITES WITH POSITIVE RHEUMATOID FACTOR (H): ICD-10-CM

## 2021-03-30 DIAGNOSIS — Z79.52 LONG TERM CURRENT USE OF SYSTEMIC STEROIDS: ICD-10-CM

## 2021-03-30 LAB
ALBUMIN SERPL-MCNC: 3.5 G/DL (ref 3.4–5)
ALT SERPL W P-5'-P-CCNC: 21 U/L (ref 0–50)
AST SERPL W P-5'-P-CCNC: 14 U/L (ref 0–45)
CREAT SERPL-MCNC: 0.83 MG/DL (ref 0.52–1.04)
CRP SERPL-MCNC: <2.9 MG/L (ref 0–8)
ERYTHROCYTE [DISTWIDTH] IN BLOOD BY AUTOMATED COUNT: 13.2 % (ref 10–15)
GFR SERPL CREATININE-BSD FRML MDRD: 77 ML/MIN/{1.73_M2}
HCT VFR BLD AUTO: 43.4 % (ref 35–47)
HGB BLD-MCNC: 13.8 G/DL (ref 11.7–15.7)
MCH RBC QN AUTO: 29.4 PG (ref 26.5–33)
MCHC RBC AUTO-ENTMCNC: 31.8 G/DL (ref 31.5–36.5)
MCV RBC AUTO: 93 FL (ref 78–100)
PLATELET # BLD AUTO: 266 10E9/L (ref 150–450)
RBC # BLD AUTO: 4.69 10E12/L (ref 3.8–5.2)
WBC # BLD AUTO: 5.6 10E9/L (ref 4–11)

## 2021-03-30 PROCEDURE — 36415 COLL VENOUS BLD VENIPUNCTURE: CPT | Performed by: NURSE PRACTITIONER

## 2021-03-30 PROCEDURE — 82565 ASSAY OF CREATININE: CPT | Performed by: NURSE PRACTITIONER

## 2021-03-30 PROCEDURE — 85027 COMPLETE CBC AUTOMATED: CPT | Performed by: NURSE PRACTITIONER

## 2021-03-30 PROCEDURE — 99207 PR NO CHARGE LOS: CPT

## 2021-03-30 PROCEDURE — 82040 ASSAY OF SERUM ALBUMIN: CPT | Performed by: NURSE PRACTITIONER

## 2021-03-30 PROCEDURE — 84460 ALANINE AMINO (ALT) (SGPT): CPT | Performed by: NURSE PRACTITIONER

## 2021-03-30 PROCEDURE — 86140 C-REACTIVE PROTEIN: CPT | Performed by: NURSE PRACTITIONER

## 2021-03-30 PROCEDURE — 84450 TRANSFERASE (AST) (SGOT): CPT | Performed by: NURSE PRACTITIONER

## 2021-03-30 PROCEDURE — 96413 CHEMO IV INFUSION 1 HR: CPT | Performed by: NURSE PRACTITIONER

## 2021-03-30 RX ORDER — METHYLPREDNISOLONE SODIUM SUCCINATE 125 MG/2ML
125 INJECTION, POWDER, LYOPHILIZED, FOR SOLUTION INTRAMUSCULAR; INTRAVENOUS ONCE
Status: CANCELLED
Start: 2021-03-31 | End: 2021-03-31

## 2021-03-30 RX ORDER — DIPHENHYDRAMINE HCL 25 MG
25 CAPSULE ORAL ONCE
Status: COMPLETED | OUTPATIENT
Start: 2021-03-30 | End: 2021-03-30

## 2021-03-30 RX ORDER — HEPARIN SODIUM,PORCINE 10 UNIT/ML
5 VIAL (ML) INTRAVENOUS
Status: CANCELLED | OUTPATIENT
Start: 2021-03-31

## 2021-03-30 RX ORDER — HEPARIN SODIUM (PORCINE) LOCK FLUSH IV SOLN 100 UNIT/ML 100 UNIT/ML
5 SOLUTION INTRAVENOUS
Status: CANCELLED | OUTPATIENT
Start: 2021-03-31

## 2021-03-30 RX ORDER — DIPHENHYDRAMINE HCL 25 MG
25 CAPSULE ORAL ONCE
Status: CANCELLED
Start: 2021-03-31 | End: 2021-03-31

## 2021-03-30 RX ORDER — ACETAMINOPHEN 325 MG/1
650 TABLET ORAL ONCE
Status: COMPLETED | OUTPATIENT
Start: 2021-03-30 | End: 2021-03-30

## 2021-03-30 RX ORDER — ACETAMINOPHEN 325 MG/1
650 TABLET ORAL ONCE
Status: CANCELLED
Start: 2021-03-31 | End: 2021-03-31

## 2021-03-30 RX ADMIN — ACETAMINOPHEN 650 MG: 325 TABLET ORAL at 08:41

## 2021-03-30 RX ADMIN — Medication 25 MG: at 08:41

## 2021-03-30 RX ADMIN — Medication 250 ML: at 08:37

## 2021-03-30 ASSESSMENT — PAIN SCALES - GENERAL: PAINLEVEL: MODERATE PAIN (5)

## 2021-03-30 NOTE — RESULT ENCOUNTER NOTE
The blood counts, liver, kidney and CRP inflammation labs are normal.     Pola BLANCHARD, CNP, MSN  3/30/2021  9:15 AM

## 2021-03-30 NOTE — TELEPHONE ENCOUNTER
Spoke with patient. She wanted to have routine testing done, last DEXA was 2019. I explained this routine testing is ordered by her primary. It was also recommended to her that she see someone in sports medicine. She is deciding if she would prefer to see that specialist first prior to following up with Pola. She cannot come into clinic this Friday so she asked if scheduling would call her back to look at additional appointments. No further questions.     Chastity Rodriguez, VIKASN, RN  Medical Specialty Care Coordinator  [facility]

## 2021-03-30 NOTE — TELEPHONE ENCOUNTER
[facility] Call Center    Phone Message    May a detailed message be left on voicemail: yes     Reason for Call: Questions and appointment with Rheumatologist.    Contacted the Pt to schedule a in person visit with Pola on 4/2 but Pt states she wants to complete some more test before scheduling a in person follow up.  Pt is looking to get a bone scan done?  Pt is wondering if someone could call her to discuss this?        Action Taken: Message routed to:  Adult Clinics: Rheumatology p [id number]    Travel Screening: Not Applicable

## 2021-04-21 DIAGNOSIS — Z79.60 LONG-TERM USE OF IMMUNOSUPPRESSANT MEDICATION: ICD-10-CM

## 2021-04-21 DIAGNOSIS — G89.29 CHRONIC LEFT SHOULDER PAIN: ICD-10-CM

## 2021-04-21 DIAGNOSIS — M15.3 OTHER SECONDARY OSTEOARTHRITIS OF MULTIPLE SITES: ICD-10-CM

## 2021-04-21 DIAGNOSIS — M05.79 RHEUMATOID ARTHRITIS INVOLVING MULTIPLE SITES WITH POSITIVE RHEUMATOID FACTOR (H): ICD-10-CM

## 2021-04-21 DIAGNOSIS — E55.9 VITAMIN D DEFICIENCY: ICD-10-CM

## 2021-04-21 DIAGNOSIS — M25.512 CHRONIC LEFT SHOULDER PAIN: ICD-10-CM

## 2021-04-21 DIAGNOSIS — Z79.52 LONG TERM CURRENT USE OF SYSTEMIC STEROIDS: ICD-10-CM

## 2021-04-23 RX ORDER — FOLIC ACID 1 MG/1
2 TABLET ORAL DAILY
Qty: 180 TABLET | Refills: 0 | Status: SHIPPED | OUTPATIENT
Start: 2021-04-23 | End: 2021-07-27

## 2021-04-23 NOTE — TELEPHONE ENCOUNTER
Last Clinic Visit: 3/27/2020  [facility]  Scheduling has been notified to contact the pt for appointment.  RF 90 day

## 2021-04-26 ENCOUNTER — TELEPHONE (OUTPATIENT)
Dept: RHEUMATOLOGY | Facility: CLINIC | Age: 61
End: 2021-04-26

## 2021-04-27 ENCOUNTER — INFUSION THERAPY VISIT (OUTPATIENT)
Dept: INFUSION THERAPY | Facility: CLINIC | Age: 61
End: 2021-04-27
Payer: COMMERCIAL

## 2021-04-27 ENCOUNTER — TELEPHONE (OUTPATIENT)
Dept: RHEUMATOLOGY | Facility: CLINIC | Age: 61
End: 2021-04-27

## 2021-04-27 VITALS
RESPIRATION RATE: 18 BRPM | DIASTOLIC BLOOD PRESSURE: 68 MMHG | BODY MASS INDEX: 26.68 KG/M2 | TEMPERATURE: 97.9 F | SYSTOLIC BLOOD PRESSURE: 130 MMHG | HEART RATE: 79 BPM | WEIGHT: 160.3 LBS | OXYGEN SATURATION: 100 %

## 2021-04-27 DIAGNOSIS — M05.79 RHEUMATOID ARTHRITIS INVOLVING MULTIPLE SITES WITH POSITIVE RHEUMATOID FACTOR (H): Primary | ICD-10-CM

## 2021-04-27 PROCEDURE — 96413 CHEMO IV INFUSION 1 HR: CPT | Performed by: NURSE PRACTITIONER

## 2021-04-27 PROCEDURE — 99207 PR NO CHARGE LOS: CPT

## 2021-04-27 RX ORDER — DIPHENHYDRAMINE HCL 25 MG
25 CAPSULE ORAL ONCE
Status: CANCELLED
Start: 2021-05-25 | End: 2021-05-25

## 2021-04-27 RX ORDER — DIPHENHYDRAMINE HCL 25 MG
25 CAPSULE ORAL ONCE
Status: COMPLETED | OUTPATIENT
Start: 2021-04-27 | End: 2021-04-27

## 2021-04-27 RX ORDER — ACETAMINOPHEN 325 MG/1
650 TABLET ORAL ONCE
Status: COMPLETED | OUTPATIENT
Start: 2021-04-27 | End: 2021-04-27

## 2021-04-27 RX ORDER — METHYLPREDNISOLONE SODIUM SUCCINATE 125 MG/2ML
60 INJECTION, POWDER, LYOPHILIZED, FOR SOLUTION INTRAMUSCULAR; INTRAVENOUS ONCE
Status: CANCELLED
Start: 2021-04-27 | End: 2021-04-27

## 2021-04-27 RX ORDER — METHYLPREDNISOLONE SODIUM SUCCINATE 125 MG/2ML
60 INJECTION, POWDER, LYOPHILIZED, FOR SOLUTION INTRAMUSCULAR; INTRAVENOUS ONCE
Status: CANCELLED
Start: 2021-05-25 | End: 2021-05-25

## 2021-04-27 RX ORDER — HEPARIN SODIUM,PORCINE 10 UNIT/ML
5 VIAL (ML) INTRAVENOUS
Status: CANCELLED | OUTPATIENT
Start: 2021-05-25

## 2021-04-27 RX ORDER — ACETAMINOPHEN 325 MG/1
650 TABLET ORAL ONCE
Status: CANCELLED
Start: 2021-05-25 | End: 2021-05-25

## 2021-04-27 RX ORDER — HEPARIN SODIUM (PORCINE) LOCK FLUSH IV SOLN 100 UNIT/ML 100 UNIT/ML
5 SOLUTION INTRAVENOUS
Status: CANCELLED | OUTPATIENT
Start: 2021-05-25

## 2021-04-27 RX ADMIN — Medication 25 MG: at 08:28

## 2021-04-27 RX ADMIN — Medication 250 ML: at 08:26

## 2021-04-27 RX ADMIN — ACETAMINOPHEN 650 MG: 325 TABLET ORAL at 08:28

## 2021-04-27 NOTE — TELEPHONE ENCOUNTER
Contacted the Pt to schedule an appointment.  Pt is scheduled for an in person visit with Dr. Reid for 6/30/21

## 2021-04-27 NOTE — PROGRESS NOTES
Infusion Nursing Note:  Janice SIDNEY Taimargot Mayfield presents today for Remicade.    Patient seen by provider today: No   present during visit today: Not Applicable.    Note: N/A.  Patient declined the covid-19 test.    Intravenous Access:  Peripheral IV placed.    Treatment Conditions:  Biological Infusion Checklist:  ~~~ NOTE: If the patient answers yes to any of the questions below, hold the infusion and contact ordering provider or on-call provider.    1. Have you recently had an elevated temperature, fever, chills, productive cough, coughing for 3 weeks or longer or hemoptysis, abnormal vital signs, night sweats,  chest pain or have you noticed a decrease in your appetite, unexplained weight loss or fatigue? No  2. Do you have any open wounds or new incisions? No  3. Do you have any recent or upcoming hospitalizations, surgeries or dental procedures? No  4. Do you currently have or recently have had any signs of illness or infection or are you on any antibiotics? No  5. Have you had any new, sudden or worsening abdominal pain? No  6. Have you or anyone in your household received a live vaccination in the past 4 weeks? Please note:  No live vaccines while on biologic/chemotherapy until 6 months after the last treatment.  Patient can receive the flu vaccine (shot only) and the pneumovax.  It is optimal for the patient to get these vaccines mid cycle, but they can be given at any time as long as it is not on the day of the infusion. No  7. Have you recently been diagnosed with any new nervous system diseases (ie. Multiple sclerosis, Guillain [place], seizures, neurological changes) or cancer diagnosis? No  8. Are you on any form of radiation or chemotherapy? No  9. Are you pregnant or breast feeding or do you have plans of pregnancy in the future? No  10. Have you been having any signs of worsening depression or suicidal ideations?  (benlysta only) No  11. Have there been any other new onset medical  symptoms? No        Post Infusion Assessment:  Patient tolerated infusion without incident.       Discharge Plan:   Patient discharged in stable condition accompanied by: self.    Evelia Can RN

## 2021-04-28 ENCOUNTER — ANCILLARY PROCEDURE (OUTPATIENT)
Dept: GENERAL RADIOLOGY | Facility: CLINIC | Age: 61
End: 2021-04-28
Attending: PREVENTIVE MEDICINE
Payer: COMMERCIAL

## 2021-04-28 ENCOUNTER — OFFICE VISIT (OUTPATIENT)
Dept: ORTHOPEDICS | Facility: CLINIC | Age: 61
End: 2021-04-28
Payer: COMMERCIAL

## 2021-04-28 VITALS
DIASTOLIC BLOOD PRESSURE: 75 MMHG | BODY MASS INDEX: 26.63 KG/M2 | WEIGHT: 160 LBS | HEART RATE: 73 BPM | SYSTOLIC BLOOD PRESSURE: 113 MMHG

## 2021-04-28 DIAGNOSIS — M25.552 LEFT HIP PAIN: ICD-10-CM

## 2021-04-28 DIAGNOSIS — M25.552 LEFT HIP PAIN: Primary | ICD-10-CM

## 2021-04-28 DIAGNOSIS — M54.16 LUMBAR RADICULAR PAIN: ICD-10-CM

## 2021-04-28 PROCEDURE — 73522 X-RAY EXAM HIPS BI 3-4 VIEWS: CPT | Performed by: RADIOLOGY

## 2021-04-28 PROCEDURE — 72100 X-RAY EXAM L-S SPINE 2/3 VWS: CPT | Performed by: RADIOLOGY

## 2021-04-28 PROCEDURE — 99203 OFFICE O/P NEW LOW 30 MIN: CPT | Performed by: PREVENTIVE MEDICINE

## 2021-04-28 ASSESSMENT — PAIN SCALES - GENERAL: PAINLEVEL: NO PAIN (0)

## 2021-04-28 NOTE — PROGRESS NOTES
NEW PATIENT INTAKE QUESTIONNAIRE  [facility] 4/28/2021      Janice Pickard Chaparro's chief complaint for this visit includes:  Chief Complaint   Patient presents with     Hip Pain     Left hip pain, reporting pain started about 6 weeks ago.Reporting some stiffness in low back.      PCP: Sunshine Harvey    Referring Provider:  No referring provider defined for this encounter.    /75   Pulse 73   Wt 72.6 kg (160 lb)   BMI 26.63 kg/m    No Pain (0)     Do you need any medication refills at today's visit? No      Reason for Visit:    What part of your body is injured / painful?  left hip    What caused the injury /pain? No inciting event     How long ago did your injury occur or pain begin? several days ago    What are your most bothersome symptoms? Pain and Inability to compete/participate in sport or activity    How would you characterize your symptom? aching    What makes your symptoms better? Rest    What makes your symptoms worse? Movement    Have you been previously seen for this problem? No    Medical History:    Medical History: Rheumatoid arthritis     Have you had surgery on this body part before? No    Medications: Up to date    Allergies: Yes:        Social History:    Exercise: Yoga     Review of Systems:    Have you recently had a a fever, chills, weight loss? No    Do you have any vision problems? No    Do you have any chest pain or edema? No    Do you have any shortness of breath or wheezing?  No    Do you have stomach problems? No    Do you have any urinary track issues? No    Do you have any numbness or focal weakness? No    Do you have diabetes? No    Do you have problems with bleeding or clotting? No    Do you have an rashes or other skin lesions? No    Communication:    Who else should know about this visit? PCP, Dr. Sunshine Harvey

## 2021-04-28 NOTE — PATIENT INSTRUCTIONS
Thanks for coming today.  Ortho/Sports Medicine Clinic  [address] [place] [address]    To schedule future appointments in Ortho Clinic, you may call [phone].    To schedule ordered imaging by your provider:   Call Central Imaging [phone]    To schedule an injection ordered by your provider:  Call Central Imaging Injection scheduling line: [phone]  Acid Labshart available online at:  [email or web address]    Please call if any further questions or concerns ([phone]).  Clinic hours 8 am to 5 pm.    Return to clinic (call) if symptoms worsen or fail to improve.

## 2021-04-28 NOTE — LETTER
4/28/2021         RE: Janice Mayfield  [address] [address]        Dear Colleague,    Thank you for referring your patient, Janice Mayfield, to the [facility] [place]. Please see a copy of my visit note below.    NEW PATIENT INTAKE QUESTIONNAIRE  [facility] 4/28/2021      Janice Mayfield's chief complaint for this visit includes:  Chief Complaint   Patient presents with     Hip Pain     Left hip pain, reporting pain started about 6 weeks ago.Reporting some stiffness in low back.      PCP: Sunshine Harvey    Referring Provider:  No referring provider defined for this encounter.    /75   Pulse 73   Wt 72.6 kg (160 lb)   BMI 26.63 kg/m    No Pain (0)     Do you need any medication refills at today's visit? No      Reason for Visit:    What part of your body is injured / painful?  left hip    What caused the injury /pain? No inciting event     How long ago did your injury occur or pain begin? several days ago    What are your most bothersome symptoms? Pain and Inability to compete/participate in sport or activity    How would you characterize your symptom? aching    What makes your symptoms better? Rest    What makes your symptoms worse? Movement    Have you been previously seen for this problem? No    Medical History:    Medical History: Rheumatoid arthritis     Have you had surgery on this body part before? No    Medications: Up to date    Allergies: Yes:        Social History:    Exercise: Yoga     Review of Systems:    Have you recently had a a fever, chills, weight loss? No    Do you have any vision problems? No    Do you have any chest pain or edema? No    Do you have any shortness of breath or wheezing?  No    Do you have stomach problems? No    Do you have any urinary track issues? No    Do you have any numbness or focal weakness? No    Do you have diabetes? No    Do you have problems with  bleeding or clotting? No    Do you have an rashes or other skin lesions? No    Communication:    Who else should know about this visit? PCP, Dr. Sunshine Harvey      HISTORY OF PRESENT ILLNESS  Ms. Melly Mayfield is a pleasant 60 year old year old female who presents to clinic today with left hip pain and low back pain  Janice explains that she has not had any imaging/xrays of these areas recently  Location: low back and hips  Quality:  achy pain    Severity: 5/10 at worst    Duration: worse over past 6 months  Timing: occurs intermittently  Context: occurs while walking and sitting  Modifying factors:  resting and non-use makes it better, movement and use makes it worse  Associated signs & symptoms: radiation of pain into hips from low back    MEDICAL HISTORY  Patient Active Problem List   Diagnosis     Rheumatoid arthritis involving multiple sites with positive rheumatoid factor (H)     Vitamin D deficiency     Long term current use of systemic steroids     Other secondary osteoarthritis of multiple sites     Long-term use of immunosuppressant medication     Varicose veins of both lower extremities, unspecified whether complicated     Lichen sclerosus et atrophicus of the vulva     Cervical cancer screening       Current Outpatient Medications   Medication Sig Dispense Refill     Acetaminophen (TYLENOL PO) Take 650 mg by mouth once       DiphenhydrAMINE HCl (BENADRYL PO) Take 25 mg by mouth as needed (Pre-medication for remicade)       folic acid (FOLVITE) 1 MG tablet Take 2 tablets (2 mg) by mouth daily For additional refills, please schedule a follow-up appointment. 180 tablet 0     IBUPROFEN PO Take 200 mg by mouth as needed for moderate pain       InFLIXimab (REMICADE IV) Inject 100 mg into the vein every 28 days        methotrexate 2.5 MG tablet Take 4 tablets (10 mg) by mouth every 7 days Labs every 8-12 week 48 tablet 0     predniSONE (DELTASONE) 1 MG tablet Take 4 tablets (4 mg) by mouth daily Try taper 4  alternate 3.5 mg every day if able 360 tablet 1     Vitamin D, Cholecalciferol, 1000 UNITS CAPS Take 2,000 Units by mouth daily 1 capsule 0       Allergies   Allergen Reactions     Adalimumab Hives     Humira      hives     Methotrexate      Anxiety. No allergic signs.      Plaquenil [Hydroxychloroquine]      Elevated LFTs     Sulfa Drugs      delusions       Family History   Problem Relation Age of Onset     Cancer Mother 72     Skin Cancer Father      Social History     Socioeconomic History     Marital status:      Spouse name: Maksim     Number of children: 3     Years of education: Not on file     Highest education level: Not on file   Occupational History     Occupation: artist- contract work   Social Needs     Financial resource strain: Not on file     Food insecurity     Worry: Not on file     Inability: Not on file     Transportation needs     Medical: Not on file     Non-medical: Not on file   Tobacco Use     Smoking status: Never Smoker     Smokeless tobacco: Never Used   Substance and Sexual Activity     Alcohol use: No     Drug use: No     Sexual activity: Not Currently     Partners: Male   Lifestyle     Physical activity     Days per week: Not on file     Minutes per session: Not on file     Stress: Not on file   Relationships     Social connections     Talks on phone: Not on file     Gets together: Not on file     Attends [employer]ism service: Not on file     Active member of club or organization: Not on file     Attends meetings of clubs or organizations: Not on file     Relationship status: Not on file     Intimate partner violence     Fear of current or ex partner: Not on file     Emotionally abused: Not on file     Physically abused: Not on file     Forced sexual activity: Not on file   Other Topics Concern     Parent/sibling w/ CABG, MI or angioplasty before 65F 55M? Not Asked   Social History Narrative     Not on file       Additional medical/Social/Surgical histories reviewed in [facility] and  updated as appropriate.     REVIEW OF SYSTEMS (4/28/2021)  10 point ROS of systems including Constitutional, Eyes, Respiratory, Cardiovascular, Gastroenterology, Genitourinary, Integumentary, Musculoskeletal, Psychiatric, Allergic/Immunologic were all negative except for pertinent positives noted in my HPI.     PHYSICAL EXAM  Vitals:    04/28/21 0823   BP: 113/75   Pulse: 73   Weight: 72.6 kg (160 lb)     Vital Signs: /75   Pulse 73   Wt 72.6 kg (160 lb)   BMI 26.63 kg/m   Patient declined being weighed. Body mass index is 26.63 kg/m .    General  - normal appearance, in no obvious distress  HEENT  - conjunctivae not injected, moist mucous membranes, normocephalic/atraumatic head, ears normal appearance, no lesions, mouth normal appearance, no scars, normal dentition and teeth present  CV  - normal femoral pulse  Pulm  - normal respiratory pattern, non-labored  Musculoskeletal - bilateral hip  - stance: gait slightly favors affected side, no obvious leg length discrepancy, normal heel and toe walk  - inspection: no swelling or effusion,  normal bone and joint alignment, no obvious deformity  - palpation: no lateral or anterior hip tenderness  - ROM: limited flexion and internal rotation secondary to pain, pain with passive and active ROM   - strength: 5/5 in all planes  - special tests:  (-) ANNY  (-) FADIR  no pain with axial femoral load  Neuro  - no sensory or motor deficit, grossly normal coordination, normal muscle tone  Skin  - no ecchymosis, erythema, warmth, or induration, no obvious rash  Psych  - interactive, appropriate, normal mood and affect  Low back: has some pain with flexion and extension, negative SLR  ASSESSMENT & PLAN  61 yo female with lumbar radicular pain due to possible disc herniation, ddd, and , hip pain due to trochanteric bursitis  Independently reviewed lumbar xray: shows some ddd  Consider lumbar MRI  And reviewed bilateral hip xrays: WNL  Given HEP  Start PT  F/u in 1-2  months, sooner if condition worsens    Appropriate PPE was utilized for prevention of spread of Covid-19.  Ventura Mabry MD, [facility]        Again, thank you for allowing me to participate in the care of your patient.        Sincerely,        Ventura Mabry MD

## 2021-04-28 NOTE — PROGRESS NOTES
HISTORY OF PRESENT ILLNESS  Ms. Melly Mayfield is a pleasant 60 year old year old female who presents to clinic today with left hip pain and low back pain  Janice explains that she has not had any imaging/xrays of these areas recently  Location: low back and hips  Quality:  achy pain    Severity: 5/10 at worst    Duration: worse over past 6 months  Timing: occurs intermittently  Context: occurs while walking and sitting  Modifying factors:  resting and non-use makes it better, movement and use makes it worse  Associated signs & symptoms: radiation of pain into hips from low back    MEDICAL HISTORY  Patient Active Problem List   Diagnosis     Rheumatoid arthritis involving multiple sites with positive rheumatoid factor (H)     Vitamin D deficiency     Long term current use of systemic steroids     Other secondary osteoarthritis of multiple sites     Long-term use of immunosuppressant medication     Varicose veins of both lower extremities, unspecified whether complicated     Lichen sclerosus et atrophicus of the vulva     Cervical cancer screening       Current Outpatient Medications   Medication Sig Dispense Refill     Acetaminophen (TYLENOL PO) Take 650 mg by mouth once       DiphenhydrAMINE HCl (BENADRYL PO) Take 25 mg by mouth as needed (Pre-medication for remicade)       folic acid (FOLVITE) 1 MG tablet Take 2 tablets (2 mg) by mouth daily For additional refills, please schedule a follow-up appointment. 180 tablet 0     IBUPROFEN PO Take 200 mg by mouth as needed for moderate pain       InFLIXimab (REMICADE IV) Inject 100 mg into the vein every 28 days        methotrexate 2.5 MG tablet Take 4 tablets (10 mg) by mouth every 7 days Labs every 8-12 week 48 tablet 0     predniSONE (DELTASONE) 1 MG tablet Take 4 tablets (4 mg) by mouth daily Try taper 4 alternate 3.5 mg every day if able 360 tablet 1     Vitamin D, Cholecalciferol, 1000 UNITS CAPS Take 2,000 Units by mouth daily 1 capsule 0       Allergies    Allergen Reactions     Adalimumab Hives     Humira      hives     Methotrexate      Anxiety. No allergic signs.      Plaquenil [Hydroxychloroquine]      Elevated LFTs     Sulfa Drugs      delusions       Family History   Problem Relation Age of Onset     Cancer Mother 72     Skin Cancer Father      Social History     Socioeconomic History     Marital status:      Spouse name: Maksim     Number of children: 3     Years of education: Not on file     Highest education level: Not on file   Occupational History     Occupation: artist- contract work   Social Needs     Financial resource strain: Not on file     Food insecurity     Worry: Not on file     Inability: Not on file     Transportation needs     Medical: Not on file     Non-medical: Not on file   Tobacco Use     Smoking status: Never Smoker     Smokeless tobacco: Never Used   Substance and Sexual Activity     Alcohol use: No     Drug use: No     Sexual activity: Not Currently     Partners: Male   Lifestyle     Physical activity     Days per week: Not on file     Minutes per session: Not on file     Stress: Not on file   Relationships     Social connections     Talks on phone: Not on file     Gets together: Not on file     Attends Yarsanism service: Not on file     Active member of club or organization: Not on file     Attends meetings of clubs or organizations: Not on file     Relationship status: Not on file     Intimate partner violence     Fear of current or ex partner: Not on file     Emotionally abused: Not on file     Physically abused: Not on file     Forced sexual activity: Not on file   Other Topics Concern     Parent/sibling w/ CABG, MI or angioplasty before 65F 55M? Not Asked   Social History Narrative     Not on file       Additional medical/Social/Surgical histories reviewed in [facility] and updated as appropriate.     REVIEW OF SYSTEMS (4/28/2021)  10 point ROS of systems including Constitutional, Eyes, Respiratory, Cardiovascular,  Gastroenterology, Genitourinary, Integumentary, Musculoskeletal, Psychiatric, Allergic/Immunologic were all negative except for pertinent positives noted in my HPI.     PHYSICAL EXAM  Vitals:    04/28/21 0823   BP: 113/75   Pulse: 73   Weight: 72.6 kg (160 lb)     Vital Signs: /75   Pulse 73   Wt 72.6 kg (160 lb)   BMI 26.63 kg/m   Patient declined being weighed. Body mass index is 26.63 kg/m .    General  - normal appearance, in no obvious distress  HEENT  - conjunctivae not injected, moist mucous membranes, normocephalic/atraumatic head, ears normal appearance, no lesions, mouth normal appearance, no scars, normal dentition and teeth present  CV  - normal femoral pulse  Pulm  - normal respiratory pattern, non-labored  Musculoskeletal - bilateral hip  - stance: gait slightly favors affected side, no obvious leg length discrepancy, normal heel and toe walk  - inspection: no swelling or effusion,  normal bone and joint alignment, no obvious deformity  - palpation: no lateral or anterior hip tenderness  - ROM: limited flexion and internal rotation secondary to pain, pain with passive and active ROM   - strength: 5/5 in all planes  - special tests:  (-) ANNY  (-) FADIR  no pain with axial femoral load  Neuro  - no sensory or motor deficit, grossly normal coordination, normal muscle tone  Skin  - no ecchymosis, erythema, warmth, or induration, no obvious rash  Psych  - interactive, appropriate, normal mood and affect  Low back: has some pain with flexion and extension, negative SLR  ASSESSMENT & PLAN  59 yo female with lumbar radicular pain due to possible disc herniation, ddd, and , hip pain due to trochanteric bursitis  Independently reviewed lumbar xray: shows some ddd  Consider lumbar MRI  And reviewed bilateral hip xrays: WNL  Given HEP  Start PT  F/u in 1-2 months, sooner if condition worsens    Appropriate PPE was utilized for prevention of spread of Covid-19.  Ventura Mabry MD, CAM

## 2021-05-10 ENCOUNTER — THERAPY VISIT (OUTPATIENT)
Dept: PHYSICAL THERAPY | Facility: CLINIC | Age: 61
End: 2021-05-10
Payer: COMMERCIAL

## 2021-05-10 DIAGNOSIS — M54.50 LEFT-SIDED LOW BACK PAIN WITHOUT SCIATICA: ICD-10-CM

## 2021-05-10 PROCEDURE — 97161 PT EVAL LOW COMPLEX 20 MIN: CPT | Mod: GP | Performed by: PHYSICAL THERAPIST

## 2021-05-10 PROCEDURE — 97110 THERAPEUTIC EXERCISES: CPT | Mod: GP | Performed by: PHYSICAL THERAPIST

## 2021-05-10 PROCEDURE — 97530 THERAPEUTIC ACTIVITIES: CPT | Mod: GP | Performed by: PHYSICAL THERAPIST

## 2021-05-10 ASSESSMENT — ACTIVITIES OF DAILY LIVING (ADL)
ROLLING_OVER_IN_BED: SLIGHT DIFFICULTY
HOW_WOULD_YOU_RATE_YOUR_CURRENT_LEVEL_OF_FUNCTION_DURING_YOUR_USUAL_ACTIVITIES_OF_DAILY_LIVING_FROM_0_TO_100_WITH_100_BEING_YOUR_LEVEL_OF_FUNCTION_PRIOR_TO_YOUR_HIP_PROBLEM_AND_0_BEING_THE_INABILITY_TO_PERFORM_ANY_OF_YOUR_USUAL_DAILY_ACTIVITIES?: 80
STANDING_FOR_15_MINUTES: NO DIFFICULTY AT ALL
HEAVY_WORK: MODERATE DIFFICULTY
WALKING_INITIALLY: NO DIFFICULTY AT ALL
GOING_DOWN_1_FLIGHT_OF_STAIRS: SLIGHT DIFFICULTY
TWISTING/PIVOTING_ON_INVOLVED_LEG: MODERATE DIFFICULTY
HOS_ADL_COUNT: 17
LIGHT_TO_MODERATE_WORK: NO DIFFICULTY AT ALL
WALKING_DOWN_STEEP_HILLS: SLIGHT DIFFICULTY
WALKING_15_MINUTES_OR_GREATER: NO DIFFICULTY AT ALL
STEPPING_UP_AND_DOWN_CURBS: SLIGHT DIFFICULTY
HOS_ADL_SCORE(%): 70.59
WALKING_UP_STEEP_HILLS: SLIGHT DIFFICULTY
RECREATIONAL_ACTIVITIES: MODERATE DIFFICULTY
HOS_ADL_HIGHEST_POTENTIAL_SCORE: 68
HOS_ADL_ITEM_SCORE_TOTAL: 48
WALKING_APPROXIMATELY_10_MINUTES: NO DIFFICULTY AT ALL
GOING_UP_1_FLIGHT_OF_STAIRS: SLIGHT DIFFICULTY
SITTING_FOR_15_MINUTES: SLIGHT DIFFICULTY
DEEP_SQUATTING: EXTREME DIFFICULTY
GETTING_INTO_AND_OUT_OF_A_BATHTUB: EXTREME DIFFICULTY
GETTING_INTO_AND_OUT_OF_AN_AVERAGE_CAR: MODERATE DIFFICULTY
PUTTING_ON_SOCKS_AND_SHOES: NO DIFFICULTY AT ALL

## 2021-05-11 DIAGNOSIS — M05.79 RHEUMATOID ARTHRITIS INVOLVING MULTIPLE SITES WITH POSITIVE RHEUMATOID FACTOR (H): ICD-10-CM

## 2021-05-11 DIAGNOSIS — M25.512 CHRONIC LEFT SHOULDER PAIN: ICD-10-CM

## 2021-05-11 DIAGNOSIS — Z79.60 LONG-TERM USE OF IMMUNOSUPPRESSANT MEDICATION: ICD-10-CM

## 2021-05-11 DIAGNOSIS — E55.9 VITAMIN D DEFICIENCY: ICD-10-CM

## 2021-05-11 DIAGNOSIS — M15.3 OTHER SECONDARY OSTEOARTHRITIS OF MULTIPLE SITES: ICD-10-CM

## 2021-05-11 DIAGNOSIS — Z79.52 LONG TERM CURRENT USE OF SYSTEMIC STEROIDS: ICD-10-CM

## 2021-05-11 DIAGNOSIS — G89.29 CHRONIC LEFT SHOULDER PAIN: ICD-10-CM

## 2021-05-13 NOTE — TELEPHONE ENCOUNTER
PREDNISONE 1 MG TABLET  Last Written Prescription Date:  3/27/2021  Last Fill Quantity: 360,   # refills: 1  Last Office Visit : 3/27/2020  Future Office visit:  6/30/2021    Routing refill request to provider for review/approval because:  Last order was tapered dosing.   Referring order to Provider that will see Pt in June.    Pola hernandez will be leaving soon.   So required to send to the Provider that will be taking over care for the Pt .      Thank you       Shanique Nina RN  Central Triage Red Flags/Med Refills

## 2021-05-14 DIAGNOSIS — M05.79 RHEUMATOID ARTHRITIS INVOLVING MULTIPLE SITES WITH POSITIVE RHEUMATOID FACTOR (H): ICD-10-CM

## 2021-05-14 DIAGNOSIS — Z79.52 LONG TERM CURRENT USE OF SYSTEMIC STEROIDS: ICD-10-CM

## 2021-05-14 DIAGNOSIS — E55.9 VITAMIN D DEFICIENCY: ICD-10-CM

## 2021-05-14 DIAGNOSIS — G89.29 CHRONIC LEFT SHOULDER PAIN: ICD-10-CM

## 2021-05-14 DIAGNOSIS — M25.512 CHRONIC LEFT SHOULDER PAIN: ICD-10-CM

## 2021-05-14 DIAGNOSIS — M15.3 OTHER SECONDARY OSTEOARTHRITIS OF MULTIPLE SITES: ICD-10-CM

## 2021-05-14 DIAGNOSIS — Z79.60 LONG-TERM USE OF IMMUNOSUPPRESSANT MEDICATION: ICD-10-CM

## 2021-05-17 RX ORDER — PREDNISONE 1 MG/1
TABLET ORAL
Qty: 360 TABLET | Refills: 0 | Status: SHIPPED | OUTPATIENT
Start: 2021-05-17 | End: 2021-08-24

## 2021-05-17 RX ORDER — PREDNISONE 1 MG/1
4 TABLET ORAL DAILY
Qty: 360 TABLET | Refills: 1 | OUTPATIENT
Start: 2021-05-17

## 2021-05-20 NOTE — TELEPHONE ENCOUNTER
NOTES Status Details   OFFICE NOTE from referring provider N/A    OFFICE NOTE from other specialist Internal 11.21.2019 Herminia Reid MD    11.14.2019 Sunshine Harvey, LO GARY    07.26.2019 Pola Santana, LO GARY   DISCHARGE SUMMARY from hospital N/A    DISCHARGE REPORT from the ER N/A    MEDICATION LIST Internal    LABS (Any and all labs)      Internal    Biopsy/pathology (Anything related to diagnoses I.e. fluid aspirations, lip biopsy, muscle biopsy)      N/A     N/A    Imaging (All imaging related to diagnoses)     Echo N/A    HRCT N/A    CXR N/A    EMG N/A                   Scleroderma/Dermatomyositis diagnoses     Previous Cardiology notes  N/A    Previous Pulmonary notes N/A    Previous Dermatology notes N/A    Previous GI notes N/A    Lupus diagnoses     Previous Nephrology notes N/A    Previous Dermatology notes N/A    Previous Cardiology notes N/A

## 2021-05-24 ENCOUNTER — PRE VISIT (OUTPATIENT)
Dept: RHEUMATOLOGY | Facility: CLINIC | Age: 61
End: 2021-05-24

## 2021-05-24 ENCOUNTER — OFFICE VISIT (OUTPATIENT)
Dept: RHEUMATOLOGY | Facility: CLINIC | Age: 61
End: 2021-05-24
Attending: INTERNAL MEDICINE
Payer: COMMERCIAL

## 2021-05-24 VITALS
DIASTOLIC BLOOD PRESSURE: 80 MMHG | OXYGEN SATURATION: 99 % | SYSTOLIC BLOOD PRESSURE: 115 MMHG | WEIGHT: 161 LBS | BODY MASS INDEX: 26.79 KG/M2 | HEART RATE: 79 BPM

## 2021-05-24 DIAGNOSIS — Z79.60 LONG-TERM USE OF IMMUNOSUPPRESSANT MEDICATION: ICD-10-CM

## 2021-05-24 DIAGNOSIS — M05.79 RHEUMATOID ARTHRITIS INVOLVING MULTIPLE SITES WITH POSITIVE RHEUMATOID FACTOR (H): Primary | Chronic | ICD-10-CM

## 2021-05-24 PROCEDURE — G0463 HOSPITAL OUTPT CLINIC VISIT: HCPCS

## 2021-05-24 PROCEDURE — 99214 OFFICE O/P EST MOD 30 MIN: CPT | Performed by: INTERNAL MEDICINE

## 2021-05-24 ASSESSMENT — JOINT PAIN
TOTAL NUMBER OF TENDER JOINTS: 0
TOTAL NUMBER OF SWOLLEN JOINTS: 0

## 2021-05-24 NOTE — NURSING NOTE
Chief Complaint   Patient presents with     RECHECK     RA follow up     /80 (BP Location: Right arm, Patient Position: Sitting, Cuff Size: Adult Regular)   Pulse 79   Wt 73 kg (161 lb)   SpO2 99%   BMI 26.79 kg/m        Pola Rivero, EMT

## 2021-05-24 NOTE — LETTER
5/24/2021       RE: Janice Mayfield  [address] [address]     Dear Colleague,    Thank you for referring your patient, Janice Mayfield, to the [facility]. Please see a copy of my visit note below.    Follow up visit for longstanding rheumatoid arthritis, previously seen by Samanta Santana.    CC rheumatoid arthritis    HPI She has been stable with her RA on Remicade infusions for the last few years. This visit is to establish care with a new rheumatologist. She does her blood work regularly and the results have been normal. She is getting her infusion every 4 weeks 400 mg, and the next infusion has been scheduled already. She has tried several bDMARDs and the Remicade seems to work best for her. She is also taking methotrexate 10 mg weekly that seems to be helping as compared to the time before restarting this in February. She takes prednisone on rare occasion for a few days for a mini flare.    [facility]  Dexa scan in 2019 show osteopenia with a T-score of -1.1  RA for many years with significant secondary OA    ROS  joint pains but improving, no major positives on ROS in detail.    Reviewed X-ray images, hips, lumbar spine and hands and DEXA scan.  Reviewed her previous blood tests and they are normal on 3/30/21    Exam  Normal vitals  Joint exam shows no active synovitis. She does have OA in the hands, especially at the right wrist with some loss of ROM.    Impression/Plan  1. Clear rheumatoid arthritis that seems reasonably well controlled on MTX 10 mg weekly and infliximab 400 mg monthly. I suggest we continue with the same meds and doses.  2. There is no evidence for drug toxicity. She would blood tests every 3 months for monitoring of the MTX.  3. OA that seems to be manageable at this point.  4. Osteopenia, which we discussed. Ca and vit D is advised.  5. F/u in 6 months or  earlier.    I spent 35 minutes face to face with the patient, with 20 minutes on counseling and coordination of care.    Sincerely,    Juancarlos Alva MD

## 2021-05-24 NOTE — PROGRESS NOTES
Follow up visit for longstanding rheumatoid arthritis, previously seen by Samanta Santana.    CC rheumatoid arthritis    HPI She has been stable with her RA on Remicade infusions for the last few years. This visit is to establish care with a new rheumatologist. She does her blood work regularly and the results have been normal. She is getting her infusion every 4 weeks 400 mg, and the next infusion has been scheduled already. She has tried several bDMARDs and the Remicade seems to work best for her. She is also taking methotrexate 10 mg weekly that seems to be helping as compared to the time before restarting this in February. She takes prednisone on rare occasion for a few days for a mini flare.    [facility]  Dexa scan in 2019 show osteopenia with a T-score of -1.1  RA for many years with significant secondary OA    ROS  joint pains but improving, no major positives on ROS in detail.    Reviewed X-ray images, hips, lumbar spine and hands and DEXA scan.  Reviewed her previous blood tests and they are normal on 3/30/21    Exam  Normal vitals  Joint exam shows no active synovitis. She does have OA in the hands, especially at the right wrist with some loss of ROM.    Impression/Plan  1. Clear rheumatoid arthritis that seems reasonably well controlled on MTX 10 mg weekly and infliximab 400 mg monthly. I suggest we continue with the same meds and doses.  2. There is no evidence for drug toxicity. She would blood tests every 3 months for monitoring of the MTX.  3. OA that seems to be manageable at this point.  4. Osteopenia, which we discussed. Ca and vit D is advised.  5. F/u in 6 months or earlier.    I spent 35 minutes face to face with the patient, with 20 minutes on counseling and coordination of care.

## 2021-06-03 ENCOUNTER — OFFICE VISIT (OUTPATIENT)
Dept: PODIATRY | Facility: CLINIC | Age: 61
End: 2021-06-03
Payer: COMMERCIAL

## 2021-06-03 VITALS
SYSTOLIC BLOOD PRESSURE: 144 MMHG | HEIGHT: 65 IN | HEART RATE: 67 BPM | DIASTOLIC BLOOD PRESSURE: 87 MMHG | BODY MASS INDEX: 26.82 KG/M2 | WEIGHT: 161 LBS

## 2021-06-03 DIAGNOSIS — L60.0 INGROWN NAIL OF GREAT TOE OF LEFT FOOT: Primary | ICD-10-CM

## 2021-06-03 DIAGNOSIS — M21.41 BILATERAL PES PLANUS: ICD-10-CM

## 2021-06-03 DIAGNOSIS — M06.9 RHEUMATOID ARTHRITIS INVOLVING BOTH FEET, UNSPECIFIED WHETHER RHEUMATOID FACTOR PRESENT (H): ICD-10-CM

## 2021-06-03 DIAGNOSIS — M21.42 BILATERAL PES PLANUS: ICD-10-CM

## 2021-06-03 PROCEDURE — 99203 OFFICE O/P NEW LOW 30 MIN: CPT | Mod: 25 | Performed by: PODIATRIST

## 2021-06-03 PROCEDURE — 11730 AVULSION NAIL PLATE SIMPLE 1: CPT | Mod: TA | Performed by: PODIATRIST

## 2021-06-03 ASSESSMENT — MIFFLIN-ST. JEOR: SCORE: 1311.17

## 2021-06-03 NOTE — LETTER
"    6/3/2021         RE: Janice Mayfield  [address] [address]        Dear Colleague,    Thank you for referring your patient, Janice Mayfield, to the [facility] [place]. Please see a copy of my visit note below.    Janice Mayfield is a 60 year old female with Rheumatoid arthritis who presents with a chief complain of a painful ingrown toenail to the left great toe.  The patient relates pain when wearing shoes.  The patient denies any redness extending up the big toe into the foot.  The patient relates the condition has been getting worse over the past several weeks.         Pertinent medical, surgical and family history include vitamin D deficiency, Varicose veins bilateral lower extremities, Rheumatoid arthritis    Vitals: BP (!) 144/87   Pulse 67   Ht 1.651 m (5' 5\")   Wt 73 kg (161 lb)   BMI 26.79 kg/m    BMI= Body mass index is 26.79 kg/m .    LOWER EXTREMITY PHYSICAL EXAM    Dermatologic: One notes an inflamed nail border of the left great toe.  There is noted erythema and edema located around the labial fold and does not extend past the interphalangeal joint.  There is no apparent purulent drainage noted.  There is pain on palpation to the proximal aspect of the nail border.  Otherwise, the skin is intact to both lower extremities without significant lesions, rash or abrasion.           Vascular: DP & PT pulses are intact & regular on the left.   CFT and skin temperature is normal to the left lower extremities.     Neurologic: Lower extremity sensation is intact to light touch.  No evidence of weakness in the left lower extremities.        Musculoskeletal: Patient is ambulatory without assistive device or brace.  No gross ankle deformity noted.  No foot or ankle joint effusion is noted.   Noted pes planus deformity bilaterally.        ASSESSMENT / PLAN:     ICD-10-CM    1. Ingrown nail of great toe of left foot  L60.0 REMOVAL " OF NAIL PLATE SIMPLE SINGLE   2. Bilateral pes planus  M21.41 [facility]    M21.42    3. Rheumatoid arthritis involving both feet, unspecified whether rheumatoid factor present (H)  M06.9 [facility]       Plan:  I have explained to Janice about the condition.  The potential causes and nature of an ingrown toenail were discussed with the patient.  We reviewed the natural history and prognosis of the condition and potential risks if no treatment is provided.  Treatment options discussed included conservative management (oral antibiotics, soaking of foot, adequate width shoes)  as well as surgical management (partial or total nail removal).  The pros and cons of both forms as well as risks and benefits of treatment were reviewed.       At this point, I recommended having the offending nail plate removed.  I have explained to the patient all of the possible risks, benefits, alternatives to the procedure.  The patient consented to the proposed procedure.  The left hallux was swabbed with alcohol.  Next, approximately 3 cc of 1% lidocaine plain was injected around the left hallux.  The left hallux was then prepped with Betadine ointment.  A tourniquet was applied to the left hallux.  The offending nail plate was avulsed in toto.  The wound was then irrigated and dressed with Triple antibiotic ointment and a compressive dry sterile dressing.  The tourniquet was removed and a prompt hyperemic response was noted.  The patient tolerated the procedure well with no complications.  The patient was given post procedure instructions for the care of the wound.    The patient was instructed to notify the office if any redness extending past the big toe joint or fever with chills are experienced.      There is low risk of morbidity with the procedure.  There was no overlap in work associated with the evaluation/management and the work associated with the procedure.    The patient was referred to  [facility] for custom orthotics that will aid in offloading the tension forces to the soft tissues and prevent further inflammation.    Janice verbalized agreement with and understanding of the rational for the diagnosis and treatment plan.  All questions were answered to best of my ability and the patient's satisfaction. The patient was advised to contact the clinic with any questions that may arise after the clinic visit.      Disclaimer: This note consists of symbols derived from keyboarding, dictation and/or voice recognition software. As a result, there may be errors in the script that have gone undetected. Please consider this when interpreting information found in this chart.      MITZI Somers D.P.M., [email or web address].[email or web address].        Again, thank you for allowing me to participate in the care of your patient.        Sincerely,        Modesto Somers DPM

## 2021-06-03 NOTE — PATIENT INSTRUCTIONS
Post toenail procedure instructions:    1. Keep bandages on for the rest of the day; take off this evening.  2. Soak the foot and toe in warm water with Epsom's salt or Dreft detergent for 20 min.  3. Clean the toe and the treated area with a soapy wash cloth.  4. Apply a topical antibiotic (Bacitracin or triple antibiotic) to the treated area and cover with a Band-Aid  5. Repeat this morning and night for two weeks, or until the treated are resolves any redness or drainage.  a. Redness and drainage is normal when treated with the Phenol acid.  b. Notify the office if there is any redness extending up the foot or purulent drainage noted.    Any discomfort should be treated with either Ibuprofen (Advil) or Tylenol.  Please follow package instructions on amount to be taken.    Next Steps:      6. Support:  a. Wear supportive shoes, sandals, boots and/or inserts that have a rigid supportive sole.    i. This will offload the majority of tension forces that travel through your feet every step you take.    1. AdMaster Max Cushioning Elite/Premier   2. AdMaster Relax Fit D'Lux Walker  3. Superfeet inserts ([email or web address])  b. It is important that you also wear supportive shoe wear in the house to continue providing support to your feet.    c. You may always use a cushioned liner for your shoes if that makes your feet feel better.  7. Stretching  a. Calf stretching is essential to offload the tension forces that travel through your feet every step you take  b. Preferred calf stretch is the Runner's Stretch  i. Place one foot behind the other foot, flat against the ground (it is important to keep the heel on the ground).  The back leg is the one that will be stretched.  1. Start with the knee straight and lean your hips into the wall, counter or whatever you are leaning into - count to ten.  2. Next, bend the knee.  You should feel the stretch lower in the calf muscle - count to ten.  c. Repeat this stretch once an hour  to start off with.  When symptoms subside, I recommend performing the stretch 3 times daily to prevent any future problems.                8. Tissue Massage  a. It is important that you physically loosen the inflammation tissue to help your body heal the injured tissue.  b. I recommend soaking your foot in warm water to increase the microcirculation to the soft tissues.  You may add Epson salt to the water if you prefer.  c. You may apply an over-the-counter muscle rub, such as Voltaren gel, and deeply massage the injured tissue.  9. Reduce Inflammation  a. You can ice the injured tissue with an ice pack with a light cloth covering or soaking in ice water 20 minutes to reduce any acute inflammation, typically at the end of the day.  b. If tolerated, you may take a Non-Steroidal Antiinflammatory medication (NSAID), such as Advil or Aleve, to help reduce the inflammation tissue.  This can help the overall healing of the injured tissue.  i. It is important to take food with any NSAID medication as the most common side effect is stomach irritation.  If you encounter any problems when taking NSAID, it is recommended that you stop taking the medication and notify your provider.    It is important to understand that most problems that develop in the foot and ankle are caused by excessive tension that cause microinjury to the soft tissues and inflammation in the foot and ankle.  By addressing the underlying causes with support and stretching as well as treating the current inflammatory conditions with tissue massage and anti-inflammatory treatments, most foot and ankle musculoskeletal conditions will resolve.  This may take time to heal.  However, if symptoms persist past 4 weeks you should return to the office for reevaluation to determine further treatment optionsMarlen Camacho to follow up with Primary Care provider regarding elevated blood pressure.

## 2021-06-03 NOTE — PROGRESS NOTES
"Janice Mayfield is a 60 year old female with Rheumatoid arthritis who presents with a chief complain of a painful ingrown toenail to the left great toe.  The patient relates pain when wearing shoes.  The patient denies any redness extending up the big toe into the foot.  The patient relates the condition has been getting worse over the past several weeks.         Pertinent medical, surgical and family history include vitamin D deficiency, Varicose veins bilateral lower extremities, Rheumatoid arthritis    Vitals: BP (!) 144/87   Pulse 67   Ht 1.651 m (5' 5\")   Wt 73 kg (161 lb)   BMI 26.79 kg/m    BMI= Body mass index is 26.79 kg/m .    LOWER EXTREMITY PHYSICAL EXAM    Dermatologic: One notes an inflamed nail border of the left great toe.  There is noted erythema and edema located around the labial fold and does not extend past the interphalangeal joint.  There is no apparent purulent drainage noted.  There is pain on palpation to the proximal aspect of the nail border.  Otherwise, the skin is intact to both lower extremities without significant lesions, rash or abrasion.           Vascular: DP & PT pulses are intact & regular on the left.   CFT and skin temperature is normal to the left lower extremities.     Neurologic: Lower extremity sensation is intact to light touch.  No evidence of weakness in the left lower extremities.        Musculoskeletal: Patient is ambulatory without assistive device or brace.  No gross ankle deformity noted.  No foot or ankle joint effusion is noted.   Noted pes planus deformity bilaterally.        ASSESSMENT / PLAN:     ICD-10-CM    1. Ingrown nail of great toe of left foot  L60.0 REMOVAL OF NAIL PLATE SIMPLE SINGLE   2. Bilateral pes planus  M21.41 [facility]    M21.42    3. Rheumatoid arthritis involving both feet, unspecified whether rheumatoid factor present (H)  M06.9 [facility]       Plan:  I have explained to Janice about the " condition.  The potential causes and nature of an ingrown toenail were discussed with the patient.  We reviewed the natural history and prognosis of the condition and potential risks if no treatment is provided.  Treatment options discussed included conservative management (oral antibiotics, soaking of foot, adequate width shoes)  as well as surgical management (partial or total nail removal).  The pros and cons of both forms as well as risks and benefits of treatment were reviewed.       At this point, I recommended having the offending nail plate removed.  I have explained to the patient all of the possible risks, benefits, alternatives to the procedure.  The patient consented to the proposed procedure.  The left hallux was swabbed with alcohol.  Next, approximately 3 cc of 1% lidocaine plain was injected around the left hallux.  The left hallux was then prepped with Betadine ointment.  A tourniquet was applied to the left hallux.  The offending nail plate was avulsed in toto.  The wound was then irrigated and dressed with Triple antibiotic ointment and a compressive dry sterile dressing.  The tourniquet was removed and a prompt hyperemic response was noted.  The patient tolerated the procedure well with no complications.  The patient was given post procedure instructions for the care of the wound.    The patient was instructed to notify the office if any redness extending past the big toe joint or fever with chills are experienced.      There is low risk of morbidity with the procedure.  There was no overlap in work associated with the evaluation/management and the work associated with the procedure.    The patient was referred to [facility] for custom orthotics that will aid in offloading the tension forces to the soft tissues and prevent further inflammation.    Janice verbalized agreement with and understanding of the rational for the diagnosis and treatment plan.  All questions were  answered to best of my ability and the patient's satisfaction. The patient was advised to contact the clinic with any questions that may arise after the clinic visit.      Disclaimer: This note consists of symbols derived from keyboarding, dictation and/or voice recognition software. As a result, there may be errors in the script that have gone undetected. Please consider this when interpreting information found in this chart.      MITZI Somers D.P.M., [email or web address].

## 2021-06-03 NOTE — NURSING NOTE
"Chief Complaint   Patient presents with     Ingrown Toenail     Great left toenail       Initial BP (!) 144/87   Pulse 67   Ht 1.651 m (5' 5\")   Wt 73 kg (161 lb)   BMI 26.79 kg/m   Estimated body mass index is 26.79 kg/m  as calculated from the following:    Height as of this encounter: 1.651 m (5' 5\").    Weight as of this encounter: 73 kg (161 lb).  Medications and allergies reviewed.      Akila ALANIS MA    "

## 2021-06-07 ENCOUNTER — INFUSION THERAPY VISIT (OUTPATIENT)
Dept: INFUSION THERAPY | Facility: CLINIC | Age: 61
End: 2021-06-07
Payer: COMMERCIAL

## 2021-06-07 VITALS
OXYGEN SATURATION: 97 % | SYSTOLIC BLOOD PRESSURE: 116 MMHG | WEIGHT: 145 LBS | BODY MASS INDEX: 24.13 KG/M2 | HEART RATE: 76 BPM | TEMPERATURE: 98.2 F | DIASTOLIC BLOOD PRESSURE: 75 MMHG | RESPIRATION RATE: 16 BRPM

## 2021-06-07 DIAGNOSIS — Z79.60 LONG-TERM USE OF IMMUNOSUPPRESSANT MEDICATION: ICD-10-CM

## 2021-06-07 DIAGNOSIS — M05.79 RHEUMATOID ARTHRITIS INVOLVING MULTIPLE SITES WITH POSITIVE RHEUMATOID FACTOR (H): Chronic | ICD-10-CM

## 2021-06-07 DIAGNOSIS — M05.79 RHEUMATOID ARTHRITIS INVOLVING MULTIPLE SITES WITH POSITIVE RHEUMATOID FACTOR (H): Primary | ICD-10-CM

## 2021-06-07 LAB
ALBUMIN SERPL-MCNC: 3.4 G/DL (ref 3.4–5)
ALT SERPL W P-5'-P-CCNC: 25 U/L (ref 0–50)
AST SERPL W P-5'-P-CCNC: 15 U/L (ref 0–45)
CREAT SERPL-MCNC: 0.86 MG/DL (ref 0.52–1.04)
CRP SERPL-MCNC: <2.9 MG/L (ref 0–8)
ERYTHROCYTE [DISTWIDTH] IN BLOOD BY AUTOMATED COUNT: 13 % (ref 10–15)
ERYTHROCYTE [SEDIMENTATION RATE] IN BLOOD BY WESTERGREN METHOD: 18 MM/H (ref 0–30)
GFR SERPL CREATININE-BSD FRML MDRD: 74 ML/MIN/{1.73_M2}
HCT VFR BLD AUTO: 37.2 % (ref 35–47)
HGB BLD-MCNC: 11.9 G/DL (ref 11.7–15.7)
MCH RBC QN AUTO: 30.1 PG (ref 26.5–33)
MCHC RBC AUTO-ENTMCNC: 32 G/DL (ref 31.5–36.5)
MCV RBC AUTO: 94 FL (ref 78–100)
PLATELET # BLD AUTO: 265 10E9/L (ref 150–450)
RBC # BLD AUTO: 3.96 10E12/L (ref 3.8–5.2)
WBC # BLD AUTO: 6.9 10E9/L (ref 4–11)

## 2021-06-07 PROCEDURE — 84460 ALANINE AMINO (ALT) (SGPT): CPT | Performed by: INTERNAL MEDICINE

## 2021-06-07 PROCEDURE — 36415 COLL VENOUS BLD VENIPUNCTURE: CPT | Performed by: INTERNAL MEDICINE

## 2021-06-07 PROCEDURE — 85652 RBC SED RATE AUTOMATED: CPT | Performed by: INTERNAL MEDICINE

## 2021-06-07 PROCEDURE — 99207 PR NO CHARGE LOS: CPT

## 2021-06-07 PROCEDURE — 84450 TRANSFERASE (AST) (SGOT): CPT | Performed by: INTERNAL MEDICINE

## 2021-06-07 PROCEDURE — 82040 ASSAY OF SERUM ALBUMIN: CPT | Performed by: INTERNAL MEDICINE

## 2021-06-07 PROCEDURE — 85027 COMPLETE CBC AUTOMATED: CPT | Performed by: INTERNAL MEDICINE

## 2021-06-07 PROCEDURE — 82565 ASSAY OF CREATININE: CPT | Performed by: INTERNAL MEDICINE

## 2021-06-07 PROCEDURE — 86140 C-REACTIVE PROTEIN: CPT | Performed by: INTERNAL MEDICINE

## 2021-06-07 NOTE — PROGRESS NOTES
Infusion Nursing Note:  Janice SIDNEY Taimargot Mayfield presents today for Rapid Remicade.    Patient seen by provider today: No   present during visit today: Not Applicable.      Note: Patient had a left toenail removal from the hallux on 06/03/2021. Patient states that there is some redness and is .     RN called Dr. Reid's office to discuss if it was appropriate to proceed or to hold.     Dr. Reid's nurse spoke with provider and was determined to hold remicade for 1 week.     Patient aware and will await a call from clinic to see if we call get her scheduled for [place] infusion.     RN discussed s/s of infection and what to do if any arose.     Patient did meet criteria for an asymptomatic covid-19 PCR test in infusion today. Patient declined the covid-19 test.  Treatment Conditions:  Biological Infusion Checklist:  ~~~ NOTE: If the patient answers yes to any of the questions below, hold the infusion and contact ordering provider or on-call provider.    1. Have you recently had an elevated temperature, fever, chills, productive cough, coughing for 3 weeks or longer or hemoptysis, abnormal vital signs, night sweats,  chest pain or have you noticed a decrease in your appetite, unexplained weight loss or fatigue? No  2. Do you have any open wounds or new incisions? No  3. Do you have any recent or upcoming hospitalizations, surgeries or dental procedures? Yes, Left hallux removal last thursday  4. Do you currently have or recently have had any signs of illness or infection or are you on any antibiotics? No  5. Have you had any new, sudden or worsening abdominal pain? No  6. Have you or anyone in your household received a live vaccination in the past 4 weeks? Please note:  No live vaccines while on biologic/chemotherapy until 6 months after the last treatment.  Patient can receive the flu vaccine (shot only) and the pneumovax.  It is optimal for the patient to get these vaccines mid  cycle, but they can be given at any time as long as it is not on the day of the infusion. No  7. Have you recently been diagnosed with any new nervous system diseases (ie. Multiple sclerosis, Guillain [place], seizures, neurological changes) or cancer diagnosis? No  8. Are you on any form of radiation or chemotherapy? No  9. Are you pregnant or breast feeding or do you have plans of pregnancy in the future? No  10. Have you been having any signs of worsening depression or suicidal ideations?  (benlysta only) No  11. Have there been any other new onset medical symptoms? No        Discharge Plan:   Patient to have Remicade infusion next week if no s/s of infection at procedure site of left hallux.      Betsy Metz RN

## 2021-07-06 ENCOUNTER — INFUSION THERAPY VISIT (OUTPATIENT)
Dept: INFUSION THERAPY | Facility: CLINIC | Age: 61
End: 2021-07-06
Payer: COMMERCIAL

## 2021-07-06 DIAGNOSIS — M05.79 RHEUMATOID ARTHRITIS INVOLVING MULTIPLE SITES WITH POSITIVE RHEUMATOID FACTOR (H): Primary | ICD-10-CM

## 2021-07-06 PROCEDURE — 96413 CHEMO IV INFUSION 1 HR: CPT | Performed by: NURSE PRACTITIONER

## 2021-07-06 PROCEDURE — 99207 PR NO CHARGE LOS: CPT

## 2021-07-06 RX ORDER — HEPARIN SODIUM (PORCINE) LOCK FLUSH IV SOLN 100 UNIT/ML 100 UNIT/ML
5 SOLUTION INTRAVENOUS
Status: CANCELLED | OUTPATIENT
Start: 2021-07-20

## 2021-07-06 RX ORDER — METHYLPREDNISOLONE SODIUM SUCCINATE 125 MG/2ML
60 INJECTION, POWDER, LYOPHILIZED, FOR SOLUTION INTRAMUSCULAR; INTRAVENOUS ONCE
Status: CANCELLED
Start: 2021-07-20 | End: 2021-07-20

## 2021-07-06 RX ORDER — ACETAMINOPHEN 325 MG/1
650 TABLET ORAL ONCE
Status: COMPLETED | OUTPATIENT
Start: 2021-07-06 | End: 2021-07-06

## 2021-07-06 RX ORDER — DIPHENHYDRAMINE HCL 25 MG
25 CAPSULE ORAL ONCE
Status: CANCELLED
Start: 2021-07-20 | End: 2021-07-20

## 2021-07-06 RX ORDER — ACETAMINOPHEN 325 MG/1
650 TABLET ORAL ONCE
Status: CANCELLED
Start: 2021-07-20 | End: 2021-07-20

## 2021-07-06 RX ORDER — HEPARIN SODIUM,PORCINE 10 UNIT/ML
5 VIAL (ML) INTRAVENOUS
Status: CANCELLED | OUTPATIENT
Start: 2021-07-20

## 2021-07-06 RX ORDER — DIPHENHYDRAMINE HCL 25 MG
25 CAPSULE ORAL ONCE
Status: COMPLETED | OUTPATIENT
Start: 2021-07-06 | End: 2021-07-06

## 2021-07-06 RX ADMIN — Medication 250 ML: at 08:10

## 2021-07-06 RX ADMIN — Medication 25 MG: at 07:57

## 2021-07-06 RX ADMIN — ACETAMINOPHEN 650 MG: 325 TABLET ORAL at 07:57

## 2021-07-06 NOTE — PROGRESS NOTES
Infusion Nursing Note:  Janice Mayfield presents today for Rapid Remicade.    Patient seen by provider today: No   present during visit today: Not Applicable.    Note: N/A.  Patient did meet criteria for an asymptomatic covid-19 PCR test in infusion today. Patient declined the covid-19 test.    Intravenous Access:  Peripheral IV placed.    Treatment Conditions:  Biological Infusion Checklist:  ~~~ NOTE: If the patient answers yes to any of the questions below, hold the infusion and contact ordering provider or on-call provider.    1. Have you recently had an elevated temperature, fever, chills, productive cough, coughing for 3 weeks or longer or hemoptysis, abnormal vital signs, night sweats,  chest pain or have you noticed a decrease in your appetite, unexplained weight loss or fatigue? No  2. Do you have any open wounds or new incisions? No  3. Do you have any recent or upcoming hospitalizations, surgeries or dental procedures? No  4. Do you currently have or recently have had any signs of illness or infection or are you on any antibiotics? No  5. Have you had any new, sudden or worsening abdominal pain? No  6. Have you or anyone in your household received a live vaccination in the past 4 weeks? Please note:  No live vaccines while on biologic/chemotherapy until 6 months after the last treatment.  Patient can receive the flu vaccine (shot only) and the pneumovax.  It is optimal for the patient to get these vaccines mid cycle, but they can be given at any time as long as it is not on the day of the infusion. No  7. Have you recently been diagnosed with any new nervous system diseases (ie. Multiple sclerosis, Guillain [place], seizures, neurological changes) or cancer diagnosis? No  8. Are you on any form of radiation or chemotherapy? No  9. Are you pregnant or breast feeding or do you have plans of pregnancy in the future? No  10. Have you been having any signs of worsening depression or suicidal  ideations?  (benlysta only) No  11. Have there been any other new onset medical symptoms? No        Post Infusion Assessment:  Patient tolerated infusion without incident.  Site patent and intact, free from redness, edema or discomfort.  No evidence of extravasations.  Access discontinued per protocol.       Discharge Plan:   Patient discharged in stable condition accompanied by: self.  Departure Mode: Ambulatory.      Betsy Metz RN

## 2021-07-08 ENCOUNTER — OFFICE VISIT (OUTPATIENT)
Dept: FAMILY MEDICINE | Facility: CLINIC | Age: 61
End: 2021-07-08
Payer: COMMERCIAL

## 2021-07-08 VITALS
RESPIRATION RATE: 16 BRPM | WEIGHT: 164.2 LBS | TEMPERATURE: 97.8 F | OXYGEN SATURATION: 100 % | SYSTOLIC BLOOD PRESSURE: 113 MMHG | BODY MASS INDEX: 27.36 KG/M2 | HEART RATE: 76 BPM | DIASTOLIC BLOOD PRESSURE: 71 MMHG | HEIGHT: 65 IN

## 2021-07-08 VITALS
OXYGEN SATURATION: 98 % | HEART RATE: 88 BPM | DIASTOLIC BLOOD PRESSURE: 78 MMHG | SYSTOLIC BLOOD PRESSURE: 127 MMHG | TEMPERATURE: 98.1 F | RESPIRATION RATE: 14 BRPM

## 2021-07-08 DIAGNOSIS — M05.79 RHEUMATOID ARTHRITIS INVOLVING MULTIPLE SITES WITH POSITIVE RHEUMATOID FACTOR (H): Chronic | ICD-10-CM

## 2021-07-08 DIAGNOSIS — Z12.31 ENCOUNTER FOR SCREENING MAMMOGRAM FOR BREAST CANCER: ICD-10-CM

## 2021-07-08 DIAGNOSIS — Z13.1 SCREENING FOR DIABETES MELLITUS (DM): ICD-10-CM

## 2021-07-08 DIAGNOSIS — Z00.00 ROUTINE GENERAL MEDICAL EXAMINATION AT A HEALTH CARE FACILITY: Primary | ICD-10-CM

## 2021-07-08 DIAGNOSIS — Z12.4 SCREENING FOR MALIGNANT NEOPLASM OF CERVIX: ICD-10-CM

## 2021-07-08 DIAGNOSIS — Z13.6 CARDIOVASCULAR SCREENING; LDL GOAL LESS THAN 160: ICD-10-CM

## 2021-07-08 DIAGNOSIS — Z13.29 SCREENING FOR THYROID DISORDER: ICD-10-CM

## 2021-07-08 DIAGNOSIS — N90.4 LICHEN SCLEROSUS ET ATROPHICUS OF THE VULVA: ICD-10-CM

## 2021-07-08 DIAGNOSIS — R19.8 RECTAL DISCHARGE: ICD-10-CM

## 2021-07-08 DIAGNOSIS — D22.9 NUMEROUS SKIN MOLES: ICD-10-CM

## 2021-07-08 PROCEDURE — 87624 HPV HI-RISK TYP POOLED RSLT: CPT | Performed by: NURSE PRACTITIONER

## 2021-07-08 PROCEDURE — G0123 SCREEN CERV/VAG THIN LAYER: HCPCS | Performed by: NURSE PRACTITIONER

## 2021-07-08 PROCEDURE — 99213 OFFICE O/P EST LOW 20 MIN: CPT | Mod: 25 | Performed by: NURSE PRACTITIONER

## 2021-07-08 PROCEDURE — 99396 PREV VISIT EST AGE 40-64: CPT | Performed by: NURSE PRACTITIONER

## 2021-07-08 ASSESSMENT — ENCOUNTER SYMPTOMS
CONSTIPATION: 0
FEVER: 0
NERVOUS/ANXIOUS: 0
HEMATOCHEZIA: 0
BREAST MASS: 0
SORE THROAT: 0
NAUSEA: 0
ARTHRALGIAS: 0
HEARTBURN: 0
FREQUENCY: 0
DYSURIA: 0
MYALGIAS: 0
CHILLS: 0
EYE PAIN: 0
COUGH: 0
SHORTNESS OF BREATH: 0
PARESTHESIAS: 1
PALPITATIONS: 0
JOINT SWELLING: 0
PARESTHESIAS: 0
ABDOMINAL PAIN: 0
HEADACHES: 0
DIZZINESS: 0
HEMATURIA: 0
DIARRHEA: 0

## 2021-07-08 ASSESSMENT — PAIN SCALES - GENERAL: PAINLEVEL: NO PAIN (0)

## 2021-07-08 ASSESSMENT — MIFFLIN-ST. JEOR: SCORE: 1320.69

## 2021-07-08 NOTE — PROGRESS NOTES
SUBJECTIVE:   CC: Janice Mayfield is an 61 year old woman who presents for preventive health visit.       Patient has been advised of split billing requirements and indicates understanding: Yes  Healthy Habits:     Getting at least 3 servings of Calcium per day:  Yes    Bi-annual eye exam:  NO    Dental care twice a year:  NO    Sleep apnea or symptoms of sleep apnea:  None    Diet:  Regular (no restrictions)    Frequency of exercise:  4-5 days/week    Duration of exercise:  30-45 minutes    Taking medications regularly:  Yes    Medication side effects:  Other    PHQ-2 Total Score: 0    Additional concerns today:  Yes      Today's PHQ-2 Score:   PHQ-2 ( 1999 Pfizer) 7/8/2021   Q1: Little interest or pleasure in doing things 0   Q2: Feeling down, depressed or hopeless 0   PHQ-2 Score 0   Q1: Little interest or pleasure in doing things Not at all   Q2: Feeling down, depressed or hopeless Not at all   PHQ-2 Score 0       Abuse: Current or Past (Physical, Sexual or Emotional) - No  Do you feel safe in your environment? Yes      Social History     Tobacco Use     Smoking status: Never Smoker     Smokeless tobacco: Never Used   Substance Use Topics     Alcohol use: No         Alcohol Use 7/8/2021   Prescreen: >3 drinks/day or >7 drinks/week? No       Reviewed orders with patient.  Reviewed health maintenance and updated orders accordingly - Yes  Labs reviewed in EPIC  BP Readings from Last 3 Encounters:   07/08/21 113/71   06/07/21 116/75   06/03/21 (!) 144/87    Wt Readings from Last 3 Encounters:   07/08/21 74.5 kg (164 lb 3.2 oz)   06/07/21 65.8 kg (145 lb)   06/03/21 73 kg (161 lb)                  Patient Active Problem List   Diagnosis     Rheumatoid arthritis involving multiple sites with positive rheumatoid factor (H)     Vitamin D deficiency     Long term current use of systemic steroids     Other secondary osteoarthritis of multiple sites     Long-term use of immunosuppressant medication      Varicose veins of both lower extremities, unspecified whether complicated     Lichen sclerosus et atrophicus of the vulva     Cervical cancer screening     Left-sided low back pain without sciatica     Past Surgical History:   Procedure Laterality Date     C/SECTION, LOW TRANSVERSE      , Low Transverse     HC TOOTH EXTRACTION W/FORCEP         Social History     Tobacco Use     Smoking status: Never Smoker     Smokeless tobacco: Never Used   Substance Use Topics     Alcohol use: No     Family History   Problem Relation Age of Onset     Cancer Mother 72     Skin Cancer Father          Current Outpatient Medications   Medication Sig Dispense Refill     DiphenhydrAMINE HCl (BENADRYL PO) Take 25 mg by mouth as needed (Pre-medication for remicade)       folic acid (FOLVITE) 1 MG tablet Take 2 tablets (2 mg) by mouth daily For additional refills, please schedule a follow-up appointment. 180 tablet 0     IBUPROFEN PO Take 200 mg by mouth as needed for moderate pain       InFLIXimab (REMICADE IV) Inject 100 mg into the vein every 28 days        methotrexate 2.5 MG tablet Take 4 tablets (10 mg) by mouth every 7 days Labs every 8-12 week 48 tablet 0     predniSONE (DELTASONE) 1 MG tablet TAKE 4 TABLETS (4 MG) BY MOUTH DAILY TRY TAPER BY 1/2 MG DAY IF ABLE 360 tablet 0     Vitamin D, Cholecalciferol, 1000 UNITS CAPS Take 2,000 Units by mouth daily 1 capsule 0     Allergies   Allergen Reactions     Adalimumab Hives     Humira      hives     Methotrexate      Anxiety. No allergic signs.      Plaquenil [Hydroxychloroquine]      Elevated LFTs     Sulfa Drugs      delusions       Breast Cancer Screening:  Any new diagnosis of family breast, ovarian, or bowel cancer? No    FHS-7:   Breast CA Risk Assessment (FHS-7) 2021   Did any of your first-degree relatives have breast or ovarian cancer? No   Did any of your relatives have bilateral breast cancer? No   Did any man in your family have breast cancer? No   Did any woman  in your family have breast and ovarian cancer? No   Did any woman in your family have breast cancer before age 50 y? No   Do you have 2 or more relatives with breast and/or ovarian cancer? No   Do you have 2 or more relatives with breast and/or bowel cancer? No       Mammogram Screening: Recommended mammography every 1-2 years with patient discussion and risk factor consideration  Pertinent mammograms are reviewed under the imaging tab.    History of abnormal Pap smear: yearly pap due to Remicaide   PAP / HPV Latest Ref Rng & Units 2019   PAP - NIL NIL   HPV 16 DNA NEG:Negative Negative Negative   HPV 18 DNA NEG:Negative Negative Negative   OTHER HR HPV NEG:Negative Negative Negative     Reviewed and updated as needed this visit by clinical staff                 Reviewed and updated as needed this visit by Provider                Past Medical History:   Diagnosis Date     Lichen sclerosus et atrophicus of the vulva 2018     Long term current use of systemic steroids 5/3/2017     Long-term use of immunosuppressant medication 2018     Other secondary osteoarthritis of multiple sites 2018     Rheumatoid arthritis involving multiple sites with positive rheumatoid factor (H) 2017     Vitamin D deficiency 5/3/2017      Past Surgical History:   Procedure Laterality Date     C/SECTION, LOW TRANSVERSE      , Low Transverse     HC TOOTH EXTRACTION W/FORCEP         Review of Systems   Constitutional: Negative for chills and fever.   HENT: Negative for congestion, ear pain, hearing loss and sore throat.    Eyes: Positive for visual disturbance. Negative for pain.        Has appointment scheduled    Respiratory: Negative for cough and shortness of breath.    Cardiovascular: Negative for chest pain, palpitations and peripheral edema.   Gastrointestinal: Negative for abdominal pain, constipation, diarrhea, heartburn, hematochezia and nausea.        Has noticed some rectal liquid  "without control will happen intermittently   Will have to use toilet paper in the rectal area because of the leakage    Breasts:  Negative for tenderness, breast mass and discharge.   Genitourinary: Negative for dysuria, frequency, genital sores, hematuria, pelvic pain, urgency, vaginal bleeding and vaginal discharge.   Musculoskeletal: Negative for arthralgias, joint swelling and myalgias.   Skin: Negative for rash.   Neurological: Negative for dizziness, headaches and paresthesias.   Psychiatric/Behavioral: Negative for mood changes. The patient is not nervous/anxious.        OBJECTIVE:   /71 (BP Location: Right arm, Patient Position: Sitting, Cuff Size: Adult Regular)   Pulse 76   Temp 97.8  F (36.6  C) (Oral)   Resp 16   Ht 1.651 m (5' 5\")   Wt 74.5 kg (164 lb 3.2 oz)   SpO2 100%   BMI 27.32 kg/m    Physical Exam  GENERAL APPEARANCE: healthy, alert and no distress  EYES: Eyes grossly normal to inspection and conjunctivae and sclerae normal  HENT: ear canals and TM's normal, nose and mouth without ulcers or lesions, oropharynx clear and oral mucous membranes moist  NECK: no adenopathy, no asymmetry, masses, or scars and thyroid normal to palpation  RESP: lungs clear to auscultation - no rales, rhonchi or wheezes  BREAST: normal without masses, tenderness or nipple discharge and no palpable axillary masses or adenopathy  CV: regular rates and rhythm, no murmur, click or rub, no peripheral edema and peripheral pulses strong  ABDOMEN: soft, nontender, no hepatosplenomegaly, no masses and bowel sounds normal   (female): normal female external genitalia except atrophic findings consistent with lichen sclerosis  , normal urethral meatus, vaginal mucosal atrophy noted, normal cervix, adnexae, and uterus without masses or abnormal discharge  MS: no musculoskeletal defects are noted and gait is age appropriate without ataxia  SKIN: no suspicious lesions or rashes  Numerous moles   NEURO: Normal strength and " tone, sensory exam grossly normal, mentation intact and speech normal  PSYCH: mentation appears normal and affect normal/bright    Diagnostic Test Results:  Labs reviewed in Epic  Results for orders placed or performed in visit on 07/08/21   HPV High Risk Types DNA Cervical     Status: None   Result Value Ref Range    HPV Source SurePath     HPV 16 DNA Negative NEG^Negative    HPV 18 DNA Negative NEG^Negative    Other HR HPV Negative NEG^Negative    Final Diagnosis This patient's sample is negative for HPV DNA.     Specimen Description Cervical Cells    A pap thin layer screen     Status: None   Result Value Ref Range    PAP NIL     Copath Report         Patient Name: CHARLI PORRAS  MR#: [id number]  Specimen #: [id number]  Collected: 7/8/2021  Received: 7/9/2021  Reported: 7/10/2021 08:17  Ordering Phy(s): ERASMO GODDARD    For improved result formatting, select 'View Enhanced Report Format' under   Linked Documents section.    SPECIMEN/STAIN PROCESS:  Pap thin layer prep screening (Surepath)       Pap-Cyto x 1, HPV ordered x 1    SOURCE: Cervical, endocervical  ----------------------------------------------------------------   Pap thin layer prep screening (Surepath)  SPECIMEN ADEQUACY:  Satisfactory for evaluation.  Specimen was unable to be imaged on the   [employer] Slide .  -Transformation zone component present.    CYTOLOGIC INTERPRETATION:    Negative for intraepithelial lesion or malignancy    Electronically signed out by:  LUCIAN Olsen (ASCP)    CLINICAL HISTORY:    Post Menopausal  Exam Note:: on remicaide, A previous normal pap  Date of Last Pap: 11/14/2019,    Papanicolaou Test Limitations:  C ervical cytology is a screening test with   limited sensitivity; regular  screening is critical for cancer prevention; Pap tests are primarily   effective for the diagnosis/prevention of  squamous cell carcinoma, not adenocarcinomas or other cancers.    COLLECTION SITE:  Client:   [facility]  Location: [facility] (B)    The technical component of this testing was completed at the [facility], with the professional component performed   at the [facility], [address] [place] [address],   [place] [address] ([phone])           ASSESSMENT/PLAN:   Janice was seen today for physical.    Diagnoses and all orders for this visit:    Routine general medical examination at a health care facility    Screening for malignant neoplasm of cervix  -     Pap imaged thin layer screen with HPV - recommended age 30 - 65 years (select HPV order below)  -     HPV High Risk Types DNA Cervical    Encounter for screening mammogram for breast cancer  -     MA SCREENING DIGITAL BILAT - Future  (s+30); Future    CARDIOVASCULAR SCREENING; LDL GOAL LESS THAN 160  -     Lipid panel reflex to direct LDL Fasting; Future    Screening for diabetes mellitus (DM)  -     Extra Tube; Future  -     Comprehensive metabolic panel; Future    Screening for thyroid disorder  -     TSH with free T4 reflex; Future    Rectal discharge  -     COLORECTAL SURGERY REFERRAL    Rheumatoid arthritis involving multiple sites with positive rheumatoid factor (H)  FOLLOW UP WITH SPECIALIST :Rheumatology    Numerous skin moles  -     ADULT DERMATOLOGY REFERRAL; Future    Lichen sclerosus et atrophicus of the vulva  -     ADULT DERMATOLOGY REFERRAL; Future    Other orders  -     REVIEW OF HEALTH MAINTENANCE PROTOCOL ORDERS  -     A pap thin layer screen    PLAN:   Patient needs to follow up in if no improvement,or sooner if worsening of symptoms or other symptoms develop.  FURTHER TESTING:       - mammogram  CONSULTATION/REFERRAL to colorectal specialist and dermatology   FUTURE LABS:       - Schedule a fasting blood draw   Will follow up and/or notify patient of  results via My Chart to determine further  "need for followup  Follow up office visit in one year for annual health maintenance exam, sooner PRN.    Patient has been advised of split billing requirements and indicates understanding: Yes  COUNSELING:  Reviewed preventive health counseling, as reflected in patient instructions  Special attention given to:        Regular exercise       Healthy diet/nutrition       Vision screening       Osteoporosis prevention/bone health       Consider Hep C screening for all patients one time for ages 18-79 years       The ASCVD Risk score (Yazan MUELLER Jr., et al., 2013) failed to calculate for the following reasons:    Cannot find a previous HDL lab    Cannot find a previous total cholesterol lab       Advance Care Planning    Estimated body mass index is 24.13 kg/m  as calculated from the following:    Height as of 6/3/21: 1.651 m (5' 5\").    Weight as of 6/7/21: 65.8 kg (145 lb).        She reports that she has never smoked. She has never used smokeless tobacco.      Counseling Resources:  ATP IV Guidelines  Pooled Cohorts Equation Calculator  Breast Cancer Risk Calculator  BRCA-Related Cancer Risk Assessment: FHS-7 Tool  FRAX Risk Assessment  ICSI Preventive Guidelines  Dietary Guidelines for Americans, 2010  USDA's MyPlate  ASA Prophylaxis  Lung CA Screening    Sunshine Harvey, LO CNP  [facility]  "

## 2021-07-08 NOTE — PATIENT INSTRUCTIONS
PLAN:   1.   Symptomatic therapy suggested:   Increase calcium to 1000mg and 1000iu Vit D  2.  Orders Placed This Encounter   Procedures     REVIEW OF HEALTH MAINTENANCE PROTOCOL ORDERS     MA SCREENING DIGITAL BILAT - Future  (s+30)     Pap imaged thin layer screen with HPV - recommended age 30 - 65 years (select HPV order below)     HPV High Risk Types DNA Cervical     Lipid panel reflex to direct LDL Fasting     JUST IN CASE     Comprehensive metabolic panel     TSH with free T4 reflex     COLORECTAL SURGERY REFERRAL     ADULT DERMATOLOGY REFERRAL       3. Patient needs to follow up in if no improvement,or sooner if worsening of symptoms or other symptoms develop.  FURTHER TESTING:       - mammogram  CONSULTATION/REFERRAL to colorectal specialist and dermatology   FUTURE LABS:       - Schedule a fasting blood draw   Will follow up and/or notify patient of  results via My Chart to determine further need for followup  Follow up office visit in one year for annual health maintenance exam, sooner PRN.    Preventive Health Recommendations  Female Ages 50 - 64    Yearly exam: See your health care provider every year in order to  o Review health changes.   o Discuss preventive care.    o Review your medicines if your doctor has prescribed any.      Get a Pap test every three years (unless you have an abnormal result and your provider advises testing more often).    If you get Pap tests with HPV test, you only need to test every 5 years, unless you have an abnormal result.     You do not need a Pap test if your uterus was removed (hysterectomy) and you have not had cancer.    You should be tested each year for STDs (sexually transmitted diseases) if you're at risk.     Have a mammogram every 1 to 2 years.    Have a colonoscopy at age 50, or have a yearly FIT test (stool test). These exams screen for colon cancer.      Have a cholesterol test every 5 years, or more often if advised.    Have a diabetes test (fasting  glucose) every three years. If you are at risk for diabetes, you should have this test more often.     If you are at risk for osteoporosis (brittle bone disease), think about having a bone density scan (DEXA).    Shots: Get a flu shot each year. Get a tetanus shot every 10 years.    Nutrition:     Eat at least 5 servings of fruits and vegetables each day.    Eat whole-grain bread, whole-wheat pasta and brown rice instead of white grains and rice.    Get adequate Calcium and Vitamin D.     Lifestyle    Exercise at least 150 minutes a week (30 minutes a day, 5 days a week). This will help you control your weight and prevent disease.    Limit alcohol to one drink per day.    No smoking.     Wear sunscreen to prevent skin cancer.     See your dentist every six months for an exam and cleaning.    See your eye doctor every 1 to 2 years.

## 2021-07-10 LAB
COPATH REPORT: NORMAL
PAP: NORMAL

## 2021-07-13 ENCOUNTER — TELEPHONE (OUTPATIENT)
Dept: FAMILY MEDICINE | Facility: CLINIC | Age: 61
End: 2021-07-13

## 2021-07-13 ENCOUNTER — TELEPHONE (OUTPATIENT)
Dept: RHEUMATOLOGY | Facility: CLINIC | Age: 61
End: 2021-07-13

## 2021-07-13 DIAGNOSIS — E55.9 VITAMIN D DEFICIENCY: ICD-10-CM

## 2021-07-13 DIAGNOSIS — R19.8 RECTAL DISCHARGE: Primary | ICD-10-CM

## 2021-07-13 DIAGNOSIS — R15.9 INCONTINENCE OF FECES, UNSPECIFIED FECAL INCONTINENCE TYPE: ICD-10-CM

## 2021-07-13 DIAGNOSIS — M15.3 OTHER SECONDARY OSTEOARTHRITIS OF MULTIPLE SITES: ICD-10-CM

## 2021-07-13 DIAGNOSIS — Z79.52 LONG TERM CURRENT USE OF SYSTEMIC STEROIDS: ICD-10-CM

## 2021-07-13 DIAGNOSIS — M05.79 RHEUMATOID ARTHRITIS INVOLVING MULTIPLE SITES WITH POSITIVE RHEUMATOID FACTOR (H): ICD-10-CM

## 2021-07-13 DIAGNOSIS — Z79.60 LONG-TERM USE OF IMMUNOSUPPRESSANT MEDICATION: ICD-10-CM

## 2021-07-13 PROBLEM — Z12.4 CERVICAL CANCER SCREENING: Status: ACTIVE | Noted: 2019-11-26

## 2021-07-13 NOTE — TELEPHONE ENCOUNTER
[facility] Call Center    Phone Message    May a detailed message be left on voicemail: yes, [phone]     Reason for Call: Medication Question or concern regarding medication   Prescription Clarification  Name of Medication: Methotrexate  Prescribing Provider: Dr Alva    Pharmacy: [facility] in [place],    What on the order needs clarification? Pt went to  her prescription but they said they have not received the order at all.     Action Taken: Message routed to:  [facility]     Travel Screening: Not Applicable

## 2021-07-13 NOTE — TELEPHONE ENCOUNTER
methotrexate 2.5 MG tablet   Last Written Prescription Date:  2/1/2021  Last Fill Quantity: 48,   # refills: 0  Last Office Visit:  6/30/2021  Future Office visit:  11/29/2021    CBC RESULTS: Recent Labs   Lab Test 06/07/21  1450   WBC 6.9   RBC 3.96   HGB 11.9   HCT 37.2   MCV 94   MCH 30.1   MCHC 32.0   RDW 13.0          Creatinine   Date Value Ref Range Status   06/07/2021 0.86 0.52 - 1.04 mg/dL Final   ]    Liver Function Studies -   Recent Labs   Lab Test 06/07/21  1450 11/08/19  1403   PROTTOTAL  --  8.4   ALBUMIN 3.4 3.9   BILITOTAL  --  0.3   ALKPHOS  --  92   AST 15 18   ALT 25 24       Routing refill request to provider for review/approval because:  Drug not on the [facility], P or [facility] refill protocol or controlled substance      Shanique Nina RN  Central Triage Red Flags/Med Refills

## 2021-07-13 NOTE — TELEPHONE ENCOUNTER
This writer attempted to contact pt on 07/13/21      Reason for call relay message and left message.      If patient calls back:   Relay message from LO Maret CNP :I placed an order for GI in [place]. They should call her for appointment .  My concern may be extended time to get in          [facility]

## 2021-07-13 NOTE — TELEPHONE ENCOUNTER
I placed an order for GI in [place]   They should call her for appointment   My concern may be extended time to get in

## 2021-07-13 NOTE — TELEPHONE ENCOUNTER
Patient called with a few requests. [facility] has not received the referral. Referral dated 7/8/2021. Please fax to:     [facility] [place] [id number]   [address] [address]   Phone: [phone]   Fax: [phone]     Also, patient is also asking why she was referred outside of [place] to see a specialist in regards to her rectal drainage. She would like to see someone within [place]. Please call her to discuss her options.  Ousmane Webb

## 2021-07-16 ENCOUNTER — TELEPHONE (OUTPATIENT)
Dept: SURGERY | Facility: CLINIC | Age: 61
End: 2021-07-16

## 2021-07-16 ENCOUNTER — OFFICE VISIT (OUTPATIENT)
Dept: DERMATOLOGY | Facility: CLINIC | Age: 61
End: 2021-07-16
Attending: NURSE PRACTITIONER
Payer: COMMERCIAL

## 2021-07-16 ENCOUNTER — TELEPHONE (OUTPATIENT)
Dept: DERMATOLOGY | Facility: CLINIC | Age: 61
End: 2021-07-16

## 2021-07-16 VITALS
DIASTOLIC BLOOD PRESSURE: 83 MMHG | BODY MASS INDEX: 26.99 KG/M2 | HEART RATE: 66 BPM | WEIGHT: 162 LBS | HEIGHT: 65 IN | SYSTOLIC BLOOD PRESSURE: 132 MMHG

## 2021-07-16 DIAGNOSIS — D18.01 CHERRY ANGIOMA: ICD-10-CM

## 2021-07-16 DIAGNOSIS — L81.4 LENTIGO: ICD-10-CM

## 2021-07-16 DIAGNOSIS — L82.1 SEBORRHEIC KERATOSIS: ICD-10-CM

## 2021-07-16 DIAGNOSIS — D22.9 MULTIPLE BENIGN NEVI: Primary | ICD-10-CM

## 2021-07-16 DIAGNOSIS — N90.4 LICHEN SCLEROSUS ET ATROPHICUS OF THE VULVA: ICD-10-CM

## 2021-07-16 DIAGNOSIS — N90.4 LICHEN SCLEROSUS ET ATROPHICUS OF THE VULVA: Primary | ICD-10-CM

## 2021-07-16 PROCEDURE — 99213 OFFICE O/P EST LOW 20 MIN: CPT | Performed by: PHYSICIAN ASSISTANT

## 2021-07-16 RX ORDER — BETAMETHASONE DIPROPIONATE 0.5 MG/G
OINTMENT, AUGMENTED TOPICAL
Qty: 15 G | Refills: 3 | Status: SHIPPED | OUTPATIENT
Start: 2021-07-16 | End: 2022-11-08

## 2021-07-16 RX ORDER — CLOBETASOL PROPIONATE 0.5 MG/G
OINTMENT TOPICAL
Qty: 15 G | Refills: 1 | Status: SHIPPED | OUTPATIENT
Start: 2021-07-16 | End: 2024-06-06

## 2021-07-16 ASSESSMENT — MIFFLIN-ST. JEOR: SCORE: 1302.77

## 2021-07-16 NOTE — LETTER
2021         RE: Janice Mayfield  [address] [address]        Dear Colleague,    Thank you for referring your patient, Janice Mayfield, to the [facility]. Please see a copy of my visit note below.    Janice Mayfield is an extremely pleasant 61 year old year old female patient here today for evaluation of moles on body and lichen sclerosus. She notes she is on remicade and methotrexate for RA. She reports she uses an OTC oil which has been helping. She note it is occasionally itchy. Patient has no other skin complaints today.  Remainder of the HPI, Meds, PMH, Allergies, FH, and SH was reviewed in chart.    Pertinent Hx:   No personal history of skin cancer.   Past Medical History:   Diagnosis Date     Lichen sclerosus et atrophicus of the vulva 2018     Long term current use of systemic steroids 5/3/2017     Long-term use of immunosuppressant medication 2018     Other secondary osteoarthritis of multiple sites 2018     Rheumatoid arthritis involving multiple sites with positive rheumatoid factor (H) 2017     Vitamin D deficiency 5/3/2017       Past Surgical History:   Procedure Laterality Date     C/SECTION, LOW TRANSVERSE      , Low Transverse     HC TOOTH EXTRACTION W/FORCEP          Family History   Problem Relation Age of Onset     Cancer Mother 72     Skin Cancer Father        Social History     Socioeconomic History     Marital status:      Spouse name: Maksim     Number of children: 3     Years of education: Not on file     Highest education level: Not on file   Occupational History     Occupation: artist- contract work   Tobacco Use     Smoking status: Never Smoker     Smokeless tobacco: Never Used   Substance and Sexual Activity     Alcohol use: No     Drug use: No     Sexual activity: Not Currently     Partners: Male   Other Topics Concern     Parent/sibling w/ CABG, MI or angioplasty  before 65F 55M? Not Asked   Social History Narrative     Not on file     Social Determinants of Health     Financial Resource Strain:      Difficulty of Paying Living Expenses:    Food Insecurity:      Worried About Running Out of Food in the Last Year:      Ran Out of Food in the Last Year:    Transportation Needs:      Lack of Transportation (Medical):      Lack of Transportation (Non-Medical):    Physical Activity:      Days of Exercise per Week:      Minutes of Exercise per Session:    Stress:      Feeling of Stress :    Social Connections:      Frequency of Communication with Friends and Family:      Frequency of Social Gatherings with Friends and Family:      Attends [employer] Services:      Active Member of Clubs or Organizations:      Attends Club or Organization Meetings:      Marital Status:    Intimate Partner Violence:      Fear of Current or Ex-Partner:      Emotionally Abused:      Physically Abused:      Sexually Abused:        Outpatient Encounter Medications as of 7/16/2021   Medication Sig Dispense Refill     clobetasol (TEMOVATE) 0.05 % external ointment Apply sparingly 1-2 times weekly. 15 g 1     folic acid (FOLVITE) 1 MG tablet Take 2 tablets (2 mg) by mouth daily For additional refills, please schedule a follow-up appointment. 180 tablet 0     InFLIXimab (REMICADE IV) Inject 100 mg into the vein every 28 days        methotrexate 2.5 MG tablet Take 4 tablets (10 mg) by mouth every 7 days Labs every 8-12 week 48 tablet 0     predniSONE (DELTASONE) 1 MG tablet TAKE 4 TABLETS (4 MG) BY MOUTH DAILY TRY TAPER BY 1/2 MG DAY IF ABLE 360 tablet 0     Vitamin D, Cholecalciferol, 1000 UNITS CAPS Take 2,000 Units by mouth daily 1 capsule 0     DiphenhydrAMINE HCl (BENADRYL PO) Take 25 mg by mouth as needed (Pre-medication for remicade)       IBUPROFEN PO Take 200 mg by mouth as needed for moderate pain       No facility-administered encounter medications on file as of 7/16/2021.             O:   NAD,  "WDWN, Alert & Oriented, Mood & Affect wnl, Vitals stable   Here today alone   /83   Pulse 66   Ht 1.638 m (5' 4.5\")   Wt 73.5 kg (162 lb)   BMI 27.38 kg/m     General appearance normal   Vitals stable   Alert, oriented and in no acute distress  White atrophic patches on vagina mucosa    Stuck on papules and brown macules on trunk and ext   Red papules on trunk  Brown papules and macules with regular pigment network and borders    The remainder of skin exam is normal     Eyes: Conjunctivae/lids:Normal     ENT: Lips: normal    MSK:Normal    Cardiovascular: peripheral edema none    Pulm: Breathing Normal    Neuro/Psych: Orientation:Alert and Orientedx3 ; Mood/Affect:normal   A/P:  1. Lichen Sclerosus  Discussed that this is a chronic inflammatory skin condition, that can wax and wane.   Use barrier creams like vaseline or aquaphor.   Apply clobetasol ointment 1-2 times weekly.  2. Seborrheic keratosis, lentigo, angioma, benign nevi  BENIGN LESIONS DISCUSSED WITH PATIENT:  I discussed the specifics of tumor, prognosis, and genetics of benign lesions.  I explained that treatment of these lesions would be purely cosmetic and not medically neccessary.  I discussed with patient different removal options including excision, cautery and /or laser.      Nature and genetics of benign skin lesions dicussed with patient.  Signs and Symptoms of skin cancer discussed with patient.  ABCDEs of melanoma reviewed with patient.  Patient encouraged to perform monthly skin exams.  UV precautions reviewed with patient.  Risks of non-melanoma skin cancer discussed with patient   Return to clinic in one year or sooner if needed.         Again, thank you for allowing me to participate in the care of your patient.        Sincerely,        Aleja Pitts PA-C    "

## 2021-07-16 NOTE — TELEPHONE ENCOUNTER
Received fax from [facility]: Alternative requested: Clobetasol 0.05 oint not covered alternatives are; Betamethasone DP Cream or Clobetasol emollient CR    Ph. [phone]

## 2021-07-16 NOTE — PROGRESS NOTES
Janice Mayfield is an extremely pleasant 61 year old year old female patient here today for evaluation of moles on body and lichen sclerosus. She notes she is on remicade and methotrexate for RA. She reports she uses an OTC oil which has been helping. She note it is occasionally itchy. Patient has no other skin complaints today.  Remainder of the HPI, Meds, PMH, Allergies, FH, and SH was reviewed in chart.    Pertinent Hx:   No personal history of skin cancer.   Past Medical History:   Diagnosis Date     Lichen sclerosus et atrophicus of the vulva 2018     Long term current use of systemic steroids 5/3/2017     Long-term use of immunosuppressant medication 2018     Other secondary osteoarthritis of multiple sites 2018     Rheumatoid arthritis involving multiple sites with positive rheumatoid factor (H) 2017     Vitamin D deficiency 5/3/2017       Past Surgical History:   Procedure Laterality Date     C/SECTION, LOW TRANSVERSE      , Low Transverse     HC TOOTH EXTRACTION W/FORCEP          Family History   Problem Relation Age of Onset     Cancer Mother 72     Skin Cancer Father        Social History     Socioeconomic History     Marital status:      Spouse name: Maksim     Number of children: 3     Years of education: Not on file     Highest education level: Not on file   Occupational History     Occupation: artist- contract work   Tobacco Use     Smoking status: Never Smoker     Smokeless tobacco: Never Used   Substance and Sexual Activity     Alcohol use: No     Drug use: No     Sexual activity: Not Currently     Partners: Male   Other Topics Concern     Parent/sibling w/ CABG, MI or angioplasty before 65F 55M? Not Asked   Social History Narrative     Not on file     Social Determinants of Health     Financial Resource Strain:      Difficulty of Paying Living Expenses:    Food Insecurity:      Worried About Running Out of Food in the Last Year:      Ran Out of Food in  "the Last Year:    Transportation Needs:      Lack of Transportation (Medical):      Lack of Transportation (Non-Medical):    Physical Activity:      Days of Exercise per Week:      Minutes of Exercise per Session:    Stress:      Feeling of Stress :    Social Connections:      Frequency of Communication with Friends and Family:      Frequency of Social Gatherings with Friends and Family:      Attends [employer] Services:      Active Member of Clubs or Organizations:      Attends Club or Organization Meetings:      Marital Status:    Intimate Partner Violence:      Fear of Current or Ex-Partner:      Emotionally Abused:      Physically Abused:      Sexually Abused:        Outpatient Encounter Medications as of 7/16/2021   Medication Sig Dispense Refill     clobetasol (TEMOVATE) 0.05 % external ointment Apply sparingly 1-2 times weekly. 15 g 1     folic acid (FOLVITE) 1 MG tablet Take 2 tablets (2 mg) by mouth daily For additional refills, please schedule a follow-up appointment. 180 tablet 0     InFLIXimab (REMICADE IV) Inject 100 mg into the vein every 28 days        methotrexate 2.5 MG tablet Take 4 tablets (10 mg) by mouth every 7 days Labs every 8-12 week 48 tablet 0     predniSONE (DELTASONE) 1 MG tablet TAKE 4 TABLETS (4 MG) BY MOUTH DAILY TRY TAPER BY 1/2 MG DAY IF ABLE 360 tablet 0     Vitamin D, Cholecalciferol, 1000 UNITS CAPS Take 2,000 Units by mouth daily 1 capsule 0     DiphenhydrAMINE HCl (BENADRYL PO) Take 25 mg by mouth as needed (Pre-medication for remicade)       IBUPROFEN PO Take 200 mg by mouth as needed for moderate pain       No facility-administered encounter medications on file as of 7/16/2021.             O:   NAD, WDWN, Alert & Oriented, Mood & Affect wnl, Vitals stable   Here today alone   /83   Pulse 66   Ht 1.638 m (5' 4.5\")   Wt 73.5 kg (162 lb)   BMI 27.38 kg/m     General appearance normal   Vitals stable   Alert, oriented and in no acute distress  White atrophic patches on " vagina mucosa    Stuck on papules and brown macules on trunk and ext   Red papules on trunk  Brown papules and macules with regular pigment network and borders    The remainder of skin exam is normal     Eyes: Conjunctivae/lids:Normal     ENT: Lips: normal    MSK:Normal    Cardiovascular: peripheral edema none    Pulm: Breathing Normal    Neuro/Psych: Orientation:Alert and Orientedx3 ; Mood/Affect:normal   A/P:  1. Lichen Sclerosus  Discussed that this is a chronic inflammatory skin condition, that can wax and wane.   Use barrier creams like vaseline or aquaphor.   Apply clobetasol ointment 1-2 times weekly.  2. Seborrheic keratosis, lentigo, angioma, benign nevi  BENIGN LESIONS DISCUSSED WITH PATIENT:  I discussed the specifics of tumor, prognosis, and genetics of benign lesions.  I explained that treatment of these lesions would be purely cosmetic and not medically neccessary.  I discussed with patient different removal options including excision, cautery and /or laser.      Nature and genetics of benign skin lesions dicussed with patient.  Signs and Symptoms of skin cancer discussed with patient.  ABCDEs of melanoma reviewed with patient.  Patient encouraged to perform monthly skin exams.  UV precautions reviewed with patient.  Risks of non-melanoma skin cancer discussed with patient   Return to clinic in one year or sooner if needed.

## 2021-07-16 NOTE — TELEPHONE ENCOUNTER
updated pt that referral to [facility] was placed.  Gave scheduling number.  Relayed message of providers concern for extended time to get in. She will call to schedule and check with her insurance      ANDREEA Quiñonez.

## 2021-07-16 NOTE — TELEPHONE ENCOUNTER
Called pt to to schedule referral with [facility], next avail.    Did notify pt that next avail is In October, and to contact referring provider if that does not work.    Left L pod number.

## 2021-07-16 NOTE — NURSING NOTE
"Initial /83   Pulse 66   Ht 1.638 m (5' 4.5\")   Wt 73.5 kg (162 lb)   BMI 27.38 kg/m   Estimated body mass index is 27.38 kg/m  as calculated from the following:    Height as of this encounter: 1.638 m (5' 4.5\").    Weight as of this encounter: 73.5 kg (162 lb). .      "

## 2021-07-19 NOTE — TELEPHONE ENCOUNTER
Spoke to pharmacy and Betamethasone ointment was covered and is ready for .     I left a message for patient that alternative medication is ready for  per pharmacy.     Neelam Chauhan RN

## 2021-07-20 ENCOUNTER — LAB (OUTPATIENT)
Dept: LAB | Facility: CLINIC | Age: 61
End: 2021-07-20
Payer: COMMERCIAL

## 2021-07-20 DIAGNOSIS — Z13.1 SCREENING FOR DIABETES MELLITUS (DM): ICD-10-CM

## 2021-07-20 DIAGNOSIS — Z13.29 SCREENING FOR THYROID DISORDER: ICD-10-CM

## 2021-07-20 DIAGNOSIS — Z13.6 CARDIOVASCULAR SCREENING; LDL GOAL LESS THAN 160: ICD-10-CM

## 2021-07-20 LAB
ALBUMIN SERPL-MCNC: 3.6 G/DL (ref 3.4–5)
ALP SERPL-CCNC: 79 U/L (ref 40–150)
ALT SERPL W P-5'-P-CCNC: 20 U/L (ref 0–50)
ANION GAP SERPL CALCULATED.3IONS-SCNC: 3 MMOL/L (ref 3–14)
AST SERPL W P-5'-P-CCNC: 13 U/L (ref 0–45)
BILIRUB SERPL-MCNC: 0.5 MG/DL (ref 0.2–1.3)
BUN SERPL-MCNC: 15 MG/DL (ref 7–30)
CALCIUM SERPL-MCNC: 9 MG/DL (ref 8.5–10.1)
CHLORIDE BLD-SCNC: 108 MMOL/L (ref 94–109)
CHOLEST SERPL-MCNC: 197 MG/DL
CO2 SERPL-SCNC: 29 MMOL/L (ref 20–32)
CREAT SERPL-MCNC: 0.76 MG/DL (ref 0.52–1.04)
FASTING STATUS PATIENT QL REPORTED: YES
GFR SERPL CREATININE-BSD FRML MDRD: 86 ML/MIN/1.73M2
GLUCOSE BLD-MCNC: 91 MG/DL (ref 70–99)
HDLC SERPL-MCNC: 104 MG/DL
HOLD SPECIMEN: NORMAL
HOLD SPECIMEN: NORMAL
LDLC SERPL CALC-MCNC: 82 MG/DL
NONHDLC SERPL-MCNC: 93 MG/DL
POTASSIUM BLD-SCNC: 3.8 MMOL/L (ref 3.4–5.3)
PROT SERPL-MCNC: 7.8 G/DL (ref 6.8–8.8)
SODIUM SERPL-SCNC: 140 MMOL/L (ref 133–144)
TRIGL SERPL-MCNC: 55 MG/DL
TSH SERPL DL<=0.005 MIU/L-ACNC: 3.22 MU/L (ref 0.4–4)

## 2021-07-20 PROCEDURE — 80053 COMPREHEN METABOLIC PANEL: CPT

## 2021-07-20 PROCEDURE — 84443 ASSAY THYROID STIM HORMONE: CPT

## 2021-07-20 PROCEDURE — 80061 LIPID PANEL: CPT

## 2021-07-20 PROCEDURE — 36415 COLL VENOUS BLD VENIPUNCTURE: CPT

## 2021-07-21 PROBLEM — M54.50 LEFT-SIDED LOW BACK PAIN WITHOUT SCIATICA: Status: RESOLVED | Noted: 2021-05-10 | Resolved: 2021-07-21

## 2021-07-26 DIAGNOSIS — M25.512 CHRONIC LEFT SHOULDER PAIN: ICD-10-CM

## 2021-07-26 DIAGNOSIS — G89.29 CHRONIC LEFT SHOULDER PAIN: ICD-10-CM

## 2021-07-26 DIAGNOSIS — M05.79 RHEUMATOID ARTHRITIS INVOLVING MULTIPLE SITES WITH POSITIVE RHEUMATOID FACTOR (H): ICD-10-CM

## 2021-07-26 DIAGNOSIS — Z79.60 LONG-TERM USE OF IMMUNOSUPPRESSANT MEDICATION: ICD-10-CM

## 2021-07-26 DIAGNOSIS — E55.9 VITAMIN D DEFICIENCY: ICD-10-CM

## 2021-07-26 DIAGNOSIS — Z79.52 LONG TERM CURRENT USE OF SYSTEMIC STEROIDS: ICD-10-CM

## 2021-07-26 DIAGNOSIS — M15.3 OTHER SECONDARY OSTEOARTHRITIS OF MULTIPLE SITES: ICD-10-CM

## 2021-07-27 RX ORDER — FOLIC ACID 1 MG/1
2 TABLET ORAL DAILY
Qty: 180 TABLET | Refills: 3 | Status: SHIPPED | OUTPATIENT
Start: 2021-07-27 | End: 2022-05-16

## 2021-07-28 ENCOUNTER — ANCILLARY PROCEDURE (OUTPATIENT)
Dept: MAMMOGRAPHY | Facility: CLINIC | Age: 61
End: 2021-07-28
Attending: NURSE PRACTITIONER
Payer: COMMERCIAL

## 2021-07-28 DIAGNOSIS — Z12.31 ENCOUNTER FOR SCREENING MAMMOGRAM FOR BREAST CANCER: ICD-10-CM

## 2021-07-28 PROCEDURE — 77067 SCR MAMMO BI INCL CAD: CPT | Performed by: RADIOLOGY

## 2021-07-28 PROCEDURE — 77063 BREAST TOMOSYNTHESIS BI: CPT | Performed by: RADIOLOGY

## 2021-08-03 ENCOUNTER — INFUSION THERAPY VISIT (OUTPATIENT)
Dept: INFUSION THERAPY | Facility: CLINIC | Age: 61
End: 2021-08-03
Payer: COMMERCIAL

## 2021-08-03 VITALS
WEIGHT: 165.4 LBS | RESPIRATION RATE: 16 BRPM | DIASTOLIC BLOOD PRESSURE: 75 MMHG | OXYGEN SATURATION: 99 % | TEMPERATURE: 97.8 F | HEART RATE: 61 BPM | BODY MASS INDEX: 27.95 KG/M2 | SYSTOLIC BLOOD PRESSURE: 120 MMHG

## 2021-08-03 DIAGNOSIS — M05.79 RHEUMATOID ARTHRITIS INVOLVING MULTIPLE SITES WITH POSITIVE RHEUMATOID FACTOR (H): Primary | ICD-10-CM

## 2021-08-03 PROCEDURE — 99207 PR NO CHARGE LOS: CPT

## 2021-08-03 PROCEDURE — 96413 CHEMO IV INFUSION 1 HR: CPT | Performed by: NURSE PRACTITIONER

## 2021-08-03 RX ORDER — HEPARIN SODIUM (PORCINE) LOCK FLUSH IV SOLN 100 UNIT/ML 100 UNIT/ML
5 SOLUTION INTRAVENOUS
Status: CANCELLED | OUTPATIENT
Start: 2021-08-31

## 2021-08-03 RX ORDER — DIPHENHYDRAMINE HCL 25 MG
25 CAPSULE ORAL ONCE
Status: CANCELLED
Start: 2021-08-31 | End: 2021-08-31

## 2021-08-03 RX ORDER — ACETAMINOPHEN 325 MG/1
650 TABLET ORAL ONCE
Status: CANCELLED
Start: 2021-08-31 | End: 2021-08-31

## 2021-08-03 RX ORDER — METHYLPREDNISOLONE SODIUM SUCCINATE 125 MG/2ML
60 INJECTION, POWDER, LYOPHILIZED, FOR SOLUTION INTRAMUSCULAR; INTRAVENOUS ONCE
Status: CANCELLED
Start: 2021-08-31 | End: 2021-08-31

## 2021-08-03 RX ORDER — DIPHENHYDRAMINE HCL 25 MG
25 CAPSULE ORAL ONCE
Status: COMPLETED | OUTPATIENT
Start: 2021-08-03 | End: 2021-08-03

## 2021-08-03 RX ORDER — ACETAMINOPHEN 325 MG/1
650 TABLET ORAL ONCE
Status: COMPLETED | OUTPATIENT
Start: 2021-08-03 | End: 2021-08-03

## 2021-08-03 RX ORDER — HEPARIN SODIUM,PORCINE 10 UNIT/ML
5 VIAL (ML) INTRAVENOUS
Status: CANCELLED | OUTPATIENT
Start: 2021-08-31

## 2021-08-03 RX ADMIN — ACETAMINOPHEN 650 MG: 325 TABLET ORAL at 07:43

## 2021-08-03 RX ADMIN — Medication 25 MG: at 07:43

## 2021-08-03 RX ADMIN — Medication 250 ML: at 08:00

## 2021-08-03 ASSESSMENT — PAIN SCALES - GENERAL: PAINLEVEL: NO PAIN (0)

## 2021-08-03 NOTE — PROGRESS NOTES
Infusion Nursing Note:  Janice Mayfield presents today for Rapid Remicade.    Patient seen by provider today: No   present during visit today: Not Applicable.    Note: N/A.  Patient did meet criteria for an asymptomatic covid-19 PCR test in infusion today. Patient declined the covid-19 test.    Intravenous Access:  Peripheral IV placed.    Treatment Conditions:  Biological Infusion Checklist:  ~~~ NOTE: If the patient answers yes to any of the questions below, hold the infusion and contact ordering provider or on-call provider.    1. Have you recently had an elevated temperature, fever, chills, productive cough, coughing for 3 weeks or longer or hemoptysis, abnormal vital signs, night sweats,  chest pain or have you noticed a decrease in your appetite, unexplained weight loss or fatigue? No  2. Do you have any open wounds or new incisions? No  3. Do you have any recent or upcoming hospitalizations, surgeries or dental procedures? No  4. Do you currently have or recently have had any signs of illness or infection or are you on any antibiotics? No  5. Have you had any new, sudden or worsening abdominal pain? No  6. Have you or anyone in your household received a live vaccination in the past 4 weeks? Please note:  No live vaccines while on biologic/chemotherapy until 6 months after the last treatment.  Patient can receive the flu vaccine (shot only) and the pneumovax.  It is optimal for the patient to get these vaccines mid cycle, but they can be given at any time as long as it is not on the day of the infusion. No  7. Have you recently been diagnosed with any new nervous system diseases (ie. Multiple sclerosis, Guillain [place], seizures, neurological changes) or cancer diagnosis? No  8. Are you on any form of radiation or chemotherapy? No  9. Are you pregnant or breast feeding or do you have plans of pregnancy in the future? No  10. Have you been having any signs of worsening depression or suicidal  ideations?  (benlysta only) No  11. Have there been any other new onset medical symptoms? No      Post Infusion Assessment:  Patient tolerated infusion without incident.  Blood return noted pre and post infusion.  Site patent and intact, free from redness, edema or discomfort.  No evidence of extravasations.  Access discontinued per protocol.       Discharge Plan:   Patient will return 8/31/2021 for next appointment.   Patient discharged in stable condition accompanied by: self.  Departure Mode: Ambulatory.    Anna London RN-BSN, PHN, OCN  [facility]

## 2021-08-09 ENCOUNTER — TRANSFERRED RECORDS (OUTPATIENT)
Dept: HEALTH INFORMATION MANAGEMENT | Facility: CLINIC | Age: 61
End: 2021-08-09

## 2021-08-13 NOTE — TELEPHONE ENCOUNTER
RECORDS RECEIVED FROM: internal    Appt date: 10/18/21   NOTES STATUS DETAILS   OFFICE NOTE from referring provider  Internal 7/8/2021 Sunshine Lazar, LO CNP   OFFICE NOTE from other specialist   N/A    DISCHARGE SUMMARY from hospital  N/A    DISCHARGE REPORT from the ER N/A    OPERATIVE REPORT  N/A    MEDICATION LIST Internal    LABS     PFC TESTING N/A    ANAL PAP N/A    BIOPSIES/PATHOLOGY RELATED TO DIAGNOSIS N/A    DIAGNOSTIC PROCEDURES     COLONOSCOPY Internal 5/19/2016 - scanned in [facility]   UPPER ENDOSCOPY (EGD) N/A    FLEX SIGMOIDOSCOPY  N/A    ERCP N/A    IMAGING (DISC & REPORT)      CT  N/A    MRI N/A    XRAY N/A    ULTRASOUND (ENDOANAL/ENDORECTAL) N/A       [place]

## 2021-08-31 ENCOUNTER — LAB (OUTPATIENT)
Dept: LAB | Facility: CLINIC | Age: 61
End: 2021-08-31
Payer: COMMERCIAL

## 2021-08-31 ENCOUNTER — INFUSION THERAPY VISIT (OUTPATIENT)
Dept: INFUSION THERAPY | Facility: CLINIC | Age: 61
End: 2021-08-31
Payer: COMMERCIAL

## 2021-08-31 VITALS
WEIGHT: 167.9 LBS | OXYGEN SATURATION: 100 % | RESPIRATION RATE: 16 BRPM | SYSTOLIC BLOOD PRESSURE: 121 MMHG | BODY MASS INDEX: 28.37 KG/M2 | TEMPERATURE: 97.9 F | DIASTOLIC BLOOD PRESSURE: 74 MMHG | HEART RATE: 74 BPM

## 2021-08-31 DIAGNOSIS — Z79.60 LONG-TERM USE OF IMMUNOSUPPRESSANT MEDICATION: ICD-10-CM

## 2021-08-31 DIAGNOSIS — M05.79 RHEUMATOID ARTHRITIS INVOLVING MULTIPLE SITES WITH POSITIVE RHEUMATOID FACTOR (H): Chronic | ICD-10-CM

## 2021-08-31 DIAGNOSIS — M05.79 RHEUMATOID ARTHRITIS INVOLVING MULTIPLE SITES WITH POSITIVE RHEUMATOID FACTOR (H): Primary | ICD-10-CM

## 2021-08-31 LAB
ALBUMIN SERPL-MCNC: 3.4 G/DL (ref 3.4–5)
ALT SERPL W P-5'-P-CCNC: 25 U/L (ref 0–50)
AST SERPL W P-5'-P-CCNC: 16 U/L (ref 0–45)
CREAT SERPL-MCNC: 0.81 MG/DL (ref 0.52–1.04)
CRP SERPL-MCNC: <2.9 MG/L (ref 0–8)
ERYTHROCYTE [DISTWIDTH] IN BLOOD BY AUTOMATED COUNT: 12.6 % (ref 10–15)
ERYTHROCYTE [SEDIMENTATION RATE] IN BLOOD BY WESTERGREN METHOD: 21 MM/HR (ref 0–30)
GFR SERPL CREATININE-BSD FRML MDRD: 80 ML/MIN/1.73M2
HCT VFR BLD AUTO: 38.2 % (ref 35–47)
HGB BLD-MCNC: 12.4 G/DL (ref 11.7–15.7)
HOLD SPECIMEN: NORMAL
MCH RBC QN AUTO: 29.9 PG (ref 26.5–33)
MCHC RBC AUTO-ENTMCNC: 32.5 G/DL (ref 31.5–36.5)
MCV RBC AUTO: 92 FL (ref 78–100)
PLATELET # BLD AUTO: 240 10E3/UL (ref 150–450)
RBC # BLD AUTO: 4.15 10E6/UL (ref 3.8–5.2)
WBC # BLD AUTO: 4.3 10E3/UL (ref 4–11)

## 2021-08-31 PROCEDURE — 96413 CHEMO IV INFUSION 1 HR: CPT | Performed by: NURSE PRACTITIONER

## 2021-08-31 PROCEDURE — 85027 COMPLETE CBC AUTOMATED: CPT

## 2021-08-31 PROCEDURE — 84460 ALANINE AMINO (ALT) (SGPT): CPT

## 2021-08-31 PROCEDURE — 36415 COLL VENOUS BLD VENIPUNCTURE: CPT

## 2021-08-31 PROCEDURE — 86140 C-REACTIVE PROTEIN: CPT

## 2021-08-31 PROCEDURE — 99207 PR NO CHARGE LOS: CPT

## 2021-08-31 PROCEDURE — 82040 ASSAY OF SERUM ALBUMIN: CPT

## 2021-08-31 PROCEDURE — 85652 RBC SED RATE AUTOMATED: CPT

## 2021-08-31 PROCEDURE — 84450 TRANSFERASE (AST) (SGOT): CPT

## 2021-08-31 PROCEDURE — 82565 ASSAY OF CREATININE: CPT

## 2021-08-31 RX ORDER — DIPHENHYDRAMINE HCL 25 MG
25 CAPSULE ORAL ONCE
Status: CANCELLED
Start: 2021-09-28 | End: 2021-09-28

## 2021-08-31 RX ORDER — ACETAMINOPHEN 325 MG/1
650 TABLET ORAL ONCE
Status: CANCELLED
Start: 2021-09-28 | End: 2021-09-28

## 2021-08-31 RX ORDER — METHYLPREDNISOLONE SODIUM SUCCINATE 125 MG/2ML
60 INJECTION, POWDER, LYOPHILIZED, FOR SOLUTION INTRAMUSCULAR; INTRAVENOUS ONCE
Status: CANCELLED
Start: 2021-09-28 | End: 2021-09-28

## 2021-08-31 RX ORDER — HEPARIN SODIUM,PORCINE 10 UNIT/ML
5 VIAL (ML) INTRAVENOUS
Status: CANCELLED | OUTPATIENT
Start: 2021-09-28

## 2021-08-31 RX ORDER — DIPHENHYDRAMINE HCL 25 MG
25 CAPSULE ORAL ONCE
Status: COMPLETED | OUTPATIENT
Start: 2021-08-31 | End: 2021-08-31

## 2021-08-31 RX ORDER — ACETAMINOPHEN 325 MG/1
650 TABLET ORAL ONCE
Status: COMPLETED | OUTPATIENT
Start: 2021-08-31 | End: 2021-08-31

## 2021-08-31 RX ORDER — HEPARIN SODIUM (PORCINE) LOCK FLUSH IV SOLN 100 UNIT/ML 100 UNIT/ML
5 SOLUTION INTRAVENOUS
Status: CANCELLED | OUTPATIENT
Start: 2021-09-28

## 2021-08-31 RX ADMIN — Medication 25 MG: at 08:25

## 2021-08-31 RX ADMIN — ACETAMINOPHEN 650 MG: 325 TABLET ORAL at 08:24

## 2021-08-31 RX ADMIN — Medication 250 ML: at 08:39

## 2021-08-31 ASSESSMENT — PAIN SCALES - GENERAL: PAINLEVEL: NO PAIN (0)

## 2021-08-31 NOTE — PROGRESS NOTES
Infusion Nursing Note:  Janice Mayfield presents today for Rapid Remicade.    Patient seen by provider today: No   present during visit today: Not Applicable.    Note: N/A.  Patient did meet criteria for an asymptomatic covid-19 PCR test in infusion today. Patient declined the covid-19 test.    Intravenous Access:  Peripheral IV placed.    Treatment Conditions:  Biological Infusion Checklist:  ~~~ NOTE: If the patient answers yes to any of the questions below, hold the infusion and contact ordering provider or on-call provider.    1. Have you recently had an elevated temperature, fever, chills, productive cough, coughing for 3 weeks or longer or hemoptysis, abnormal vital signs, night sweats,  chest pain or have you noticed a decrease in your appetite, unexplained weight loss or fatigue? No  2. Do you have any open wounds or new incisions? No  3. Do you have any recent or upcoming hospitalizations, surgeries or dental procedures? No  4. Do you currently have or recently have had any signs of illness or infection or are you on any antibiotics? No  5. Have you had any new, sudden or worsening abdominal pain? No  6. Have you or anyone in your household received a live vaccination in the past 4 weeks? Please note:  No live vaccines while on biologic/chemotherapy until 6 months after the last treatment.  Patient can receive the flu vaccine (shot only) and the pneumovax.  It is optimal for the patient to get these vaccines mid cycle, but they can be given at any time as long as it is not on the day of the infusion. No  7. Have you recently been diagnosed with any new nervous system diseases (ie. Multiple sclerosis, Guillain [place], seizures, neurological changes) or cancer diagnosis? No  8. Are you on any form of radiation or chemotherapy? No  9. Are you pregnant or breast feeding or do you have plans of pregnancy in the future? No  10. Have you been having any signs of worsening depression or suicidal  ideations?  (benlysta only) No  11. Have there been any other new onset medical symptoms? No      Post Infusion Assessment:  Patient tolerated infusion without incident.  Blood return noted pre and post infusion.  Site patent and intact, free from redness, edema or discomfort.  No evidence of extravasations.  Access discontinued per protocol.       Discharge Plan:   Patient will return 9/28/2021 for next appointment.   Patient discharged in stable condition accompanied by: self.  Departure Mode: Ambulatory.    Anna London RN-BSN, PHN, OCN  [facility]

## 2021-09-28 ENCOUNTER — INFUSION THERAPY VISIT (OUTPATIENT)
Dept: INFUSION THERAPY | Facility: CLINIC | Age: 61
End: 2021-09-28
Payer: COMMERCIAL

## 2021-09-28 VITALS
RESPIRATION RATE: 18 BRPM | BODY MASS INDEX: 28.51 KG/M2 | DIASTOLIC BLOOD PRESSURE: 79 MMHG | WEIGHT: 168.7 LBS | SYSTOLIC BLOOD PRESSURE: 118 MMHG | OXYGEN SATURATION: 99 % | HEART RATE: 71 BPM | TEMPERATURE: 97.7 F

## 2021-09-28 DIAGNOSIS — M05.79 RHEUMATOID ARTHRITIS INVOLVING MULTIPLE SITES WITH POSITIVE RHEUMATOID FACTOR (H): Primary | ICD-10-CM

## 2021-09-28 PROCEDURE — 99207 PR NO CHARGE LOS: CPT

## 2021-09-28 PROCEDURE — 96413 CHEMO IV INFUSION 1 HR: CPT | Performed by: NURSE PRACTITIONER

## 2021-09-28 RX ORDER — DIPHENHYDRAMINE HCL 25 MG
25 CAPSULE ORAL ONCE
Status: CANCELLED
Start: 2021-10-26 | End: 2021-10-26

## 2021-09-28 RX ORDER — HEPARIN SODIUM (PORCINE) LOCK FLUSH IV SOLN 100 UNIT/ML 100 UNIT/ML
5 SOLUTION INTRAVENOUS
Status: CANCELLED | OUTPATIENT
Start: 2021-10-26

## 2021-09-28 RX ORDER — HEPARIN SODIUM,PORCINE 10 UNIT/ML
5 VIAL (ML) INTRAVENOUS
Status: CANCELLED | OUTPATIENT
Start: 2021-10-26

## 2021-09-28 RX ORDER — ACETAMINOPHEN 325 MG/1
650 TABLET ORAL ONCE
Status: CANCELLED
Start: 2021-10-26 | End: 2021-10-26

## 2021-09-28 RX ORDER — DIPHENHYDRAMINE HCL 25 MG
25 CAPSULE ORAL ONCE
Status: COMPLETED | OUTPATIENT
Start: 2021-09-28 | End: 2021-09-28

## 2021-09-28 RX ORDER — ACETAMINOPHEN 325 MG/1
650 TABLET ORAL ONCE
Status: COMPLETED | OUTPATIENT
Start: 2021-09-28 | End: 2021-09-28

## 2021-09-28 RX ORDER — METHYLPREDNISOLONE SODIUM SUCCINATE 125 MG/2ML
60 INJECTION, POWDER, LYOPHILIZED, FOR SOLUTION INTRAMUSCULAR; INTRAVENOUS ONCE
Status: CANCELLED
Start: 2021-10-26 | End: 2021-10-26

## 2021-09-28 RX ADMIN — ACETAMINOPHEN 650 MG: 325 TABLET ORAL at 07:54

## 2021-09-28 RX ADMIN — Medication 25 MG: at 07:54

## 2021-09-28 RX ADMIN — Medication 250 ML: at 08:04

## 2021-09-28 ASSESSMENT — PAIN SCALES - GENERAL: PAINLEVEL: NO PAIN (0)

## 2021-09-28 NOTE — PROGRESS NOTES
Infusion Nursing Note:  Janice Curranjuno Mayfield presents today for Rapid Remicade    Patient seen by provider today: No   present during visit today: Not Applicable.    Note: Pt states she is doing well, denies any reason to hold her therapy today, takers 25 mg po benadryl with 650 mg of tylenol as premeds - verbalized understanding to make more appointments.      Intravenous Access:  Peripheral IV placed.    Treatment Conditions:  Biological Infusion Checklist:  ~~~ NOTE: If the patient answers yes to any of the questions below, hold the infusion and contact ordering provider or on-call provider.    1. Have you recently had an elevated temperature, fever, chills, productive cough, coughing for 3 weeks or longer or hemoptysis, abnormal vital signs, night sweats,  chest pain or have you noticed a decrease in your appetite, unexplained weight loss or fatigue? No  2. Do you have any open wounds or new incisions? No  3. Do you have any recent or upcoming hospitalizations, surgeries or dental procedures? No  4. Do you currently have or recently have had any signs of illness or infection or are you on any antibiotics? No  5. Have you had any new, sudden or worsening abdominal pain? No  6. Have you or anyone in your household received a live vaccination in the past 4 weeks? Please note:  No live vaccines while on biologic/chemotherapy until 6 months after the last treatment.  Patient can receive the flu vaccine (shot only) and the pneumovax.  It is optimal for the patient to get these vaccines mid cycle, but they can be given at any time as long as it is not on the day of the infusion. No  7. Have you recently been diagnosed with any new nervous system diseases (ie. Multiple sclerosis, Guillain Munith, seizures, neurological changes) or cancer diagnosis? No  8. Are you on any form of radiation or chemotherapy? No  9. Are you pregnant or breast feeding or do you have plans of pregnancy in the future?  No  10. Have you been having any signs of worsening depression or suicidal ideations?  (benlysta only) No  11. Have there been any other new onset medical symptoms? No        Post Infusion Assessment:  Patient tolerated infusion without incident.  Blood return noted pre and post infusion.  Site patent and intact, free from redness, edema or discomfort.  No evidence of extravasations.  Access discontinued per protocol.  Biologic Infusion Post Education: Call the triage nurse at your clinic or seek medical attention if you have chills and/or temperature greater than or equal to 100.5, uncontrolled nausea/vomiting, diarrhea, constipation, dizziness, shortness of breath, chest pain, heart palpitations, weakness or any other new or concerning symptoms, questions or concerns.  You cannot have any live virus vaccines prior to or during treatment or up to 6 months post infusion.  If you have an upcoming surgery, medical procedure or dental procedure during treatment, this should be discussed with your ordering physician and your surgeon/dentist.  If you are having any concerning symptom, if you are unsure if you should get your next infusion or wish to speak to a provider before your next infusion, please call your care coordinator or triage nurse at your clinic to notify them so we can adequately serve you.       Discharge Plan:   Return every 4 weeks - pt states she will make more appointments.  Discharge instructions reviewed with: Patient.  Patient and/or family verbalized understanding of discharge instructions and all questions answered.  Patient discharged in stable condition accompanied by: self.  Departure Mode: Ambulatory.      Analisa Bhardwaj RN

## 2021-10-09 ENCOUNTER — HEALTH MAINTENANCE LETTER (OUTPATIENT)
Age: 61
End: 2021-10-09

## 2021-10-15 ENCOUNTER — MYC MEDICAL ADVICE (OUTPATIENT)
Dept: SURGERY | Facility: CLINIC | Age: 61
End: 2021-10-15

## 2021-10-17 ENCOUNTER — OFFICE VISIT (OUTPATIENT)
Dept: URGENT CARE | Facility: URGENT CARE | Age: 61
End: 2021-10-17
Payer: COMMERCIAL

## 2021-10-17 VITALS
OXYGEN SATURATION: 100 % | TEMPERATURE: 99.9 F | SYSTOLIC BLOOD PRESSURE: 113 MMHG | BODY MASS INDEX: 28 KG/M2 | DIASTOLIC BLOOD PRESSURE: 74 MMHG | HEART RATE: 87 BPM | WEIGHT: 165.7 LBS

## 2021-10-17 DIAGNOSIS — H65.192 ACUTE OTITIS MEDIA WITH EFFUSION OF LEFT EAR: Primary | ICD-10-CM

## 2021-10-17 DIAGNOSIS — J06.9 VIRAL URI: ICD-10-CM

## 2021-10-17 PROCEDURE — 99214 OFFICE O/P EST MOD 30 MIN: CPT | Performed by: NURSE PRACTITIONER

## 2021-10-17 RX ORDER — AMOXICILLIN 500 MG/1
500 CAPSULE ORAL 3 TIMES DAILY
Qty: 21 CAPSULE | Refills: 0 | Status: SHIPPED | OUTPATIENT
Start: 2021-10-17 | End: 2021-10-24

## 2021-10-17 ASSESSMENT — ENCOUNTER SYMPTOMS
HEADACHES: 1
FEVER: 1
CHILLS: 1
COUGH: 1

## 2021-10-17 NOTE — PROGRESS NOTES
SUBJECTIVE:   Janice Mayfield is a 61 year old female presenting with a chief complaint of   Chief Complaint   Patient presents with     Nasal Congestion     Ear Problem     left ear       She is an established patient of [facility].    URI Adult    Onset of symptoms was 2 week(s) ago.  Course of illness is worsening.    Severity moderate  Current and Associated symptoms: stuffy nose, fever, chills, ear pain left and headache  Treatment measures tried include Rest.  Predisposing factors include None.        Review of Systems   Constitutional: Positive for chills and fever.   HENT: Positive for congestion and ear pain.    Respiratory: Positive for cough.    Neurological: Positive for headaches.   All other systems reviewed and are negative.      Past Medical History:   Diagnosis Date     Lichen sclerosus et atrophicus of the vulva 11/21/2018     Long term current use of systemic steroids 5/3/2017     Long-term use of immunosuppressant medication 5/11/2018     Other secondary osteoarthritis of multiple sites 5/11/2018     Rheumatoid arthritis involving multiple sites with positive rheumatoid factor (H) 4/28/2017     Vitamin D deficiency 5/3/2017     Family History   Problem Relation Age of Onset     Cancer Mother 72     Skin Cancer Father      Current Outpatient Medications   Medication Sig Dispense Refill     amoxicillin (AMOXIL) 500 MG capsule Take 1 capsule (500 mg) by mouth 3 times daily for 7 days 21 capsule 0     DiphenhydrAMINE HCl (BENADRYL PO) Take 25 mg by mouth as needed (Pre-medication for remicade)       folic acid (FOLVITE) 1 MG tablet Take 2 tablets (2 mg) by mouth daily For additional refills, please schedule a follow-up appointment. 180 tablet 3     IBUPROFEN PO Take 200 mg by mouth as needed for moderate pain       InFLIXimab (REMICADE IV) Inject 100 mg into the vein every 28 days        methotrexate 2.5 MG tablet Take 4 tablets (10 mg) by mouth every 7 days Labs every 8-12 week 48 tablet 1      Vitamin D, Cholecalciferol, 1000 UNITS CAPS Take 2,000 Units by mouth daily 1 capsule 0     augmented betamethasone dipropionate (DIPROLENE-AF) 0.05 % external ointment Apply sparingly to affected area in groin 1-2 times weekly. (Patient not taking: Reported on 10/17/2021) 15 g 3     clobetasol (TEMOVATE) 0.05 % external ointment Apply sparingly 1-2 times weekly. (Patient not taking: Reported on 10/17/2021) 15 g 1     predniSONE (DELTASONE) 1 MG tablet 4 mg PO every day. Try taper by 1/2 tab every month. (Patient taking differently: Take 2.5 mg by mouth daily 4 mg PO every day. Try taper by 1/2 tab every month.) 120 tablet 2     Social History     Tobacco Use     Smoking status: Never Smoker     Smokeless tobacco: Never Used   Substance Use Topics     Alcohol use: No       OBJECTIVE  /74   Pulse 87   Temp 99.9  F (37.7  C) (Oral)   Wt 75.2 kg (165 lb 11.2 oz)   SpO2 100%   Breastfeeding No   BMI 28.00 kg/m      Physical Exam  Vitals and nursing note reviewed.   Constitutional:       Appearance: She is not diaphoretic.   HENT:      Head: Normocephalic and atraumatic.      Right Ear: Tympanic membrane and external ear normal.      Left Ear: External ear normal. Tympanic membrane is erythematous and bulging.      Nose: Congestion and rhinorrhea present.   Eyes:      Pupils: Pupils are equal, round, and reactive to light.   Pulmonary:      Effort: Pulmonary effort is normal. No respiratory distress.      Breath sounds: Normal breath sounds.   Musculoskeletal:      Cervical back: Normal range of motion and neck supple.   Lymphadenopathy:      Cervical: No cervical adenopathy.   Skin:     General: Skin is warm and dry.   Neurological:      Mental Status: She is alert.      Cranial Nerves: No cranial nerve deficit.         ASSESSMENT:      ICD-10-CM    1. Acute otitis media with effusion of left ear  H65.192 amoxicillin (AMOXIL) 500 MG capsule   2. Viral URI  J06.9       PLAN:  Discussed URI symptoms and  causes  Increase hydration, rest  Take antibiotic as prescribed.   Side effects of medications discussed  Follow up with PCP if symptoms are persisting in 3 days or sooner if getting worse.  All questions are answered and patient is in agreement with plan           Patient Instructions     Patient Education     Middle Ear Infection (Otitis Media) in Adults  What is a middle ear infection?  A middle ear infection occurs behind the eardrum. It is most often caused by a virus or bacteria. Most kids have at least one middle ear infection by the time they are 3 years old. But adults can also get them.   What causes middle ear infections?  Inflammation in the middle ear most often starts after you ve had a sore throat, cold, or other upper respiratory problem. The infection spreads to the middle ear and causes fluid buildup behind the eardrum.    What are the symptoms of a middle ear infection?  These are the most common symptoms of middle ear infections in adults:     Ear pain    Feeling of fullness in the ear    Fluid draining from the ear    Fever    Hearing loss  These symptoms may look like other conditions or health problems. Always talk with your healthcare provider for a diagnosis.   How is a middle ear infection diagnosed?  Your healthcare provider will review your health history and do a physical exam. They will check the outer ear and the eardrum using an otoscope. This is a lighted tool that lets the healthcare provider see inside the ear. A pneumatic otoscope blows a puff of air into the ear to test eardrum movement. When there is fluid or infection in the middle ear, movement is decreased.   Your provider may also do a tympanometry. This is a test that directs air and sound to the middle ear.   If you have ear infections often, your healthcare provider may suggest having a hearing test.   How is a middle ear infection treated?  Treatment will depend on your symptoms, age, and general health. It will also  depend on how severe the condition is.   Treatment may include:    Antibiotics    Pain relievers    Placing small tubes in the eardrum for chronic ear infections   What are possible complications of a middle ear infection?   Untreated ear infections can lead to:    Infection in other parts of the head    Lasting (permanent) hearing loss    Speech and language problems  Can middle ear infections be prevented?  Cold and allergy medicines don't seem to prevent ear infections. And currently there is no vaccine that can prevent the disease. But check with your healthcare provider and make sure your vaccines are up-to-date. Living in a home where cigarettes are smoked can increase the chances of ear infections. So can living in a home where vaping devices, such as e-cigarettes and electronic nicotine, are used   Key points about middle ear infections    Middle ear infections can affect both children and adults.    Pain and fever can be the most common symptoms.    Without treatment, permanent hearing loss may happen.    Take antibiotics as prescribed and finish all of the prescription. This can help prevent antibiotic-resistant infections or incomplete treatment with the infection returning.    Keeping your home smoke-free or free of vaping devices can decrease the chances of ear infections.    Next steps  Tips to help you get the most from a visit to your healthcare provider:    Know the reason for your visit and what you want to happen.    Before your visit, write down questions you want answered.    Bring someone with you to help you ask questions and remember what your provider tells you.    At the visit, write down the name of a new diagnosis, and any new medicines, treatments, or tests. Also write down any new instructions your provider gives you.    Know why a new medicine or treatment is prescribed, and how it will help you. Also know what the side effects are.    Ask if your condition can be treated in other  ways.    Know why a test or procedure is recommended and what the results could mean.    Know what to expect if you do not take the medicine or have the test or procedure.    If you have a follow-up appointment, write down the date, time, and purpose for that visit.    Know how you can contact your provider if you have questions.  [employer] last reviewed this educational content on 1/1/2021 2000-2021 [employer]. All rights reserved. This information is not intended as a substitute for professional medical care. Always follow your healthcare professional's instructions.

## 2021-10-17 NOTE — PATIENT INSTRUCTIONS
Patient Education     Middle Ear Infection (Otitis Media) in Adults  What is a middle ear infection?  A middle ear infection occurs behind the eardrum. It is most often caused by a virus or bacteria. Most kids have at least one middle ear infection by the time they are 3 years old. But adults can also get them.   What causes middle ear infections?  Inflammation in the middle ear most often starts after you ve had a sore throat, cold, or other upper respiratory problem. The infection spreads to the middle ear and causes fluid buildup behind the eardrum.    What are the symptoms of a middle ear infection?  These are the most common symptoms of middle ear infections in adults:     Ear pain    Feeling of fullness in the ear    Fluid draining from the ear    Fever    Hearing loss  These symptoms may look like other conditions or health problems. Always talk with your healthcare provider for a diagnosis.   How is a middle ear infection diagnosed?  Your healthcare provider will review your health history and do a physical exam. They will check the outer ear and the eardrum using an otoscope. This is a lighted tool that lets the healthcare provider see inside the ear. A pneumatic otoscope blows a puff of air into the ear to test eardrum movement. When there is fluid or infection in the middle ear, movement is decreased.   Your provider may also do a tympanometry. This is a test that directs air and sound to the middle ear.   If you have ear infections often, your healthcare provider may suggest having a hearing test.   How is a middle ear infection treated?  Treatment will depend on your symptoms, age, and general health. It will also depend on how severe the condition is.   Treatment may include:    Antibiotics    Pain relievers    Placing small tubes in the eardrum for chronic ear infections   What are possible complications of a middle ear infection?   Untreated ear infections can lead to:    Infection in other parts  of the head    Lasting (permanent) hearing loss    Speech and language problems  Can middle ear infections be prevented?  Cold and allergy medicines don't seem to prevent ear infections. And currently there is no vaccine that can prevent the disease. But check with your healthcare provider and make sure your vaccines are up-to-date. Living in a home where cigarettes are smoked can increase the chances of ear infections. So can living in a home where vaping devices, such as e-cigarettes and electronic nicotine, are used   Key points about middle ear infections    Middle ear infections can affect both children and adults.    Pain and fever can be the most common symptoms.    Without treatment, permanent hearing loss may happen.    Take antibiotics as prescribed and finish all of the prescription. This can help prevent antibiotic-resistant infections or incomplete treatment with the infection returning.    Keeping your home smoke-free or free of vaping devices can decrease the chances of ear infections.    Next steps  Tips to help you get the most from a visit to your healthcare provider:    Know the reason for your visit and what you want to happen.    Before your visit, write down questions you want answered.    Bring someone with you to help you ask questions and remember what your provider tells you.    At the visit, write down the name of a new diagnosis, and any new medicines, treatments, or tests. Also write down any new instructions your provider gives you.    Know why a new medicine or treatment is prescribed, and how it will help you. Also know what the side effects are.    Ask if your condition can be treated in other ways.    Know why a test or procedure is recommended and what the results could mean.    Know what to expect if you do not take the medicine or have the test or procedure.    If you have a follow-up appointment, write down the date, time, and purpose for that visit.    Know how you can contact  your provider if you have questions.  [employer] last reviewed this educational content on 1/1/2021 2000-2021 [employer]. All rights reserved. This information is not intended as a substitute for professional medical care. Always follow your healthcare professional's instructions.

## 2021-10-18 ENCOUNTER — PRE VISIT (OUTPATIENT)
Dept: SURGERY | Facility: CLINIC | Age: 61
End: 2021-10-18

## 2021-10-21 ENCOUNTER — TELEPHONE (OUTPATIENT)
Dept: FAMILY MEDICINE | Facility: CLINIC | Age: 61
End: 2021-10-21

## 2021-10-21 NOTE — TELEPHONE ENCOUNTER
Patient states she was seen in urgent care 10/17 for sinus and ear infection  Has been taking Amoxicillin and Ibuprofen  Patient states she no longer has ear pain, afebrile but still c/o sinus pressure and congestion  Patient would like to know if she should take antibiotic for longer period of time  Advised patient needs to be seen to be rechecked    Appointment tomorrow with LO Marte CNPN, RN

## 2021-10-22 ENCOUNTER — OFFICE VISIT (OUTPATIENT)
Dept: FAMILY MEDICINE | Facility: CLINIC | Age: 61
End: 2021-10-22
Payer: COMMERCIAL

## 2021-10-22 VITALS
DIASTOLIC BLOOD PRESSURE: 81 MMHG | BODY MASS INDEX: 28.48 KG/M2 | WEIGHT: 168.5 LBS | TEMPERATURE: 97.6 F | SYSTOLIC BLOOD PRESSURE: 127 MMHG | OXYGEN SATURATION: 99 % | HEART RATE: 69 BPM

## 2021-10-22 DIAGNOSIS — Z79.60 LONG-TERM USE OF IMMUNOSUPPRESSANT MEDICATION: ICD-10-CM

## 2021-10-22 DIAGNOSIS — M05.79 RHEUMATOID ARTHRITIS INVOLVING MULTIPLE SITES WITH POSITIVE RHEUMATOID FACTOR (H): Chronic | ICD-10-CM

## 2021-10-22 DIAGNOSIS — J01.00 ACUTE NON-RECURRENT MAXILLARY SINUSITIS: Primary | ICD-10-CM

## 2021-10-22 PROCEDURE — 99213 OFFICE O/P EST LOW 20 MIN: CPT | Performed by: NURSE PRACTITIONER

## 2021-10-22 NOTE — PATIENT INSTRUCTIONS
PLAN:   1.   Symptomatic therapy suggested: rest, increase fluids, apply heat to sinuses prn, use mist of vaporizer prn, OTC saline nasal spray as needed and call prn if symptoms persist or worsen.  OK to start on Flonase   2.  Orders Placed This Encounter   Medications     amoxicillin-clavulanate (AUGMENTIN) 875-125 MG tablet     Sig: Take 1 tablet by mouth 2 times daily     Dispense:  20 tablet     Refill:  0     3. Patient needs to follow up in if no improvement,or sooner if worsening of symptoms or other symptoms develop.

## 2021-10-22 NOTE — PROGRESS NOTES
Bernie Camacho is a 61 year old who presents for the following health issues     History of Present Illness       She eats 4 or more servings of fruits and vegetables daily.She consumes 0 sweetened beverage(s) daily.She exercises with enough effort to increase her heart rate 30 to 60 minutes per day.  She exercises with enough effort to increase her heart rate 7 days per week.   She is taking medications regularly.     Acute Illness  Acute illness concerns: Has allergies usually in October   Almost 2 weeks has had symptoms and went in last Sunday in  and had an ear infection and was placed on antibiotics. Her concern is that she is not feeling great and will be done on the antibiotics   Onset/Duration: about 2 weeks   Symptoms:  Fever: no  Chills/Sweats: no  Headache (location?): no  Sinus Pressure: YES  Conjunctivitis:  no  Ear Pain: YES: bilateral  Rhinorrhea: YES  Congestion: YES  Sore Throat: no  Cough: YES-productive of clear sputum, productive of yellow sputum  Wheeze: no  Decreased Appetite: no  Nausea: no  Vomiting: no  Diarrhea: no  Dysuria/Freq.: no  Dysuria or Hematuria: no  Fatigue/Achiness: YES  Sick/Strep Exposure: no  Therapies tried and outcome: has been on Amoxicillin and ibuprofen  Was on nasal saline and mucinex   Labs reviewed in EPIC  BP Readings from Last 3 Encounters:   10/22/21 127/81   10/17/21 113/74   09/28/21 118/79    Wt Readings from Last 3 Encounters:   10/22/21 76.4 kg (168 lb 8 oz)   10/17/21 75.2 kg (165 lb 11.2 oz)   09/28/21 76.5 kg (168 lb 11.2 oz)                  Patient Active Problem List   Diagnosis     Rheumatoid arthritis involving multiple sites with positive rheumatoid factor (H)     Vitamin D deficiency     Long term current use of systemic steroids     Other secondary osteoarthritis of multiple sites     Long-term use of immunosuppressant medication     Varicose veins of both lower extremities, unspecified whether complicated     Lichen sclerosus et  atrophicus of the vulva     Cervical cancer screening     Past Surgical History:   Procedure Laterality Date     C/SECTION, LOW TRANSVERSE      , Low Transverse     HC TOOTH EXTRACTION W/FORCEP         Social History     Tobacco Use     Smoking status: Never Smoker     Smokeless tobacco: Never Used   Substance Use Topics     Alcohol use: No     Family History   Problem Relation Age of Onset     Cancer Mother 72     Skin Cancer Father          Current Outpatient Medications   Medication Sig Dispense Refill     amoxicillin (AMOXIL) 500 MG capsule Take 1 capsule (500 mg) by mouth 3 times daily for 7 days 21 capsule 0     augmented betamethasone dipropionate (DIPROLENE-AF) 0.05 % external ointment Apply sparingly to affected area in groin 1-2 times weekly. (Patient not taking: Reported on 10/17/2021) 15 g 3     clobetasol (TEMOVATE) 0.05 % external ointment Apply sparingly 1-2 times weekly. (Patient not taking: Reported on 10/17/2021) 15 g 1     DiphenhydrAMINE HCl (BENADRYL PO) Take 25 mg by mouth as needed (Pre-medication for remicade)       folic acid (FOLVITE) 1 MG tablet Take 2 tablets (2 mg) by mouth daily For additional refills, please schedule a follow-up appointment. 180 tablet 3     IBUPROFEN PO Take 200 mg by mouth as needed for moderate pain       InFLIXimab (REMICADE IV) Inject 100 mg into the vein every 28 days        methotrexate 2.5 MG tablet Take 4 tablets (10 mg) by mouth every 7 days Labs every 8-12 week 48 tablet 1     predniSONE (DELTASONE) 1 MG tablet 4 mg PO every day. Try taper by 1/2 tab every month. (Patient taking differently: Take 2.5 mg by mouth daily 4 mg PO every day. Try taper by 1/2 tab every month.) 120 tablet 2     Vitamin D, Cholecalciferol, 1000 UNITS CAPS Take 2,000 Units by mouth daily 1 capsule 0     Allergies   Allergen Reactions     Adalimumab Hives     Humira      hives     Methotrexate      Anxiety. No allergic signs.      Plaquenil [Hydroxychloroquine]      Elevated  LFTs     Sulfa Drugs      delusions         Review of Systems   Constitutional, HEENT, cardiovascular, pulmonary, gi and gu systems are negative, except as otherwise noted.      Objective    /81 (BP Location: Right arm)   Pulse 69   Temp 97.6  F (36.4  C)   Wt 76.4 kg (168 lb 8 oz)   SpO2 99%   BMI 28.48 kg/m    Body mass index is 28.48 kg/m .  Physical Exam   GENERAL: Patient is well nourished, well developed,in no apparent distress  EYES:  Right conjunctiva is not injected and without discharge.  Left conjunctiva is not injected and without discharge.  EARS: negative findings: external ears normal to inspection and palpation, no mastoid process tenderness,, Right TM: serous middle ear fluid, Left TM: serous middle ear fluid  NOSE: positive findings: mucosa swollen, pale, and boggy,  Sinus maxillary tenderness on bilaterally.  THROAT: normal.  NECK: supple with no adenopathy,   CARDIAC:NORMAL - regular rate and rhythm without murmur.  RESP: normal respiratory rate and rhythm, lungs clear to auscultation  ABD: Abdomen soft, non-tender.  SKIN: Skin color, texture, turgor normal. No rashes or lesions.  MS: extremities normal- no gross deformities noted, gait normal and normal muscle tone    Diagnostic Test Results:  Labs reviewed in Epic  none       Assessment & Plan     Acute non-recurrent maxillary sinusitis  Discussed the nature and pathophysiology of sinusitis and treatment including the role of antibiotics and the need to get over the underlying trigger, URI or allergies.  Will Start:  - amoxicillin-clavulanate (AUGMENTIN) 875-125 MG tablet  Dispense: 20 tablet; Refill: 0    Rheumatoid arthritis involving multiple sites with positive rheumatoid factor (H)  FOLLOW UP WITH SPECIALIST :Rheumatology    Long-term use of immunosuppressant medication  FOLLOW UP WITH SPECIALIST :Rheumatology    Prescription drug management         See Patient Instructions  Patient Instructions     PLAN:   1.   Symptomatic  therapy suggested: rest, increase fluids, apply heat to sinuses prn, use mist of vaporizer prn, OTC saline nasal spray as needed and call prn if symptoms persist or worsen.  OK to start on Flonase   2.  Orders Placed This Encounter   Medications     amoxicillin-clavulanate (AUGMENTIN) 875-125 MG tablet     Sig: Take 1 tablet by mouth 2 times daily     Dispense:  20 tablet     Refill:  0     3. Patient needs to follow up in if no improvement,or sooner if worsening of symptoms or other symptoms develop.    Return in about 1 week (around 10/29/2021), or if symptoms worsen or fail to improve.    LO [facility]

## 2021-11-24 ENCOUNTER — INFUSION THERAPY VISIT (OUTPATIENT)
Dept: INFUSION THERAPY | Facility: CLINIC | Age: 61
End: 2021-11-24
Payer: COMMERCIAL

## 2021-11-24 VITALS
OXYGEN SATURATION: 98 % | BODY MASS INDEX: 28.53 KG/M2 | SYSTOLIC BLOOD PRESSURE: 134 MMHG | DIASTOLIC BLOOD PRESSURE: 75 MMHG | RESPIRATION RATE: 16 BRPM | TEMPERATURE: 98.8 F | WEIGHT: 168.8 LBS | HEART RATE: 81 BPM

## 2021-11-24 DIAGNOSIS — M05.79 RHEUMATOID ARTHRITIS INVOLVING MULTIPLE SITES WITH POSITIVE RHEUMATOID FACTOR (H): Primary | ICD-10-CM

## 2021-11-24 PROCEDURE — 96413 CHEMO IV INFUSION 1 HR: CPT | Performed by: NURSE PRACTITIONER

## 2021-11-24 PROCEDURE — 99207 PR NO CHARGE LOS: CPT

## 2021-11-24 RX ORDER — DIPHENHYDRAMINE HCL 25 MG
25 CAPSULE ORAL ONCE
Status: CANCELLED
Start: 2021-12-21 | End: 2021-12-21

## 2021-11-24 RX ORDER — ACETAMINOPHEN 325 MG/1
650 TABLET ORAL ONCE
Status: CANCELLED
Start: 2021-12-21 | End: 2021-12-21

## 2021-11-24 RX ORDER — HEPARIN SODIUM (PORCINE) LOCK FLUSH IV SOLN 100 UNIT/ML 100 UNIT/ML
5 SOLUTION INTRAVENOUS
Status: CANCELLED | OUTPATIENT
Start: 2021-12-21

## 2021-11-24 RX ORDER — DIPHENHYDRAMINE HCL 25 MG
25 CAPSULE ORAL ONCE
Status: COMPLETED | OUTPATIENT
Start: 2021-11-24 | End: 2021-11-24

## 2021-11-24 RX ORDER — ACETAMINOPHEN 325 MG/1
650 TABLET ORAL ONCE
Status: COMPLETED | OUTPATIENT
Start: 2021-11-24 | End: 2021-11-24

## 2021-11-24 RX ORDER — HEPARIN SODIUM,PORCINE 10 UNIT/ML
5 VIAL (ML) INTRAVENOUS
Status: CANCELLED | OUTPATIENT
Start: 2021-12-21

## 2021-11-24 RX ORDER — METHYLPREDNISOLONE SODIUM SUCCINATE 125 MG/2ML
60 INJECTION, POWDER, LYOPHILIZED, FOR SOLUTION INTRAMUSCULAR; INTRAVENOUS ONCE
Status: CANCELLED
Start: 2021-12-21 | End: 2021-12-21

## 2021-11-24 RX ADMIN — Medication 25 MG: at 08:21

## 2021-11-24 RX ADMIN — Medication 250 ML: at 08:27

## 2021-11-24 RX ADMIN — ACETAMINOPHEN 650 MG: 325 TABLET ORAL at 08:21

## 2021-11-24 NOTE — PROGRESS NOTES
Infusion Nursing Note:  Janice Mayfield presents today for Rapid Remicade.    Patient seen by provider today: No   present during visit today: Not Applicable.    Note: Patient stating that she is finally feeling better from her sinus infection and ear infection.  From an RA standpoint, she is feeling worse due to missing a month of remicade. States her hands and shoulders are the worst and she's having trouble raising her arms.       Intravenous Access:  Peripheral IV placed.    Treatment Conditions:  Biological Infusion Checklist:  ~~~ NOTE: If the patient answers yes to any of the questions below, hold the infusion and contact ordering provider or on-call provider.    1. Have you recently had an elevated temperature, fever, chills, productive cough, coughing for 3 weeks or longer or hemoptysis, abnormal vital signs, night sweats,  chest pain or have you noticed a decrease in your appetite, unexplained weight loss or fatigue? No  2. Do you have any open wounds or new incisions? No  3. Do you have any recent or upcoming hospitalizations, surgeries or dental procedures? No  4. Do you currently have or recently have had any signs of illness or infection or are you on any antibiotics? No  5. Have you had any new, sudden or worsening abdominal pain? No  6. Have you or anyone in your household received a live vaccination in the past 4 weeks? Please note:  No live vaccines while on biologic/chemotherapy until 6 months after the last treatment.  Patient can receive the flu vaccine (shot only) and the pneumovax.  It is optimal for the patient to get these vaccines mid cycle, but they can be given at any time as long as it is not on the day of the infusion. No  7. Have you recently been diagnosed with any new nervous system diseases (ie. Multiple sclerosis, Guillain [place], seizures, neurological changes) or cancer diagnosis? No  8. Are you on any form of radiation or chemotherapy? No  9. Are you pregnant  or breast feeding or do you have plans of pregnancy in the future? No  10. Have you been having any signs of worsening depression or suicidal ideations?  (benlysta only) No  11. Have there been any other new onset medical symptoms? No        Post Infusion Assessment:  Patient tolerated infusion without incident.  Blood return noted pre and post infusion.  Site patent and intact, free from redness, edema or discomfort.  No evidence of extravasations.  Access discontinued per protocol.  Biologic Infusion Post Education: Call the triage nurse at your clinic or seek medical attention if you have chills and/or temperature greater than or equal to 100.5, uncontrolled nausea/vomiting, diarrhea, constipation, dizziness, shortness of breath, chest pain, heart palpitations, weakness or any other new or concerning symptoms, questions or concerns.  You cannot have any live virus vaccines prior to or during treatment or up to 6 months post infusion.  If you have an upcoming surgery, medical procedure or dental procedure during treatment, this should be discussed with your ordering physician and your surgeon/dentist.  If you are having any concerning symptom, if you are unsure if you should get your next infusion or wish to speak to a provider before your next infusion, please call your care coordinator or triage nurse at your clinic to notify them so we can adequately serve you.       Discharge Plan:   Discharge instructions reviewed with: Patient.  Patient and/or family verbalized understanding of discharge instructions and all questions answered.  Patient discharged in stable condition accompanied by: self.  Departure Mode: Ambulatory.      Shanique Nair RN

## 2021-11-29 ENCOUNTER — OFFICE VISIT (OUTPATIENT)
Dept: RHEUMATOLOGY | Facility: CLINIC | Age: 61
End: 2021-11-29
Attending: INTERNAL MEDICINE
Payer: COMMERCIAL

## 2021-11-29 VITALS
BODY MASS INDEX: 28.73 KG/M2 | WEIGHT: 168.3 LBS | HEIGHT: 64 IN | OXYGEN SATURATION: 98 % | RESPIRATION RATE: 18 BRPM | HEART RATE: 78 BPM | TEMPERATURE: 97.9 F | DIASTOLIC BLOOD PRESSURE: 79 MMHG | SYSTOLIC BLOOD PRESSURE: 127 MMHG

## 2021-11-29 DIAGNOSIS — M05.79 RHEUMATOID ARTHRITIS INVOLVING MULTIPLE SITES WITH POSITIVE RHEUMATOID FACTOR (H): Primary | ICD-10-CM

## 2021-11-29 PROCEDURE — G0463 HOSPITAL OUTPT CLINIC VISIT: HCPCS

## 2021-11-29 PROCEDURE — 99214 OFFICE O/P EST MOD 30 MIN: CPT | Performed by: INTERNAL MEDICINE

## 2021-11-29 ASSESSMENT — MIFFLIN-ST. JEOR: SCORE: 1331.15

## 2021-11-29 ASSESSMENT — PAIN SCALES - GENERAL: PAINLEVEL: NO PAIN (1)

## 2021-11-29 NOTE — LETTER
11/29/2021       RE: Janice Mayfield  [address] [address]     Dear Colleague,    Thank you for referring your patient, Janice Mayfield, to the [facility] [place] at [facility]. Please see a copy of my visit note below.    Rheumatology consultation note    CC: Follow-up for rheumatoid arthritis    HPI: Shalini is here for a 6-month follow-up for her rheumatoid arthritis.  She has had a trent several months with delay of her Remicade infusions, while she was battling an ear infection and allergies, as well as needing a Covid vaccination.  She is supposed to be getting her Remicade infusions every 4 weeks.  Most recently she got her Remicade last week on Wednesday and she started to feel better today.  In addition to Remicade she also takes methotrexate 10 mg weekly and prednisone she tapered down to 2 mg daily.    PMH  Rheumatoid arthritis, osteopenia, significant secondary OA     ROS  joint pains but improving, no major positives on ROS in detail.    Physical examination  I reviewed her vital signs and they are normal and her BMI is 28  Joint examination shows that she has no active synovitis in the hands or wrists, elbows or shoulders.  She has good range of motion and normal strength.  Lower extremity exam shows no active synovitis.    I reviewed her lab tests in [facility] and she had normal chemistries and hematology parameters inflammatory markers in late August.  And she is due for a new labs soon.    Impression/plan  1.  Rheumatoid arthritis that seems to be now finally getting better controlled with the restarting of the Remicade.  She gets this every 4 weeks plus she takes methotrexate 10 mg weekly and prednisone 2 mg daily.  Once she is doing really well she can consider tapering the prednisone further but we keep that currently at the same dose.  2.  Osteopenia she continues to take calcium  plus vitamin D  3.  She is yet to be vaccinated for Covid.  I have explained to her that it is my medical opinion that she should get vaccination for Covid as she is at increased risk for severe Covid due to the fact that she is on immunosuppressive medication.  Ideally she should be off this medication for couple weeks to allow an optimal immune response to the vaccine.  4.  She is due for monitoring blood work and she has a standing order.  5.  Follow-up in 6 months or earlier if needed.    I spent 30 minutes face to face with the patient, with 25 minutes on counseling and coordination of care.    Again, thank you for allowing me to participate in the care of your patient.      Sincerely,    Juancarlos Alva MD

## 2021-11-29 NOTE — LETTER
11/29/2021      RE: Janice Mayfield  [address] [address]       Rheumatology consultation note    CC: Follow-up for rheumatoid arthritis    HPI: Shalini is here for a 6-month follow-up for her rheumatoid arthritis.  She has had a trent several months with delay of her Remicade infusions, while she was battling an ear infection and allergies, as well as needing a Covid vaccination.  She is supposed to be getting her Remicade infusions every 4 weeks.  Most recently she got her Remicade last week on Wednesday and she started to feel better today.  In addition to Remicade she also takes methotrexate 10 mg weekly and prednisone she tapered down to 2 mg daily.    PMH  Rheumatoid arthritis, osteopenia, significant secondary OA     ROS  joint pains but improving, no major positives on ROS in detail.    Physical examination  I reviewed her vital signs and they are normal and her BMI is 28  Joint examination shows that she has no active synovitis in the hands or wrists, elbows or shoulders.  She has good range of motion and normal strength.  Lower extremity exam shows no active synovitis.    I reviewed her lab tests in [facility] and she had normal chemistries and hematology parameters inflammatory markers in late August.  And she is due for a new labs soon.    Impression/plan  1.  Rheumatoid arthritis that seems to be now finally getting better controlled with the restarting of the Remicade.  She gets this every 4 weeks plus she takes methotrexate 10 mg weekly and prednisone 2 mg daily.  Once she is doing really well she can consider tapering the prednisone further but we keep that currently at the same dose.  2.  Osteopenia she continues to take calcium plus vitamin D  3.  She is yet to be vaccinated for Covid.  I have explained to her that it is my medical opinion that she should get vaccination for Covid as she is at increased risk for severe Covid due to the fact that she is on  immunosuppressive medication.  Ideally she should be off this medication for couple weeks to allow an optimal immune response to the vaccine.  4.  She is due for monitoring blood work and she has a standing order.  5.  Follow-up in 6 months or earlier if needed.    I spent 30 minutes face to face with the patient, with 25 minutes on counseling and coordination of care.    Juancarlos Alva MD

## 2021-11-30 NOTE — PROGRESS NOTES
Rheumatology consultation note    CC: Follow-up for rheumatoid arthritis    HPI: Shalini is here for a 6-month follow-up for her rheumatoid arthritis.  She has had a trent several months with delay of her Remicade infusions, while she was battling an ear infection and allergies, as well as needing a Covid vaccination.  She is supposed to be getting her Remicade infusions every 4 weeks.  Most recently she got her Remicade last week on Wednesday and she started to feel better today.  In addition to Remicade she also takes methotrexate 10 mg weekly and prednisone she tapered down to 2 mg daily.    PMH  Rheumatoid arthritis, osteopenia, significant secondary OA     ROS  joint pains but improving, no major positives on ROS in detail.    Physical examination  I reviewed her vital signs and they are normal and her BMI is 28  Joint examination shows that she has no active synovitis in the hands or wrists, elbows or shoulders.  She has good range of motion and normal strength.  Lower extremity exam shows no active synovitis.    I reviewed her lab tests in [facility] and she had normal chemistries and hematology parameters inflammatory markers in late August.  And she is due for a new labs soon.    Impression/plan  1.  Rheumatoid arthritis that seems to be now finally getting better controlled with the restarting of the Remicade.  She gets this every 4 weeks plus she takes methotrexate 10 mg weekly and prednisone 2 mg daily.  Once she is doing really well she can consider tapering the prednisone further but we keep that currently at the same dose.  2.  Osteopenia she continues to take calcium plus vitamin D  3.  She is yet to be vaccinated for Covid.  I have explained to her that it is my medical opinion that she should get vaccination for Covid as she is at increased risk for severe Covid due to the fact that she is on immunosuppressive medication.  Ideally she should be off this medication for couple weeks to allow an optimal  immune response to the vaccine.  4.  She is due for monitoring blood work and she has a standing order.  5.  Follow-up in 6 months or earlier if needed.    I spent 30 minutes face to face with the patient, with 25 minutes on counseling and coordination of care.

## 2021-12-22 ENCOUNTER — LAB (OUTPATIENT)
Dept: LAB | Facility: CLINIC | Age: 61
End: 2021-12-22
Payer: COMMERCIAL

## 2021-12-22 ENCOUNTER — INFUSION THERAPY VISIT (OUTPATIENT)
Dept: INFUSION THERAPY | Facility: CLINIC | Age: 61
End: 2021-12-22
Payer: COMMERCIAL

## 2021-12-22 VITALS
BODY MASS INDEX: 28.54 KG/M2 | DIASTOLIC BLOOD PRESSURE: 61 MMHG | OXYGEN SATURATION: 98 % | HEART RATE: 72 BPM | WEIGHT: 168.8 LBS | SYSTOLIC BLOOD PRESSURE: 111 MMHG | RESPIRATION RATE: 18 BRPM | TEMPERATURE: 97.7 F

## 2021-12-22 DIAGNOSIS — M05.79 RHEUMATOID ARTHRITIS INVOLVING MULTIPLE SITES WITH POSITIVE RHEUMATOID FACTOR (H): Primary | ICD-10-CM

## 2021-12-22 DIAGNOSIS — M05.79 RHEUMATOID ARTHRITIS INVOLVING MULTIPLE SITES WITH POSITIVE RHEUMATOID FACTOR (H): Chronic | ICD-10-CM

## 2021-12-22 DIAGNOSIS — Z79.60 LONG-TERM USE OF IMMUNOSUPPRESSANT MEDICATION: ICD-10-CM

## 2021-12-22 LAB
ALBUMIN SERPL-MCNC: 3.4 G/DL (ref 3.4–5)
ALT SERPL W P-5'-P-CCNC: 36 U/L (ref 0–50)
AST SERPL W P-5'-P-CCNC: 21 U/L (ref 0–45)
CREAT SERPL-MCNC: 0.73 MG/DL (ref 0.52–1.04)
CRP SERPL-MCNC: <2.9 MG/L (ref 0–8)
ERYTHROCYTE [DISTWIDTH] IN BLOOD BY AUTOMATED COUNT: 13.2 % (ref 10–15)
ERYTHROCYTE [SEDIMENTATION RATE] IN BLOOD BY WESTERGREN METHOD: 23 MM/HR (ref 0–30)
GFR SERPL CREATININE-BSD FRML MDRD: >90 ML/MIN/1.73M2
HCT VFR BLD AUTO: 39.9 % (ref 35–47)
HGB BLD-MCNC: 12.9 G/DL (ref 11.7–15.7)
MCH RBC QN AUTO: 29.9 PG (ref 26.5–33)
MCHC RBC AUTO-ENTMCNC: 32.3 G/DL (ref 31.5–36.5)
MCV RBC AUTO: 92 FL (ref 78–100)
PLATELET # BLD AUTO: 220 10E3/UL (ref 150–450)
RBC # BLD AUTO: 4.32 10E6/UL (ref 3.8–5.2)
WBC # BLD AUTO: 3.7 10E3/UL (ref 4–11)

## 2021-12-22 PROCEDURE — 82040 ASSAY OF SERUM ALBUMIN: CPT

## 2021-12-22 PROCEDURE — 85027 COMPLETE CBC AUTOMATED: CPT

## 2021-12-22 PROCEDURE — 84460 ALANINE AMINO (ALT) (SGPT): CPT

## 2021-12-22 PROCEDURE — 96413 CHEMO IV INFUSION 1 HR: CPT | Performed by: NURSE PRACTITIONER

## 2021-12-22 PROCEDURE — 86140 C-REACTIVE PROTEIN: CPT

## 2021-12-22 PROCEDURE — 85652 RBC SED RATE AUTOMATED: CPT

## 2021-12-22 PROCEDURE — 82565 ASSAY OF CREATININE: CPT

## 2021-12-22 PROCEDURE — 36415 COLL VENOUS BLD VENIPUNCTURE: CPT

## 2021-12-22 PROCEDURE — 99207 PR NO CHARGE LOS: CPT

## 2021-12-22 PROCEDURE — 84450 TRANSFERASE (AST) (SGOT): CPT

## 2021-12-22 RX ORDER — METHYLPREDNISOLONE SODIUM SUCCINATE 125 MG/2ML
60 INJECTION, POWDER, LYOPHILIZED, FOR SOLUTION INTRAMUSCULAR; INTRAVENOUS ONCE
Status: CANCELLED
Start: 2022-01-19 | End: 2022-01-19

## 2021-12-22 RX ORDER — HEPARIN SODIUM (PORCINE) LOCK FLUSH IV SOLN 100 UNIT/ML 100 UNIT/ML
5 SOLUTION INTRAVENOUS
Status: CANCELLED | OUTPATIENT
Start: 2022-01-19

## 2021-12-22 RX ORDER — DIPHENHYDRAMINE HCL 25 MG
25 CAPSULE ORAL ONCE
Status: CANCELLED
Start: 2022-01-19 | End: 2022-01-19

## 2021-12-22 RX ORDER — DIPHENHYDRAMINE HCL 25 MG
25 CAPSULE ORAL ONCE
Status: COMPLETED | OUTPATIENT
Start: 2021-12-22 | End: 2021-12-22

## 2021-12-22 RX ORDER — HEPARIN SODIUM,PORCINE 10 UNIT/ML
5 VIAL (ML) INTRAVENOUS
Status: CANCELLED | OUTPATIENT
Start: 2022-01-19

## 2021-12-22 RX ORDER — ACETAMINOPHEN 325 MG/1
650 TABLET ORAL ONCE
Status: CANCELLED
Start: 2022-01-19 | End: 2022-01-19

## 2021-12-22 RX ORDER — ACETAMINOPHEN 325 MG/1
650 TABLET ORAL ONCE
Status: COMPLETED | OUTPATIENT
Start: 2021-12-22 | End: 2021-12-22

## 2021-12-22 RX ADMIN — ACETAMINOPHEN 650 MG: 325 TABLET ORAL at 08:21

## 2021-12-22 RX ADMIN — Medication 250 ML: at 08:31

## 2021-12-22 RX ADMIN — Medication 25 MG: at 08:21

## 2021-12-22 NOTE — PROGRESS NOTES
Infusion Nursing Note:  Janice Mayfield presents today for Rapid Remicade.    Patient seen by provider today: No   present during visit today: Not Applicable.    Note: Pt states she is doing well, denies any problems with her last infusion, denies any reason to hold therpay today - takes Tylenol and Benadryl only - pt states she doesn't need the IV solumedrol.      Intravenous Access:  Peripheral IV placed - 24 Gauge angio place X 1 to the right hand.    Treatment Conditions:  Biological Infusion Checklist:  ~~~ NOTE: If the patient answers yes to any of the questions below, hold the infusion and contact ordering provider or on-call provider.    1. Have you recently had an elevated temperature, fever, chills, productive cough, coughing for 3 weeks or longer or hemoptysis, abnormal vital signs, night sweats,  chest pain or have you noticed a decrease in your appetite, unexplained weight loss or fatigue? No  2. Do you have any open wounds or new incisions? No  3. Do you have any recent or upcoming hospitalizations, surgeries or dental procedures? No  4. Do you currently have or recently have had any signs of illness or infection or are you on any antibiotics? No  5. Have you had any new, sudden or worsening abdominal pain? No  6. Have you or anyone in your household received a live vaccination in the past 4 weeks? Please note:  No live vaccines while on biologic/chemotherapy until 6 months after the last treatment.  Patient can receive the flu vaccine (shot only) and the pneumovax.  It is optimal for the patient to get these vaccines mid cycle, but they can be given at any time as long as it is not on the day of the infusion. No  7. Have you recently been diagnosed with any new nervous system diseases (ie. Multiple sclerosis, Guillain [place], seizures, neurological changes) or cancer diagnosis? No  8. Are you on any form of radiation or chemotherapy? No  9. Are you pregnant or breast feeding or do  you have plans of pregnancy in the future? No  10. Have you been having any signs of worsening depression or suicidal ideations?  (benlysta only) No  11. Have there been any other new onset medical symptoms? No      Post Infusion Assessment:  Patient tolerated infusion without incident.  Blood return noted pre and post infusion.  Site patent and intact, free from redness, edema or discomfort.  No evidence of extravasations.  Access discontinued per protocol.  Biologic Infusion Post Education: Call the triage nurse at your clinic or seek medical attention if you have chills and/or temperature greater than or equal to 100.5, uncontrolled nausea/vomiting, diarrhea, constipation, dizziness, shortness of breath, chest pain, heart palpitations, weakness or any other new or concerning symptoms, questions or concerns.  You cannot have any live virus vaccines prior to or during treatment or up to 6 months post infusion.  If you have an upcoming surgery, medical procedure or dental procedure during treatment, this should be discussed with your ordering physician and your surgeon/dentist.  If you are having any concerning symptom, if you are unsure if you should get your next infusion or wish to speak to a provider before your next infusion, please call your care coordinator or triage nurse at your clinic to notify them so we can adequately serve you.       Discharge Plan:   Return on 01/19/2022 and 02/16/2022.  Discharge instructions reviewed with: Patient.  Patient and/or family verbalized understanding of discharge instructions and all questions answered.  Patient discharged in stable condition accompanied by: self.  Departure Mode: Ambulatory.      Analisa Bhardwaj RN

## 2022-01-19 ENCOUNTER — INFUSION THERAPY VISIT (OUTPATIENT)
Dept: INFUSION THERAPY | Facility: CLINIC | Age: 62
End: 2022-01-19
Payer: COMMERCIAL

## 2022-01-19 VITALS
TEMPERATURE: 97.6 F | BODY MASS INDEX: 28.72 KG/M2 | RESPIRATION RATE: 16 BRPM | HEART RATE: 74 BPM | DIASTOLIC BLOOD PRESSURE: 65 MMHG | WEIGHT: 169.9 LBS | SYSTOLIC BLOOD PRESSURE: 112 MMHG | OXYGEN SATURATION: 100 %

## 2022-01-19 DIAGNOSIS — M05.79 RHEUMATOID ARTHRITIS INVOLVING MULTIPLE SITES WITH POSITIVE RHEUMATOID FACTOR (H): Primary | ICD-10-CM

## 2022-01-19 PROCEDURE — 99207 PR NO CHARGE LOS: CPT

## 2022-01-19 PROCEDURE — 96413 CHEMO IV INFUSION 1 HR: CPT | Performed by: NURSE PRACTITIONER

## 2022-01-19 RX ORDER — ACETAMINOPHEN 325 MG/1
650 TABLET ORAL ONCE
Status: CANCELLED
Start: 2022-02-16 | End: 2022-02-16

## 2022-01-19 RX ORDER — DIPHENHYDRAMINE HCL 25 MG
25 CAPSULE ORAL ONCE
Status: COMPLETED | OUTPATIENT
Start: 2022-01-19 | End: 2022-01-19

## 2022-01-19 RX ORDER — ACETAMINOPHEN 325 MG/1
650 TABLET ORAL ONCE
Status: COMPLETED | OUTPATIENT
Start: 2022-01-19 | End: 2022-01-19

## 2022-01-19 RX ORDER — HEPARIN SODIUM (PORCINE) LOCK FLUSH IV SOLN 100 UNIT/ML 100 UNIT/ML
5 SOLUTION INTRAVENOUS
Status: CANCELLED | OUTPATIENT
Start: 2022-02-16

## 2022-01-19 RX ORDER — HEPARIN SODIUM,PORCINE 10 UNIT/ML
5 VIAL (ML) INTRAVENOUS
Status: CANCELLED | OUTPATIENT
Start: 2022-02-16

## 2022-01-19 RX ORDER — DIPHENHYDRAMINE HCL 25 MG
25 CAPSULE ORAL ONCE
Status: CANCELLED
Start: 2022-02-16 | End: 2022-02-16

## 2022-01-19 RX ORDER — METHYLPREDNISOLONE SODIUM SUCCINATE 125 MG/2ML
60 INJECTION, POWDER, LYOPHILIZED, FOR SOLUTION INTRAMUSCULAR; INTRAVENOUS ONCE
Status: CANCELLED
Start: 2022-02-16 | End: 2022-02-16

## 2022-01-19 RX ADMIN — ACETAMINOPHEN 650 MG: 325 TABLET ORAL at 08:39

## 2022-01-19 RX ADMIN — Medication 250 ML: at 08:36

## 2022-01-19 RX ADMIN — Medication 25 MG: at 08:39

## 2022-01-19 NOTE — PROGRESS NOTES
Infusion Nursing Note:  Janice SIDNEY Taimargot Mayfield presents today for Remicade.    Patient seen by provider today: No   present during visit today: Not Applicable.    Note: patient reports no changes.       Intravenous Access:  Peripheral IV placed.    Treatment Conditions:  Biological Infusion Checklist:  ~~~ NOTE: If the patient answers yes to any of the questions below, hold the infusion and contact ordering provider or on-call provider.    1. Have you recently had an elevated temperature, fever, chills, productive cough, coughing for 3 weeks or longer or hemoptysis, abnormal vital signs, night sweats,  chest pain or have you noticed a decrease in your appetite, unexplained weight loss or fatigue? No  2. Do you have any open wounds or new incisions? No  3. Do you have any recent or upcoming hospitalizations, surgeries or dental procedures? No  4. Do you currently have or recently have had any signs of illness or infection or are you on any antibiotics? No  5. Have you had any new, sudden or worsening abdominal pain? No  6. Have you or anyone in your household received a live vaccination in the past 4 weeks? Please note:  No live vaccines while on biologic/chemotherapy until 6 months after the last treatment.  Patient can receive the flu vaccine (shot only) and the pneumovax.  It is optimal for the patient to get these vaccines mid cycle, but they can be given at any time as long as it is not on the day of the infusion. No  7. Have you recently been diagnosed with any new nervous system diseases (ie. Multiple sclerosis, Guillain [place], seizures, neurological changes) or cancer diagnosis? No  8. Are you on any form of radiation or chemotherapy? No  9. Are you pregnant or breast feeding or do you have plans of pregnancy in the future? No  10. Have there been any other new onset medical symptoms? No        Post Infusion Assessment:  Patient tolerated infusion without incident.  Site patent and intact, free  from redness, edema or discomfort.  No evidence of extravasations.  Access discontinued per protocol.       Discharge Plan:   AVS to patient via MYCHART.  Patient will return 2/16 for next appointment.   Patient discharged in stable condition accompanied by: self.  Departure Mode: Ambulatory.      Dulce Maria Clement RN

## 2022-01-25 DIAGNOSIS — G89.29 CHRONIC LEFT SHOULDER PAIN: ICD-10-CM

## 2022-01-25 DIAGNOSIS — M15.3 OTHER SECONDARY OSTEOARTHRITIS OF MULTIPLE SITES: ICD-10-CM

## 2022-01-25 DIAGNOSIS — M25.512 CHRONIC LEFT SHOULDER PAIN: ICD-10-CM

## 2022-01-25 DIAGNOSIS — M05.79 RHEUMATOID ARTHRITIS INVOLVING MULTIPLE SITES WITH POSITIVE RHEUMATOID FACTOR (H): ICD-10-CM

## 2022-01-25 DIAGNOSIS — E55.9 VITAMIN D DEFICIENCY: ICD-10-CM

## 2022-01-25 DIAGNOSIS — Z79.52 LONG TERM CURRENT USE OF SYSTEMIC STEROIDS: ICD-10-CM

## 2022-01-25 DIAGNOSIS — Z79.60 LONG-TERM USE OF IMMUNOSUPPRESSANT MEDICATION: ICD-10-CM

## 2022-02-04 NOTE — TELEPHONE ENCOUNTER
Call to Janice, after receiving refill for Prednisone, message left asking what dose of prednisone she is taking?  Requested she call to rheumatology clinic or save herself some time and send a PushPage message with current prednisone dose.    PREDNISONE 1 MG TABLET      Last Written Prescription Date:  8/24/21  Last Fill Quantity: 120,   # refills: 2  Last Office Visit : 11/29/21  Future Office visit:  None scheduled    Routing refill request to provider for review/approval because:  Drug not on rheumatology refill protocol when tapering

## 2022-02-10 NOTE — TELEPHONE ENCOUNTER
Placed call to patient requesting she contact clinic to confirm the dose of prednisone she is currently taking. Left voice message with above request and also sent [employer] message to patient with the same request.  Katelyn Harvey RN  Adult Rheumatology Clinic

## 2022-02-15 RX ORDER — PREDNISONE 1 MG/1
TABLET ORAL
Qty: 120 TABLET | Refills: 2 | OUTPATIENT
Start: 2022-02-15

## 2022-02-15 NOTE — TELEPHONE ENCOUNTER
Patient responded via MyChart that she stopped taking prednisone two weeks ago and that she does not need a refill at this time.   Katelyn Harvey RN  Adult Rheumatology Clinic

## 2022-02-16 ENCOUNTER — NURSE TRIAGE (OUTPATIENT)
Dept: NURSING | Facility: CLINIC | Age: 62
End: 2022-02-16
Payer: COMMERCIAL

## 2022-02-16 NOTE — TELEPHONE ENCOUNTER
"Pt reports driving clement for a covid test yesterday.  Grandmohan had a \"headache only.\"  Per child's school, covid testing is required for any symptom whatsoever.  Clement sat in front seat of car with pt.    Pt has infusion appt this morning.  Infusion protocols advise against going forward with infusion if any exposure to \"persons who sought covid testing.\"  Therefore decision is for pt to postpone her infusion this morning.  Currently 7:30 am; therefore decision is to try warm-transferring pt to [facility] to determine whether they are open yet ...  Yes -> staffer answers phone and will take it from here with pt in terms of noting the pt's situation and facilitating rescheduling pt's infusion.    Gladis SELLERS [facility] Nurse Advisor     Reason for Disposition    [1] CLOSE CONTACT COVID-19 EXPOSURE within last 14 days AND [2] NO symptoms    Protocols used: CORONAVIRUS (COVID-19) EXPOSURE-A- 8.25.2021    ____________________    COVID 19 Nurse Triage Plan/Patient Instructions    Please be aware that novel coronavirus (COVID-19) may be circulating in the community. If you develop symptoms such as fever, cough, or SOB or if you have concerns about the presence of another infection including coronavirus (COVID-19), please contact your health care provider or visit [email or web address].     Disposition/Instructions    Additional COVID19 information to add for patients.   How can I protect others?  If you have symptoms (fever, cough, body aches or trouble breathing): Stay home and away from others (self-isolate) until:    At least 10 days have passed since your symptoms started, And     You ve had no fever--and no medicine that reduces fever--for 1 full day (24 hours), And      Your other symptoms have resolved (gotten better).     If you don t have symptoms, but a test showed that you have COVID-19 (you tested positive):    Stay home and away from others (self-isolate). Follow the tips under " "\"How do I self-isolate?\" below for 10 days (20 days if you have a weak immune system).    You don't need to be retested for COVID-19 before going back to school or work. As long as you're fever-free and feeling better, you can go back to school, work and other activities after waiting the 10 or 20 days.     How do I self-isolate?    Stay in your own room, even for meals. Use your own bathroom if you can.     Stay away from others in your home. No hugging, kissing or shaking hands. No visitors.    Don t go to work, school or anywhere else.     Clean  high touch  surfaces often (doorknobs, counters, handles, etc.). Use a household cleaning spray or wipes. You ll find a full list on the EPA website:  www.epa.gov/pesticide-registration/list-n-disinfectants-use-against-sars-cov-2.    Cover your mouth and nose with a mask, tissue or washcloth to avoid spreading germs.    Wash your hands and face often. Use soap and water.    Caregivers in these groups are at risk for severe illness due to COVID-19:  o People 65 years and older  o People who live in a nursing home or long-term care facility  o People with chronic disease (lung, heart, cancer, diabetes, kidney, liver, immunologic)  o People who have a weakened immune system, including those who:  - Are in cancer treatment  - Take medicine that weakens the immune system, such as corticosteroids  - Had a bone marrow or organ transplant  - Have an immune deficiency  - Have poorly controlled HIV or AIDS  - Are obese (body mass index of 40 or higher)  - Smoke regularly    Caregivers should wear gloves while washing dishes, handling laundry and cleaning bedrooms and bathrooms.    Use caution when washing and drying laundry: Don t shake dirty laundry, and use the warmest water setting that you can.    For more tips, go to www.cdc.gov/coronavirus/2019-ncov/downloads/10Things.pdf.    How can I take care of myself?  1. Get lots of rest. Drink extra fluids (unless a doctor has told " you not to).     2. Take Tylenol (acetaminophen) for fever or pain. If you have liver or kidney problems, ask your family doctor if it s okay to take Tylenol.     Adults can take either:     650 mg (two 325 mg pills) every 4 to 6 hours, or     1,000 mg (two 500 mg pills) every 8 hours as needed.     Note: Don t take more than 3,000 mg in one day.   Acetaminophen is found in many medicines (both prescribed and over-the-counter medicines). Read all labels to be sure you don t take too much.     For children, check the Tylenol bottle for the right dose. The dose is based on the child s age or weight.    3. If you have other health problems (like cancer, heart failure, an organ transplant or severe kidney disease): Call your specialty clinic if you don t feel better in the next 2 days.    4. Know when to call 911: Emergency warning signs include:    Trouble breathing or shortness of breath    Pain or pressure in the chest that doesn t go away    Feeling confused like you haven t felt before, or not being able to wake up    Bluish-colored lips or face    What are the symptoms of COVID-19?     The most common symptoms are cough, fever and trouble breathing.     Less common symptoms include body aches, chills, diarrhea (loose, watery poops), fatigue (feeling very tired), headache, runny nose, sore throat and loss of smell.    COVID-19 can cause severe coughing (bronchitis) and lung infection (pneumonia).    How does it spread?     The virus may spread when a person coughs or sneezes into the air. The virus can travel about 6 feet this way, and it can live on surfaces.      Common  (household disinfectants) will kill the virus.    Who is at risk?  Anyone can catch COVID-19 if they re around someone who has the virus.    How can others protect themselves?     Stay away from people who have COVID-19 (or symptoms of COVID-19).    Wash hands often with soap and water. Or, use hand  with at least 60%  alcohol.    Avoid touching the eyes, nose or mouth.     Wear a face mask when you go out in public, when sick or when caring for a sick person.    Where can I get more information?    [facility]: About COVID-19: [email or web address]    CDC: What to Do If You re Sick: www.cdc.gov/coronavirus/2019-ncov/about/steps-when-sick.html    CDC: Ending Home Isolation: www.cdc.gov/coronavirus/2019-ncov/hcp/disposition-in-home-patients.html     CDC: Caring for Someone: www.cdc.gov/coronavirus/2019-ncov/if-you-are-sick/care-for-someone.html     [facility]: Interim Guidance for Hospital Discharge to Home: www.[facility]./diseases/coronavirus/hcp/hospdischarge.pdf    [facility] clinical trials (COVID-19 research studies): clinicalaffairs.[facility]-clinical-trials     Below are the COVID-19 hotlines at the [place] Department of Health ([facility]). Interpreters are available.   o For health questions: Call 742-804-8614 or 1-442.374.6964 (7 a.m. to 7 p.m.)  o For questions about schools and childcare: Call 642-309-2952 or 1-773.154.5038 (7 a.m. to 7 p.m.)          Thank you for taking steps to prevent the spread of this virus.  o Limit your contact with others.  o Wear a simple mask to cover your cough.  o Wash your hands well and often.    Resources    [facility]: About COVID-19: [email or web address]    CDC: What to Do If You're Sick: www.cdc.gov/coronavirus/2019-ncov/about/steps-when-sick.html    CDC: Ending Home Isolation: www.cdc.gov/coronavirus/2019-ncov/hcp/disposition-in-home-patients.html     CDC: Caring for Someone: www.cdc.gov/coronavirus/2019-ncov/if-you-are-sick/care-for-someone.html     [facility]: Interim Guidance for Hospital Discharge to Home: www.[facility].[place]/diseases/coronavirus/hcp/hospdischarge.pdf    [facility] clinical trials (COVID-19 research studies): clinicalaffairs.[facility]/n-clinical-trials     Below are the COVID-19 hotlines at the [place]  Department of [facility] ([facility]). Interpreters are available.   o For health questions: Call 730-789-2579 or 1-335.267.1614 (7 a.m. to 7 p.m.)  o For questions about schools and childcare: Call 830-509-2297 or 1-744.349.7271 (7 a.m. to 7 p.m.)

## 2022-02-24 ENCOUNTER — OFFICE VISIT (OUTPATIENT)
Dept: FAMILY MEDICINE | Facility: CLINIC | Age: 62
End: 2022-02-24
Payer: COMMERCIAL

## 2022-02-24 VITALS
DIASTOLIC BLOOD PRESSURE: 76 MMHG | RESPIRATION RATE: 15 BRPM | HEART RATE: 73 BPM | SYSTOLIC BLOOD PRESSURE: 112 MMHG | BODY MASS INDEX: 28.42 KG/M2 | OXYGEN SATURATION: 98 % | WEIGHT: 168.1 LBS | TEMPERATURE: 98.1 F

## 2022-02-24 DIAGNOSIS — J01.90 ACUTE SINUSITIS WITH SYMPTOMS > 10 DAYS: Primary | ICD-10-CM

## 2022-02-24 DIAGNOSIS — M05.79 RHEUMATOID ARTHRITIS INVOLVING MULTIPLE SITES WITH POSITIVE RHEUMATOID FACTOR (H): Chronic | ICD-10-CM

## 2022-02-24 PROCEDURE — 99213 OFFICE O/P EST LOW 20 MIN: CPT | Performed by: INTERNAL MEDICINE

## 2022-02-24 NOTE — PROGRESS NOTES
"  Assessment & Plan     1.  Acute sinusitis with symptoms more than 10 days.  Will treat with Augmentin 875/125 twice daily for 7 days.  2.  Rheumatoid arthritis.  Been treated with Remicade and methotrexate both of insulin on hold due to recent sinusitis symptoms for the past 3 weeks.         BMI:   Estimated body mass index is 28.42 kg/m  as calculated from the following:    Height as of 11/29/21: 1.638 m (5' 4.49\").    Weight as of this encounter: 76.2 kg (168 lb 1.6 oz).       No follow-ups on file.    Momo Nolen MD  [facility] LORI Camacho is a 61 year old who presents for the following health issues     HPI     61-year-old lady with rheumatoid arthritis presents with head congestion.  Indicates that she had a positive Covid test on 2/16/2022.  She had no symptoms.  Her daughter and  both had Covid at that time.  She has been fully vaccinated.  She has been having head congestion since February 3.  She had similar sinus infection in October 2021 at which time she was treated with antibiotic therapy.  She denies fever or chills.  Has postnasal drip.    Review of Systems   Constitutional, HEENT, cardiovascular, pulmonary, GI, , musculoskeletal, neuro, skin, endocrine and psych systems are negative, except as otherwise noted.      Objective    There were no vitals taken for this visit.  There is no height or weight on file to calculate BMI.  Physical Exam   GENERAL: healthy, alert and no distress  EYES: Eyes grossly normal to inspection, PERRL and conjunctivae and sclerae normal  HENT: normal cephalic/atraumatic, cerumen left ear - removed, nose and mouth without ulcers or lesions, oropharynx clear and oral mucous membranes moist. No sinus tenderness  NECK: no adenopathy, no asymmetry, masses, or scars and thyroid normal to palpation  MS: no gross musculoskeletal defects noted, no edema            "

## 2022-03-01 ENCOUNTER — TELEPHONE (OUTPATIENT)
Dept: INFUSION THERAPY | Facility: CLINIC | Age: 62
End: 2022-03-01
Payer: COMMERCIAL

## 2022-03-01 ENCOUNTER — TELEPHONE (OUTPATIENT)
Dept: RHEUMATOLOGY | Facility: CLINIC | Age: 62
End: 2022-03-01
Payer: COMMERCIAL

## 2022-03-01 ENCOUNTER — TELEPHONE (OUTPATIENT)
Dept: RHEUMATOLOGY | Facility: CLINIC | Age: 62
End: 2022-03-01

## 2022-03-01 ENCOUNTER — OFFICE VISIT (OUTPATIENT)
Dept: RHEUMATOLOGY | Facility: CLINIC | Age: 62
End: 2022-03-01
Attending: INTERNAL MEDICINE
Payer: COMMERCIAL

## 2022-03-01 VITALS
DIASTOLIC BLOOD PRESSURE: 78 MMHG | SYSTOLIC BLOOD PRESSURE: 122 MMHG | WEIGHT: 168.2 LBS | HEART RATE: 85 BPM | HEIGHT: 64 IN | BODY MASS INDEX: 28.71 KG/M2 | OXYGEN SATURATION: 98 % | RESPIRATION RATE: 18 BRPM | TEMPERATURE: 97.6 F

## 2022-03-01 DIAGNOSIS — M05.79 RHEUMATOID ARTHRITIS INVOLVING MULTIPLE SITES WITH POSITIVE RHEUMATOID FACTOR (H): Primary | ICD-10-CM

## 2022-03-01 PROCEDURE — 99214 OFFICE O/P EST MOD 30 MIN: CPT | Performed by: INTERNAL MEDICINE

## 2022-03-01 PROCEDURE — G0463 HOSPITAL OUTPT CLINIC VISIT: HCPCS

## 2022-03-01 RX ORDER — PREDNISONE 5 MG/1
TABLET ORAL
Qty: 60 TABLET | Refills: 0 | Status: SHIPPED | OUTPATIENT
Start: 2022-03-01 | End: 2022-05-16

## 2022-03-01 RX ORDER — PREDNISONE 1 MG/1
2 TABLET ORAL DAILY
Qty: 60 TABLET | Refills: 1 | Status: SHIPPED | OUTPATIENT
Start: 2022-03-01

## 2022-03-01 ASSESSMENT — PAIN SCALES - GENERAL: PAINLEVEL: SEVERE PAIN (7)

## 2022-03-01 NOTE — LETTER
3/1/2022      RE: Janice Mayfield  [address] [address]       Rheumatology consultation note    CC: Semi-urgent visit for rheumatoid flare    HPI: Janice is here because she developed a flare of her seropositive rheumatoid arthritis.  She started to get significant joint pain and swelling especially late last week that develops quite abruptly requiring prednisone that she borrowed from a friend.  She has been taking 10 mg in the morning and 5 mg in the evening for the last 3 to 4 days with improvement.  For several reasons her monthly infliximab infusion was delayed and she held her methotrexate.  She was supposed to get her infliximab infusion 4 weeks ago but she tested positive for Covid and 2 weeks later she developed sinusitis and was started on antibiotics that she is just finishing tomorrow.  She is getting better with her sinusitis.  Before this all started she was on prednisone of just 2 mg daily and things were under pretty good control until about a month ago.    Past medical history includes: Rheumatoid arthritis, osteopenia,  chronic low-dose prednisone use, significant secondary OA.    Social history and family history no new additions.    Physical examination  I reviewed her vital signs and they are essentially normal  Joint examination shows that she has no active synovitis in the hands and wrists or elbows.    I reviewed her laboratory tests that were last performed in December 2021 that showed normal kidney and liver function normal CRP, and normal hematology parameters except for a slightly low white count of 3.7.    Impression/plan  1.  Seropositive rheumatoid arthritis.  She had a recent flare that seems convincing by her story and she is to self treated this with prednisone as she could not get a prescription quickly.  It seems to be improved today and I suggest that we continue to taper of prednisone over the next 4-6 weeks or so while recommencing her  methotrexate and Remicade infusion.  I suspect that these medications fill take full effect in about 6 weeks and so it seems reasonable to cover this.  Tapering prednisone dose.  Thus, I expect her to be down to her 2 mg of prednisone by then and have given her prescription for both the 5 mg tablets and the 1 mg tablets.  2.  We will notify the infusion center that they can schedule her for a Remicade infusion in the next few days otherwise next week.  She will have to have a blood test soon and that could be done at the time of the infusion.  3.  This flare was probably triggered by the Covid infection and/or the sinusitis, and I do not think that this happened just because she was down to 2 mg of prednisone.  Hence, once he is down to 2 mg he can slowly taper off the use last 2 mg at her leisure cutting out half tablet every month or so.  4.  Keep follow-up visit in May 2022    I spent 30 minutes face to face with the patient, with 25 minutes on counseling and coordination of care.      Juancarlos Alva MD

## 2022-03-01 NOTE — NURSING NOTE
"Chief Complaint   Patient presents with     RECHECK     RA flare-up     Just started Prednisone 10 mg today.    No methotrexate in Feb.     Jan. 16th was last dose of Remicade     Meds held due to COVID and sinus infection     Vital signs:  Temp: 97.6  F (36.4  C) Temp src: Oral BP: 122/78 Pulse: 85   Resp: 18 SpO2: 98 %     Height: 163.8 cm (5' 4.49\") Weight: 76.3 kg (168 lb 3.2 oz)  Estimated body mass index is 28.44 kg/m  as calculated from the following:    Height as of this encounter: 1.638 m (5' 4.49\").    Weight as of this encounter: 76.3 kg (168 lb 3.2 oz).        Linda Giraldo, [facility]  3/1/2022 8:55 AM      "

## 2022-03-01 NOTE — NURSING NOTE
Received call from SULEMAN Zepeda with Dr Alva.  States patient is finishing antibiotics today and Dr Alva gives clearance for patient to get Infliximab as soon as possible.    Marianna Thompson RN on 3/1/2022 at 10:37 AM

## 2022-03-01 NOTE — LETTER
3/1/2022       RE: Janice Mayfield  [address] [address]     Dear Colleague,    Thank you for referring your patient, Janice Mayfield, to the [facility]. Please see a copy of my visit note below.    Rheumatology consultation note    CC: Semi-urgent visit for rheumatoid flare    HPI: Janice is here because she developed a flare of her seropositive rheumatoid arthritis.  She started to get significant joint pain and swelling especially late last week that develops quite abruptly requiring prednisone that she borrowed from a friend.  She has been taking 10 mg in the morning and 5 mg in the evening for the last 3 to 4 days with improvement.  For several reasons her monthly infliximab infusion was delayed and she held her methotrexate.  She was supposed to get her infliximab infusion 4 weeks ago but she tested positive for Covid and 2 weeks later she developed sinusitis and was started on antibiotics that she is just finishing tomorrow.  She is getting better with her sinusitis.  Before this all started she was on prednisone of just 2 mg daily and things were under pretty good control until about a month ago.    Past medical history includes: Rheumatoid arthritis, osteopenia,  chronic low-dose prednisone use, significant secondary OA.    Social history and family history no new additions.    Physical examination  I reviewed her vital signs and they are essentially normal  Joint examination shows that she has no active synovitis in the hands and wrists or elbows.    I reviewed her laboratory tests that were last performed in December 2021 that showed normal kidney and liver function normal CRP, and normal hematology parameters except for a slightly low white count of 3.7.    Impression/plan  1.  Seropositive rheumatoid arthritis.  She had a recent flare that seems convincing by her  story and she is to self treated this with prednisone as she could not get a prescription quickly.  It seems to be improved today and I suggest that we continue to taper of prednisone over the next 4-6 weeks or so while recommencing her methotrexate and Remicade infusion.  I suspect that these medications fill take full effect in about 6 weeks and so it seems reasonable to cover this.  Tapering prednisone dose.  Thus, I expect her to be down to her 2 mg of prednisone by then and have given her prescription for both the 5 mg tablets and the 1 mg tablets.  2.  We will notify the infusion center that they can schedule her for a Remicade infusion in the next few days otherwise next week.  She will have to have a blood test soon and that could be done at the time of the infusion.  3.  This flare was probably triggered by the Covid infection and/or the sinusitis, and I do not think that this happened just because she was down to 2 mg of prednisone.  Hence, once he is down to 2 mg he can slowly taper off the use last 2 mg at her leisure cutting out half tablet every month or so.  4.  Keep follow-up visit in May 2022    I spent 30 minutes face to face with the patient, with 25 minutes on counseling and coordination of care.      Sincerely,    Juancarlos Alva MD

## 2022-03-02 NOTE — PROGRESS NOTES
Rheumatology consultation note    CC: Semi-urgent visit for rheumatoid flare    HPI: Janice is here because she developed a flare of her seropositive rheumatoid arthritis.  She started to get significant joint pain and swelling especially late last week that develops quite abruptly requiring prednisone that she borrowed from a friend.  She has been taking 10 mg in the morning and 5 mg in the evening for the last 3 to 4 days with improvement.  For several reasons her monthly infliximab infusion was delayed and she held her methotrexate.  She was supposed to get her infliximab infusion 4 weeks ago but she tested positive for Covid and 2 weeks later she developed sinusitis and was started on antibiotics that she is just finishing tomorrow.  She is getting better with her sinusitis.  Before this all started she was on prednisone of just 2 mg daily and things were under pretty good control until about a month ago.    Past medical history includes: Rheumatoid arthritis, osteopenia,  chronic low-dose prednisone use, significant secondary OA.    Social history and family history no new additions.    Physical examination  I reviewed her vital signs and they are essentially normal  Joint examination shows that she has no active synovitis in the hands and wrists or elbows.    I reviewed her laboratory tests that were last performed in December 2021 that showed normal kidney and liver function normal CRP, and normal hematology parameters except for a slightly low white count of 3.7.    Impression/plan  1.  Seropositive rheumatoid arthritis.  She had a recent flare that seems convincing by her story and she is to self treated this with prednisone as she could not get a prescription quickly.  It seems to be improved today and I suggest that we continue to taper of prednisone over the next 4-6 weeks or so while recommencing her methotrexate and Remicade infusion.  I suspect that these medications fill take full effect in about 6  weeks and so it seems reasonable to cover this.  Tapering prednisone dose.  Thus, I expect her to be down to her 2 mg of prednisone by then and have given her prescription for both the 5 mg tablets and the 1 mg tablets.  2.  We will notify the infusion center that they can schedule her for a Remicade infusion in the next few days otherwise next week.  She will have to have a blood test soon and that could be done at the time of the infusion.  3.  This flare was probably triggered by the Covid infection and/or the sinusitis, and I do not think that this happened just because she was down to 2 mg of prednisone.  Hence, once he is down to 2 mg he can slowly taper off the use last 2 mg at her leisure cutting out half tablet every month or so.  4.  Keep follow-up visit in May 2022    I spent 30 minutes face to face with the patient, with 25 minutes on counseling and coordination of care.     Universal Safety Interventions

## 2022-03-07 ENCOUNTER — LAB (OUTPATIENT)
Dept: LAB | Facility: CLINIC | Age: 62
End: 2022-03-07
Payer: COMMERCIAL

## 2022-03-07 ENCOUNTER — INFUSION THERAPY VISIT (OUTPATIENT)
Dept: INFUSION THERAPY | Facility: CLINIC | Age: 62
End: 2022-03-07
Payer: COMMERCIAL

## 2022-03-07 VITALS
RESPIRATION RATE: 16 BRPM | TEMPERATURE: 98.4 F | BODY MASS INDEX: 28.1 KG/M2 | OXYGEN SATURATION: 98 % | WEIGHT: 166.2 LBS | HEART RATE: 74 BPM | DIASTOLIC BLOOD PRESSURE: 67 MMHG | SYSTOLIC BLOOD PRESSURE: 133 MMHG

## 2022-03-07 DIAGNOSIS — M05.79 RHEUMATOID ARTHRITIS INVOLVING MULTIPLE SITES WITH POSITIVE RHEUMATOID FACTOR (H): Chronic | ICD-10-CM

## 2022-03-07 DIAGNOSIS — M05.79 RHEUMATOID ARTHRITIS INVOLVING MULTIPLE SITES WITH POSITIVE RHEUMATOID FACTOR (H): Primary | ICD-10-CM

## 2022-03-07 DIAGNOSIS — Z79.60 LONG-TERM USE OF IMMUNOSUPPRESSANT MEDICATION: ICD-10-CM

## 2022-03-07 LAB
ALBUMIN SERPL-MCNC: 3.4 G/DL (ref 3.4–5)
ALT SERPL W P-5'-P-CCNC: 221 U/L (ref 0–50)
AST SERPL W P-5'-P-CCNC: 131 U/L (ref 0–45)
CREAT SERPL-MCNC: 0.67 MG/DL (ref 0.52–1.04)
CRP SERPL-MCNC: 7.2 MG/L (ref 0–8)
ERYTHROCYTE [SEDIMENTATION RATE] IN BLOOD BY WESTERGREN METHOD: 60 MM/HR (ref 0–30)
GFR SERPL CREATININE-BSD FRML MDRD: >90 ML/MIN/1.73M2
HOLD SPECIMEN: NORMAL

## 2022-03-07 PROCEDURE — 86140 C-REACTIVE PROTEIN: CPT

## 2022-03-07 PROCEDURE — 99207 PR NO CHARGE LOS: CPT

## 2022-03-07 PROCEDURE — 36415 COLL VENOUS BLD VENIPUNCTURE: CPT

## 2022-03-07 PROCEDURE — 96413 CHEMO IV INFUSION 1 HR: CPT | Performed by: NURSE PRACTITIONER

## 2022-03-07 PROCEDURE — 82565 ASSAY OF CREATININE: CPT

## 2022-03-07 PROCEDURE — 85652 RBC SED RATE AUTOMATED: CPT

## 2022-03-07 PROCEDURE — 85027 COMPLETE CBC AUTOMATED: CPT

## 2022-03-07 PROCEDURE — 84450 TRANSFERASE (AST) (SGOT): CPT

## 2022-03-07 PROCEDURE — 84460 ALANINE AMINO (ALT) (SGPT): CPT

## 2022-03-07 PROCEDURE — 82040 ASSAY OF SERUM ALBUMIN: CPT

## 2022-03-07 RX ORDER — METHYLPREDNISOLONE SODIUM SUCCINATE 125 MG/2ML
60 INJECTION, POWDER, LYOPHILIZED, FOR SOLUTION INTRAMUSCULAR; INTRAVENOUS ONCE
Status: CANCELLED
Start: 2022-03-16 | End: 2022-03-16

## 2022-03-07 RX ORDER — DIPHENHYDRAMINE HCL 25 MG
25 CAPSULE ORAL ONCE
Status: CANCELLED
Start: 2022-03-16 | End: 2022-03-16

## 2022-03-07 RX ORDER — HEPARIN SODIUM (PORCINE) LOCK FLUSH IV SOLN 100 UNIT/ML 100 UNIT/ML
5 SOLUTION INTRAVENOUS
Status: CANCELLED | OUTPATIENT
Start: 2022-03-16

## 2022-03-07 RX ORDER — HEPARIN SODIUM,PORCINE 10 UNIT/ML
5 VIAL (ML) INTRAVENOUS
Status: CANCELLED | OUTPATIENT
Start: 2022-03-16

## 2022-03-07 RX ORDER — DIPHENHYDRAMINE HCL 25 MG
25 CAPSULE ORAL ONCE
Status: COMPLETED | OUTPATIENT
Start: 2022-03-07 | End: 2022-03-07

## 2022-03-07 RX ORDER — ACETAMINOPHEN 325 MG/1
650 TABLET ORAL ONCE
Status: CANCELLED
Start: 2022-03-16 | End: 2022-03-16

## 2022-03-07 RX ORDER — ACETAMINOPHEN 325 MG/1
650 TABLET ORAL ONCE
Status: COMPLETED | OUTPATIENT
Start: 2022-03-07 | End: 2022-03-07

## 2022-03-07 RX ADMIN — Medication 250 ML: at 08:25

## 2022-03-07 RX ADMIN — Medication 25 MG: at 08:32

## 2022-03-07 RX ADMIN — ACETAMINOPHEN 650 MG: 325 TABLET ORAL at 08:32

## 2022-03-07 NOTE — PROGRESS NOTES
Infusion Nursing Note:  Janice Mayfield presents today for Rapid Remicade.    Patient seen by provider today: No   present during visit today: Not Applicable.    Note: Pt expressed that she has been very fatigued the last week or so. She said that she has had a lot of joint pain/stiffness, different areas of body have been increasingly affected different days.      Intravenous Access:  Peripheral IV placed.    Treatment Conditions:  Biological Infusion Checklist:  ~~~ NOTE: If the patient answers yes to any of the questions below, hold the infusion and contact ordering provider or on-call provider.    1. Have you recently had an elevated temperature, fever, chills, productive cough, coughing for 3 weeks or longer or hemoptysis, abnormal vital signs, night sweats,  chest pain or have you noticed a decrease in your appetite, unexplained weight loss or fatigue? No  2. Do you have any open wounds or new incisions? No  3. Do you have any recent or upcoming hospitalizations, surgeries or dental procedures? No  4. Do you currently have or recently have had any signs of illness or infection or are you on any antibiotics? Yes, RECENT ANTIBIOTCS BUT OK PER md  5. Have you had any new, sudden or worsening abdominal pain? No  6. Have you or anyone in your household received a live vaccination in the past 4 weeks? Please note:  No live vaccines while on biologic/chemotherapy until 6 months after the last treatment.  Patient can receive the flu vaccine (shot only) and the pneumovax.  It is optimal for the patient to get these vaccines mid cycle, but they can be given at any time as long as it is not on the day of the infusion. No  7. Have you recently been diagnosed with any new nervous system diseases (ie. Multiple sclerosis, Guillain Redmond, seizures, neurological changes) or cancer diagnosis? No  8. Are you on any form of radiation or chemotherapy? No  9. Have there been any other new onset medical  symptoms? No        Post Infusion Assessment:  Patient tolerated infusion without incident.  Blood return noted pre and post infusion.  Site patent and intact, free from redness, edema or discomfort.  No evidence of extravasations.  Access discontinued per protocol.       Discharge Plan:   AVS to patient via MYCHART.  Patient will return 4/4/22 for next appointment.   Patient discharged in stable condition accompanied by: self.  Departure Mode: Ambulatory.      Ursula Kothari RN

## 2022-03-11 DIAGNOSIS — M05.79 RHEUMATOID ARTHRITIS INVOLVING MULTIPLE SITES WITH POSITIVE RHEUMATOID FACTOR (H): Primary | ICD-10-CM

## 2022-03-21 ENCOUNTER — LAB (OUTPATIENT)
Dept: LAB | Facility: CLINIC | Age: 62
End: 2022-03-21
Payer: COMMERCIAL

## 2022-03-21 DIAGNOSIS — M05.79 RHEUMATOID ARTHRITIS INVOLVING MULTIPLE SITES WITH POSITIVE RHEUMATOID FACTOR (H): ICD-10-CM

## 2022-03-21 LAB
ALBUMIN SERPL-MCNC: 3.3 G/DL (ref 3.4–5)
ALP SERPL-CCNC: 83 U/L (ref 40–150)
ALT SERPL W P-5'-P-CCNC: 47 U/L (ref 0–50)
ANION GAP SERPL CALCULATED.3IONS-SCNC: 4 MMOL/L (ref 3–14)
AST SERPL W P-5'-P-CCNC: 29 U/L (ref 0–45)
BILIRUB SERPL-MCNC: 0.5 MG/DL (ref 0.2–1.3)
BUN SERPL-MCNC: 17 MG/DL (ref 7–30)
CALCIUM SERPL-MCNC: 8.7 MG/DL (ref 8.5–10.1)
CHLORIDE BLD-SCNC: 108 MMOL/L (ref 94–109)
CO2 SERPL-SCNC: 28 MMOL/L (ref 20–32)
CREAT SERPL-MCNC: 0.73 MG/DL (ref 0.52–1.04)
GFR SERPL CREATININE-BSD FRML MDRD: >90 ML/MIN/1.73M2
GLUCOSE BLD-MCNC: 96 MG/DL (ref 70–99)
HOLD SPECIMEN: NORMAL
HOLD SPECIMEN: NORMAL
POTASSIUM BLD-SCNC: 4.1 MMOL/L (ref 3.4–5.3)
PROT SERPL-MCNC: 7.3 G/DL (ref 6.8–8.8)
SODIUM SERPL-SCNC: 140 MMOL/L (ref 133–144)

## 2022-03-21 PROCEDURE — 36415 COLL VENOUS BLD VENIPUNCTURE: CPT

## 2022-03-21 PROCEDURE — 80053 COMPREHEN METABOLIC PANEL: CPT

## 2022-04-04 ENCOUNTER — INFUSION THERAPY VISIT (OUTPATIENT)
Dept: INFUSION THERAPY | Facility: CLINIC | Age: 62
End: 2022-04-04
Payer: COMMERCIAL

## 2022-04-04 VITALS
RESPIRATION RATE: 16 BRPM | HEART RATE: 72 BPM | SYSTOLIC BLOOD PRESSURE: 130 MMHG | TEMPERATURE: 98 F | WEIGHT: 168 LBS | BODY MASS INDEX: 28.4 KG/M2 | DIASTOLIC BLOOD PRESSURE: 62 MMHG | OXYGEN SATURATION: 98 %

## 2022-04-04 DIAGNOSIS — M05.79 RHEUMATOID ARTHRITIS INVOLVING MULTIPLE SITES WITH POSITIVE RHEUMATOID FACTOR (H): Primary | ICD-10-CM

## 2022-04-04 PROCEDURE — 99207 PR NO CHARGE LOS: CPT

## 2022-04-04 PROCEDURE — 96413 CHEMO IV INFUSION 1 HR: CPT | Performed by: NURSE PRACTITIONER

## 2022-04-04 RX ORDER — METHYLPREDNISOLONE SODIUM SUCCINATE 125 MG/2ML
60 INJECTION, POWDER, LYOPHILIZED, FOR SOLUTION INTRAMUSCULAR; INTRAVENOUS ONCE
Status: CANCELLED
Start: 2022-05-02 | End: 2022-05-02

## 2022-04-04 RX ORDER — ACETAMINOPHEN 325 MG/1
650 TABLET ORAL ONCE
Status: COMPLETED | OUTPATIENT
Start: 2022-04-04 | End: 2022-04-04

## 2022-04-04 RX ORDER — DIPHENHYDRAMINE HCL 25 MG
25 CAPSULE ORAL ONCE
Status: COMPLETED | OUTPATIENT
Start: 2022-04-04 | End: 2022-04-04

## 2022-04-04 RX ORDER — ACETAMINOPHEN 325 MG/1
650 TABLET ORAL ONCE
Status: CANCELLED
Start: 2022-05-02 | End: 2022-05-02

## 2022-04-04 RX ORDER — HEPARIN SODIUM (PORCINE) LOCK FLUSH IV SOLN 100 UNIT/ML 100 UNIT/ML
5 SOLUTION INTRAVENOUS
Status: CANCELLED | OUTPATIENT
Start: 2022-05-02

## 2022-04-04 RX ORDER — HEPARIN SODIUM,PORCINE 10 UNIT/ML
5 VIAL (ML) INTRAVENOUS
Status: CANCELLED | OUTPATIENT
Start: 2022-05-02

## 2022-04-04 RX ORDER — DIPHENHYDRAMINE HCL 25 MG
25 CAPSULE ORAL ONCE
Status: CANCELLED
Start: 2022-05-02 | End: 2022-05-02

## 2022-04-04 RX ADMIN — Medication 250 ML: at 10:05

## 2022-04-04 RX ADMIN — ACETAMINOPHEN 650 MG: 325 TABLET ORAL at 09:45

## 2022-04-04 RX ADMIN — Medication 25 MG: at 09:45

## 2022-04-04 NOTE — PROGRESS NOTES
Infusion Nursing Note:  Janice Taimargot Mayfield presents today for Remicade.    Patient seen by provider today: No   present during visit today: Not Applicable.    Note: Patient reports having received Remicade in the past without any complications. .  Tylenol and benadryl for premeds.    Intravenous Access:  Peripheral IV placed.    Treatment Conditions:  Biological Infusion Checklist:  ~~~ NOTE: If the patient answers yes to any of the questions below, hold the infusion and contact ordering provider or on-call provider.    1. Have you recently had an elevated temperature, fever, chills, productive cough, coughing for 3 weeks or longer or hemoptysis, abnormal vital signs, night sweats,  chest pain or have you noticed a decrease in your appetite, unexplained weight loss or fatigue? No  2. Do you have any open wounds or new incisions? No  3. Do you have any recent or upcoming hospitalizations, surgeries or dental procedures? No  4. Do you currently have or recently have had any signs of illness or infection or are you on any antibiotics? No  5. Have you had any new, sudden or worsening abdominal pain? No  6. Have you or anyone in your household received a live vaccination in the past 4 weeks? Please note:  No live vaccines while on biologic/chemotherapy until 6 months after the last treatment.  Patient can receive the flu vaccine (shot only) and the pneumovax.  It is optimal for the patient to get these vaccines mid cycle, but they can be given at any time as long as it is not on the day of the infusion. No  7. Have you recently been diagnosed with any new nervous system diseases (ie. Multiple sclerosis, Guillain [place], seizures, neurological changes) or cancer diagnosis? No  8. Are you on any form of radiation or chemotherapy? No  9. Are you pregnant or breast feeding or do you have plans of pregnancy in the future? No  10. Have there been any other new onset medical symptoms? No        Post Infusion  Assessment:  Patient tolerated infusion without incident.  Site patent and intact, free from redness, edema or discomfort.  No evidence of extravasations.  Access discontinued per protocol.  Biologic Infusion Post Education: Call the triage nurse at your clinic or seek medical attention if you have chills and/or temperature greater than or equal to 100.5, uncontrolled nausea/vomiting, diarrhea, constipation, dizziness, shortness of breath, chest pain, heart palpitations, weakness or any other new or concerning symptoms, questions or concerns.  You cannot have any live virus vaccines prior to or during treatment or up to 6 months post infusion.  If you have an upcoming surgery, medical procedure or dental procedure during treatment, this should be discussed with your ordering physician and your surgeon/dentist.  If you are having any concerning symptom, if you are unsure if you should get your next infusion or wish to speak to a provider before your next infusion, please call your care coordinator or triage nurse at your clinic to notify them so we can adequately serve you.       Discharge Plan:   AVS to patient via [employer]PCHART.  Patient will return 5/5 for next appointment.   Patient discharged in stable condition accompanied by: self.  Departure Mode: Ambulatory.      Dulce Maria Clement RN

## 2022-05-03 RX ORDER — HEPARIN SODIUM,PORCINE 10 UNIT/ML
5 VIAL (ML) INTRAVENOUS
Status: CANCELLED | OUTPATIENT
Start: 2022-05-03

## 2022-05-03 RX ORDER — DIPHENHYDRAMINE HCL 25 MG
25 CAPSULE ORAL ONCE
Status: CANCELLED
Start: 2022-05-03 | End: 2022-05-03

## 2022-05-03 RX ORDER — METHYLPREDNISOLONE SODIUM SUCCINATE 125 MG/2ML
60 INJECTION, POWDER, LYOPHILIZED, FOR SOLUTION INTRAMUSCULAR; INTRAVENOUS ONCE
Status: CANCELLED
Start: 2022-05-03 | End: 2022-05-03

## 2022-05-03 RX ORDER — HEPARIN SODIUM (PORCINE) LOCK FLUSH IV SOLN 100 UNIT/ML 100 UNIT/ML
5 SOLUTION INTRAVENOUS
Status: CANCELLED | OUTPATIENT
Start: 2022-05-03

## 2022-05-03 RX ORDER — ACETAMINOPHEN 325 MG/1
650 TABLET ORAL ONCE
Status: CANCELLED
Start: 2022-05-03 | End: 2022-05-03

## 2022-05-05 ENCOUNTER — INFUSION THERAPY VISIT (OUTPATIENT)
Dept: INFUSION THERAPY | Facility: CLINIC | Age: 62
End: 2022-05-05
Payer: COMMERCIAL

## 2022-05-05 VITALS
WEIGHT: 166.4 LBS | DIASTOLIC BLOOD PRESSURE: 62 MMHG | TEMPERATURE: 98 F | HEART RATE: 68 BPM | SYSTOLIC BLOOD PRESSURE: 146 MMHG | BODY MASS INDEX: 28.13 KG/M2 | OXYGEN SATURATION: 98 %

## 2022-05-05 DIAGNOSIS — M05.79 RHEUMATOID ARTHRITIS INVOLVING MULTIPLE SITES WITH POSITIVE RHEUMATOID FACTOR (H): Primary | ICD-10-CM

## 2022-05-05 PROCEDURE — 99207 PR NO CHARGE LOS: CPT

## 2022-05-05 PROCEDURE — 96413 CHEMO IV INFUSION 1 HR: CPT | Performed by: INTERNAL MEDICINE

## 2022-05-05 RX ORDER — DIPHENHYDRAMINE HCL 25 MG
25 CAPSULE ORAL ONCE
Status: CANCELLED
Start: 2022-05-30 | End: 2022-05-30

## 2022-05-05 RX ORDER — ACETAMINOPHEN 325 MG/1
650 TABLET ORAL ONCE
Status: COMPLETED | OUTPATIENT
Start: 2022-05-05 | End: 2022-05-05

## 2022-05-05 RX ORDER — HEPARIN SODIUM (PORCINE) LOCK FLUSH IV SOLN 100 UNIT/ML 100 UNIT/ML
5 SOLUTION INTRAVENOUS
Status: CANCELLED | OUTPATIENT
Start: 2022-05-30

## 2022-05-05 RX ORDER — HEPARIN SODIUM,PORCINE 10 UNIT/ML
5 VIAL (ML) INTRAVENOUS
Status: CANCELLED | OUTPATIENT
Start: 2022-05-30

## 2022-05-05 RX ORDER — METHYLPREDNISOLONE SODIUM SUCCINATE 125 MG/2ML
60 INJECTION, POWDER, LYOPHILIZED, FOR SOLUTION INTRAMUSCULAR; INTRAVENOUS ONCE
Status: CANCELLED
Start: 2022-05-30 | End: 2022-05-30

## 2022-05-05 RX ORDER — ACETAMINOPHEN 325 MG/1
650 TABLET ORAL ONCE
Status: CANCELLED
Start: 2022-05-30 | End: 2022-05-30

## 2022-05-05 RX ORDER — DIPHENHYDRAMINE HCL 25 MG
25 CAPSULE ORAL ONCE
Status: COMPLETED | OUTPATIENT
Start: 2022-05-05 | End: 2022-05-05

## 2022-05-05 RX ADMIN — Medication 250 ML: at 07:49

## 2022-05-05 RX ADMIN — Medication 25 MG: at 07:38

## 2022-05-05 RX ADMIN — ACETAMINOPHEN 650 MG: 325 TABLET ORAL at 07:38

## 2022-05-05 ASSESSMENT — PAIN SCALES - GENERAL: PAINLEVEL: EXTREME PAIN (8)

## 2022-05-05 NOTE — PROGRESS NOTES
Infusion Nursing Note:  Janice Mayfield presents today for Rapid Remicade.    Patient seen by provider today: No   present during visit today: Not Applicable.    Note: Pt states her R knee is swollen and sore, knows she will feel better in a day or two after her remicade, see flow sheet for assessment.      Intravenous Access:  Peripheral IV placed.    Treatment Conditions:  Biological Infusion Checklist:  ~~~ NOTE: If the patient answers yes to any of the questions below, hold the infusion and contact ordering provider or on-call provider.    1. Have you recently had an elevated temperature, fever, chills, productive cough, coughing for 3 weeks or longer or hemoptysis, abnormal vital signs, night sweats,  chest pain or have you noticed a decrease in your appetite, unexplained weight loss or fatigue? No  2. Do you have any open wounds or new incisions? No  3. Do you have any recent or upcoming hospitalizations, surgeries or dental procedures? No  4. Do you currently have or recently have had any signs of illness or infection or are you on any antibiotics? No  5. Have you had any new, sudden or worsening abdominal pain? No  6. Have you or anyone in your household received a live vaccination in the past 4 weeks? Please note:  No live vaccines while on biologic/chemotherapy until 6 months after the last treatment.  Patient can receive the flu vaccine (shot only) and the pneumovax.  It is optimal for the patient to get these vaccines mid cycle, but they can be given at any time as long as it is not on the day of the infusion. No  7. Have you recently been diagnosed with any new nervous system diseases (ie. Multiple sclerosis, Guillain [place], seizures, neurological changes) or cancer diagnosis? No  8. Are you on any form of radiation or chemotherapy? No  9. Are you pregnant or breast feeding or do you have plans of pregnancy in the future? No  10. Have you been having any signs of worsening  depression or suicidal ideations?  (benlysta only) No  11. Have there been any other new onset medical symptoms? No        Post Infusion Assessment:  Patient tolerated infusion without incident.  Blood return noted pre and post infusion.  Site patent and intact, free from redness, edema or discomfort.  No evidence of extravasations.  Access discontinued per protocol.       Discharge Plan:   Patient discharged in stable condition accompanied by: self.  Departure Mode: Ambulatory.  Pt will RTC 6/2/22 for Rapid Remicade.      Pravin Blanco RN

## 2022-05-10 ENCOUNTER — TRANSFERRED RECORDS (OUTPATIENT)
Dept: HEALTH INFORMATION MANAGEMENT | Facility: CLINIC | Age: 62
End: 2022-05-10
Payer: COMMERCIAL

## 2022-05-16 ENCOUNTER — TRANSFERRED RECORDS (OUTPATIENT)
Dept: HEALTH INFORMATION MANAGEMENT | Facility: CLINIC | Age: 62
End: 2022-05-16
Payer: COMMERCIAL

## 2022-05-16 DIAGNOSIS — Z79.60 LONG-TERM USE OF IMMUNOSUPPRESSANT MEDICATION: ICD-10-CM

## 2022-05-16 DIAGNOSIS — M05.79 RHEUMATOID ARTHRITIS INVOLVING MULTIPLE SITES WITH POSITIVE RHEUMATOID FACTOR (H): Primary | ICD-10-CM

## 2022-05-16 DIAGNOSIS — M25.512 CHRONIC LEFT SHOULDER PAIN: ICD-10-CM

## 2022-05-16 DIAGNOSIS — G89.29 CHRONIC LEFT SHOULDER PAIN: ICD-10-CM

## 2022-05-16 DIAGNOSIS — M15.3 OTHER SECONDARY OSTEOARTHRITIS OF MULTIPLE SITES: ICD-10-CM

## 2022-05-16 DIAGNOSIS — E55.9 VITAMIN D DEFICIENCY: ICD-10-CM

## 2022-05-16 DIAGNOSIS — Z79.52 LONG TERM CURRENT USE OF SYSTEMIC STEROIDS: ICD-10-CM

## 2022-05-16 RX ORDER — FOLIC ACID 1 MG/1
2 TABLET ORAL DAILY
Qty: 180 TABLET | Refills: 3 | Status: SHIPPED | OUTPATIENT
Start: 2022-05-16 | End: 2023-03-29

## 2022-05-16 RX ORDER — PREDNISONE 5 MG/1
5 TABLET ORAL DAILY
Qty: 30 TABLET | Refills: 3 | Status: SHIPPED | OUTPATIENT
Start: 2022-05-16 | End: 2022-10-01

## 2022-05-16 NOTE — TELEPHONE ENCOUNTER
Placed call to patient to determine current prednisone dose. Patient states she has been unable to taper down to 2 mg/day as prescribed by Dr. Alva in March and has been taking 5 mg prednisone daily for knee pain. She reports receiving a steroid injection in her right knee today at [facility].    Last Office Visit:  3/1/2022  Next Enc: 9/22/22  Medication: Folic acid, methotrexate, prednisone  Last given: methotrexate, folic acid 7/21/21 for one year, prednisone 3/1/22  Lab:  CBC RESULTS: Recent Labs   Lab Test 03/07/22  0746   WBC 8.3   RBC 4.36   HGB 12.9   HCT 39.8   MCV 91   MCH 29.6   MCHC 32.4   RDW 12.2        Lab Results   Component Value Date    ALT 47 03/21/2022    ALT 25 06/07/2021     AST   Date Value Ref Range Status   03/21/2022 29 0 - 45 U/L Final   06/07/2021 15 0 - 45 U/L Final     Lab Results   Component Value Date    ALBUMIN 3.3 03/21/2022    ALBUMIN 3.4 06/07/2021     Creatinine   Date Value Ref Range Status   03/21/2022 0.73 0.52 - 1.04 mg/dL Final   06/07/2021 0.86 0.52 - 1.04 mg/dL Final       Refill request routed to on-call Dr. Saenz for review.  Katelyn Harvey RN  [facility]

## 2022-06-02 ENCOUNTER — INFUSION THERAPY VISIT (OUTPATIENT)
Dept: INFUSION THERAPY | Facility: CLINIC | Age: 62
End: 2022-06-02
Payer: COMMERCIAL

## 2022-06-02 VITALS
TEMPERATURE: 98 F | RESPIRATION RATE: 18 BRPM | DIASTOLIC BLOOD PRESSURE: 77 MMHG | BODY MASS INDEX: 27.93 KG/M2 | HEART RATE: 78 BPM | OXYGEN SATURATION: 98 % | SYSTOLIC BLOOD PRESSURE: 116 MMHG | WEIGHT: 165.2 LBS

## 2022-06-02 DIAGNOSIS — M05.79 RHEUMATOID ARTHRITIS INVOLVING MULTIPLE SITES WITH POSITIVE RHEUMATOID FACTOR (H): Primary | ICD-10-CM

## 2022-06-02 PROCEDURE — 96413 CHEMO IV INFUSION 1 HR: CPT | Performed by: NURSE PRACTITIONER

## 2022-06-02 PROCEDURE — 99207 PR NO CHARGE LOS: CPT

## 2022-06-02 RX ORDER — ACETAMINOPHEN 325 MG/1
650 TABLET ORAL ONCE
Status: COMPLETED | OUTPATIENT
Start: 2022-06-02 | End: 2022-06-02

## 2022-06-02 RX ORDER — HEPARIN SODIUM,PORCINE 10 UNIT/ML
5 VIAL (ML) INTRAVENOUS
Status: CANCELLED | OUTPATIENT
Start: 2022-06-30

## 2022-06-02 RX ORDER — METHYLPREDNISOLONE SODIUM SUCCINATE 125 MG/2ML
60 INJECTION, POWDER, LYOPHILIZED, FOR SOLUTION INTRAMUSCULAR; INTRAVENOUS ONCE
Status: CANCELLED
Start: 2022-06-30 | End: 2022-06-30

## 2022-06-02 RX ORDER — DIPHENHYDRAMINE HCL 25 MG
25 CAPSULE ORAL ONCE
Status: CANCELLED
Start: 2022-06-30 | End: 2022-06-30

## 2022-06-02 RX ORDER — HEPARIN SODIUM (PORCINE) LOCK FLUSH IV SOLN 100 UNIT/ML 100 UNIT/ML
5 SOLUTION INTRAVENOUS
Status: CANCELLED | OUTPATIENT
Start: 2022-06-30

## 2022-06-02 RX ORDER — ACETAMINOPHEN 325 MG/1
650 TABLET ORAL ONCE
Status: CANCELLED
Start: 2022-06-30 | End: 2022-06-30

## 2022-06-02 RX ORDER — DIPHENHYDRAMINE HCL 25 MG
25 CAPSULE ORAL ONCE
Status: COMPLETED | OUTPATIENT
Start: 2022-06-02 | End: 2022-06-02

## 2022-06-02 RX ADMIN — ACETAMINOPHEN 650 MG: 325 TABLET ORAL at 08:10

## 2022-06-02 RX ADMIN — Medication 250 ML: at 08:07

## 2022-06-02 RX ADMIN — Medication 25 MG: at 08:10

## 2022-06-02 ASSESSMENT — PAIN SCALES - GENERAL: PAINLEVEL: MODERATE PAIN (4)

## 2022-06-02 NOTE — PROGRESS NOTES
Infusion Nursing Note:  Janice SIDNEY Taimargot Mayfield presents today for Rapid Remicade.    Patient seen by provider today: No   present during visit today: Not Applicable.    Note: Pt expressed no new medical concerns at this visit.      Intravenous Access:  Peripheral IV placed.    Treatment Conditions:  Biological Infusion Checklist:  ~~~ NOTE: If the patient answers yes to any of the questions below, hold the infusion and contact ordering provider or on-call provider.    1. Have you recently had an elevated temperature, fever, chills, productive cough, coughing for 3 weeks or longer or hemoptysis, abnormal vital signs, night sweats,  chest pain or have you noticed a decrease in your appetite, unexplained weight loss or fatigue? No  2. Do you have any open wounds or new incisions? No  3. Do you have any recent or upcoming hospitalizations, surgeries or dental procedures? No  4. Do you currently have or recently have had any signs of illness or infection or are you on any antibiotics? No  5. Have you had any new, sudden or worsening abdominal pain? No  6. Have you or anyone in your household received a live vaccination in the past 4 weeks? Please note:  No live vaccines while on biologic/chemotherapy until 6 months after the last treatment.  Patient can receive the flu vaccine (shot only) and the pneumovax.  It is optimal for the patient to get these vaccines mid cycle, but they can be given at any time as long as it is not on the day of the infusion. No  7. Have you recently been diagnosed with any new nervous system diseases (ie. Multiple sclerosis, Guillain [place], seizures, neurological changes) or cancer diagnosis? No  8. Are you on any form of radiation or chemotherapy? No  9. Have there been any other new onset medical symptoms? No        Post Infusion Assessment:  Patient tolerated infusion without incident.  Blood return noted pre and post infusion.  Site patent and intact, free from redness, edema  or discomfort.  No evidence of extravasations.  Access discontinued per protocol.  Biologic Infusion Post Education: Call the triage nurse at your clinic or seek medical attention if you have chills and/or temperature greater than or equal to 100.5, uncontrolled nausea/vomiting, diarrhea, constipation, dizziness, shortness of breath, chest pain, heart palpitations, weakness or any other new or concerning symptoms, questions or concerns.  You cannot have any live virus vaccines prior to or during treatment or up to 6 months post infusion.  If you have an upcoming surgery, medical procedure or dental procedure during treatment, this should be discussed with your ordering physician and your surgeon/dentist.  If you are having any concerning symptom, if you are unsure if you should get your next infusion or wish to speak to a provider before your next infusion, please call your care coordinator or triage nurse at your clinic to notify them so we can adequately serve you.       Discharge Plan:   AVS to patient via V[employer]HART.  Patient will return 6/30/22 for next appointment.   Patient discharged in stable condition accompanied by: Self.  Departure Mode: Ambulatory      Ursula Kothari RN

## 2022-06-30 ENCOUNTER — INFUSION THERAPY VISIT (OUTPATIENT)
Dept: INFUSION THERAPY | Facility: CLINIC | Age: 62
End: 2022-06-30
Payer: COMMERCIAL

## 2022-06-30 ENCOUNTER — LAB (OUTPATIENT)
Dept: LAB | Facility: CLINIC | Age: 62
End: 2022-06-30
Payer: COMMERCIAL

## 2022-06-30 VITALS
WEIGHT: 165.4 LBS | TEMPERATURE: 97.7 F | BODY MASS INDEX: 27.96 KG/M2 | DIASTOLIC BLOOD PRESSURE: 77 MMHG | HEART RATE: 76 BPM | OXYGEN SATURATION: 98 % | RESPIRATION RATE: 16 BRPM | SYSTOLIC BLOOD PRESSURE: 111 MMHG

## 2022-06-30 DIAGNOSIS — M05.79 RHEUMATOID ARTHRITIS INVOLVING MULTIPLE SITES WITH POSITIVE RHEUMATOID FACTOR (H): Primary | ICD-10-CM

## 2022-06-30 DIAGNOSIS — Z79.60 LONG-TERM USE OF IMMUNOSUPPRESSANT MEDICATION: ICD-10-CM

## 2022-06-30 DIAGNOSIS — M05.79 RHEUMATOID ARTHRITIS INVOLVING MULTIPLE SITES WITH POSITIVE RHEUMATOID FACTOR (H): Chronic | ICD-10-CM

## 2022-06-30 LAB
ALBUMIN SERPL-MCNC: 3.1 G/DL (ref 3.4–5)
ALT SERPL W P-5'-P-CCNC: 43 U/L (ref 0–50)
AST SERPL W P-5'-P-CCNC: 14 U/L (ref 0–45)
CREAT SERPL-MCNC: 0.68 MG/DL (ref 0.52–1.04)
CRP SERPL-MCNC: 5.9 MG/L (ref 0–8)
ERYTHROCYTE [DISTWIDTH] IN BLOOD BY AUTOMATED COUNT: 13 % (ref 10–15)
ERYTHROCYTE [SEDIMENTATION RATE] IN BLOOD BY WESTERGREN METHOD: 42 MM/HR (ref 0–30)
GFR SERPL CREATININE-BSD FRML MDRD: >90 ML/MIN/1.73M2
HCT VFR BLD AUTO: 40.5 % (ref 35–47)
HGB BLD-MCNC: 12.9 G/DL (ref 11.7–15.7)
MCH RBC QN AUTO: 29 PG (ref 26.5–33)
MCHC RBC AUTO-ENTMCNC: 31.9 G/DL (ref 31.5–36.5)
MCV RBC AUTO: 91 FL (ref 78–100)
PLATELET # BLD AUTO: 321 10E3/UL (ref 150–450)
RBC # BLD AUTO: 4.45 10E6/UL (ref 3.8–5.2)
WBC # BLD AUTO: 6 10E3/UL (ref 4–11)

## 2022-06-30 PROCEDURE — 86140 C-REACTIVE PROTEIN: CPT

## 2022-06-30 PROCEDURE — 85027 COMPLETE CBC AUTOMATED: CPT

## 2022-06-30 PROCEDURE — 82040 ASSAY OF SERUM ALBUMIN: CPT

## 2022-06-30 PROCEDURE — 96413 CHEMO IV INFUSION 1 HR: CPT | Performed by: NURSE PRACTITIONER

## 2022-06-30 PROCEDURE — 99207 PR NO CHARGE LOS: CPT

## 2022-06-30 PROCEDURE — 84460 ALANINE AMINO (ALT) (SGPT): CPT

## 2022-06-30 PROCEDURE — 36415 COLL VENOUS BLD VENIPUNCTURE: CPT

## 2022-06-30 PROCEDURE — 84450 TRANSFERASE (AST) (SGOT): CPT

## 2022-06-30 PROCEDURE — 85652 RBC SED RATE AUTOMATED: CPT

## 2022-06-30 PROCEDURE — 82565 ASSAY OF CREATININE: CPT

## 2022-06-30 RX ORDER — HEPARIN SODIUM,PORCINE 10 UNIT/ML
5 VIAL (ML) INTRAVENOUS
Status: CANCELLED | OUTPATIENT
Start: 2022-07-28

## 2022-06-30 RX ORDER — HEPARIN SODIUM (PORCINE) LOCK FLUSH IV SOLN 100 UNIT/ML 100 UNIT/ML
5 SOLUTION INTRAVENOUS
Status: CANCELLED | OUTPATIENT
Start: 2022-07-28

## 2022-06-30 RX ORDER — DIPHENHYDRAMINE HCL 25 MG
25 CAPSULE ORAL ONCE
Status: COMPLETED | OUTPATIENT
Start: 2022-06-30 | End: 2022-06-30

## 2022-06-30 RX ORDER — ACETAMINOPHEN 325 MG/1
650 TABLET ORAL ONCE
Status: CANCELLED
Start: 2022-07-28 | End: 2022-07-28

## 2022-06-30 RX ORDER — ACETAMINOPHEN 325 MG/1
650 TABLET ORAL ONCE
Status: COMPLETED | OUTPATIENT
Start: 2022-06-30 | End: 2022-06-30

## 2022-06-30 RX ORDER — DIPHENHYDRAMINE HCL 25 MG
25 CAPSULE ORAL ONCE
Status: CANCELLED
Start: 2022-07-28 | End: 2022-07-28

## 2022-06-30 RX ORDER — METHYLPREDNISOLONE SODIUM SUCCINATE 125 MG/2ML
60 INJECTION, POWDER, LYOPHILIZED, FOR SOLUTION INTRAMUSCULAR; INTRAVENOUS ONCE
Status: CANCELLED
Start: 2022-07-28 | End: 2022-07-28

## 2022-06-30 RX ADMIN — Medication 25 MG: at 07:53

## 2022-06-30 RX ADMIN — ACETAMINOPHEN 650 MG: 325 TABLET ORAL at 07:53

## 2022-06-30 RX ADMIN — Medication 250 ML: at 07:50

## 2022-06-30 ASSESSMENT — PAIN SCALES - GENERAL: PAINLEVEL: SEVERE PAIN (7)

## 2022-06-30 NOTE — PROGRESS NOTES
Infusion Nursing Note:  Janice Mayfield presents today for Remicade.    Patient seen by provider today: No   present during visit today: Not Applicable.    Note: The pt denies any medical concerns today and reports tolerating her infusions well.    Intravenous Access:  Peripheral IV placed.    Treatment Conditions:  Biological Infusion Checklist:  ~~~ NOTE: If the patient answers yes to any of the questions below, hold the infusion and contact ordering provider or on-call provider.    1. Have you recently had an elevated temperature, fever, chills, productive cough, coughing for 3 weeks or longer or hemoptysis, abnormal vital signs, night sweats,  chest pain or have you noticed a decrease in your appetite, unexplained weight loss or fatigue? No  2. Do you have any open wounds or new incisions? No  3. Do you have any recent or upcoming hospitalizations, surgeries or dental procedures? No  4. Do you currently have or recently have had any signs of illness or infection or are you on any antibiotics? No  5. Have you had any new, sudden or worsening abdominal pain? No  6. Have you or anyone in your household received a live vaccination in the past 4 weeks? Please note:  No live vaccines while on biologic/chemotherapy until 6 months after the last treatment.  Patient can receive the flu vaccine (shot only) and the pneumovax.  It is optimal for the patient to get these vaccines mid cycle, but they can be given at any time as long as it is not on the day of the infusion. No  7. Have you recently been diagnosed with any new nervous system diseases (ie. Multiple sclerosis, Guillain [place], seizures, neurological changes) or cancer diagnosis? No  8. Are you on any form of radiation or chemotherapy? No  9. Are you pregnant or breast feeding or do you have plans of pregnancy in the future? No  10. Have you been having any signs of worsening depression or suicidal ideations?  (benlysta only) No  11. Have there  been any other new onset medical symptoms? No      Post Infusion Assessment:  Patient tolerated infusion without incident.  Site patent and intact, free from redness, edema or discomfort.  No evidence of extravasations.  Access discontinued per protocol.  Biologic Infusion Post Education: Call the triage nurse at your clinic or seek medical attention if you have chills and/or temperature greater than or equal to 100.5, uncontrolled nausea/vomiting, diarrhea, constipation, dizziness, shortness of breath, chest pain, heart palpitations, weakness or any other new or concerning symptoms, questions or concerns.  You cannot have any live virus vaccines prior to or during treatment or up to 6 months post infusion.  If you have an upcoming surgery, medical procedure or dental procedure during treatment, this should be discussed with your ordering physician and your surgeon/dentist.  If you are having any concerning symptom, if you are unsure if you should get your next infusion or wish to speak to a provider before your next infusion, please call your care coordinator or triage nurse at your clinic to notify them so we can adequately serve you.     Discharge Plan:   AVS to patient via [employer].  Patient will return 7/28/22 for next appointment.   Patient discharged in stable condition accompanied by: self.  Departure Mode: Ambulatory.      Che Mcfadden RN

## 2022-07-28 ENCOUNTER — INFUSION THERAPY VISIT (OUTPATIENT)
Dept: INFUSION THERAPY | Facility: CLINIC | Age: 62
End: 2022-07-28
Payer: COMMERCIAL

## 2022-07-28 VITALS
OXYGEN SATURATION: 99 % | TEMPERATURE: 98 F | HEART RATE: 71 BPM | DIASTOLIC BLOOD PRESSURE: 74 MMHG | WEIGHT: 161.9 LBS | BODY MASS INDEX: 27.37 KG/M2 | SYSTOLIC BLOOD PRESSURE: 125 MMHG

## 2022-07-28 DIAGNOSIS — M05.79 RHEUMATOID ARTHRITIS INVOLVING MULTIPLE SITES WITH POSITIVE RHEUMATOID FACTOR (H): Primary | ICD-10-CM

## 2022-07-28 PROCEDURE — 99207 PR NO CHARGE LOS: CPT

## 2022-07-28 PROCEDURE — 96413 CHEMO IV INFUSION 1 HR: CPT | Performed by: NURSE PRACTITIONER

## 2022-07-28 RX ORDER — HEPARIN SODIUM (PORCINE) LOCK FLUSH IV SOLN 100 UNIT/ML 100 UNIT/ML
5 SOLUTION INTRAVENOUS
Status: CANCELLED | OUTPATIENT
Start: 2022-08-25

## 2022-07-28 RX ORDER — ACETAMINOPHEN 325 MG/1
650 TABLET ORAL ONCE
Status: CANCELLED
Start: 2022-08-25 | End: 2022-08-25

## 2022-07-28 RX ORDER — HEPARIN SODIUM,PORCINE 10 UNIT/ML
5 VIAL (ML) INTRAVENOUS
Status: CANCELLED | OUTPATIENT
Start: 2022-08-25

## 2022-07-28 RX ORDER — METHYLPREDNISOLONE SODIUM SUCCINATE 125 MG/2ML
60 INJECTION, POWDER, LYOPHILIZED, FOR SOLUTION INTRAMUSCULAR; INTRAVENOUS ONCE
Status: CANCELLED
Start: 2022-08-25 | End: 2022-08-25

## 2022-07-28 RX ORDER — DIPHENHYDRAMINE HCL 25 MG
25 CAPSULE ORAL ONCE
Status: CANCELLED
Start: 2022-08-25 | End: 2022-08-25

## 2022-07-28 RX ADMIN — Medication 250 ML: at 07:56

## 2022-07-28 ASSESSMENT — PAIN SCALES - GENERAL: PAINLEVEL: MODERATE PAIN (4)

## 2022-07-28 NOTE — PROGRESS NOTES
Infusion Nursing Note:  Janice Mayfield presents today for Rapid Remicade.    Patient seen by provider today: No   present during visit today: Not Applicable.    Note: Pt c/o some R wrist and R knee pain, R knee swelling but states by next week she will feel great again after her remicade infusion.  See flow sheet for assessment.    Intravenous Access:  Peripheral IV placed.    Treatment Conditions:  Biological Infusion Checklist:  ~~~ NOTE: If the patient answers yes to any of the questions below, hold the infusion and contact ordering provider or on-call provider.    1. Have you recently had an elevated temperature, fever, chills, productive cough, coughing for 3 weeks or longer or hemoptysis, abnormal vital signs, night sweats,  chest pain or have you noticed a decrease in your appetite, unexplained weight loss or fatigue? No  2. Do you have any open wounds or new incisions? No  3. Do you have any recent or upcoming hospitalizations, surgeries or dental procedures? No  4. Do you currently have or recently have had any signs of illness or infection or are you on any antibiotics? No  5. Have you had any new, sudden or worsening abdominal pain? No  6. Have you or anyone in your household received a live vaccination in the past 4 weeks? Please note:  No live vaccines while on biologic/chemotherapy until 6 months after the last treatment.  Patient can receive the flu vaccine (shot only) and the pneumovax.  It is optimal for the patient to get these vaccines mid cycle, but they can be given at any time as long as it is not on the day of the infusion. No  7. Have you recently been diagnosed with any new nervous system diseases (ie. Multiple sclerosis, Guillain [place], seizures, neurological changes) or cancer diagnosis? No  8. Are you on any form of radiation or chemotherapy? No  9. Are you pregnant or breast feeding or do you have plans of pregnancy in the future? No  10. Have you been having any  signs of worsening depression or suicidal ideations?  (benlysta only) No  11. Have there been any other new onset medical symptoms? No      Post Infusion Assessment:  Patient tolerated infusion without incident.  Blood return noted pre and post infusion.  Site patent and intact, free from redness, edema or discomfort.  No evidence of extravasations.  Access discontinued per protocol.     Discharge Plan:   Patient discharged in stable condition accompanied by: self.  Departure Mode: Ambulatory.  Pt will RTC 8/30/22 for Rapid Remicade.      Pravin Blanco RN

## 2022-08-30 ENCOUNTER — INFUSION THERAPY VISIT (OUTPATIENT)
Dept: INFUSION THERAPY | Facility: CLINIC | Age: 62
End: 2022-08-30
Payer: COMMERCIAL

## 2022-08-30 VITALS
HEART RATE: 61 BPM | RESPIRATION RATE: 16 BRPM | OXYGEN SATURATION: 100 % | SYSTOLIC BLOOD PRESSURE: 119 MMHG | DIASTOLIC BLOOD PRESSURE: 79 MMHG | WEIGHT: 163 LBS | TEMPERATURE: 97.9 F | BODY MASS INDEX: 27.56 KG/M2

## 2022-08-30 DIAGNOSIS — M05.79 RHEUMATOID ARTHRITIS INVOLVING MULTIPLE SITES WITH POSITIVE RHEUMATOID FACTOR (H): Primary | ICD-10-CM

## 2022-08-30 PROCEDURE — 99207 PR NO CHARGE LOS: CPT

## 2022-08-30 PROCEDURE — 96413 CHEMO IV INFUSION 1 HR: CPT | Performed by: NURSE PRACTITIONER

## 2022-08-30 RX ORDER — HEPARIN SODIUM,PORCINE 10 UNIT/ML
5 VIAL (ML) INTRAVENOUS
Status: CANCELLED | OUTPATIENT
Start: 2022-09-22

## 2022-08-30 RX ORDER — DIPHENHYDRAMINE HCL 25 MG
25 CAPSULE ORAL ONCE
Status: COMPLETED | OUTPATIENT
Start: 2022-08-30 | End: 2022-08-30

## 2022-08-30 RX ORDER — DIPHENHYDRAMINE HCL 25 MG
25 CAPSULE ORAL ONCE
Status: CANCELLED
Start: 2022-09-22 | End: 2022-09-22

## 2022-08-30 RX ORDER — HEPARIN SODIUM (PORCINE) LOCK FLUSH IV SOLN 100 UNIT/ML 100 UNIT/ML
5 SOLUTION INTRAVENOUS
Status: CANCELLED | OUTPATIENT
Start: 2022-09-22

## 2022-08-30 RX ORDER — ACETAMINOPHEN 325 MG/1
650 TABLET ORAL ONCE
Status: COMPLETED | OUTPATIENT
Start: 2022-08-30 | End: 2022-08-30

## 2022-08-30 RX ORDER — METHYLPREDNISOLONE SODIUM SUCCINATE 125 MG/2ML
60 INJECTION, POWDER, LYOPHILIZED, FOR SOLUTION INTRAMUSCULAR; INTRAVENOUS ONCE
Status: CANCELLED
Start: 2022-09-22 | End: 2022-09-22

## 2022-08-30 RX ORDER — ACETAMINOPHEN 325 MG/1
650 TABLET ORAL ONCE
Status: CANCELLED
Start: 2022-09-22 | End: 2022-09-22

## 2022-08-30 RX ADMIN — ACETAMINOPHEN 650 MG: 325 TABLET ORAL at 08:02

## 2022-08-30 RX ADMIN — Medication 250 ML: at 08:00

## 2022-08-30 RX ADMIN — Medication 25 MG: at 08:02

## 2022-08-30 ASSESSMENT — PAIN SCALES - GENERAL: PAINLEVEL: MODERATE PAIN (4)

## 2022-08-30 NOTE — PROGRESS NOTES
Infusion Nursing Note:  Janice SIDNEY Taimargot Mayfield presents today for Rapid Remicade.    Patient seen by provider today: No   present during visit today: Not Applicable.    Note: Patient reports continuing to tolerate Remicade well with no concerns at this time.    Intravenous Access:  Peripheral IV placed.    Treatment Conditions:  Biological Infusion Checklist:  ~~~ NOTE: If the patient answers yes to any of the questions below, hold the infusion and contact ordering provider or on-call provider.    1. Have you recently had an elevated temperature, fever, chills, productive cough, coughing for 3 weeks or longer or hemoptysis, abnormal vital signs, night sweats,  chest pain or have you noticed a decrease in your appetite, unexplained weight loss or fatigue? No  2. Do you have any open wounds or new incisions? No  3. Do you have any recent or upcoming hospitalizations, surgeries or dental procedures? No  4. Do you currently have or recently have had any signs of illness or infection or are you on any antibiotics? No  5. Have you had any new, sudden or worsening abdominal pain? No  6. Have you or anyone in your household received a live vaccination in the past 4 weeks? Please note:  No live vaccines while on biologic/chemotherapy until 6 months after the last treatment.  Patient can receive the flu vaccine (shot only) and the pneumovax.  It is optimal for the patient to get these vaccines mid cycle, but they can be given at any time as long as it is not on the day of the infusion. No  7. Have you recently been diagnosed with any new nervous system diseases (ie. Multiple sclerosis, Guillain [place], seizures, neurological changes) or cancer diagnosis? No  8. Are you on any form of radiation or chemotherapy? No  9. Are you pregnant or breast feeding or do you have plans of pregnancy in the future? No  10. Have there been any other new onset medical symptoms? No      Post Infusion Assessment:  Patient tolerated  infusion without incident.  Site patent and intact, free from redness, edema or discomfort.  No evidence of extravasations.  Access discontinued per protocol.     Discharge Plan:   AVS to patient via MYCHART.  Patient directed to scheduling to make next appointment.  Patient discharged in stable condition accompanied by: self.  Departure Mode: Ambulatory.      Lucrecia Kaur RN

## 2022-09-11 ENCOUNTER — HEALTH MAINTENANCE LETTER (OUTPATIENT)
Age: 62
End: 2022-09-11

## 2022-09-22 ENCOUNTER — LAB (OUTPATIENT)
Dept: LAB | Facility: CLINIC | Age: 62
End: 2022-09-22
Attending: INTERNAL MEDICINE

## 2022-09-22 ENCOUNTER — ANCILLARY PROCEDURE (OUTPATIENT)
Dept: GENERAL RADIOLOGY | Facility: CLINIC | Age: 62
End: 2022-09-22
Attending: INTERNAL MEDICINE
Payer: COMMERCIAL

## 2022-09-22 ENCOUNTER — TELEPHONE (OUTPATIENT)
Dept: RHEUMATOLOGY | Facility: CLINIC | Age: 62
End: 2022-09-22

## 2022-09-22 ENCOUNTER — OFFICE VISIT (OUTPATIENT)
Dept: RHEUMATOLOGY | Facility: CLINIC | Age: 62
End: 2022-09-22
Attending: INTERNAL MEDICINE
Payer: COMMERCIAL

## 2022-09-22 VITALS
BODY MASS INDEX: 27.78 KG/M2 | WEIGHT: 162.7 LBS | HEART RATE: 79 BPM | HEIGHT: 64 IN | DIASTOLIC BLOOD PRESSURE: 79 MMHG | SYSTOLIC BLOOD PRESSURE: 143 MMHG

## 2022-09-22 DIAGNOSIS — M17.0 PRIMARY OSTEOARTHRITIS OF BOTH KNEES: ICD-10-CM

## 2022-09-22 DIAGNOSIS — M05.79 RHEUMATOID ARTHRITIS INVOLVING MULTIPLE SITES WITH POSITIVE RHEUMATOID FACTOR (H): ICD-10-CM

## 2022-09-22 DIAGNOSIS — M05.79 RHEUMATOID ARTHRITIS INVOLVING MULTIPLE SITES WITH POSITIVE RHEUMATOID FACTOR (H): Primary | ICD-10-CM

## 2022-09-22 DIAGNOSIS — Z79.899 HIGH RISK MEDICATION USE: ICD-10-CM

## 2022-09-22 LAB
ALBUMIN SERPL BCG-MCNC: 3.7 G/DL (ref 3.5–5.2)
ALP SERPL-CCNC: 86 U/L (ref 35–104)
ALT SERPL W P-5'-P-CCNC: 13 U/L (ref 10–35)
ANION GAP SERPL CALCULATED.3IONS-SCNC: 10 MMOL/L (ref 7–15)
AST SERPL W P-5'-P-CCNC: 16 U/L (ref 10–35)
BASOPHILS # BLD AUTO: 0 10E3/UL (ref 0–0.2)
BASOPHILS NFR BLD AUTO: 0 %
BILIRUB SERPL-MCNC: 0.3 MG/DL
BUN SERPL-MCNC: 17.9 MG/DL (ref 8–23)
CALCIUM SERPL-MCNC: 9.5 MG/DL (ref 8.8–10.2)
CHLORIDE SERPL-SCNC: 104 MMOL/L (ref 98–107)
CREAT SERPL-MCNC: 0.69 MG/DL (ref 0.51–0.95)
CRP SERPL-MCNC: 8.38 MG/L
DEPRECATED HCO3 PLAS-SCNC: 24 MMOL/L (ref 22–29)
EOSINOPHIL # BLD AUTO: 0.1 10E3/UL (ref 0–0.7)
EOSINOPHIL NFR BLD AUTO: 1 %
ERYTHROCYTE [DISTWIDTH] IN BLOOD BY AUTOMATED COUNT: 13 % (ref 10–15)
ERYTHROCYTE [SEDIMENTATION RATE] IN BLOOD BY WESTERGREN METHOD: 64 MM/HR (ref 0–30)
GFR SERPL CREATININE-BSD FRML MDRD: >90 ML/MIN/1.73M2
GLUCOSE SERPL-MCNC: 87 MG/DL (ref 70–99)
HCT VFR BLD AUTO: 38.4 % (ref 35–47)
HGB BLD-MCNC: 12.2 G/DL (ref 11.7–15.7)
IMM GRANULOCYTES # BLD: 0 10E3/UL
IMM GRANULOCYTES NFR BLD: 0 %
LYMPHOCYTES # BLD AUTO: 2 10E3/UL (ref 0.8–5.3)
LYMPHOCYTES NFR BLD AUTO: 29 %
MCH RBC QN AUTO: 28.4 PG (ref 26.5–33)
MCHC RBC AUTO-ENTMCNC: 31.8 G/DL (ref 31.5–36.5)
MCV RBC AUTO: 89 FL (ref 78–100)
MONOCYTES # BLD AUTO: 0.8 10E3/UL (ref 0–1.3)
MONOCYTES NFR BLD AUTO: 11 %
NEUTROPHILS # BLD AUTO: 4.1 10E3/UL (ref 1.6–8.3)
NEUTROPHILS NFR BLD AUTO: 59 %
NRBC # BLD AUTO: 0 10E3/UL
NRBC BLD AUTO-RTO: 0 /100
PLATELET # BLD AUTO: 315 10E3/UL (ref 150–450)
POTASSIUM SERPL-SCNC: 4 MMOL/L (ref 3.4–5.3)
PROT SERPL-MCNC: 7.7 G/DL (ref 6.4–8.3)
RBC # BLD AUTO: 4.3 10E6/UL (ref 3.8–5.2)
SODIUM SERPL-SCNC: 138 MMOL/L (ref 136–145)
WBC # BLD AUTO: 7 10E3/UL (ref 4–11)

## 2022-09-22 PROCEDURE — 73130 X-RAY EXAM OF HAND: CPT | Mod: LT | Performed by: RADIOLOGY

## 2022-09-22 PROCEDURE — G0463 HOSPITAL OUTPT CLINIC VISIT: HCPCS

## 2022-09-22 PROCEDURE — 86140 C-REACTIVE PROTEIN: CPT | Performed by: PATHOLOGY

## 2022-09-22 PROCEDURE — 80053 COMPREHEN METABOLIC PANEL: CPT | Performed by: PATHOLOGY

## 2022-09-22 PROCEDURE — 99215 OFFICE O/P EST HI 40 MIN: CPT | Performed by: INTERNAL MEDICINE

## 2022-09-22 PROCEDURE — 85025 COMPLETE CBC W/AUTO DIFF WBC: CPT | Performed by: PATHOLOGY

## 2022-09-22 PROCEDURE — 36415 COLL VENOUS BLD VENIPUNCTURE: CPT | Performed by: PATHOLOGY

## 2022-09-22 PROCEDURE — 85652 RBC SED RATE AUTOMATED: CPT | Performed by: PATHOLOGY

## 2022-09-22 RX ORDER — FOLIC ACID 1 MG/1
3 TABLET ORAL DAILY
Qty: 270 TABLET | Refills: 1 | Status: SHIPPED | OUTPATIENT
Start: 2022-09-22 | End: 2023-03-21

## 2022-09-22 ASSESSMENT — PAIN SCALES - GENERAL: PAINLEVEL: SEVERE PAIN (7)

## 2022-09-22 NOTE — Clinical Note
See Espino, can you please fax the pool therapy order to [facility] and let the patient know once it has been faxed. thanks

## 2022-09-22 NOTE — LETTER
"9/22/2022       RE: Janice Mayfield  [address] [facility] [address]     Dear Colleague,    Thank you for referring your patient, Janice Mayfield, to the [facility] [place] at [facility]. Please see a copy of my visit note below.    [facility] Outpatient Rheumatology Follow-up  Date of service: September 22, 2022    Patient name: Janice Mayfield  YOB: 1960  MRN: [id number]    Reason for Follow-up: Seropositive Rheumatoid Arthritis on methotrexate and infliximab, prior patient of Dr Alva who is establishing care with me today September 22, 2022     Has steroid injection into the right knee in June. Did have positive response to this over the summer with ability to hike/walk/ do most of what she wanted with minimal issues, though slowly worsening again since that time. Swelling has been worse also. Has also found that pain also returned to the right wrist over the last 6 months or so. She has continued on 5mg of prednisone daily despite trying.  She has about 1 week of improvement of symptoms after her Remicade infusion which she receives once every 4 weeks.  She continues to feel what she describes as \"out of it\" for 1 day after her methotrexate weekly dose.  To the point that she is unable to drive.  It then resolved.  She describes her joint symptoms as a persistent sprain particularly in the right wrist and right knee.  Previously did acupuncture with significant improvement and resolution of her symptoms.  She does this yearly in [place].  No interval infections.  No fevers chills.  No new rashes.  No other side effect from therapy.  Interested in pool physical therapy.  Lives 6 minutes from [facility] and interested in going there.  14 point review of systems collected and otherwise negative    Current RA treatment:   Methotrexate 10mg weekly  Remicade 400mg q4 " "weeks   Prednisone 5mg once daily since 2022, previously on 2mg daily    Prior therapy  Humira- hives  HCQ- LFT elevation  Enbrel   Past Medical History:  Past Medical History:   Diagnosis Date     Lichen sclerosus et atrophicus of the vulva 2018     Long term current use of systemic steroids 5/3/2017     Long-term use of immunosuppressant medication 2018     Other secondary osteoarthritis of multiple sites 2018     Rheumatoid arthritis involving multiple sites with positive rheumatoid factor (H) 2017     Vitamin D deficiency 5/3/2017       Past Surgical History:  Past Surgical History:   Procedure Laterality Date     C/SECTION, LOW TRANSVERSE      , Low Transverse     HC TOOTH EXTRACTION W/FORCEP         Medications:  Current Outpatient Medications   Medication     augmented betamethasone dipropionate (DIPROLENE-AF) 0.05 % external ointment     clobetasol (TEMOVATE) 0.05 % external ointment     DiphenhydrAMINE HCl (BENADRYL PO)     folic acid (FOLVITE) 1 MG tablet     IBUPROFEN PO     InFLIXimab (REMICADE IV)     methotrexate 2.5 MG tablet     predniSONE (DELTASONE) 1 MG tablet     predniSONE (DELTASONE) 5 MG tablet     Vitamin D, Cholecalciferol, 1000 UNITS CAPS     No current facility-administered medications for this visit.       Allergies:  Allergies   Allergen Reactions     Adalimumab Hives     Humira      hives     Methotrexate      Anxiety. No allergic signs.      Plaquenil [Hydroxychloroquine]      Elevated LFTs     Sulfa Drugs      delusions       Family history: reviewed    Social History:   has children.  Her father had a recent hip fracture and COVID.  Her  has ocular disease and seeing ophthalmology today 2022    Objective:  BP (!) 143/79   Pulse 79   Ht 1.626 m (5' 4\")   Wt 73.8 kg (162 lb 11.2 oz)   BMI 27.93 kg/m    Sitting up unassisted no acute distress  No acute cutaneous lesions  Mild bilateral effusion of her wrists.  Unable to flex " beyond about 30 degrees of her right wrist.  Unable to extend beyond about 10 degrees of her right wrist.  Left wrist extension and flexion are full.  Neither wrist is tender erythematous or warm.  She has an effusion of her right knee as well.   strength is weak on the right more than the left.  No synovitis of her MCPs PIPs or DIPs.  Able to make a full fist and touch her fingertips to her palms.  Full range of motion of all joints.    WBC   Date Value Ref Range Status   06/07/2021 6.9 4.0 - 11.0 10e9/L Final     WBC Count   Date Value Ref Range Status   06/30/2022 6.0 4.0 - 11.0 10e3/uL Final     Hemoglobin   Date Value Ref Range Status   06/30/2022 12.9 11.7 - 15.7 g/dL Final   06/07/2021 11.9 11.7 - 15.7 g/dL Final     Platelet Count   Date Value Ref Range Status   06/30/2022 321 150 - 450 10e3/uL Final   06/07/2021 265 150 - 450 10e9/L Final     Creatinine   Date Value Ref Range Status   06/30/2022 0.68 0.52 - 1.04 mg/dL Final   06/07/2021 0.86 0.52 - 1.04 mg/dL Final     Lab Results   Component Value Date    ALKPHOS 83 03/21/2022    ALKPHOS 92 11/08/2019     AST   Date Value Ref Range Status   06/30/2022 14 0 - 45 U/L Final   06/07/2021 15 0 - 45 U/L Final     Lab Results   Component Value Date    ALT 43 06/30/2022    ALT 25 06/07/2021     Sed Rate   Date Value Ref Range Status   06/07/2021 18 0 - 30 mm/h Final     Erythrocyte Sedimentation Rate   Date Value Ref Range Status   06/30/2022 42 (H) 0 - 30 mm/hr Final     CRP Inflammation   Date Value Ref Range Status   06/30/2022 5.9 0.0 - 8.0 mg/L Final   06/07/2021 <2.9 0.0 - 8.0 mg/L Final     UA RESULTS:  Recent Labs   Lab Test 04/28/17  0940   COLOR Yellow   APPEARANCE Clear   URINEGLC Negative   URINEBILI Negative   URINEKETONE Negative   SG 1.016   UBLD Negative   URINEPH 6.0   PROTEIN Negative   NITRITE Negative   LEUKEST Moderate*   RBCU O - 2   WBCU 2-5*      ZANDER pattern 1   Date Value Ref Range Status   11/08/2019 HOMOGENEOUS  Final     Rheumatoid  "Factor   Date Value Ref Range Status   11/08/2019 <20 <20 IU/mL Final     Cyclic Citrullinated Peptide Antibody, IgG   Date Value Ref Range Status   11/08/2019 3 <7 U/mL Final     Comment:     Negative       Imaging    A/P  Seropositive RA: Likely ongoing mild disease activity on 400mg q4 weeks remicade, 10mg methotrexate weekly and 5mg prednisone daily. Unable to increase methotrexate above 10mg weekly due to day after side effects of feeling \"out of it\". Will increase folic acid up to 3mg daily to see if helpful as increase from 1 to 2mg daily did improve this somewhat. Will continue above therapy for now as has had multiple reactions to prior therapies and does report that this is far from her prior state when disease was severe/active and when she presented to [place]. I think this is reasonable for now while we augment her symptom control with the following to address background pain control with PT (pool therapy) and acupuncture which was very helpful in the past. In regards to her right wrist and right knee, she will return to [facility] ortho for injections which will address both inflammatory arthritis and non inflammatory OA component of her disease in a targeted fashion given they appear to be the only two joints that still show some ongoing potential inflammatory activity without the need for a change in her systemic therapy.     PLAN:  -Continue methotrexate 10mg weekly  -Continue remicade 400mg q4 weeks  -Repeat bilateral hand films today  -Return to tri for right wrist and right knee steroid injection  -acupuncture referral placed  -pool PT referral placed to [facility]  -return to clinic in 4months to assess improvement on above. If ongoing question of active disease at that time, will consider [facility] ultrasound to assess for objective active synovitis prior to making a change in her biologic therapy    High risk medication use: remicade and methotrexate  --Risks and benefits of TNF inhibitor discussed today " to include infection, injection site reactions, anaphylaxis, demyelinating disease, GI/hepatoxicity, bowel perforation, malignancy among others. Patient is agreeable to continue  ---Risks and benefits of methotrexate discussed today to include infection, BM suppression, GI/hepatoxicity, malignancy among others. Patient knows not to consume ETOH. Patient  is agreeable to continue.  --Most recent labs reviewed from 6/30/22 to include CBC with platelets, AST, ALT, creatinine which did not show evidence of drug toxicity. Will continue q3 month routine drug screening lab monitoring. Will draw with her next infusion which is scheduled for 9/29/22    Gilson Penny MD  Rheumatology    I spent a total of 40 minutes on the date of service on chart review, patient encounter, , documentation.

## 2022-09-22 NOTE — NURSING NOTE
"Chief Complaint   Patient presents with     RECHECK     Follow up with RA     BP (!) 143/79   Pulse 79   Ht 1.626 m (5' 4\")   Wt 73.8 kg (162 lb 11.2 oz)   BMI 27.93 kg/m    Yun Garcia CMA on 9/22/2022 at 8:20 AM    "

## 2022-09-22 NOTE — TELEPHONE ENCOUNTER
Referral faxed as requested. Patient notified via [employer].     Kenzie Marc CMA   9/22/2022 1:35 PM

## 2022-09-22 NOTE — PATIENT INSTRUCTIONS
1) Bilateral hand xray  2) labs with your infusion  3) Continue methotrexate and remicade  4) increase the folic acid up to 3mg daily  5) acupuncture referral placed  6) Pool PT referral placed  7) Wrist and knee injections with ortho    Follow-up in 4 months

## 2022-09-22 NOTE — PROGRESS NOTES
"[facility] Outpatient Rheumatology Follow-up  Date of service: September 22, 2022    Patient name: Janice Mayfield  YOB: 1960  MRN: [id number]    Reason for Follow-up: Seropositive Rheumatoid Arthritis on methotrexate and infliximab, prior patient of Dr Alva who is establishing care with me today September 22, 2022     Has steroid injection into the right knee in June. Did have positive response to this over the summer with ability to hike/walk/ do most of what she wanted with minimal issues, though slowly worsening again since that time. Swelling has been worse also. Has also found that pain also returned to the right wrist over the last 6 months or so. She has continued on 5mg of prednisone daily despite trying.  She has about 1 week of improvement of symptoms after her Remicade infusion which she receives once every 4 weeks.  She continues to feel what she describes as \"out of it\" for 1 day after her methotrexate weekly dose.  To the point that she is unable to drive.  It then resolved.  She describes her joint symptoms as a persistent sprain particularly in the right wrist and right knee.  Previously did acupuncture with significant improvement and resolution of her symptoms.  She does this yearly in [place].  No interval infections.  No fevers chills.  No new rashes.  No other side effect from therapy.  Interested in pool physical therapy.  Lives [place] from [facility] and interested in going there.  14 point review of systems collected and otherwise negative    Current RA treatment:   Methotrexate 10mg weekly  Remicade 400mg q4 weeks   Prednisone 5mg once daily since March 2022, previously on 2mg daily    Prior therapy  Humira- hives  HCQ- LFT elevation  Enbrel   Past Medical History:  Past Medical History:   Diagnosis Date     Lichen sclerosus et atrophicus of the vulva 11/21/2018     Long term current use of systemic steroids 5/3/2017     Long-term use of " "immunosuppressant medication 2018     Other secondary osteoarthritis of multiple sites 2018     Rheumatoid arthritis involving multiple sites with positive rheumatoid factor (H) 2017     Vitamin D deficiency 5/3/2017       Past Surgical History:  Past Surgical History:   Procedure Laterality Date     C/SECTION, LOW TRANSVERSE      , Low Transverse     HC TOOTH EXTRACTION W/FORCEP         Medications:  Current Outpatient Medications   Medication     augmented betamethasone dipropionate (DIPROLENE-AF) 0.05 % external ointment     clobetasol (TEMOVATE) 0.05 % external ointment     DiphenhydrAMINE HCl (BENADRYL PO)     folic acid (FOLVITE) 1 MG tablet     IBUPROFEN PO     InFLIXimab (REMICADE IV)     methotrexate 2.5 MG tablet     predniSONE (DELTASONE) 1 MG tablet     predniSONE (DELTASONE) 5 MG tablet     Vitamin D, Cholecalciferol, 1000 UNITS CAPS     No current facility-administered medications for this visit.       Allergies:  Allergies   Allergen Reactions     Adalimumab Hives     Humira      hives     Methotrexate      Anxiety. No allergic signs.      Plaquenil [Hydroxychloroquine]      Elevated LFTs     Sulfa Drugs      delusions       Family history: reviewed    Social History:   has children.  Her father had a recent hip fracture and COVID.  Her  has ocular disease and seeing ophthalmology today 2022    Objective:  BP (!) 143/79   Pulse 79   Ht 1.626 m (5' 4\")   Wt 73.8 kg (162 lb 11.2 oz)   BMI 27.93 kg/m    Sitting up unassisted no acute distress  No acute cutaneous lesions  Mild bilateral effusion of her wrists.  Unable to flex beyond about 30 degrees of her right wrist.  Unable to extend beyond about 10 degrees of her right wrist.  Left wrist extension and flexion are full.  Neither wrist is tender erythematous or warm.  She has an effusion of her right knee as well.   strength is weak on the right more than the left.  No synovitis of her MCPs PIPs or " DIPs.  Able to make a full fist and touch her fingertips to her palms.  Full range of motion of all joints.    WBC   Date Value Ref Range Status   06/07/2021 6.9 4.0 - 11.0 10e9/L Final     WBC Count   Date Value Ref Range Status   06/30/2022 6.0 4.0 - 11.0 10e3/uL Final     Hemoglobin   Date Value Ref Range Status   06/30/2022 12.9 11.7 - 15.7 g/dL Final   06/07/2021 11.9 11.7 - 15.7 g/dL Final     Platelet Count   Date Value Ref Range Status   06/30/2022 321 150 - 450 10e3/uL Final   06/07/2021 265 150 - 450 10e9/L Final     Creatinine   Date Value Ref Range Status   06/30/2022 0.68 0.52 - 1.04 mg/dL Final   06/07/2021 0.86 0.52 - 1.04 mg/dL Final     Lab Results   Component Value Date    ALKPHOS 83 03/21/2022    ALKPHOS 92 11/08/2019     AST   Date Value Ref Range Status   06/30/2022 14 0 - 45 U/L Final   06/07/2021 15 0 - 45 U/L Final     Lab Results   Component Value Date    ALT 43 06/30/2022    ALT 25 06/07/2021     Sed Rate   Date Value Ref Range Status   06/07/2021 18 0 - 30 mm/h Final     Erythrocyte Sedimentation Rate   Date Value Ref Range Status   06/30/2022 42 (H) 0 - 30 mm/hr Final     CRP Inflammation   Date Value Ref Range Status   06/30/2022 5.9 0.0 - 8.0 mg/L Final   06/07/2021 <2.9 0.0 - 8.0 mg/L Final     UA RESULTS:  Recent Labs   Lab Test 04/28/17  0940   COLOR Yellow   APPEARANCE Clear   URINEGLC Negative   URINEBILI Negative   URINEKETONE Negative   SG 1.016   UBLD Negative   URINEPH 6.0   PROTEIN Negative   NITRITE Negative   LEUKEST Moderate*   RBCU O - 2   WBCU 2-5*      ZANDER pattern 1   Date Value Ref Range Status   11/08/2019 HOMOGENEOUS  Final     Rheumatoid Factor   Date Value Ref Range Status   11/08/2019 <20 <20 IU/mL Final     Cyclic Citrullinated Peptide Antibody, IgG   Date Value Ref Range Status   11/08/2019 3 <7 U/mL Final     Comment:     Negative       Imaging    A/P  Seropositive RA: Likely ongoing mild disease activity on 400mg q4 weeks remicade, 10mg methotrexate weekly and  "5mg prednisone daily. Unable to increase methotrexate above 10mg weekly due to day after side effects of feeling \"out of it\". Will increase folic acid up to 3mg daily to see if helpful as increase from 1 to 2mg daily did improve this somewhat. Will continue above therapy for now as has had multiple reactions to prior therapies and does report that this is far from her prior state when disease was severe/active and when she presented to [place]. I think this is reasonable for now while we augment her symptom control with the following to address background pain control with PT (pool therapy) and acupuncture which was very helpful in the past. In regards to her right wrist and right knee, she will return to [facility] ortho for injections which will address both inflammatory arthritis and non inflammatory OA component of her disease in a targeted fashion given they appear to be the only two joints that still show some ongoing potential inflammatory activity without the need for a change in her systemic therapy.     PLAN:  -Continue methotrexate 10mg weekly  -Continue remicade 400mg q4 weeks  -Repeat bilateral hand films today  -Return to [facility] for right wrist and right knee steroid injection  -acupuncture referral placed  -pool PT referral placed to [facility]  -return to clinic in 4months to assess improvement on above. If ongoing question of active disease at that time, will consider [facility] ultrasound to assess for objective active synovitis prior to making a change in her biologic therapy    High risk medication use: remicade and methotrexate  --Risks and benefits of TNF inhibitor discussed today to include infection, injection site reactions, anaphylaxis, demyelinating disease, GI/hepatoxicity, bowel perforation, malignancy among others. Patient is agreeable to continue  ---Risks and benefits of methotrexate discussed today to include infection, BM suppression, GI/hepatoxicity, malignancy among others. Patient knows not to " consume ETOH. Patient  is agreeable to continue.  --Most recent labs reviewed from 6/30/22 to include CBC with platelets, AST, ALT, creatinine which did not show evidence of drug toxicity. Will continue q3 month routine drug screening lab monitoring. Will draw with her next infusion which is scheduled for 9/29/22    Gilson Penny MD  Rheumatology    I spent a total of 40 minutes on the date of service on chart review, patient encounter, , documentation.

## 2022-09-28 DIAGNOSIS — M05.79 RHEUMATOID ARTHRITIS INVOLVING MULTIPLE SITES WITH POSITIVE RHEUMATOID FACTOR (H): ICD-10-CM

## 2022-09-29 ENCOUNTER — INFUSION THERAPY VISIT (OUTPATIENT)
Dept: INFUSION THERAPY | Facility: CLINIC | Age: 62
End: 2022-09-29
Payer: COMMERCIAL

## 2022-09-29 VITALS
WEIGHT: 165.3 LBS | BODY MASS INDEX: 28.37 KG/M2 | OXYGEN SATURATION: 97 % | SYSTOLIC BLOOD PRESSURE: 122 MMHG | HEART RATE: 71 BPM | DIASTOLIC BLOOD PRESSURE: 74 MMHG | TEMPERATURE: 98.1 F

## 2022-09-29 DIAGNOSIS — M05.79 RHEUMATOID ARTHRITIS INVOLVING MULTIPLE SITES WITH POSITIVE RHEUMATOID FACTOR (H): Primary | ICD-10-CM

## 2022-09-29 PROCEDURE — 96413 CHEMO IV INFUSION 1 HR: CPT | Performed by: NURSE PRACTITIONER

## 2022-09-29 PROCEDURE — 99207 PR NO CHARGE LOS: CPT

## 2022-09-29 RX ORDER — HEPARIN SODIUM (PORCINE) LOCK FLUSH IV SOLN 100 UNIT/ML 100 UNIT/ML
5 SOLUTION INTRAVENOUS
Status: CANCELLED | OUTPATIENT
Start: 2022-10-25

## 2022-09-29 RX ORDER — METHYLPREDNISOLONE SODIUM SUCCINATE 125 MG/2ML
60 INJECTION, POWDER, LYOPHILIZED, FOR SOLUTION INTRAMUSCULAR; INTRAVENOUS ONCE
Status: CANCELLED
Start: 2022-10-25 | End: 2022-10-25

## 2022-09-29 RX ORDER — ACETAMINOPHEN 325 MG/1
650 TABLET ORAL ONCE
Status: CANCELLED
Start: 2022-10-25 | End: 2022-10-25

## 2022-09-29 RX ORDER — HEPARIN SODIUM,PORCINE 10 UNIT/ML
5 VIAL (ML) INTRAVENOUS
Status: CANCELLED | OUTPATIENT
Start: 2022-10-25

## 2022-09-29 RX ORDER — DIPHENHYDRAMINE HCL 25 MG
25 CAPSULE ORAL ONCE
Status: COMPLETED | OUTPATIENT
Start: 2022-09-29 | End: 2022-09-29

## 2022-09-29 RX ORDER — DIPHENHYDRAMINE HCL 25 MG
25 CAPSULE ORAL ONCE
Status: CANCELLED
Start: 2022-10-25 | End: 2022-10-25

## 2022-09-29 RX ORDER — ACETAMINOPHEN 325 MG/1
650 TABLET ORAL ONCE
Status: COMPLETED | OUTPATIENT
Start: 2022-09-29 | End: 2022-09-29

## 2022-09-29 RX ADMIN — Medication 250 ML: at 08:23

## 2022-09-29 RX ADMIN — ACETAMINOPHEN 650 MG: 325 TABLET ORAL at 08:04

## 2022-09-29 RX ADMIN — Medication 25 MG: at 08:03

## 2022-09-29 ASSESSMENT — PAIN SCALES - GENERAL: PAINLEVEL: SEVERE PAIN (7)

## 2022-09-29 NOTE — PROGRESS NOTES
Infusion Nursing Note:  Janice SIDNEY Erickirk Mayfield presents today for Rapid Remicade.    Patient seen by provider today: No   present during visit today: Not Applicable.    Note: no new symptoms or concerns.    Intravenous Access:  Peripheral IV placed.    Treatment Conditions:  Biological Infusion Checklist:  ~~~ NOTE: If the patient answers yes to any of the questions below, hold the infusion and contact ordering provider or on-call provider.    1. Have you recently had an elevated temperature, fever, chills, productive cough, coughing for 3 weeks or longer or hemoptysis, abnormal vital signs, night sweats,  chest pain or have you noticed a decrease in your appetite, unexplained weight loss or fatigue? No  2. Do you have any open wounds or new incisions? No  3. Do you have any recent or upcoming hospitalizations, surgeries or dental procedures? No  4. Do you currently have or recently have had any signs of illness or infection or are you on any antibiotics? No  5. Have you had any new, sudden or worsening abdominal pain? No  6. Have you or anyone in your household received a live vaccination in the past 4 weeks? Please note:  No live vaccines while on biologic/chemotherapy until 6 months after the last treatment.  Patient can receive the flu vaccine (shot only) and the pneumovax.  It is optimal for the patient to get these vaccines mid cycle, but they can be given at any time as long as it is not on the day of the infusion. No  7. Have you recently been diagnosed with any new nervous system diseases (ie. Multiple sclerosis, Guillain [place], seizures, neurological changes) or cancer diagnosis? No  8. Are you on any form of radiation or chemotherapy? No  9. Are you pregnant or breast feeding or do you have plans of pregnancy in the future? No  10. Have you been having any signs of worsening depression or suicidal ideations?  (benlysta only) No  11. Have there been any other new onset medical symptoms?  No      Post Infusion Assessment:  Patient tolerated infusion without incident.     Discharge Plan:   RTC as scheduled.  Reviewed appts with patient.      HARRY WISE RN

## 2022-10-01 RX ORDER — PREDNISONE 5 MG/1
TABLET ORAL
Qty: 30 TABLET | Refills: 3 | Status: SHIPPED | OUTPATIENT
Start: 2022-10-01 | End: 2023-02-20

## 2022-10-01 NOTE — TELEPHONE ENCOUNTER
predniSONE (DELTASONE) 5 MG tablet  Last Written Prescription Date:  5/16/22  Last Fill Quantity: 30,   # refills: 3  Last Office Visit : 9/22/22  Future Office visit:  1/26/23    Routed because: last note does not mention to continue med rf?

## 2022-10-12 ENCOUNTER — TRANSFERRED RECORDS (OUTPATIENT)
Dept: HEALTH INFORMATION MANAGEMENT | Facility: CLINIC | Age: 62
End: 2022-10-12

## 2022-10-27 ENCOUNTER — INFUSION THERAPY VISIT (OUTPATIENT)
Dept: INFUSION THERAPY | Facility: CLINIC | Age: 62
End: 2022-10-27
Payer: COMMERCIAL

## 2022-10-27 VITALS
SYSTOLIC BLOOD PRESSURE: 142 MMHG | OXYGEN SATURATION: 99 % | BODY MASS INDEX: 28.36 KG/M2 | WEIGHT: 165.2 LBS | TEMPERATURE: 97.8 F | DIASTOLIC BLOOD PRESSURE: 86 MMHG | RESPIRATION RATE: 16 BRPM | HEART RATE: 69 BPM

## 2022-10-27 DIAGNOSIS — M05.79 RHEUMATOID ARTHRITIS INVOLVING MULTIPLE SITES WITH POSITIVE RHEUMATOID FACTOR (H): Primary | ICD-10-CM

## 2022-10-27 PROCEDURE — 96413 CHEMO IV INFUSION 1 HR: CPT | Performed by: NURSE PRACTITIONER

## 2022-10-27 PROCEDURE — 99207 PR NO CHARGE LOS: CPT

## 2022-10-27 RX ORDER — HEPARIN SODIUM (PORCINE) LOCK FLUSH IV SOLN 100 UNIT/ML 100 UNIT/ML
5 SOLUTION INTRAVENOUS
Status: CANCELLED | OUTPATIENT
Start: 2022-11-24

## 2022-10-27 RX ORDER — ACETAMINOPHEN 325 MG/1
650 TABLET ORAL ONCE
Status: CANCELLED
Start: 2022-11-24 | End: 2022-11-24

## 2022-10-27 RX ORDER — ACETAMINOPHEN 325 MG/1
650 TABLET ORAL ONCE
Status: COMPLETED | OUTPATIENT
Start: 2022-10-27 | End: 2022-10-27

## 2022-10-27 RX ORDER — METHYLPREDNISOLONE SODIUM SUCCINATE 125 MG/2ML
60 INJECTION, POWDER, LYOPHILIZED, FOR SOLUTION INTRAMUSCULAR; INTRAVENOUS ONCE
Status: CANCELLED
Start: 2022-11-24 | End: 2022-11-24

## 2022-10-27 RX ORDER — DIPHENHYDRAMINE HCL 25 MG
25 CAPSULE ORAL ONCE
Status: COMPLETED | OUTPATIENT
Start: 2022-10-27 | End: 2022-10-27

## 2022-10-27 RX ORDER — DIPHENHYDRAMINE HCL 25 MG
25 CAPSULE ORAL ONCE
Status: CANCELLED
Start: 2022-11-24 | End: 2022-11-24

## 2022-10-27 RX ORDER — HEPARIN SODIUM,PORCINE 10 UNIT/ML
5 VIAL (ML) INTRAVENOUS
Status: CANCELLED | OUTPATIENT
Start: 2022-11-24

## 2022-10-27 RX ADMIN — Medication 25 MG: at 08:26

## 2022-10-27 RX ADMIN — Medication 250 ML: at 08:24

## 2022-10-27 RX ADMIN — ACETAMINOPHEN 650 MG: 325 TABLET ORAL at 08:26

## 2022-10-27 ASSESSMENT — PAIN SCALES - GENERAL: PAINLEVEL: NO PAIN (0)

## 2022-10-27 NOTE — PROGRESS NOTES
Infusion Nursing Note:  Janice Mayfield presents today for Rapid Remicade.    Patient seen by provider today: No   present during visit today: Not Applicable.    Note: The patient reports feeling well today. She denies any concerns at this time and reports tolerating her infusions well.    Intravenous Access:  Peripheral IV placed.    Treatment Conditions:  Biological Infusion Checklist:  ~~~ NOTE: If the patient answers yes to any of the questions below, hold the infusion and contact ordering provider or on-call provider.    1. Have you recently had an elevated temperature, fever, chills, productive cough, coughing for 3 weeks or longer or hemoptysis, abnormal vital signs, night sweats,  chest pain or have you noticed a decrease in your appetite, unexplained weight loss or fatigue? No  2. Do you have any open wounds or new incisions? No  3. Do you have any recent or upcoming hospitalizations, surgeries or dental procedures? No  4. Do you currently have or recently have had any signs of illness or infection or are you on any antibiotics? No  5. Have you had any new, sudden or worsening abdominal pain? No  6. Have you or anyone in your household received a live vaccination in the past 4 weeks? Please note:  No live vaccines while on biologic/chemotherapy until 6 months after the last treatment.  Patient can receive the flu vaccine (shot only) and the pneumovax.  It is optimal for the patient to get these vaccines mid cycle, but they can be given at any time as long as it is not on the day of the infusion. No  7. Have you recently been diagnosed with any new nervous system diseases (ie. Multiple sclerosis, Guillain [place], seizures, neurological changes) or cancer diagnosis? No  8. Are you on any form of radiation or chemotherapy? No  9. Are you pregnant or breast feeding or do you have plans of pregnancy in the future? No  10. Have you been having any signs of worsening depression or suicidal  ideations?  (benlysta only) No  11. Have there been any other new onset medical symptoms? No      Post Infusion Assessment:  Patient tolerated infusion without incident.  Site patent and intact, free from redness, edema or discomfort.  No evidence of extravasations.  Access discontinued per protocol.  Biologic Infusion Post Education: Call the triage nurse at your clinic or seek medical attention if you have chills and/or temperature greater than or equal to 100.5, uncontrolled nausea/vomiting, diarrhea, constipation, dizziness, shortness of breath, chest pain, heart palpitations, weakness or any other new or concerning symptoms, questions or concerns.  You cannot have any live virus vaccines prior to or during treatment or up to 6 months post infusion.  If you have an upcoming surgery, medical procedure or dental procedure during treatment, this should be discussed with your ordering physician and your surgeon/dentist.  If you are having any concerning symptom, if you are unsure if you should get your next infusion or wish to speak to a provider before your next infusion, please call your care coordinator or triage nurse at your clinic to notify them so we can adequately serve you.     Discharge Plan:   AVS to patient via [employer].  Patient will return 11/25/22 for next appointment. Future appts have been reviewed and crosschecked with appt note and plan.   Patient discharged in stable condition accompanied by: self.  Departure Mode: Ambulatory.      Che Mcfadden RN

## 2022-11-08 ENCOUNTER — OFFICE VISIT (OUTPATIENT)
Dept: DERMATOLOGY | Facility: CLINIC | Age: 62
End: 2022-11-08
Payer: COMMERCIAL

## 2022-11-08 DIAGNOSIS — D22.9 MULTIPLE BENIGN NEVI: ICD-10-CM

## 2022-11-08 DIAGNOSIS — N90.4 LICHEN SCLEROSUS ET ATROPHICUS OF THE VULVA: ICD-10-CM

## 2022-11-08 DIAGNOSIS — L82.1 SEBORRHEIC KERATOSIS: Primary | ICD-10-CM

## 2022-11-08 DIAGNOSIS — L81.4 LENTIGO: ICD-10-CM

## 2022-11-08 DIAGNOSIS — D48.5 NEOPLASM OF UNCERTAIN BEHAVIOR OF SKIN: ICD-10-CM

## 2022-11-08 DIAGNOSIS — D18.01 CHERRY ANGIOMA: ICD-10-CM

## 2022-11-08 PROCEDURE — 99213 OFFICE O/P EST LOW 20 MIN: CPT | Mod: 25 | Performed by: PHYSICIAN ASSISTANT

## 2022-11-08 PROCEDURE — 88305 TISSUE EXAM BY PATHOLOGIST: CPT | Performed by: DERMATOLOGY

## 2022-11-08 PROCEDURE — 11102 TANGNTL BX SKIN SINGLE LES: CPT | Performed by: PHYSICIAN ASSISTANT

## 2022-11-08 PROCEDURE — 88342 IMHCHEM/IMCYTCHM 1ST ANTB: CPT | Performed by: DERMATOLOGY

## 2022-11-08 RX ORDER — BETAMETHASONE DIPROPIONATE 0.5 MG/G
OINTMENT, AUGMENTED TOPICAL
Qty: 15 G | Refills: 3 | Status: SHIPPED | OUTPATIENT
Start: 2022-11-08

## 2022-11-08 ASSESSMENT — PAIN SCALES - GENERAL: PAINLEVEL: NO PAIN (0)

## 2022-11-08 NOTE — LETTER
2022         RE: Janice Mayfield  [address] [facility] [address] [address]        Dear Colleague,    Thank you for referring your patient, Janice Mayfield, to the [facility]. Please see a copy of my visit note below.    Janice Mayfield is an extremely pleasant 62 year old year old female patient here today for skin check. She denies any new or changing nevi. No painful or bleeding areas. She notes she uses oil which helps to control her lichen sclerosus. She does use sunscreen.  Patient has no other skin complaints today.  Remainder of the HPI, Meds, PMH, Allergies, FH, and SH was reviewed in chart.    Pertinent Hx:   No personal history of skin cancer.   Past Medical History:   Diagnosis Date     Lichen sclerosus et atrophicus of the vulva 2018     Long term current use of systemic steroids 5/3/2017     Long-term use of immunosuppressant medication 2018     Other secondary osteoarthritis of multiple sites 2018     Rheumatoid arthritis involving multiple sites with positive rheumatoid factor (H) 2017     Vitamin D deficiency 5/3/2017       Past Surgical History:   Procedure Laterality Date     C/SECTION, LOW TRANSVERSE      , Low Transverse     HC TOOTH EXTRACTION W/FORCEP          Family History   Problem Relation Age of Onset     Cancer Mother 72     Skin Cancer Father        Social History     Socioeconomic History     Marital status:      Spouse name: Maksim     Number of children: 3     Years of education: Not on file     Highest education level: Not on file   Occupational History     Occupation: artist- contract work   Tobacco Use     Smoking status: Never     Smokeless tobacco: Never   Substance and Sexual Activity     Alcohol use: No     Drug use: No     Sexual activity: Not Currently     Partners: Male   Other Topics Concern     Parent/sibling w/ CABG, MI or angioplasty before 65F 55M? Not Asked   Social  History Narrative     Not on file     Social Determinants of Health     Financial Resource Strain: Not on file   Food Insecurity: Not on file   Transportation Needs: Not on file   Physical Activity: Not on file   Stress: Not on file   Social Connections: Not on file   Intimate Partner Violence: Not on file   Housing Stability: Not on file       Outpatient Encounter Medications as of 11/8/2022   Medication Sig Dispense Refill     DiphenhydrAMINE HCl (BENADRYL PO) Take 25 mg by mouth as needed (Pre-medication for remicade)       folic acid (FOLVITE) 1 MG tablet Take 3 tablets (3 mg) by mouth daily for 180 days 270 tablet 1     IBUPROFEN PO Take 200 mg by mouth as needed for moderate pain       InFLIXimab (REMICADE IV) Inject 100 mg into the vein every 28 days        methotrexate 2.5 MG tablet Take 4 tablets (10 mg) by mouth every 7 days Labs every 8-12 week 48 tablet 1     predniSONE (DELTASONE) 5 MG tablet TAKE 1 TABLET BY MOUTH EVERY DAY 30 tablet 3     augmented betamethasone dipropionate (DIPROLENE-AF) 0.05 % external ointment Apply sparingly to affected area in groin 1-2 times weekly. (Patient not taking: Reported on 10/17/2021) 15 g 3     clobetasol (TEMOVATE) 0.05 % external ointment Apply sparingly 1-2 times weekly. (Patient not taking: Reported on 10/17/2021) 15 g 1     folic acid (FOLVITE) 1 MG tablet Take 2 tablets (2 mg) by mouth daily For additional refills, please schedule a follow-up appointment. (Patient not taking: Reported on 9/29/2022) 180 tablet 3     methotrexate 2.5 MG tablet Take 4 tablets (10 mg) by mouth every 7 days for 90 days Labs every 8 - 12 weeks for refills. (Patient not taking: Reported on 11/8/2022) 48 tablet 0     predniSONE (DELTASONE) 1 MG tablet Take 2 tablets (2 mg) by mouth daily Taper as directed (Patient not taking: Reported on 9/22/2022) 60 tablet 1     Vitamin D, Cholecalciferol, 1000 UNITS CAPS Take 2,000 Units by mouth daily (Patient not taking: Reported on 11/8/2022) 1  capsule 0     No facility-administered encounter medications on file as of 11/8/2022.             O:   NAD, WDWN, Alert & Oriented, Mood & Affect wnl, Vitals stable   Here today alone   There were no vitals taken for this visit.   General appearance normal   Vitals stable   Alert, oriented and in no acute distress     0.4 cm brown irregular macule on right lateral thigh   Stuck on papules and brown macules on trunk and ext   Red papules on trunk  Brown papules and macules with regular pigment network and borders on torso and extremities      The remainder of skin exam is normal       Eyes: Conjunctivae/lids:Normal     ENT: Lips: normal    MSK:Normal    Cardiovascular: peripheral edema none    Pulm: Breathing Normal    Neuro/Psych: Orientation:Alert and Orientedx3 ; Mood/Affect:normal     A/P:  1. R/O atypical nevus on right lateral thigh  TANGENTIAL BIOPSY SENT OUT:  After consent, anesthesia with LEC and prep, tangential excision performed and specimen sent out for permanent section histology.  No complications and routine wound care. Patient told to call our office in 1-2 weeks for result.      2. Lichen sclerosus   Resent betamethasone.   3. Seborrheic keratosis, lentigo, angioma, benign nevi   It was a pleasure speaking to Janice Mayfield today.  BENIGN LESIONS DISCUSSED WITH PATIENT:  I discussed the specifics of tumor, prognosis, and genetics of benign lesions.  I explained that treatment of these lesions would be purely cosmetic and not medically neccessary.  I discussed with patient different removal options including excision, cautery and /or laser.      Nature and genetics of benign skin lesions dicussed with patient.  Signs and Symptoms of skin cancer discussed with patient.  ABCDEs of melanoma reviewed with patient.  Patient encouraged to perform monthly skin exams.  UV precautions reviewed with patient.  Risks of non-melanoma skin cancer discussed with patient   Return to clinic in one year or  sooner if needed.         Again, thank you for allowing me to participate in the care of your patient.        Sincerely,        Aleja Pitts PA-C

## 2022-11-08 NOTE — NURSING NOTE
Chief Complaint   Patient presents with     Skin Check     No concerns        There were no vitals filed for this visit.  Wt Readings from Last 1 Encounters:   10/27/22 74.9 kg (165 lb 3.2 oz)       Ruma Archer LPN .................11/8/2022

## 2022-11-08 NOTE — PROGRESS NOTES
Janice Mayfield is an extremely pleasant 62 year old year old female patient here today for skin check. She denies any new or changing nevi. No painful or bleeding areas. She notes she uses oil which helps to control her lichen sclerosus. She does use sunscreen.  Patient has no other skin complaints today.  Remainder of the HPI, Meds, PMH, Allergies, FH, and SH was reviewed in chart.    Pertinent Hx:   No personal history of skin cancer.   Past Medical History:   Diagnosis Date     Lichen sclerosus et atrophicus of the vulva 2018     Long term current use of systemic steroids 5/3/2017     Long-term use of immunosuppressant medication 2018     Other secondary osteoarthritis of multiple sites 2018     Rheumatoid arthritis involving multiple sites with positive rheumatoid factor (H) 2017     Vitamin D deficiency 5/3/2017       Past Surgical History:   Procedure Laterality Date     C/SECTION, LOW TRANSVERSE      , Low Transverse     HC TOOTH EXTRACTION W/FORCEP          Family History   Problem Relation Age of Onset     Cancer Mother 72     Skin Cancer Father        Social History     Socioeconomic History     Marital status:      Spouse name: Maksim     Number of children: 3     Years of education: Not on file     Highest education level: Not on file   Occupational History     Occupation: artist- contract work   Tobacco Use     Smoking status: Never     Smokeless tobacco: Never   Substance and Sexual Activity     Alcohol use: No     Drug use: No     Sexual activity: Not Currently     Partners: Male   Other Topics Concern     Parent/sibling w/ CABG, MI or angioplasty before 65F 55M? Not Asked   Social History Narrative     Not on file     Social Determinants of Health     Financial Resource Strain: Not on file   Food Insecurity: Not on file   Transportation Needs: Not on file   Physical Activity: Not on file   Stress: Not on file   Social Connections: Not on file   Intimate  Partner Violence: Not on file   Housing Stability: Not on file       Outpatient Encounter Medications as of 11/8/2022   Medication Sig Dispense Refill     DiphenhydrAMINE HCl (BENADRYL PO) Take 25 mg by mouth as needed (Pre-medication for remicade)       folic acid (FOLVITE) 1 MG tablet Take 3 tablets (3 mg) by mouth daily for 180 days 270 tablet 1     IBUPROFEN PO Take 200 mg by mouth as needed for moderate pain       InFLIXimab (REMICADE IV) Inject 100 mg into the vein every 28 days        methotrexate 2.5 MG tablet Take 4 tablets (10 mg) by mouth every 7 days Labs every 8-12 week 48 tablet 1     predniSONE (DELTASONE) 5 MG tablet TAKE 1 TABLET BY MOUTH EVERY DAY 30 tablet 3     augmented betamethasone dipropionate (DIPROLENE-AF) 0.05 % external ointment Apply sparingly to affected area in groin 1-2 times weekly. (Patient not taking: Reported on 10/17/2021) 15 g 3     clobetasol (TEMOVATE) 0.05 % external ointment Apply sparingly 1-2 times weekly. (Patient not taking: Reported on 10/17/2021) 15 g 1     folic acid (FOLVITE) 1 MG tablet Take 2 tablets (2 mg) by mouth daily For additional refills, please schedule a follow-up appointment. (Patient not taking: Reported on 9/29/2022) 180 tablet 3     methotrexate 2.5 MG tablet Take 4 tablets (10 mg) by mouth every 7 days for 90 days Labs every 8 - 12 weeks for refills. (Patient not taking: Reported on 11/8/2022) 48 tablet 0     predniSONE (DELTASONE) 1 MG tablet Take 2 tablets (2 mg) by mouth daily Taper as directed (Patient not taking: Reported on 9/22/2022) 60 tablet 1     Vitamin D, Cholecalciferol, 1000 UNITS CAPS Take 2,000 Units by mouth daily (Patient not taking: Reported on 11/8/2022) 1 capsule 0     No facility-administered encounter medications on file as of 11/8/2022.             O:   NAD, WDWN, Alert & Oriented, Mood & Affect wnl, Vitals stable   Here today alone   There were no vitals taken for this visit.   General appearance normal   Vitals  stable   Alert, oriented and in no acute distress     0.4 cm brown irregular macule on right lateral thigh   Stuck on papules and brown macules on trunk and ext   Red papules on trunk  Brown papules and macules with regular pigment network and borders on torso and extremities      The remainder of skin exam is normal       Eyes: Conjunctivae/lids:Normal     ENT: Lips: normal    MSK:Normal    Cardiovascular: peripheral edema none    Pulm: Breathing Normal    Neuro/Psych: Orientation:Alert and Orientedx3 ; Mood/Affect:normal     A/P:  1. R/O atypical nevus on right lateral thigh  TANGENTIAL BIOPSY SENT OUT:  After consent, anesthesia with LEC and prep, tangential excision performed and specimen sent out for permanent section histology.  No complications and routine wound care. Patient told to call our office in 1-2 weeks for result.      2. Lichen sclerosus   Resent betamethasone.   3. Seborrheic keratosis, lentigo, angioma, benign nevi   It was a pleasure speaking to Janice Mayfield today.  BENIGN LESIONS DISCUSSED WITH PATIENT:  I discussed the specifics of tumor, prognosis, and genetics of benign lesions.  I explained that treatment of these lesions would be purely cosmetic and not medically neccessary.  I discussed with patient different removal options including excision, cautery and /or laser.      Nature and genetics of benign skin lesions dicussed with patient.  Signs and Symptoms of skin cancer discussed with patient.  ABCDEs of melanoma reviewed with patient.  Patient encouraged to perform monthly skin exams.  UV precautions reviewed with patient.  Risks of non-melanoma skin cancer discussed with patient   Return to clinic in one year or sooner if needed.

## 2022-11-08 NOTE — PATIENT INSTRUCTIONS
Wound Care Instructions     FOR SUPERFICIAL WOUNDS     [facility] [phone]  [facility] [phone]      AFTER 24 HOURS YOU SHOULD REMOVE THE BANDAGE AND BEGIN DAILY DRESSING CHANGES AS FOLLOWS:     1) Remove Dressing.     2) Clean and dry the area with tap water using a Q-tip or sterile gauze pad.     3) Apply Vaseline, Aquaphor, Polysporin ointment or Bacitracin ointment over entire wound.  Do NOT use Neosporin ointment.     4) Cover the wound with a band-aid, or a sterile non-stick gauze pad and micropore paper tape      REPEAT THESE INSTRUCTIONS AT LEAST ONCE A DAY UNTIL THE WOUND HAS COMPLETELY HEALED.    It is an old wives tale that a wound heals better when it is exposed to air and allowed to dry out. The wound will heal faster with a better cosmetic result if it is kept moist with ointment and covered with a bandage.    **Do not let the wound dry out.**  Supplies Needed:      *Cotton tipped applicators (Q-tips)    *Polysporin Ointment or Bacitracin Ointment (NOT NEOSPORIN)    *Band-aids or non-stick gauze pads and micropore paper tape.    PATIENT INFORMATION:    During the healing process you will notice a number of changes. All wounds develop a small halo of redness surrounding the wound.  This means healing is occurring. Severe itching with extensive redness usually indicates sensitivity to the ointment or bandage tape used to dress the wound.  You should call our office if this develops.      Swelling  and/or discoloration around your surgical site is common, particularly when performed around the eye.    All wounds normally drain.  The larger the wound the more drainage there will be.  After 7-10 days, you will notice the wound beginning to shrink and new skin will begin to grow.  The wound is healed when you can see skin has formed over the entire area.  A healed wound has a healthy, shiny look to the surface and is red to dark pink in color to normalize.  Wounds may  take approximately 4-6 weeks to heal.  Larger wounds may take 6-8 weeks.  After the wound is healed you may discontinue dressing changes.    You may experience a sensation of tightness as your wound heals. This is normal and will gradually subside.    Your healed wound may be sensitive to temperature changes. This sensitivity improves with time, but if you re having a lot of discomfort, try to avoid temperature extremes.    Patients frequently experience itching after their wound appears to have healed because of the continue healing under the skin.  Plain Vaseline will help relieve the itching.    POSSIBLE COMPLICATIONS    BLEEDING:    Leave the bandage in place.  Use tightly rolled up gauze or a cloth to apply direct pressure over the bandage for 30  minutes.  Reapply pressure for an additional 30 minutes if necessary  Use additional gauze and tape to maintain pressure once the bleeding has stopped.

## 2022-11-11 LAB
PATH REPORT.COMMENTS IMP SPEC: NORMAL
PATH REPORT.COMMENTS IMP SPEC: NORMAL
PATH REPORT.FINAL DX SPEC: NORMAL
PATH REPORT.GROSS SPEC: NORMAL
PATH REPORT.MICROSCOPIC SPEC OTHER STN: NORMAL
PATH REPORT.RELEVANT HX SPEC: NORMAL

## 2022-11-25 ENCOUNTER — INFUSION THERAPY VISIT (OUTPATIENT)
Dept: INFUSION THERAPY | Facility: CLINIC | Age: 62
End: 2022-11-25
Payer: COMMERCIAL

## 2022-11-25 ENCOUNTER — LAB (OUTPATIENT)
Dept: LAB | Facility: CLINIC | Age: 62
End: 2022-11-25
Payer: COMMERCIAL

## 2022-11-25 VITALS
WEIGHT: 169.2 LBS | HEART RATE: 66 BPM | RESPIRATION RATE: 18 BRPM | SYSTOLIC BLOOD PRESSURE: 111 MMHG | OXYGEN SATURATION: 99 % | BODY MASS INDEX: 29.04 KG/M2 | TEMPERATURE: 98.2 F | DIASTOLIC BLOOD PRESSURE: 78 MMHG

## 2022-11-25 DIAGNOSIS — M05.79 RHEUMATOID ARTHRITIS INVOLVING MULTIPLE SITES WITH POSITIVE RHEUMATOID FACTOR (H): ICD-10-CM

## 2022-11-25 DIAGNOSIS — Z79.899 HIGH RISK MEDICATION USE: ICD-10-CM

## 2022-11-25 DIAGNOSIS — M05.79 RHEUMATOID ARTHRITIS INVOLVING MULTIPLE SITES WITH POSITIVE RHEUMATOID FACTOR (H): Primary | ICD-10-CM

## 2022-11-25 LAB
ALBUMIN SERPL-MCNC: 3.3 G/DL (ref 3.4–5)
ALP SERPL-CCNC: 79 U/L (ref 40–150)
ALT SERPL W P-5'-P-CCNC: 25 U/L (ref 0–50)
ANION GAP SERPL CALCULATED.3IONS-SCNC: 5 MMOL/L (ref 3–14)
AST SERPL W P-5'-P-CCNC: 17 U/L (ref 0–45)
BASOPHILS # BLD AUTO: 0 10E3/UL (ref 0–0.2)
BASOPHILS NFR BLD AUTO: 0 %
BILIRUB SERPL-MCNC: 0.4 MG/DL (ref 0.2–1.3)
BUN SERPL-MCNC: 18 MG/DL (ref 7–30)
CALCIUM SERPL-MCNC: 8.7 MG/DL (ref 8.5–10.1)
CHLORIDE BLD-SCNC: 109 MMOL/L (ref 94–109)
CO2 SERPL-SCNC: 27 MMOL/L (ref 20–32)
CREAT SERPL-MCNC: 0.69 MG/DL (ref 0.52–1.04)
CRP SERPL-MCNC: <2.9 MG/L (ref 0–8)
EOSINOPHIL # BLD AUTO: 0 10E3/UL (ref 0–0.7)
EOSINOPHIL NFR BLD AUTO: 1 %
ERYTHROCYTE [DISTWIDTH] IN BLOOD BY AUTOMATED COUNT: 14.6 % (ref 10–15)
ERYTHROCYTE [SEDIMENTATION RATE] IN BLOOD BY WESTERGREN METHOD: 20 MM/HR (ref 0–30)
GFR SERPL CREATININE-BSD FRML MDRD: >90 ML/MIN/1.73M2
GLUCOSE BLD-MCNC: 99 MG/DL (ref 70–99)
HCT VFR BLD AUTO: 40.8 % (ref 35–47)
HGB BLD-MCNC: 12.8 G/DL (ref 11.7–15.7)
HOLD SPECIMEN: NORMAL
IMM GRANULOCYTES # BLD: 0 10E3/UL
IMM GRANULOCYTES NFR BLD: 0 %
LYMPHOCYTES # BLD AUTO: 2.2 10E3/UL (ref 0.8–5.3)
LYMPHOCYTES NFR BLD AUTO: 38 %
MCH RBC QN AUTO: 29.2 PG (ref 26.5–33)
MCHC RBC AUTO-ENTMCNC: 31.4 G/DL (ref 31.5–36.5)
MCV RBC AUTO: 93 FL (ref 78–100)
MONOCYTES # BLD AUTO: 0.6 10E3/UL (ref 0–1.3)
MONOCYTES NFR BLD AUTO: 10 %
NEUTROPHILS # BLD AUTO: 3 10E3/UL (ref 1.6–8.3)
NEUTROPHILS NFR BLD AUTO: 51 %
NRBC # BLD AUTO: 0 10E3/UL
NRBC BLD AUTO-RTO: 0 /100
PLATELET # BLD AUTO: 278 10E3/UL (ref 150–450)
POTASSIUM BLD-SCNC: 3.7 MMOL/L (ref 3.4–5.3)
PROT SERPL-MCNC: 7 G/DL (ref 6.8–8.8)
RBC # BLD AUTO: 4.38 10E6/UL (ref 3.8–5.2)
SODIUM SERPL-SCNC: 141 MMOL/L (ref 133–144)
WBC # BLD AUTO: 5.9 10E3/UL (ref 4–11)

## 2022-11-25 PROCEDURE — 86140 C-REACTIVE PROTEIN: CPT

## 2022-11-25 PROCEDURE — 36415 COLL VENOUS BLD VENIPUNCTURE: CPT

## 2022-11-25 PROCEDURE — 85652 RBC SED RATE AUTOMATED: CPT

## 2022-11-25 PROCEDURE — 99207 PR NO CHARGE LOS: CPT

## 2022-11-25 PROCEDURE — 80053 COMPREHEN METABOLIC PANEL: CPT

## 2022-11-25 PROCEDURE — 85025 COMPLETE CBC W/AUTO DIFF WBC: CPT

## 2022-11-25 PROCEDURE — 96413 CHEMO IV INFUSION 1 HR: CPT | Performed by: INTERNAL MEDICINE

## 2022-11-25 RX ORDER — HEPARIN SODIUM (PORCINE) LOCK FLUSH IV SOLN 100 UNIT/ML 100 UNIT/ML
5 SOLUTION INTRAVENOUS
Status: CANCELLED | OUTPATIENT
Start: 2022-12-22

## 2022-11-25 RX ORDER — DIPHENHYDRAMINE HCL 25 MG
25 CAPSULE ORAL ONCE
Status: COMPLETED | OUTPATIENT
Start: 2022-11-25 | End: 2022-11-25

## 2022-11-25 RX ORDER — HEPARIN SODIUM,PORCINE 10 UNIT/ML
5 VIAL (ML) INTRAVENOUS
Status: CANCELLED | OUTPATIENT
Start: 2022-12-22

## 2022-11-25 RX ORDER — METHYLPREDNISOLONE SODIUM SUCCINATE 125 MG/2ML
60 INJECTION, POWDER, LYOPHILIZED, FOR SOLUTION INTRAMUSCULAR; INTRAVENOUS ONCE
Status: CANCELLED
Start: 2022-12-22 | End: 2022-12-22

## 2022-11-25 RX ORDER — ACETAMINOPHEN 325 MG/1
650 TABLET ORAL ONCE
Status: CANCELLED
Start: 2022-12-22 | End: 2022-12-22

## 2022-11-25 RX ORDER — DIPHENHYDRAMINE HCL 25 MG
25 CAPSULE ORAL ONCE
Status: CANCELLED
Start: 2022-12-22 | End: 2022-12-22

## 2022-11-25 RX ORDER — ACETAMINOPHEN 325 MG/1
650 TABLET ORAL ONCE
Status: COMPLETED | OUTPATIENT
Start: 2022-11-25 | End: 2022-11-25

## 2022-11-25 RX ADMIN — Medication 25 MG: at 08:29

## 2022-11-25 RX ADMIN — ACETAMINOPHEN 650 MG: 325 TABLET ORAL at 08:30

## 2022-11-25 RX ADMIN — Medication 250 ML: at 08:25

## 2022-11-25 NOTE — PROGRESS NOTES
Infusion Nursing Note:  Janice SIDNEY Taimargot Mayfield presents today for Rapid Remicade.    Patient seen by provider today: No   present during visit today: Not Applicable.    Note: Pt expressed no new medical concerns. Tolerating infusions well.    Intravenous Access:  Peripheral IV placed.    Treatment Conditions:  Biological Infusion Checklist:  ~~~ NOTE: If the patient answers yes to any of the questions below, hold the infusion and contact ordering provider or on-call provider.    1. Have you recently had an elevated temperature, fever, chills, productive cough, coughing for 3 weeks or longer or hemoptysis, abnormal vital signs, night sweats,  chest pain or have you noticed a decrease in your appetite, unexplained weight loss or fatigue? No  2. Do you have any open wounds or new incisions? No  3. Do you have any recent or upcoming hospitalizations, surgeries or dental procedures? No  4. Do you currently have or recently have had any signs of illness or infection or are you on any antibiotics? No  5. Have you had any new, sudden or worsening abdominal pain? No  6. Have you or anyone in your household received a live vaccination in the past 4 weeks? Please note:  No live vaccines while on biologic/chemotherapy until 6 months after the last treatment.  Patient can receive the flu vaccine (shot only) and the pneumovax.  It is optimal for the patient to get these vaccines mid cycle, but they can be given at any time as long as it is not on the day of the infusion. No  7. Have you recently been diagnosed with any new nervous system diseases (ie. Multiple sclerosis, Guillain [place], seizures, neurological changes) or cancer diagnosis? No  8. Are you on any form of radiation or chemotherapy? No  9. Have there been any other new onset medical symptoms? No      Post Infusion Assessment:  Patient tolerated infusion without incident.  Blood return noted pre and post infusion.  Site patent and intact, free from  redness, edema or discomfort.  No evidence of extravasations.  Access discontinued per protocol.  Biologic Infusion Post Education: Call the triage nurse at your clinic or seek medical attention if you have chills and/or temperature greater than or equal to 100.5, uncontrolled nausea/vomiting, diarrhea, constipation, dizziness, shortness of breath, chest pain, heart palpitations, weakness or any other new or concerning symptoms, questions or concerns.  You cannot have any live virus vaccines prior to or during treatment or up to 6 months post infusion.  If you have an upcoming surgery, medical procedure or dental procedure during treatment, this should be discussed with your ordering physician and your surgeon/dentist.  If you are having any concerning symptom, if you are unsure if you should get your next infusion or wish to speak to a provider before your next infusion, please call your care coordinator or triage nurse at your clinic to notify them so we can adequately serve you.     Discharge Plan:   AVS to patient via [employer].  Patient will return 12/23/22 for next appointment.   Patient discharged in stable condition accompanied by: self.  Departure Mode: Ambulatory.      Ursula Kothari RN

## 2022-12-21 DIAGNOSIS — Z79.60 LONG-TERM USE OF IMMUNOSUPPRESSANT MEDICATION: ICD-10-CM

## 2022-12-21 DIAGNOSIS — M05.79 RHEUMATOID ARTHRITIS INVOLVING MULTIPLE SITES WITH POSITIVE RHEUMATOID FACTOR (H): ICD-10-CM

## 2022-12-21 DIAGNOSIS — M15.3 OTHER SECONDARY OSTEOARTHRITIS OF MULTIPLE SITES: ICD-10-CM

## 2022-12-21 DIAGNOSIS — Z79.52 LONG TERM CURRENT USE OF SYSTEMIC STEROIDS: ICD-10-CM

## 2022-12-21 DIAGNOSIS — E55.9 VITAMIN D DEFICIENCY: ICD-10-CM

## 2022-12-23 ENCOUNTER — INFUSION THERAPY VISIT (OUTPATIENT)
Dept: INFUSION THERAPY | Facility: CLINIC | Age: 62
End: 2022-12-23
Payer: COMMERCIAL

## 2022-12-23 VITALS
TEMPERATURE: 98.1 F | BODY MASS INDEX: 28.91 KG/M2 | OXYGEN SATURATION: 99 % | DIASTOLIC BLOOD PRESSURE: 75 MMHG | RESPIRATION RATE: 18 BRPM | WEIGHT: 168.4 LBS | HEART RATE: 64 BPM | SYSTOLIC BLOOD PRESSURE: 121 MMHG

## 2022-12-23 DIAGNOSIS — M05.79 RHEUMATOID ARTHRITIS INVOLVING MULTIPLE SITES WITH POSITIVE RHEUMATOID FACTOR (H): Primary | ICD-10-CM

## 2022-12-23 PROCEDURE — 96413 CHEMO IV INFUSION 1 HR: CPT | Performed by: INTERNAL MEDICINE

## 2022-12-23 PROCEDURE — 99207 PR NO CHARGE LOS: CPT

## 2022-12-23 RX ORDER — HEPARIN SODIUM,PORCINE 10 UNIT/ML
5 VIAL (ML) INTRAVENOUS
Status: CANCELLED | OUTPATIENT
Start: 2023-01-20

## 2022-12-23 RX ORDER — ACETAMINOPHEN 325 MG/1
650 TABLET ORAL ONCE
Status: COMPLETED | OUTPATIENT
Start: 2022-12-23 | End: 2022-12-23

## 2022-12-23 RX ORDER — ACETAMINOPHEN 325 MG/1
650 TABLET ORAL ONCE
Status: CANCELLED
Start: 2023-01-20 | End: 2023-01-20

## 2022-12-23 RX ORDER — DIPHENHYDRAMINE HCL 25 MG
25 CAPSULE ORAL ONCE
Status: CANCELLED
Start: 2023-01-20 | End: 2023-01-20

## 2022-12-23 RX ORDER — DIPHENHYDRAMINE HCL 25 MG
25 CAPSULE ORAL ONCE
Status: COMPLETED | OUTPATIENT
Start: 2022-12-23 | End: 2022-12-23

## 2022-12-23 RX ORDER — METHYLPREDNISOLONE SODIUM SUCCINATE 125 MG/2ML
60 INJECTION, POWDER, LYOPHILIZED, FOR SOLUTION INTRAMUSCULAR; INTRAVENOUS ONCE
Status: CANCELLED
Start: 2023-01-20 | End: 2023-01-20

## 2022-12-23 RX ORDER — HEPARIN SODIUM (PORCINE) LOCK FLUSH IV SOLN 100 UNIT/ML 100 UNIT/ML
5 SOLUTION INTRAVENOUS
Status: CANCELLED | OUTPATIENT
Start: 2023-01-20

## 2022-12-23 RX ADMIN — Medication 25 MG: at 08:34

## 2022-12-23 RX ADMIN — Medication 250 ML: at 08:31

## 2022-12-23 RX ADMIN — ACETAMINOPHEN 650 MG: 325 TABLET ORAL at 08:34

## 2022-12-23 NOTE — PROGRESS NOTES
Infusion Nursing Note:  Janice SIDNEY Taimargot Mayfield presents today for Rapid Remicade.    Patient seen by provider today: No   present during visit today: Not Applicable.    Note: Patient expressed no new medical concerns.    Intravenous Access:  Peripheral IV placed.    Treatment Conditions:  Biological Infusion Checklist:  ~~~ NOTE: If the patient answers yes to any of the questions below, hold the infusion and contact ordering provider or on-call provider.    1. Have you recently had an elevated temperature, fever, chills, productive cough, coughing for 3 weeks or longer or hemoptysis, abnormal vital signs, night sweats,  chest pain or have you noticed a decrease in your appetite, unexplained weight loss or fatigue? No  2. Do you have any open wounds or new incisions? No  3. Do you have any recent or upcoming hospitalizations, surgeries or dental procedures? No  4. Do you currently have or recently have had any signs of illness or infection or are you on any antibiotics? No  5. Have you had any new, sudden or worsening abdominal pain? No  6. Have you or anyone in your household received a live vaccination in the past 4 weeks? Please note:  No live vaccines while on biologic/chemotherapy until 6 months after the last treatment.  Patient can receive the flu vaccine (shot only) and the pneumovax.  It is optimal for the patient to get these vaccines mid cycle, but they can be given at any time as long as it is not on the day of the infusion. No  7. Have you recently been diagnosed with any new nervous system diseases (ie. Multiple sclerosis, Guillain [place], seizures, neurological changes) or cancer diagnosis? No  8. Are you on any form of radiation or chemotherapy? No  9. Have there been any other new onset medical symptoms? No      Post Infusion Assessment:  Patient tolerated infusion without incident.  Blood return noted pre and post infusion.  Site patent and intact, free from redness, edema or  discomfort.  No evidence of extravasations.  Access discontinued per protocol.  Biologic Infusion Post Education: Call the triage nurse at your clinic or seek medical attention if you have chills and/or temperature greater than or equal to 100.5, uncontrolled nausea/vomiting, diarrhea, constipation, dizziness, shortness of breath, chest pain, heart palpitations, weakness or any other new or concerning symptoms, questions or concerns.  You cannot have any live virus vaccines prior to or during treatment or up to 6 months post infusion.  If you have an upcoming surgery, medical procedure or dental procedure during treatment, this should be discussed with your ordering physician and your surgeon/dentist.  If you are having any concerning symptom, if you are unsure if you should get your next infusion or wish to speak to a provider before your next infusion, please call your care coordinator or triage nurse at your clinic to notify them so we can adequately serve you.     Discharge Plan:   AVS to patient via [employer].  Patient will return 1/19/23 for next appointment.   Patient discharged in stable condition accompanied by: self.  Departure Mode: Ambulatory.      Ursula Kothari RN

## 2022-12-24 NOTE — TELEPHONE ENCOUNTER
methotrexate 2.5 MG tablet  Last Written Prescription Date:   9/22/2022-12/21/2022  Last Fill Quantity: 48,   # refills: 0  Last Office Visit:  9/22/2022  Future Office visit:   1/26/2023    CBC RESULTS: Recent Labs   Lab Test 11/25/22  0745   WBC 5.9   RBC 4.38   HGB 12.8   HCT 40.8   MCV 93   MCH 29.2   MCHC 31.4*   RDW 14.6          Creatinine   Date Value Ref Range Status   11/25/2022 0.69 0.52 - 1.04 mg/dL Final   06/07/2021 0.86 0.52 - 1.04 mg/dL Final   ]    Liver Function Studies -   Recent Labs   Lab Test 11/25/22  0745   PROTTOTAL 7.0   ALBUMIN 3.3*   BILITOTAL 0.4   ALKPHOS 79   AST 17   ALT 25       Routing refill request to provider for review/approval because:  Drug not on the [facility] or [facility] refill protocol or controlled substance      Shanique Nina RN  Central Triage Red Flags/Med Refills

## 2023-01-19 ENCOUNTER — LAB (OUTPATIENT)
Dept: LAB | Facility: CLINIC | Age: 63
End: 2023-01-19
Payer: COMMERCIAL

## 2023-01-19 ENCOUNTER — INFUSION THERAPY VISIT (OUTPATIENT)
Dept: INFUSION THERAPY | Facility: CLINIC | Age: 63
End: 2023-01-19
Payer: COMMERCIAL

## 2023-01-19 VITALS
RESPIRATION RATE: 16 BRPM | SYSTOLIC BLOOD PRESSURE: 140 MMHG | DIASTOLIC BLOOD PRESSURE: 82 MMHG | TEMPERATURE: 98.7 F | WEIGHT: 166 LBS | HEART RATE: 80 BPM | BODY MASS INDEX: 28.49 KG/M2 | OXYGEN SATURATION: 97 %

## 2023-01-19 DIAGNOSIS — M05.79 RHEUMATOID ARTHRITIS INVOLVING MULTIPLE SITES WITH POSITIVE RHEUMATOID FACTOR (H): ICD-10-CM

## 2023-01-19 DIAGNOSIS — Z79.899 HIGH RISK MEDICATION USE: ICD-10-CM

## 2023-01-19 DIAGNOSIS — M05.79 RHEUMATOID ARTHRITIS INVOLVING MULTIPLE SITES WITH POSITIVE RHEUMATOID FACTOR (H): Primary | ICD-10-CM

## 2023-01-19 LAB
ALBUMIN SERPL-MCNC: 3.3 G/DL (ref 3.4–5)
ALP SERPL-CCNC: 88 U/L (ref 40–150)
ALT SERPL W P-5'-P-CCNC: 42 U/L (ref 0–50)
ANION GAP SERPL CALCULATED.3IONS-SCNC: 7 MMOL/L (ref 3–14)
AST SERPL W P-5'-P-CCNC: 19 U/L (ref 0–45)
BASOPHILS # BLD AUTO: 0 10E3/UL (ref 0–0.2)
BASOPHILS NFR BLD AUTO: 0 %
BILIRUB SERPL-MCNC: 0.5 MG/DL (ref 0.2–1.3)
BUN SERPL-MCNC: 17 MG/DL (ref 7–30)
CALCIUM SERPL-MCNC: 9.5 MG/DL (ref 8.5–10.1)
CHLORIDE BLD-SCNC: 106 MMOL/L (ref 94–109)
CO2 SERPL-SCNC: 24 MMOL/L (ref 20–32)
CREAT SERPL-MCNC: 0.59 MG/DL (ref 0.52–1.04)
CRP SERPL-MCNC: 6.8 MG/L (ref 0–8)
EOSINOPHIL # BLD AUTO: 0 10E3/UL (ref 0–0.7)
EOSINOPHIL NFR BLD AUTO: 0 %
ERYTHROCYTE [DISTWIDTH] IN BLOOD BY AUTOMATED COUNT: 12.5 % (ref 10–15)
ERYTHROCYTE [SEDIMENTATION RATE] IN BLOOD BY WESTERGREN METHOD: 55 MM/HR (ref 0–30)
GFR SERPL CREATININE-BSD FRML MDRD: >90 ML/MIN/1.73M2
GLUCOSE BLD-MCNC: 153 MG/DL (ref 70–99)
HCT VFR BLD AUTO: 37.7 % (ref 35–47)
HGB BLD-MCNC: 12.2 G/DL (ref 11.7–15.7)
HOLD SPECIMEN: NORMAL
IMM GRANULOCYTES # BLD: 0 10E3/UL
IMM GRANULOCYTES NFR BLD: 0 %
LYMPHOCYTES # BLD AUTO: 0.6 10E3/UL (ref 0.8–5.3)
LYMPHOCYTES NFR BLD AUTO: 7 %
MCH RBC QN AUTO: 29.1 PG (ref 26.5–33)
MCHC RBC AUTO-ENTMCNC: 32.4 G/DL (ref 31.5–36.5)
MCV RBC AUTO: 90 FL (ref 78–100)
MONOCYTES # BLD AUTO: 0.3 10E3/UL (ref 0–1.3)
MONOCYTES NFR BLD AUTO: 3 %
NEUTROPHILS # BLD AUTO: 7.5 10E3/UL (ref 1.6–8.3)
NEUTROPHILS NFR BLD AUTO: 90 %
NRBC # BLD AUTO: 0 10E3/UL
NRBC BLD AUTO-RTO: 0 /100
PLATELET # BLD AUTO: 381 10E3/UL (ref 150–450)
POTASSIUM BLD-SCNC: 3.9 MMOL/L (ref 3.4–5.3)
PROT SERPL-MCNC: 8.6 G/DL (ref 6.8–8.8)
RBC # BLD AUTO: 4.19 10E6/UL (ref 3.8–5.2)
SODIUM SERPL-SCNC: 137 MMOL/L (ref 133–144)
WBC # BLD AUTO: 8.4 10E3/UL (ref 4–11)

## 2023-01-19 PROCEDURE — 86140 C-REACTIVE PROTEIN: CPT

## 2023-01-19 PROCEDURE — 80053 COMPREHEN METABOLIC PANEL: CPT

## 2023-01-19 PROCEDURE — 85025 COMPLETE CBC W/AUTO DIFF WBC: CPT

## 2023-01-19 PROCEDURE — 36415 COLL VENOUS BLD VENIPUNCTURE: CPT

## 2023-01-19 PROCEDURE — 85652 RBC SED RATE AUTOMATED: CPT

## 2023-01-19 PROCEDURE — 99207 PR NO CHARGE LOS: CPT

## 2023-01-19 PROCEDURE — 96413 CHEMO IV INFUSION 1 HR: CPT | Performed by: NURSE PRACTITIONER

## 2023-01-19 RX ORDER — DIPHENHYDRAMINE HCL 25 MG
25 CAPSULE ORAL ONCE
Status: CANCELLED
Start: 2023-01-20 | End: 2023-01-20

## 2023-01-19 RX ORDER — ACETAMINOPHEN 325 MG/1
650 TABLET ORAL ONCE
Status: CANCELLED
Start: 2023-01-20 | End: 2023-01-20

## 2023-01-19 RX ORDER — HEPARIN SODIUM,PORCINE 10 UNIT/ML
5 VIAL (ML) INTRAVENOUS
Status: CANCELLED | OUTPATIENT
Start: 2023-01-20

## 2023-01-19 RX ORDER — DIPHENHYDRAMINE HCL 25 MG
25 CAPSULE ORAL ONCE
Status: COMPLETED | OUTPATIENT
Start: 2023-01-19 | End: 2023-01-19

## 2023-01-19 RX ORDER — HEPARIN SODIUM (PORCINE) LOCK FLUSH IV SOLN 100 UNIT/ML 100 UNIT/ML
5 SOLUTION INTRAVENOUS
Status: CANCELLED | OUTPATIENT
Start: 2023-01-20

## 2023-01-19 RX ORDER — ACETAMINOPHEN 325 MG/1
650 TABLET ORAL ONCE
Status: COMPLETED | OUTPATIENT
Start: 2023-01-19 | End: 2023-01-19

## 2023-01-19 RX ORDER — METHYLPREDNISOLONE SODIUM SUCCINATE 125 MG/2ML
60 INJECTION, POWDER, LYOPHILIZED, FOR SOLUTION INTRAMUSCULAR; INTRAVENOUS ONCE
Status: CANCELLED
Start: 2023-01-20 | End: 2023-01-20

## 2023-01-19 RX ADMIN — Medication 25 MG: at 09:02

## 2023-01-19 RX ADMIN — Medication 250 ML: at 09:17

## 2023-01-19 RX ADMIN — ACETAMINOPHEN 650 MG: 325 TABLET ORAL at 09:02

## 2023-01-19 NOTE — PROGRESS NOTES
Infusion Nursing Note:  Janice Curranjuno Mayfield presents today for Remicade infusion.    Patient seen by provider today: No   present during visit today: Not Applicable.    Note: N/A.    Intravenous Access:  Peripheral IV placed.    Treatment Conditions:  Biological Infusion Checklist:  ~~~ NOTE: If the patient answers yes to any of the questions below, hold the infusion and contact ordering provider or on-call provider.    1. Have you recently had an elevated temperature, fever, chills, productive cough, coughing for 3 weeks or longer or hemoptysis, abnormal vital signs, night sweats,  chest pain or have you noticed a decrease in your appetite, unexplained weight loss or fatigue? No  2. Do you have any open wounds or new incisions? No  3. Do you have any recent or upcoming hospitalizations, surgeries or dental procedures? No  4. Do you currently have or recently have had any signs of illness or infection or are you on any antibiotics? No  5. Have you had any new, sudden or worsening abdominal pain? No  6. Have you or anyone in your household received a live vaccination in the past 4 weeks? Please note:  No live vaccines while on biologic/chemotherapy until 6 months after the last treatment.  Patient can receive the flu vaccine (shot only) and the pneumovax.  It is optimal for the patient to get these vaccines mid cycle, but they can be given at any time as long as it is not on the day of the infusion. No  7. Have you recently been diagnosed with any new nervous system diseases (ie. Multiple sclerosis, Guillain [place], seizures, neurological changes) or cancer diagnosis? No  8. Are you on any form of radiation or chemotherapy? No  9. Are you pregnant or breast feeding or do you have plans of pregnancy in the future? No  10. Have there been any other new onset medical symptoms? No      Post Infusion Assessment:  Patient tolerated infusion without incident.  Blood return noted pre and post infusion.  Site  patent and intact, free from redness, edema or discomfort.  No evidence of extravasations.  Access discontinued per protocol.     Discharge Plan:   AVS to patient via MYCHART.  Patient will return 2/16/23 for next appointment.   Patient discharged in stable condition accompanied by: self. Son is  today.  Departure Mode: Ambulatory.      Marianna Thompson RN

## 2023-02-16 ENCOUNTER — INFUSION THERAPY VISIT (OUTPATIENT)
Dept: INFUSION THERAPY | Facility: CLINIC | Age: 63
End: 2023-02-16
Payer: COMMERCIAL

## 2023-02-16 VITALS
TEMPERATURE: 97.9 F | HEART RATE: 69 BPM | BODY MASS INDEX: 29.04 KG/M2 | DIASTOLIC BLOOD PRESSURE: 85 MMHG | WEIGHT: 169.2 LBS | RESPIRATION RATE: 16 BRPM | SYSTOLIC BLOOD PRESSURE: 128 MMHG | OXYGEN SATURATION: 99 %

## 2023-02-16 DIAGNOSIS — M05.79 RHEUMATOID ARTHRITIS INVOLVING MULTIPLE SITES WITH POSITIVE RHEUMATOID FACTOR (H): Primary | ICD-10-CM

## 2023-02-16 PROCEDURE — 99207 PR NO CHARGE LOS: CPT

## 2023-02-16 PROCEDURE — 96413 CHEMO IV INFUSION 1 HR: CPT | Performed by: INTERNAL MEDICINE

## 2023-02-16 RX ORDER — HEPARIN SODIUM (PORCINE) LOCK FLUSH IV SOLN 100 UNIT/ML 100 UNIT/ML
5 SOLUTION INTRAVENOUS
Status: CANCELLED | OUTPATIENT
Start: 2023-02-17

## 2023-02-16 RX ORDER — DIPHENHYDRAMINE HCL 25 MG
25 CAPSULE ORAL ONCE
Status: COMPLETED | OUTPATIENT
Start: 2023-02-16 | End: 2023-02-16

## 2023-02-16 RX ORDER — HEPARIN SODIUM,PORCINE 10 UNIT/ML
5 VIAL (ML) INTRAVENOUS
Status: CANCELLED | OUTPATIENT
Start: 2023-02-17

## 2023-02-16 RX ORDER — METHYLPREDNISOLONE SODIUM SUCCINATE 125 MG/2ML
60 INJECTION, POWDER, LYOPHILIZED, FOR SOLUTION INTRAMUSCULAR; INTRAVENOUS ONCE
Status: CANCELLED
Start: 2023-02-17 | End: 2023-02-17

## 2023-02-16 RX ORDER — ACETAMINOPHEN 325 MG/1
650 TABLET ORAL ONCE
Status: CANCELLED
Start: 2023-02-17 | End: 2023-02-17

## 2023-02-16 RX ORDER — DIPHENHYDRAMINE HCL 25 MG
25 CAPSULE ORAL ONCE
Status: CANCELLED
Start: 2023-02-17 | End: 2023-02-17

## 2023-02-16 RX ORDER — ACETAMINOPHEN 325 MG/1
650 TABLET ORAL ONCE
Status: COMPLETED | OUTPATIENT
Start: 2023-02-16 | End: 2023-02-16

## 2023-02-16 RX ADMIN — ACETAMINOPHEN 650 MG: 325 TABLET ORAL at 08:37

## 2023-02-16 RX ADMIN — Medication 25 MG: at 08:37

## 2023-02-16 RX ADMIN — Medication 250 ML: at 08:37

## 2023-02-16 ASSESSMENT — PAIN SCALES - GENERAL: PAINLEVEL: MILD PAIN (2)

## 2023-02-16 NOTE — PROGRESS NOTES
Infusion Nursing Note:  Janice SIDNEY Taimargot Mayfield presents today for Rapid Remicade.    Patient seen by provider today: No   present during visit today: Not Applicable.    Note: No new medical concerns.    Intravenous Access:  Peripheral IV placed    Treatment Conditions:  Biological Infusion Checklist:  ~~~ NOTE: If the patient answers yes to any of the questions below, hold the infusion and contact ordering provider or on-call provider.    1. Have you recently had an elevated temperature, fever, chills, productive cough, coughing for 3 weeks or longer or hemoptysis, abnormal vital signs, night sweats,  chest pain or have you noticed a decrease in your appetite, unexplained weight loss or fatigue? No  2. Do you have any open wounds or new incisions? No  3. Do you have any recent or upcoming hospitalizations, surgeries or dental procedures? No  4. Do you currently have or recently have had any signs of illness or infection or are you on any antibiotics? No  5. Have you had any new, sudden or worsening abdominal pain? No  6. Have you or anyone in your household received a live vaccination in the past 4 weeks? Please note:  No live vaccines while on biologic/chemotherapy until 6 months after the last treatment.  Patient can receive the flu vaccine (shot only) and the pneumovax.  It is optimal for the patient to get these vaccines mid cycle, but they can be given at any time as long as it is not on the day of the infusion. No  7. Have you recently been diagnosed with any new nervous system diseases (ie. Multiple sclerosis, Guillain [place], seizures, neurological changes) or cancer diagnosis? No  8. Are you on any form of radiation or chemotherapy? No  9. Have there been any other new onset medical symptoms? No    Post Infusion Assessment:  Patient tolerated infusion without incident.  Site patent and intact, free from redness, edema or discomfort.  No evidence of extravasations.  Access discontinued per  protocol.  Biologic Infusion Post Education: Call the triage nurse at your clinic or seek medical attention if you have chills and/or temperature greater than or equal to 100.5, uncontrolled nausea/vomiting, diarrhea, constipation, dizziness, shortness of breath, chest pain, heart palpitations, weakness or any other new or concerning symptoms, questions or concerns.  You cannot have any live virus vaccines prior to or during treatment or up to 6 months post infusion.  If you have an upcoming surgery, medical procedure or dental procedure during treatment, this should be discussed with your ordering physician and your surgeon/dentist.  If you are having any concerning symptom, if you are unsure if you should get your next infusion or wish to speak to a provider before your next infusion, please call your care coordinator or triage nurse at your clinic to notify them so we can adequately serve you.     Discharge Plan:   Discharge instructions reviewed with: Patient.  Patient and/or family verbalized understanding of discharge instructions and all questions answered.  Patient discharged in stable condition accompanied by: self.  Departure Mode: Ambulatory.      Kimberley Nix RN

## 2023-02-18 DIAGNOSIS — M05.79 RHEUMATOID ARTHRITIS INVOLVING MULTIPLE SITES WITH POSITIVE RHEUMATOID FACTOR (H): ICD-10-CM

## 2023-02-21 RX ORDER — PREDNISONE 5 MG/1
5 TABLET ORAL DAILY
Qty: 90 TABLET | Refills: 0 | Status: SHIPPED | OUTPATIENT
Start: 2023-02-21 | End: 2023-05-30

## 2023-02-21 NOTE — TELEPHONE ENCOUNTER
Medication/Dose: predniSONE (DELTASONE) 5 MG tablet     Last Written : 10/1/22  Last Quantity: 30, # refills: 3  Last Office Visit :  9/22/22  Pending appointment: None on file     Prescription set up and routed to provider, Unsure of duration for this medication.    VIKAS SextonN, RN  [facility] Refill Team

## 2023-03-16 ENCOUNTER — INFUSION THERAPY VISIT (OUTPATIENT)
Dept: INFUSION THERAPY | Facility: CLINIC | Age: 63
End: 2023-03-16
Payer: COMMERCIAL

## 2023-03-16 VITALS
HEART RATE: 71 BPM | RESPIRATION RATE: 14 BRPM | BODY MASS INDEX: 28.7 KG/M2 | WEIGHT: 167.2 LBS | SYSTOLIC BLOOD PRESSURE: 124 MMHG | OXYGEN SATURATION: 99 % | DIASTOLIC BLOOD PRESSURE: 84 MMHG | TEMPERATURE: 98.9 F

## 2023-03-16 DIAGNOSIS — M05.79 RHEUMATOID ARTHRITIS INVOLVING MULTIPLE SITES WITH POSITIVE RHEUMATOID FACTOR (H): Primary | ICD-10-CM

## 2023-03-16 PROCEDURE — 96413 CHEMO IV INFUSION 1 HR: CPT | Performed by: NURSE PRACTITIONER

## 2023-03-16 PROCEDURE — 99207 PR NO CHARGE LOS: CPT

## 2023-03-16 RX ORDER — METHYLPREDNISOLONE SODIUM SUCCINATE 125 MG/2ML
60 INJECTION, POWDER, LYOPHILIZED, FOR SOLUTION INTRAMUSCULAR; INTRAVENOUS ONCE
Status: CANCELLED
Start: 2023-03-17 | End: 2023-03-17

## 2023-03-16 RX ORDER — DIPHENHYDRAMINE HCL 25 MG
25 CAPSULE ORAL ONCE
Status: CANCELLED
Start: 2023-03-17 | End: 2023-03-17

## 2023-03-16 RX ORDER — HEPARIN SODIUM,PORCINE 10 UNIT/ML
5 VIAL (ML) INTRAVENOUS
Status: CANCELLED | OUTPATIENT
Start: 2023-03-17

## 2023-03-16 RX ORDER — ACETAMINOPHEN 325 MG/1
650 TABLET ORAL ONCE
Status: CANCELLED
Start: 2023-03-17 | End: 2023-03-17

## 2023-03-16 RX ORDER — DIPHENHYDRAMINE HCL 25 MG
25 CAPSULE ORAL ONCE
Status: COMPLETED | OUTPATIENT
Start: 2023-03-16 | End: 2023-03-16

## 2023-03-16 RX ORDER — ACETAMINOPHEN 325 MG/1
650 TABLET ORAL ONCE
Status: COMPLETED | OUTPATIENT
Start: 2023-03-16 | End: 2023-03-16

## 2023-03-16 RX ORDER — HEPARIN SODIUM (PORCINE) LOCK FLUSH IV SOLN 100 UNIT/ML 100 UNIT/ML
5 SOLUTION INTRAVENOUS
Status: CANCELLED | OUTPATIENT
Start: 2023-03-17

## 2023-03-16 RX ADMIN — Medication 250 ML: at 09:05

## 2023-03-16 RX ADMIN — Medication 25 MG: at 09:08

## 2023-03-16 RX ADMIN — ACETAMINOPHEN 650 MG: 325 TABLET ORAL at 09:08

## 2023-03-16 NOTE — PROGRESS NOTES
Infusion Nursing Note:  Janice Mayfield presents today for Remicade infusion.    Patient seen by provider today: No   present during visit today: Not Applicable.    Note: Patient states she has had a rough month with pain due to frequent weather changes.    Intravenous Access:  Peripheral IV placed.    Treatment Conditions:  Biological Infusion Checklist:  ~~~ NOTE: If the patient answers yes to any of the questions below, hold the infusion and contact ordering provider or on-call provider.    1. Have you recently had an elevated temperature, fever, chills, productive cough, coughing for 3 weeks or longer or hemoptysis, abnormal vital signs, night sweats,  chest pain or have you noticed a decrease in your appetite, unexplained weight loss or fatigue? No  2. Do you have any open wounds or new incisions? No  3. Do you have any recent or upcoming hospitalizations, surgeries or dental procedures? No  4. Do you currently have or recently have had any signs of illness or infection or are you on any antibiotics? No  5. Have you had any new, sudden or worsening abdominal pain? No  6. Have you or anyone in your household received a live vaccination in the past 4 weeks? Please note:  No live vaccines while on biologic/chemotherapy until 6 months after the last treatment.  Patient can receive the flu vaccine (shot only) and the pneumovax.  It is optimal for the patient to get these vaccines mid cycle, but they can be given at any time as long as it is not on the day of the infusion. No  7. Have you recently been diagnosed with any new nervous system diseases (ie. Multiple sclerosis, Guillain [place], seizures, neurological changes) or cancer diagnosis? No  8. Are you on any form of radiation or chemotherapy? No.  9. Are you pregnant or breast feeding or do you have plans of pregnancy in the future? No  10. Have there been any other new onset medical symptoms? No    Post Infusion Assessment:  Patient  tolerated infusion without incident.  Blood return noted pre and post infusion.  Site patent and intact, free from redness, edema or discomfort.  No evidence of extravasations.  Access discontinued per protocol.     Discharge Plan:   AVS to patient via MYCHART.  Patient will return 4/13/23 with labs for next appointment.   Patient discharged in stable condition accompanied by: self.  Departure Mode: Ambulatory.      Marianna Thompson RN

## 2023-03-27 DIAGNOSIS — M25.512 CHRONIC LEFT SHOULDER PAIN: ICD-10-CM

## 2023-03-27 DIAGNOSIS — Z79.52 LONG TERM CURRENT USE OF SYSTEMIC STEROIDS: ICD-10-CM

## 2023-03-27 DIAGNOSIS — E55.9 VITAMIN D DEFICIENCY: ICD-10-CM

## 2023-03-27 DIAGNOSIS — M05.79 RHEUMATOID ARTHRITIS INVOLVING MULTIPLE SITES WITH POSITIVE RHEUMATOID FACTOR (H): ICD-10-CM

## 2023-03-27 DIAGNOSIS — Z79.60 LONG-TERM USE OF IMMUNOSUPPRESSANT MEDICATION: ICD-10-CM

## 2023-03-27 DIAGNOSIS — M15.3 OTHER SECONDARY OSTEOARTHRITIS OF MULTIPLE SITES: ICD-10-CM

## 2023-03-27 DIAGNOSIS — G89.29 CHRONIC LEFT SHOULDER PAIN: ICD-10-CM

## 2023-03-28 NOTE — TELEPHONE ENCOUNTER
folic acid (FOLVITE) 1 MG tablet    Vitamin Supplements (Adult) Protocol Passed     9/22/2022  [facility] [place]   Gilson Penny MD  Rheumatology         methotrexate 2.5 MG tablet     Last Written Prescription Date:  12-  Last Fill Quantity: 48,   # refills: 0  Last Office Visit: 09-  Future Office visit:  07-    CBC RESULTS: Recent Labs   Lab Test 01/19/23  0832   WBC 8.4   RBC 4.19   HGB 12.2   HCT 37.7   MCV 90   MCH 29.1   MCHC 32.4   RDW 12.5          Creatinine   Date Value Ref Range Status   01/19/2023 0.59 0.52 - 1.04 mg/dL Final   06/07/2021 0.86 0.52 - 1.04 mg/dL Final   ]    Liver Function Studies -   Recent Labs   Lab Test 01/19/23  0832   PROTTOTAL 8.6   ALBUMIN 3.3*   BILITOTAL 0.5   ALKPHOS 88   AST 19   ALT 42

## 2023-03-29 RX ORDER — FOLIC ACID 1 MG/1
2 TABLET ORAL DAILY
Qty: 180 TABLET | Refills: 3 | Status: SHIPPED | OUTPATIENT
Start: 2023-03-29 | End: 2023-08-24

## 2023-04-13 ENCOUNTER — INFUSION THERAPY VISIT (OUTPATIENT)
Dept: INFUSION THERAPY | Facility: CLINIC | Age: 63
End: 2023-04-13
Payer: COMMERCIAL

## 2023-04-13 ENCOUNTER — LAB (OUTPATIENT)
Dept: LAB | Facility: CLINIC | Age: 63
End: 2023-04-13
Payer: COMMERCIAL

## 2023-04-13 VITALS
RESPIRATION RATE: 16 BRPM | DIASTOLIC BLOOD PRESSURE: 79 MMHG | SYSTOLIC BLOOD PRESSURE: 125 MMHG | HEART RATE: 71 BPM | BODY MASS INDEX: 28.36 KG/M2 | WEIGHT: 165.2 LBS | OXYGEN SATURATION: 100 % | TEMPERATURE: 98.2 F

## 2023-04-13 DIAGNOSIS — M05.79 RHEUMATOID ARTHRITIS INVOLVING MULTIPLE SITES WITH POSITIVE RHEUMATOID FACTOR (H): Primary | ICD-10-CM

## 2023-04-13 DIAGNOSIS — Z79.899 HIGH RISK MEDICATION USE: ICD-10-CM

## 2023-04-13 DIAGNOSIS — M05.79 RHEUMATOID ARTHRITIS INVOLVING MULTIPLE SITES WITH POSITIVE RHEUMATOID FACTOR (H): ICD-10-CM

## 2023-04-13 LAB
ALBUMIN SERPL-MCNC: 3 G/DL (ref 3.4–5)
ALP SERPL-CCNC: 94 U/L (ref 40–150)
ALT SERPL W P-5'-P-CCNC: 22 U/L (ref 0–50)
ANION GAP SERPL CALCULATED.3IONS-SCNC: 2 MMOL/L (ref 3–14)
AST SERPL W P-5'-P-CCNC: 15 U/L (ref 0–45)
BASOPHILS # BLD AUTO: 0 10E3/UL (ref 0–0.2)
BASOPHILS NFR BLD AUTO: 0 %
BILIRUB SERPL-MCNC: 0.3 MG/DL (ref 0.2–1.3)
BUN SERPL-MCNC: 13 MG/DL (ref 7–30)
CALCIUM SERPL-MCNC: 8.5 MG/DL (ref 8.5–10.1)
CHLORIDE BLD-SCNC: 109 MMOL/L (ref 94–109)
CO2 SERPL-SCNC: 27 MMOL/L (ref 20–32)
CREAT SERPL-MCNC: 0.65 MG/DL (ref 0.52–1.04)
CRP SERPL-MCNC: <2.9 MG/L (ref 0–8)
EOSINOPHIL # BLD AUTO: 0 10E3/UL (ref 0–0.7)
EOSINOPHIL NFR BLD AUTO: 0 %
ERYTHROCYTE [DISTWIDTH] IN BLOOD BY AUTOMATED COUNT: 14.1 % (ref 10–15)
ERYTHROCYTE [SEDIMENTATION RATE] IN BLOOD BY WESTERGREN METHOD: 56 MM/HR (ref 0–30)
GFR SERPL CREATININE-BSD FRML MDRD: >90 ML/MIN/1.73M2
GLUCOSE BLD-MCNC: 81 MG/DL (ref 70–99)
HCT VFR BLD AUTO: 37.5 % (ref 35–47)
HGB BLD-MCNC: 11.8 G/DL (ref 11.7–15.7)
HOLD SPECIMEN: NORMAL
IMM GRANULOCYTES # BLD: 0 10E3/UL
IMM GRANULOCYTES NFR BLD: 0 %
LYMPHOCYTES # BLD AUTO: 2.4 10E3/UL (ref 0.8–5.3)
LYMPHOCYTES NFR BLD AUTO: 40 %
MCH RBC QN AUTO: 28.2 PG (ref 26.5–33)
MCHC RBC AUTO-ENTMCNC: 31.5 G/DL (ref 31.5–36.5)
MCV RBC AUTO: 90 FL (ref 78–100)
MONOCYTES # BLD AUTO: 0.8 10E3/UL (ref 0–1.3)
MONOCYTES NFR BLD AUTO: 14 %
NEUTROPHILS # BLD AUTO: 2.7 10E3/UL (ref 1.6–8.3)
NEUTROPHILS NFR BLD AUTO: 46 %
NRBC # BLD AUTO: 0 10E3/UL
NRBC BLD AUTO-RTO: 0 /100
PLATELET # BLD AUTO: 360 10E3/UL (ref 150–450)
POTASSIUM BLD-SCNC: 3.9 MMOL/L (ref 3.4–5.3)
PROT SERPL-MCNC: 7.6 G/DL (ref 6.8–8.8)
RBC # BLD AUTO: 4.19 10E6/UL (ref 3.8–5.2)
SODIUM SERPL-SCNC: 138 MMOL/L (ref 133–144)
WBC # BLD AUTO: 6 10E3/UL (ref 4–11)

## 2023-04-13 PROCEDURE — 80053 COMPREHEN METABOLIC PANEL: CPT

## 2023-04-13 PROCEDURE — 85652 RBC SED RATE AUTOMATED: CPT

## 2023-04-13 PROCEDURE — 36415 COLL VENOUS BLD VENIPUNCTURE: CPT

## 2023-04-13 PROCEDURE — 86140 C-REACTIVE PROTEIN: CPT

## 2023-04-13 PROCEDURE — 99207 PR NO CHARGE LOS: CPT

## 2023-04-13 PROCEDURE — 85025 COMPLETE CBC W/AUTO DIFF WBC: CPT

## 2023-04-13 PROCEDURE — 96413 CHEMO IV INFUSION 1 HR: CPT | Performed by: NURSE PRACTITIONER

## 2023-04-13 RX ORDER — DIPHENHYDRAMINE HCL 25 MG
25 CAPSULE ORAL ONCE
Status: COMPLETED | OUTPATIENT
Start: 2023-04-13 | End: 2023-04-13

## 2023-04-13 RX ORDER — METHYLPREDNISOLONE SODIUM SUCCINATE 125 MG/2ML
60 INJECTION, POWDER, LYOPHILIZED, FOR SOLUTION INTRAMUSCULAR; INTRAVENOUS ONCE
Status: CANCELLED
Start: 2023-04-14 | End: 2023-04-14

## 2023-04-13 RX ORDER — ACETAMINOPHEN 325 MG/1
650 TABLET ORAL ONCE
Status: COMPLETED | OUTPATIENT
Start: 2023-04-13 | End: 2023-04-13

## 2023-04-13 RX ORDER — HEPARIN SODIUM,PORCINE 10 UNIT/ML
5 VIAL (ML) INTRAVENOUS
Status: CANCELLED | OUTPATIENT
Start: 2023-04-14

## 2023-04-13 RX ORDER — DIPHENHYDRAMINE HCL 25 MG
25 CAPSULE ORAL ONCE
Status: CANCELLED
Start: 2023-04-14 | End: 2023-04-14

## 2023-04-13 RX ORDER — HEPARIN SODIUM (PORCINE) LOCK FLUSH IV SOLN 100 UNIT/ML 100 UNIT/ML
5 SOLUTION INTRAVENOUS
Status: CANCELLED | OUTPATIENT
Start: 2023-04-14

## 2023-04-13 RX ORDER — ACETAMINOPHEN 325 MG/1
650 TABLET ORAL ONCE
Status: CANCELLED
Start: 2023-04-14 | End: 2023-04-14

## 2023-04-13 RX ADMIN — Medication 250 ML: at 08:37

## 2023-04-13 RX ADMIN — Medication 25 MG: at 08:36

## 2023-04-13 RX ADMIN — ACETAMINOPHEN 650 MG: 325 TABLET ORAL at 08:35

## 2023-04-13 NOTE — PROGRESS NOTES
Infusion Nursing Note:  Janice Mayfield presents today for Remicade.    Patient seen by provider today: No   present during visit today: Not Applicable.    Note: Waking with sweats thinks related to weather changes. To help with her joint and bone pain, she has acupuncture this week, water therapy coming up and a Cortisone shot next week in knee to help with this pain.  `     Intravenous Access:  Peripheral IV placed.    Treatment Conditions:  Biological Infusion Checklist:  ~~~ NOTE: If the patient answers yes to any of the questions below, hold the infusion and contact ordering provider or on-call provider.    1. Have you recently had an elevated temperature, fever, chills, productive cough, coughing for 3 weeks or longer or hemoptysis, abnormal vital signs, night sweats,  chest pain or have you noticed a decrease in your appetite, unexplained weight loss or fatigue? No  2. Do you have any open wounds or new incisions? No  3. Do you have any recent or upcoming hospitalizations, surgeries or dental procedures? No  4. Do you currently have or recently have had any signs of illness or infection or are you on any antibiotics? No  5. Have you had any new, sudden or worsening abdominal pain? No  6. Have you or anyone in your household received a live vaccination in the past 4 weeks? Please note:  No live vaccines while on biologic/chemotherapy until 6 months after the last treatment.  Patient can receive the flu vaccine (shot only) and the pneumovax.  It is optimal for the patient to get these vaccines mid cycle, but they can be given at any time as long as it is not on the day of the infusion. No  7. Have you recently been diagnosed with any new nervous system diseases (ie. Multiple sclerosis, Guillain [place], seizures, neurological changes) or cancer diagnosis? No  8. Are you on any form of radiation or chemotherapy? No  9. Are you pregnant or breast feeding or do you have plans of pregnancy in the  future? No  10. Have you been having any signs of worsening depression or suicidal ideations?  (benlysta only) No  11. Have there been any other new onset medical symptoms? No        Post Infusion Assessment:  Patient tolerated infusion without incident.  Site patent and intact, free from redness, edema or discomfort.  No evidence of extravasations.  Access discontinued per protocol.  Biologic Infusion Post Education: Call the triage nurse at your clinic or seek medical attention if you have chills and/or temperature greater than or equal to 100.5, uncontrolled nausea/vomiting, diarrhea, constipation, dizziness, shortness of breath, chest pain, heart palpitations, weakness or any other new or concerning symptoms, questions or concerns.  You cannot have any live virus vaccines prior to or during treatment or up to 6 months post infusion.  If you have an upcoming surgery, medical procedure or dental procedure during treatment, this should be discussed with your ordering physician and your surgeon/dentist.  If you are having any concerning symptom, if you are unsure if you should get your next infusion or wish to speak to a provider before your next infusion, please call your care coordinator or triage nurse at your clinic to notify them so we can adequately serve you.       Discharge Plan:   AVS to patient via [employer].  Patient will return 5/10 for next appointment.   Patient discharged in stable condition accompanied by: self.  Departure Mode: Ambulatory.      Dulce Maria Clement RN

## 2023-04-20 ENCOUNTER — OFFICE VISIT (OUTPATIENT)
Dept: PSYCHIATRY | Facility: CLINIC | Age: 63
End: 2023-04-20
Payer: COMMERCIAL

## 2023-04-20 DIAGNOSIS — M05.79 RHEUMATOID ARTHRITIS INVOLVING MULTIPLE SITES WITH POSITIVE RHEUMATOID FACTOR (H): Primary | Chronic | ICD-10-CM

## 2023-04-20 DIAGNOSIS — M15.3 OTHER SECONDARY OSTEOARTHRITIS OF MULTIPLE SITES: ICD-10-CM

## 2023-04-20 DIAGNOSIS — I83.93 VARICOSE VEINS OF BOTH LOWER EXTREMITIES, UNSPECIFIED WHETHER COMPLICATED: ICD-10-CM

## 2023-04-20 DIAGNOSIS — Z79.52 LONG TERM CURRENT USE OF SYSTEMIC STEROIDS: ICD-10-CM

## 2023-04-20 DIAGNOSIS — Z79.60 LONG-TERM USE OF IMMUNOSUPPRESSANT MEDICATION: ICD-10-CM

## 2023-04-20 NOTE — PROGRESS NOTES
Ms. Melly Mayfield, is a 62 year old female is here today for initial acupuncture treatment for main complaint of Knee Pain, Wrist Pain, Arthritis, and Hip Pain         Patient is referred by: Self    Session 1 (2023)    Subjective: Patient experiencing multi joint arthritic pain.  Hx of rheumatoid and osteo arthritis.  Pain is most often located at right wrist, hip, and knee.  Today pain is rated at 2-3/10.  No recent flares in arthritis.  cortisone injection to right knee recently, right wrist 7 months ago.  Currently on methotrexate and infusion.  Tolerating well.   Acupuncture has been beneficial in the past.     Energy: Medium  Sleep: No concerns  Emotions: No  Limbs/Back: Refer to Notes  Head/Eyes/Ears: NA  Digestion: no current symptoms  Stools: No Current Symptoms  Palpitation: NA   Women's Health: NA    ROS:   CON: Denies weight loss, fever and chills.  HEENT: no current complaints or significant history.  Denies changes in vision and hearing.  Very mild right ear tinnitus noted by patient.  Not concerning or impactful.  RESP: COVID 2 x, non-symptomaticno current complaints or significant history.  Denies SOB and cough.  CV: no current complaints or significant history.  Denies palpitations and CP.  No pacemaker, bleeding disorders, anticoagulant use.  GI: no current complaints or significant history.  Denies abdominal pain, nausea, vomiting and diarrhea.  : no current complaints or significant history.  Denies dysuria and urinary frequency.  MSK: see note  SKIN: no current complaints or significant history.  Denies rash and pruritus.  NEURO: no current complaints or significant history.  Denies headache and syncope.  PSYCH: no current complaints or significant history.  Denies recent changes in mood. Denies anxiety and depression.  REPRO:  no current complaints or significant history.  Post menopausal    Previous acu experience:  yes      Past Medical History:   has a past medical history of Lichen  sclerosus et atrophicus of the vulva (11/21/2018), Long term current use of systemic steroids (5/3/2017), Long-term use of immunosuppressant medication (5/11/2018), Other secondary osteoarthritis of multiple sites (5/11/2018), Rheumatoid arthritis involving multiple sites with positive rheumatoid factor (H) (4/28/2017), and Vitamin D deficiency (5/3/2017).      Objective: Right knee visibly swollen, generally 2 x larger than left knee.  General: Well appearing, well nourished, in no distress. Oriented X3, intact recent and remote memory, judgment and insight, normal mood and affect.   Pulse: Moderate, soft in front, mid and rear weaker, deeper      Eastern Medical Diagnosis Pertinent to Treatment:  Qi Deficiency, Blood Stagnation, Kidney and Liver Qi Deficiency and Damp and Phlegm Accumulation    Treatment Principle: tonify and strengthen Qi, strengthen KI, soothe LR, move qi and blood    Points Used Today:   DU 20, LI 4, LR 3, KI 3, SP 6, Ear lymbic, point zero, right ear wrist, hip, knee, DJ rear 2, scalp sensory upper 4/5 left x 5    At 25 minutes, needles inserted and stimulated to de'qi, and retained for 15 minutes.  At 40 minutes needles are reinserted or stimulated as needed and retained additional 15 minutes, then removed at 55 minutes.  CNT followed.  DBC 0.18 x 15 and 0.18 x 30 needles    Total needles inserted: 23 Total needles removed: 23      Accessory Technique's:   TDP Heat Lamp: On Feet    Additional Treatment Notes:  Pulses checked and balanced at needle insertion, at 40 minutes, and at removal.  TDP temperature checked at 40 minutes and at removal.        Acupuncture Treatment Recommendations and Comments:   It is my recommendation that patient seek advice from their PCP about active symptoms not addressed during this visit.      Plan:   Patient agrees to treatment plan: acu and supportive treatment Q 1-2 weeks until symptoms and patterns managed, then as indicated for health maintenance.      Time  Spent with Patient:       I spent a total of 38 minutes face-to-face with Janice Mayfield during today's office visit.  Over 50% of this time was spent counseling the patient and/or coordinating care regarding   Chief Complaint   Patient presents with     Knee Pain     Wrist Pain     Arthritis     Hip Pain    .  See note for details.    Suze Farley LAc

## 2023-05-04 ENCOUNTER — TELEPHONE (OUTPATIENT)
Dept: RHEUMATOLOGY | Facility: CLINIC | Age: 63
End: 2023-05-04
Payer: COMMERCIAL

## 2023-05-04 NOTE — TELEPHONE ENCOUNTER
----- Message from Kenzie Marc CMA sent at 5/4/2023 11:10 AM CDT -----  Regarding: FW: need new remicade order    ----- Message -----  From: Marianna Anderson  Sent: 5/4/2023  10:52 AM CDT  To: [facility] [place]; #  Subject: need new remicade order                          Janice has an infusion appointment for remicade scheduled for 5/10/23. We need new orders entered, if you wish for her to continue remicade.    Thanks  Marianna ANDERSON  Oncology Pharmacy Coordinator   [place]

## 2023-05-04 NOTE — TELEPHONE ENCOUNTER
Plan entered and routed to provider for review/sign. Patient receives at the [facility] infusion [facility].    Melida Bhatt RN  Rheumatology Clinic

## 2023-05-05 RX ORDER — ACETAMINOPHEN 325 MG/1
650 TABLET ORAL ONCE
Status: CANCELLED
Start: 2023-05-12 | End: 2023-05-12

## 2023-05-05 RX ORDER — METHYLPREDNISOLONE SODIUM SUCCINATE 125 MG/2ML
125 INJECTION, POWDER, LYOPHILIZED, FOR SOLUTION INTRAMUSCULAR; INTRAVENOUS ONCE
Status: CANCELLED | OUTPATIENT
Start: 2023-05-12 | End: 2023-05-12

## 2023-05-05 RX ORDER — DIPHENHYDRAMINE HCL 25 MG
25 CAPSULE ORAL ONCE
Status: CANCELLED
Start: 2023-05-12 | End: 2023-05-12

## 2023-05-05 RX ORDER — MEPERIDINE HYDROCHLORIDE 25 MG/ML
25 INJECTION INTRAMUSCULAR; INTRAVENOUS; SUBCUTANEOUS EVERY 30 MIN PRN
Status: CANCELLED | OUTPATIENT
Start: 2023-05-12

## 2023-05-05 RX ORDER — HEPARIN SODIUM,PORCINE 10 UNIT/ML
5 VIAL (ML) INTRAVENOUS
Status: CANCELLED | OUTPATIENT
Start: 2023-05-12

## 2023-05-05 RX ORDER — ALBUTEROL SULFATE 0.83 MG/ML
2.5 SOLUTION RESPIRATORY (INHALATION)
Status: CANCELLED | OUTPATIENT
Start: 2023-05-12

## 2023-05-05 RX ORDER — ALBUTEROL SULFATE 90 UG/1
1-2 AEROSOL, METERED RESPIRATORY (INHALATION)
Status: CANCELLED
Start: 2023-05-12

## 2023-05-05 RX ORDER — EPINEPHRINE 1 MG/ML
0.3 INJECTION, SOLUTION, CONCENTRATE INTRAVENOUS EVERY 5 MIN PRN
Status: CANCELLED | OUTPATIENT
Start: 2023-05-12

## 2023-05-05 RX ORDER — METHYLPREDNISOLONE SODIUM SUCCINATE 125 MG/2ML
125 INJECTION, POWDER, LYOPHILIZED, FOR SOLUTION INTRAMUSCULAR; INTRAVENOUS
Status: CANCELLED
Start: 2023-05-12

## 2023-05-05 RX ORDER — HEPARIN SODIUM (PORCINE) LOCK FLUSH IV SOLN 100 UNIT/ML 100 UNIT/ML
5 SOLUTION INTRAVENOUS
Status: CANCELLED | OUTPATIENT
Start: 2023-05-12

## 2023-05-05 RX ORDER — DIPHENHYDRAMINE HYDROCHLORIDE 50 MG/ML
50 INJECTION INTRAMUSCULAR; INTRAVENOUS
Status: CANCELLED
Start: 2023-05-12

## 2023-05-05 NOTE — TELEPHONE ENCOUNTER
Infusion plan signed by on call provider, updated infusion pharmacist, and patient.    Melida Bhatt RN  Rheumatology Clinic

## 2023-05-10 ENCOUNTER — TELEPHONE (OUTPATIENT)
Dept: ONCOLOGY | Facility: CLINIC | Age: 63
End: 2023-05-10

## 2023-05-11 ENCOUNTER — INFUSION THERAPY VISIT (OUTPATIENT)
Dept: INFUSION THERAPY | Facility: CLINIC | Age: 63
End: 2023-05-11
Payer: COMMERCIAL

## 2023-05-11 VITALS
RESPIRATION RATE: 18 BRPM | WEIGHT: 167.5 LBS | OXYGEN SATURATION: 99 % | HEART RATE: 69 BPM | SYSTOLIC BLOOD PRESSURE: 113 MMHG | DIASTOLIC BLOOD PRESSURE: 76 MMHG | BODY MASS INDEX: 28.75 KG/M2 | TEMPERATURE: 97.7 F

## 2023-05-11 DIAGNOSIS — M05.79 RHEUMATOID ARTHRITIS INVOLVING MULTIPLE SITES WITH POSITIVE RHEUMATOID FACTOR (H): Primary | ICD-10-CM

## 2023-05-11 PROCEDURE — 99207 PR NO CHARGE LOS: CPT

## 2023-05-11 PROCEDURE — 96413 CHEMO IV INFUSION 1 HR: CPT | Performed by: NURSE PRACTITIONER

## 2023-05-11 RX ORDER — ALBUTEROL SULFATE 90 UG/1
1-2 AEROSOL, METERED RESPIRATORY (INHALATION)
Status: CANCELLED
Start: 2023-06-08

## 2023-05-11 RX ORDER — METHYLPREDNISOLONE SODIUM SUCCINATE 125 MG/2ML
125 INJECTION, POWDER, LYOPHILIZED, FOR SOLUTION INTRAMUSCULAR; INTRAVENOUS
Status: CANCELLED
Start: 2023-06-08

## 2023-05-11 RX ORDER — DIPHENHYDRAMINE HYDROCHLORIDE 50 MG/ML
50 INJECTION INTRAMUSCULAR; INTRAVENOUS
Status: CANCELLED
Start: 2023-06-08

## 2023-05-11 RX ORDER — ACETAMINOPHEN 325 MG/1
650 TABLET ORAL ONCE
Status: CANCELLED
Start: 2023-06-08 | End: 2023-06-08

## 2023-05-11 RX ORDER — METHYLPREDNISOLONE SODIUM SUCCINATE 125 MG/2ML
125 INJECTION, POWDER, LYOPHILIZED, FOR SOLUTION INTRAMUSCULAR; INTRAVENOUS ONCE
Status: CANCELLED | OUTPATIENT
Start: 2023-06-08 | End: 2023-06-08

## 2023-05-11 RX ORDER — HEPARIN SODIUM,PORCINE 10 UNIT/ML
5 VIAL (ML) INTRAVENOUS
Status: CANCELLED | OUTPATIENT
Start: 2023-06-08

## 2023-05-11 RX ORDER — DIPHENHYDRAMINE HCL 25 MG
25 CAPSULE ORAL ONCE
Status: CANCELLED
Start: 2023-06-08 | End: 2023-06-08

## 2023-05-11 RX ORDER — ACETAMINOPHEN 325 MG/1
650 TABLET ORAL ONCE
Status: COMPLETED | OUTPATIENT
Start: 2023-05-11 | End: 2023-05-11

## 2023-05-11 RX ORDER — DIPHENHYDRAMINE HCL 25 MG
25 CAPSULE ORAL ONCE
Status: COMPLETED | OUTPATIENT
Start: 2023-05-11 | End: 2023-05-11

## 2023-05-11 RX ORDER — ALBUTEROL SULFATE 0.83 MG/ML
2.5 SOLUTION RESPIRATORY (INHALATION)
Status: CANCELLED | OUTPATIENT
Start: 2023-06-08

## 2023-05-11 RX ORDER — MEPERIDINE HYDROCHLORIDE 25 MG/ML
25 INJECTION INTRAMUSCULAR; INTRAVENOUS; SUBCUTANEOUS EVERY 30 MIN PRN
Status: CANCELLED | OUTPATIENT
Start: 2023-06-08

## 2023-05-11 RX ORDER — EPINEPHRINE 1 MG/ML
0.3 INJECTION, SOLUTION INTRAMUSCULAR; SUBCUTANEOUS EVERY 5 MIN PRN
Status: CANCELLED | OUTPATIENT
Start: 2023-06-08

## 2023-05-11 RX ORDER — HEPARIN SODIUM (PORCINE) LOCK FLUSH IV SOLN 100 UNIT/ML 100 UNIT/ML
5 SOLUTION INTRAVENOUS
Status: CANCELLED | OUTPATIENT
Start: 2023-06-08

## 2023-05-11 RX ADMIN — Medication 250 ML: at 09:17

## 2023-05-11 RX ADMIN — ACETAMINOPHEN 650 MG: 325 TABLET ORAL at 08:56

## 2023-05-11 RX ADMIN — Medication 25 MG: at 08:56

## 2023-05-11 NOTE — PROGRESS NOTES
Infusion Nursing Note:  Janice LITTLE Zairemargot Mayfield presents today for Rapid Remicade.    Patient seen by provider today: No   present during visit today: Not Applicable.    Note: Patient expressed no new medical concerns.      Intravenous Access:  Peripheral IV placed.    Treatment Conditions:  ~~~ NOTE: If the patient answers yes to any of the questions below, hold the infusion and contact ordering provider or on-call provider.    1. Have you recently had an elevated temperature, fever, chills, productive cough, coughing for 3 weeks or longer or hemoptysis,  abnormal vital signs, night sweats,  chest pain or have you noticed a decrease in your appetite, unexplained weight loss or fatigue? No  2. Do you have any open wounds or new incisions? No  3. Do you have any upcoming hospitalizations or surgeries? Does not include esophagogastroduodenoscopy, colonoscopy, endoscopic retrograde cholangiopancreatography (ERCP), endoscopic ultrasound (EUS), dental procedures or joint aspiration/steroid injections No  4. Do you currently have any signs of illness or infection or are you on any antibiotics? No  5. Have you had any new, sudden or worsening abdominal pain? No  6. Have you or anyone in your household received a live vaccination in the past 4 weeks? Please note: No live vaccines while on biologic/chemotherapy until 6 months after the last treatment. Patient can receive the flu vaccine (shot only), pneumovax and the Covid vaccine. It is optimal for the patient to get these vaccines mid cycle, but they can be given at any time as long as it is not on the day of the infusion. No  7. Have you recently been diagnosed with any new nervous system diseases (ie. Multiple sclerosis, Guillain [place], seizures, neurological changes) or cancer diagnosis? Are you on any form of radiation or chemotherapy? No  8. Have there been any other new onset medical symptoms? No      Post Infusion Assessment:  Patient tolerated infusion  without incident.  Blood return noted pre and post infusion.  Site patent and intact, free from redness, edema or discomfort.  No evidence of extravasations.  Access discontinued per protocol.  Biologic Infusion Post Education: Call the triage nurse at your clinic or seek medical attention if you have chills and/or temperature greater than or equal to 100.5, uncontrolled nausea/vomiting, diarrhea, constipation, dizziness, shortness of breath, chest pain, heart palpitations, weakness or any other new or concerning symptoms, questions or concerns.  You cannot have any live virus vaccines prior to or during treatment or up to 6 months post infusion.  If you have an upcoming surgery, medical procedure or dental procedure during treatment, this should be discussed with your ordering physician and your surgeon/dentist.  If you are having any concerning symptom, if you are unsure if you should get your next infusion or wish to speak to a provider before your next infusion, please call your care coordinator or triage nurse at your clinic to notify them so we can adequately serve you.       Discharge Plan:   AVS to patient via [employer].  Patient will return 6/8/23 for next appointment.   Patient discharged in stable condition accompanied by: self.  Departure Mode: Ambulatory.      Ursula Kothari RN

## 2023-05-11 NOTE — PROGRESS NOTES
SPIRITUAL HEALTH SERVICES Progress Note  [facility] - Infusion    Referral: Visit for emotional support. I introduced myself and  Services.    Janice shared reflections about coping with chronic illness, acceptance of her limitations and losses, and appreciation of seasons, both in the wheel of the year and in times of life.    She told of spending time with her 92 year old father and that she'll soon be taking care of an infant. 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    She also considered how a daughter has had life-threatening illness and they have a connection over the depth of meaning that brings to them both.    Visit ended for cares.    I offered reflective listening, affirmed experiences, and invited discernment.    SH remains available.    Rev Yenni Hernández  [facility] Phone Line [phone]  [facility] (Tuesdays & Thursdays) [phone]

## 2023-05-30 ENCOUNTER — MYC MEDICAL ADVICE (OUTPATIENT)
Dept: RHEUMATOLOGY | Facility: CLINIC | Age: 63
End: 2023-05-30
Payer: COMMERCIAL

## 2023-05-30 ENCOUNTER — MYC REFILL (OUTPATIENT)
Dept: RHEUMATOLOGY | Facility: CLINIC | Age: 63
End: 2023-05-30
Payer: COMMERCIAL

## 2023-05-30 DIAGNOSIS — M05.79 RHEUMATOID ARTHRITIS INVOLVING MULTIPLE SITES WITH POSITIVE RHEUMATOID FACTOR (H): ICD-10-CM

## 2023-05-30 RX ORDER — PREDNISONE 5 MG/1
5 TABLET ORAL DAILY
Qty: 90 TABLET | Refills: 0 | Status: SHIPPED | OUTPATIENT
Start: 2023-05-30 | End: 2023-08-24

## 2023-05-30 NOTE — TELEPHONE ENCOUNTER
predniSONE (DELTASONE) 5 MG tablet  Last Written Prescription Date:  2/21/23  Last Fill Quantity: 90   # refills: 0  Last Office Visit :9/22/22  Future Office visit:  7/5/23

## 2023-06-23 DIAGNOSIS — Z79.60 LONG-TERM USE OF IMMUNOSUPPRESSANT MEDICATION: ICD-10-CM

## 2023-06-23 DIAGNOSIS — M15.3 OTHER SECONDARY OSTEOARTHRITIS OF MULTIPLE SITES: ICD-10-CM

## 2023-06-23 DIAGNOSIS — Z79.52 LONG TERM CURRENT USE OF SYSTEMIC STEROIDS: ICD-10-CM

## 2023-06-23 DIAGNOSIS — M05.79 RHEUMATOID ARTHRITIS INVOLVING MULTIPLE SITES WITH POSITIVE RHEUMATOID FACTOR (H): ICD-10-CM

## 2023-06-23 DIAGNOSIS — E55.9 VITAMIN D DEFICIENCY: ICD-10-CM

## 2023-06-23 NOTE — TELEPHONE ENCOUNTER
methotrexate 2.5 MG tablet      Last Written Prescription Date:  3/29/23  Last Fill Quantity: 48,   # refills: 0  Last Office Visit: 9/22/22  Future Office visit:  7/5/23    CBC RESULTS: Recent Labs   Lab Test 04/13/23  0751   WBC 6.0   RBC 4.19   HGB 11.8   HCT 37.5   MCV 90   MCH 28.2   MCHC 31.5   RDW 14.1          Creatinine   Date Value Ref Range Status   04/13/2023 0.65 0.52 - 1.04 mg/dL Final   06/07/2021 0.86 0.52 - 1.04 mg/dL Final       Liver Function Studies -   Recent Labs   Lab Test 04/13/23  0751   PROTTOTAL 7.6   ALBUMIN 3.0*   BILITOTAL 0.3   ALKPHOS 94   AST 15   ALT 22       Routing refill request to provider for review/approval because:  DMARD - requires provider review/auth

## 2023-06-26 ENCOUNTER — INFUSION THERAPY VISIT (OUTPATIENT)
Dept: INFUSION THERAPY | Facility: CLINIC | Age: 63
End: 2023-06-26
Payer: COMMERCIAL

## 2023-06-26 ENCOUNTER — LAB (OUTPATIENT)
Dept: LAB | Facility: CLINIC | Age: 63
End: 2023-06-26
Payer: COMMERCIAL

## 2023-06-26 VITALS
DIASTOLIC BLOOD PRESSURE: 57 MMHG | HEART RATE: 77 BPM | BODY MASS INDEX: 28.36 KG/M2 | WEIGHT: 165.2 LBS | RESPIRATION RATE: 16 BRPM | SYSTOLIC BLOOD PRESSURE: 107 MMHG | OXYGEN SATURATION: 99 % | TEMPERATURE: 98.3 F

## 2023-06-26 DIAGNOSIS — M05.79 RHEUMATOID ARTHRITIS INVOLVING MULTIPLE SITES WITH POSITIVE RHEUMATOID FACTOR (H): Primary | ICD-10-CM

## 2023-06-26 DIAGNOSIS — Z79.899 HIGH RISK MEDICATION USE: ICD-10-CM

## 2023-06-26 DIAGNOSIS — M05.79 RHEUMATOID ARTHRITIS INVOLVING MULTIPLE SITES WITH POSITIVE RHEUMATOID FACTOR (H): ICD-10-CM

## 2023-06-26 LAB
ALBUMIN SERPL BCG-MCNC: 3.6 G/DL (ref 3.5–5.2)
ALP SERPL-CCNC: 82 U/L (ref 35–104)
ALT SERPL W P-5'-P-CCNC: 23 U/L (ref 0–50)
ANION GAP SERPL CALCULATED.3IONS-SCNC: 10 MMOL/L (ref 7–15)
AST SERPL W P-5'-P-CCNC: 24 U/L (ref 0–45)
BASOPHILS # BLD AUTO: 0 10E3/UL (ref 0–0.2)
BASOPHILS NFR BLD AUTO: 0 %
BILIRUB SERPL-MCNC: 0.2 MG/DL
BUN SERPL-MCNC: 13.5 MG/DL (ref 8–23)
CALCIUM SERPL-MCNC: 9 MG/DL (ref 8.8–10.2)
CHLORIDE SERPL-SCNC: 104 MMOL/L (ref 98–107)
CREAT SERPL-MCNC: 0.85 MG/DL (ref 0.51–0.95)
CRP SERPL-MCNC: 13.4 MG/L
DEPRECATED HCO3 PLAS-SCNC: 24 MMOL/L (ref 22–29)
EOSINOPHIL # BLD AUTO: 0 10E3/UL (ref 0–0.7)
EOSINOPHIL NFR BLD AUTO: 0 %
ERYTHROCYTE [DISTWIDTH] IN BLOOD BY AUTOMATED COUNT: 13.2 % (ref 10–15)
ERYTHROCYTE [SEDIMENTATION RATE] IN BLOOD BY WESTERGREN METHOD: 65 MM/HR (ref 0–30)
GFR SERPL CREATININE-BSD FRML MDRD: 78 ML/MIN/1.73M2
GLUCOSE SERPL-MCNC: 128 MG/DL (ref 70–99)
HCT VFR BLD AUTO: 35.2 % (ref 35–47)
HGB BLD-MCNC: 11.1 G/DL (ref 11.7–15.7)
IMM GRANULOCYTES # BLD: 0 10E3/UL
IMM GRANULOCYTES NFR BLD: 0 %
LYMPHOCYTES # BLD AUTO: 2.2 10E3/UL (ref 0.8–5.3)
LYMPHOCYTES NFR BLD AUTO: 28 %
MCH RBC QN AUTO: 27.3 PG (ref 26.5–33)
MCHC RBC AUTO-ENTMCNC: 31.5 G/DL (ref 31.5–36.5)
MCV RBC AUTO: 87 FL (ref 78–100)
MONOCYTES # BLD AUTO: 0.8 10E3/UL (ref 0–1.3)
MONOCYTES NFR BLD AUTO: 10 %
NEUTROPHILS # BLD AUTO: 4.9 10E3/UL (ref 1.6–8.3)
NEUTROPHILS NFR BLD AUTO: 62 %
NRBC # BLD AUTO: 0 10E3/UL
NRBC BLD AUTO-RTO: 0 /100
PLATELET # BLD AUTO: 366 10E3/UL (ref 150–450)
POTASSIUM SERPL-SCNC: 3.8 MMOL/L (ref 3.4–5.3)
PROT SERPL-MCNC: 7.6 G/DL (ref 6.4–8.3)
RBC # BLD AUTO: 4.07 10E6/UL (ref 3.8–5.2)
SODIUM SERPL-SCNC: 138 MMOL/L (ref 136–145)
WBC # BLD AUTO: 7.9 10E3/UL (ref 4–11)

## 2023-06-26 PROCEDURE — 80053 COMPREHEN METABOLIC PANEL: CPT

## 2023-06-26 PROCEDURE — 85652 RBC SED RATE AUTOMATED: CPT

## 2023-06-26 PROCEDURE — 85025 COMPLETE CBC W/AUTO DIFF WBC: CPT

## 2023-06-26 PROCEDURE — 36415 COLL VENOUS BLD VENIPUNCTURE: CPT

## 2023-06-26 PROCEDURE — 99207 PR NO CHARGE LOS: CPT

## 2023-06-26 PROCEDURE — 86140 C-REACTIVE PROTEIN: CPT

## 2023-06-26 PROCEDURE — 96413 CHEMO IV INFUSION 1 HR: CPT | Performed by: NURSE PRACTITIONER

## 2023-06-26 RX ORDER — DIPHENHYDRAMINE HCL 25 MG
25 CAPSULE ORAL ONCE
Status: COMPLETED | OUTPATIENT
Start: 2023-06-26 | End: 2023-06-26

## 2023-06-26 RX ORDER — ALBUTEROL SULFATE 0.83 MG/ML
2.5 SOLUTION RESPIRATORY (INHALATION)
Status: CANCELLED | OUTPATIENT
Start: 2023-07-06

## 2023-06-26 RX ORDER — EPINEPHRINE 1 MG/ML
0.3 INJECTION, SOLUTION INTRAMUSCULAR; SUBCUTANEOUS EVERY 5 MIN PRN
Status: CANCELLED | OUTPATIENT
Start: 2023-07-06

## 2023-06-26 RX ORDER — METHYLPREDNISOLONE SODIUM SUCCINATE 125 MG/2ML
125 INJECTION, POWDER, LYOPHILIZED, FOR SOLUTION INTRAMUSCULAR; INTRAVENOUS ONCE
Status: CANCELLED | OUTPATIENT
Start: 2023-07-06 | End: 2023-07-06

## 2023-06-26 RX ORDER — HEPARIN SODIUM (PORCINE) LOCK FLUSH IV SOLN 100 UNIT/ML 100 UNIT/ML
5 SOLUTION INTRAVENOUS
Status: CANCELLED | OUTPATIENT
Start: 2023-07-06

## 2023-06-26 RX ORDER — METHYLPREDNISOLONE SODIUM SUCCINATE 125 MG/2ML
125 INJECTION, POWDER, LYOPHILIZED, FOR SOLUTION INTRAMUSCULAR; INTRAVENOUS
Status: CANCELLED
Start: 2023-07-06

## 2023-06-26 RX ORDER — ALBUTEROL SULFATE 90 UG/1
1-2 AEROSOL, METERED RESPIRATORY (INHALATION)
Status: CANCELLED
Start: 2023-07-06

## 2023-06-26 RX ORDER — MEPERIDINE HYDROCHLORIDE 25 MG/ML
25 INJECTION INTRAMUSCULAR; INTRAVENOUS; SUBCUTANEOUS EVERY 30 MIN PRN
Status: CANCELLED | OUTPATIENT
Start: 2023-07-06

## 2023-06-26 RX ORDER — DIPHENHYDRAMINE HYDROCHLORIDE 50 MG/ML
50 INJECTION INTRAMUSCULAR; INTRAVENOUS
Status: CANCELLED
Start: 2023-07-06

## 2023-06-26 RX ORDER — HEPARIN SODIUM,PORCINE 10 UNIT/ML
5 VIAL (ML) INTRAVENOUS
Status: CANCELLED | OUTPATIENT
Start: 2023-07-06

## 2023-06-26 RX ORDER — DIPHENHYDRAMINE HCL 25 MG
25 CAPSULE ORAL ONCE
Status: CANCELLED
Start: 2023-07-06 | End: 2023-07-06

## 2023-06-26 RX ORDER — ACETAMINOPHEN 325 MG/1
650 TABLET ORAL ONCE
Status: COMPLETED | OUTPATIENT
Start: 2023-06-26 | End: 2023-06-26

## 2023-06-26 RX ORDER — ACETAMINOPHEN 325 MG/1
650 TABLET ORAL ONCE
Status: CANCELLED
Start: 2023-07-06 | End: 2023-07-06

## 2023-06-26 RX ADMIN — Medication 250 ML: at 15:51

## 2023-06-26 RX ADMIN — ACETAMINOPHEN 650 MG: 325 TABLET ORAL at 15:54

## 2023-06-26 RX ADMIN — Medication 25 MG: at 15:54

## 2023-06-26 NOTE — PROGRESS NOTES
Infusion Nursing Note:  Janice Mayfield presents today for Rapid Remicade.    Patient seen by provider today: No   present during visit today: Not Applicable.    Note: Patient states her remicade infusion was delayed due to being on an antibiotic so her arthritis in her hands and wrists are 7/10 pain today. Antibiotic completed prior to infusion today.     Intravenous Access:  Peripheral IV placed.    Treatment Conditions:  Biological Infusion Checklist:  ~~~ NOTE: If the patient answers yes to any of the questions below, hold the infusion and contact ordering provider or on-call provider.    1. Have you recently had an elevated temperature, fever, chills, productive cough, coughing for 3 weeks or longer or hemoptysis,  abnormal vital signs, night sweats,  chest pain or have you noticed a decrease in your appetite, unexplained weight loss or fatigue? No  2. Do you have any open wounds or new incisions? No  3. Do you have any upcoming hospitalizations or surgeries? Does not include esophagogastroduodenoscopy, colonoscopy, endoscopic retrograde cholangiopancreatography (ERCP), endoscopic ultrasound (EUS), dental procedures or joint aspiration/steroid injections No  4. Do you currently have any signs of illness or infection or are you on any antibiotics? No  5. Have you had any new, sudden or worsening abdominal pain? No  6. Have you or anyone in your household received a live vaccination in the past 4 weeks? Please note: No live vaccines while on biologic/chemotherapy until 6 months after the last treatment. Patient can receive the flu vaccine (shot only), pneumovax and the Covid vaccine. It is optimal for the patient to get these vaccines mid cycle, but they can be given at any time as long as it is not on the day of the infusion. No  7. Have you recently been diagnosed with any new nervous system diseases (ie. Multiple sclerosis, Guillain [place], seizures, neurological changes) or cancer  diagnosis? Are you on any form of radiation or chemotherapy? No  8. Have there been any other new onset medical symptoms? No    Post Infusion Assessment:  Patient tolerated infusion without incident.  Site patent and intact, free from redness, edema or discomfort.  No evidence of extravasations.  Access discontinued per protocol.  Biologic Infusion Post Education: Call the triage nurse at your clinic or seek medical attention if you have chills and/or temperature greater than or equal to 100.5, uncontrolled nausea/vomiting, diarrhea, constipation, dizziness, shortness of breath, chest pain, heart palpitations, weakness or any other new or concerning symptoms, questions or concerns.  You cannot have any live virus vaccines prior to or during treatment or up to 6 months post infusion.  If you have an upcoming surgery, medical procedure or dental procedure during treatment, this should be discussed with your ordering physician and your surgeon/dentist.  If you are having any concerning symptom, if you are unsure if you should get your next infusion or wish to speak to a provider before your next infusion, please call your care coordinator or triage nurse at your clinic to notify them so we can adequately serve you.       Discharge Plan:   Discharge instructions reviewed with: Patient.  Patient and/or family verbalized understanding of discharge instructions and all questions answered.  Patient discharged in stable condition accompanied by: self.  Departure Mode: Ambulatory.      Kimberley Nix RN

## 2023-07-24 ENCOUNTER — INFUSION THERAPY VISIT (OUTPATIENT)
Dept: INFUSION THERAPY | Facility: CLINIC | Age: 63
End: 2023-07-24
Payer: COMMERCIAL

## 2023-07-24 VITALS
BODY MASS INDEX: 28.46 KG/M2 | HEART RATE: 80 BPM | SYSTOLIC BLOOD PRESSURE: 115 MMHG | OXYGEN SATURATION: 98 % | WEIGHT: 165.8 LBS | TEMPERATURE: 98 F | DIASTOLIC BLOOD PRESSURE: 65 MMHG | RESPIRATION RATE: 16 BRPM

## 2023-07-24 DIAGNOSIS — M05.79 RHEUMATOID ARTHRITIS INVOLVING MULTIPLE SITES WITH POSITIVE RHEUMATOID FACTOR (H): Primary | ICD-10-CM

## 2023-07-24 PROCEDURE — 96413 CHEMO IV INFUSION 1 HR: CPT | Performed by: NURSE PRACTITIONER

## 2023-07-24 RX ORDER — MEPERIDINE HYDROCHLORIDE 25 MG/ML
25 INJECTION INTRAMUSCULAR; INTRAVENOUS; SUBCUTANEOUS EVERY 30 MIN PRN
Status: CANCELLED | OUTPATIENT
Start: 2023-08-21

## 2023-07-24 RX ORDER — DIPHENHYDRAMINE HYDROCHLORIDE 50 MG/ML
50 INJECTION INTRAMUSCULAR; INTRAVENOUS
Status: CANCELLED
Start: 2023-08-21

## 2023-07-24 RX ORDER — ALBUTEROL SULFATE 90 UG/1
1-2 AEROSOL, METERED RESPIRATORY (INHALATION)
Status: CANCELLED
Start: 2023-08-21

## 2023-07-24 RX ORDER — ACETAMINOPHEN 325 MG/1
650 TABLET ORAL ONCE
Status: CANCELLED
Start: 2023-08-21 | End: 2023-08-21

## 2023-07-24 RX ORDER — EPINEPHRINE 1 MG/ML
0.3 INJECTION, SOLUTION INTRAMUSCULAR; SUBCUTANEOUS EVERY 5 MIN PRN
Status: CANCELLED | OUTPATIENT
Start: 2023-08-21

## 2023-07-24 RX ORDER — METHYLPREDNISOLONE SODIUM SUCCINATE 125 MG/2ML
125 INJECTION, POWDER, LYOPHILIZED, FOR SOLUTION INTRAMUSCULAR; INTRAVENOUS ONCE
Status: CANCELLED | OUTPATIENT
Start: 2023-08-21 | End: 2023-08-21

## 2023-07-24 RX ORDER — DIPHENHYDRAMINE HCL 25 MG
25 CAPSULE ORAL ONCE
Status: COMPLETED | OUTPATIENT
Start: 2023-07-24 | End: 2023-07-24

## 2023-07-24 RX ORDER — HEPARIN SODIUM (PORCINE) LOCK FLUSH IV SOLN 100 UNIT/ML 100 UNIT/ML
5 SOLUTION INTRAVENOUS
Status: CANCELLED | OUTPATIENT
Start: 2023-08-21

## 2023-07-24 RX ORDER — HEPARIN SODIUM,PORCINE 10 UNIT/ML
5 VIAL (ML) INTRAVENOUS
Status: CANCELLED | OUTPATIENT
Start: 2023-08-21

## 2023-07-24 RX ORDER — ALBUTEROL SULFATE 0.83 MG/ML
2.5 SOLUTION RESPIRATORY (INHALATION)
Status: CANCELLED | OUTPATIENT
Start: 2023-08-21

## 2023-07-24 RX ORDER — METHYLPREDNISOLONE SODIUM SUCCINATE 125 MG/2ML
125 INJECTION, POWDER, LYOPHILIZED, FOR SOLUTION INTRAMUSCULAR; INTRAVENOUS
Status: CANCELLED
Start: 2023-08-21

## 2023-07-24 RX ORDER — ACETAMINOPHEN 325 MG/1
650 TABLET ORAL ONCE
Status: COMPLETED | OUTPATIENT
Start: 2023-07-24 | End: 2023-07-24

## 2023-07-24 RX ORDER — DIPHENHYDRAMINE HCL 25 MG
25 CAPSULE ORAL ONCE
Status: CANCELLED
Start: 2023-08-21 | End: 2023-08-21

## 2023-07-24 RX ADMIN — Medication 25 MG: at 16:01

## 2023-07-24 RX ADMIN — Medication 250 ML: at 15:56

## 2023-07-24 RX ADMIN — ACETAMINOPHEN 650 MG: 325 TABLET ORAL at 16:01

## 2023-07-24 NOTE — PROGRESS NOTES
Infusion Nursing Note:  Janice LITTLE Zairemargot Mayfield presents today for remicade.    Patient seen by provider today: No   present during visit today: Not Applicable.    Note: Patient reports tolerating Remicade well in the past-no new medical concerns reported today.    Intravenous Access:  Peripheral IV placed.    Treatment Conditions:  Biological Infusion Checklist:  ~~~ NOTE: If the patient answers yes to any of the questions below, hold the infusion and contact ordering provider or on-call provider.    Have you recently had an elevated temperature, fever, chills, productive cough, coughing for 3 weeks or longer or hemoptysis,  abnormal vital signs, night sweats,  chest pain or have you noticed a decrease in your appetite, unexplained weight loss or fatigue? No  Do you have any open wounds or new incisions? No  Do you have any upcoming hospitalizations or surgeries? Does not include esophagogastroduodenoscopy, colonoscopy, endoscopic retrograde cholangiopancreatography (ERCP), endoscopic ultrasound (EUS), dental procedures or joint aspiration/steroid injections No  Do you currently have any signs of illness or infection or are you on any antibiotics? No  Have you had any new, sudden or worsening abdominal pain? No  Have you or anyone in your household received a live vaccination in the past 4 weeks? Please note: No live vaccines while on biologic/chemotherapy until 6 months after the last treatment. Patient can receive the flu vaccine (shot only), pneumovax and the Covid vaccine. It is optimal for the patient to get these vaccines mid cycle, but they can be given at any time as long as it is not on the day of the infusion. No  Have you recently been diagnosed with any new nervous system diseases (ie. Multiple sclerosis, Guillain [place], seizures, neurological changes) or cancer diagnosis? Are you on any form of radiation or chemotherapy? No  Are you pregnant or breast feeding or do you have plans of  pregnancy in the future? No  Have you been having any signs of worsening depression or suicidal ideations?  (benlysta only) No  Have there been any other new onset medical symptoms? No  Have you had any new blood clots? (IVIG only) No    Post Infusion Assessment:  Patient tolerated infusion without incident.  Site patent and intact, free from redness, edema or discomfort.  No evidence of extravasations.  Access discontinued per protocol.  Biologic Infusion Post Education: Call the triage nurse at your clinic or seek medical attention if you have chills and/or temperature greater than or equal to 100.5, uncontrolled nausea/vomiting, diarrhea, constipation, dizziness, shortness of breath, chest pain, heart palpitations, weakness or any other new or concerning symptoms, questions or concerns.  You cannot have any live virus vaccines prior to or during treatment or up to 6 months post infusion.  If you have an upcoming surgery, medical procedure or dental procedure during treatment, this should be discussed with your ordering physician and your surgeon/dentist.  If you are having any concerning symptom, if you are unsure if you should get your next infusion or wish to speak to a provider before your next infusion, please call your care coordinator or triage nurse at your clinic to notify them so we can adequately serve you.     Discharge Plan:   AVS to patient via [employer].  Patient will return in 4 weeks for next appointment. Patient directed to scheduling to make next appointment.  Patient discharged in stable condition accompanied by: self.  Departure Mode: Ambulatory.      Tatyana Peacock RN BSN OCN

## 2023-08-03 DIAGNOSIS — M05.79 RHEUMATOID ARTHRITIS INVOLVING MULTIPLE SITES WITH POSITIVE RHEUMATOID FACTOR (H): ICD-10-CM

## 2023-08-22 ENCOUNTER — LAB (OUTPATIENT)
Dept: LAB | Facility: CLINIC | Age: 63
End: 2023-08-22
Payer: COMMERCIAL

## 2023-08-22 ENCOUNTER — INFUSION THERAPY VISIT (OUTPATIENT)
Dept: INFUSION THERAPY | Facility: CLINIC | Age: 63
End: 2023-08-22
Payer: COMMERCIAL

## 2023-08-22 VITALS
WEIGHT: 165.3 LBS | SYSTOLIC BLOOD PRESSURE: 119 MMHG | DIASTOLIC BLOOD PRESSURE: 81 MMHG | BODY MASS INDEX: 28.37 KG/M2 | TEMPERATURE: 98 F | RESPIRATION RATE: 18 BRPM | HEART RATE: 85 BPM | OXYGEN SATURATION: 98 %

## 2023-08-22 DIAGNOSIS — M05.79 RHEUMATOID ARTHRITIS INVOLVING MULTIPLE SITES WITH POSITIVE RHEUMATOID FACTOR (H): Primary | ICD-10-CM

## 2023-08-22 DIAGNOSIS — Z79.899 HIGH RISK MEDICATION USE: ICD-10-CM

## 2023-08-22 DIAGNOSIS — M05.79 RHEUMATOID ARTHRITIS INVOLVING MULTIPLE SITES WITH POSITIVE RHEUMATOID FACTOR (H): ICD-10-CM

## 2023-08-22 LAB
ALBUMIN SERPL BCG-MCNC: 3.6 G/DL (ref 3.5–5.2)
ALP SERPL-CCNC: 88 U/L (ref 35–104)
ALT SERPL W P-5'-P-CCNC: 11 U/L (ref 0–50)
ANION GAP SERPL CALCULATED.3IONS-SCNC: 10 MMOL/L (ref 7–15)
AST SERPL W P-5'-P-CCNC: 17 U/L (ref 0–45)
BASOPHILS # BLD AUTO: 0 10E3/UL (ref 0–0.2)
BASOPHILS NFR BLD AUTO: 0 %
BILIRUB SERPL-MCNC: 0.2 MG/DL
BUN SERPL-MCNC: 13.1 MG/DL (ref 8–23)
CALCIUM SERPL-MCNC: 8.4 MG/DL (ref 8.8–10.2)
CHLORIDE SERPL-SCNC: 105 MMOL/L (ref 98–107)
CREAT SERPL-MCNC: 0.83 MG/DL (ref 0.51–0.95)
CRP SERPL-MCNC: 7.37 MG/L
DEPRECATED HCO3 PLAS-SCNC: 24 MMOL/L (ref 22–29)
EOSINOPHIL # BLD AUTO: 0 10E3/UL (ref 0–0.7)
EOSINOPHIL NFR BLD AUTO: 0 %
ERYTHROCYTE [DISTWIDTH] IN BLOOD BY AUTOMATED COUNT: 15.4 % (ref 10–15)
ERYTHROCYTE [SEDIMENTATION RATE] IN BLOOD BY WESTERGREN METHOD: 44 MM/HR (ref 0–30)
GFR SERPL CREATININE-BSD FRML MDRD: 80 ML/MIN/1.73M2
GLUCOSE SERPL-MCNC: 113 MG/DL (ref 70–99)
HCT VFR BLD AUTO: 34.6 % (ref 35–47)
HGB BLD-MCNC: 11.1 G/DL (ref 11.7–15.7)
IMM GRANULOCYTES # BLD: 0 10E3/UL
IMM GRANULOCYTES NFR BLD: 0 %
LYMPHOCYTES # BLD AUTO: 2.3 10E3/UL (ref 0.8–5.3)
LYMPHOCYTES NFR BLD AUTO: 33 %
MCH RBC QN AUTO: 27.8 PG (ref 26.5–33)
MCHC RBC AUTO-ENTMCNC: 32.1 G/DL (ref 31.5–36.5)
MCV RBC AUTO: 87 FL (ref 78–100)
MONOCYTES # BLD AUTO: 0.9 10E3/UL (ref 0–1.3)
MONOCYTES NFR BLD AUTO: 12 %
NEUTROPHILS # BLD AUTO: 3.7 10E3/UL (ref 1.6–8.3)
NEUTROPHILS NFR BLD AUTO: 55 %
NRBC # BLD AUTO: 0 10E3/UL
NRBC BLD AUTO-RTO: 0 /100
PLATELET # BLD AUTO: 306 10E3/UL (ref 150–450)
POTASSIUM SERPL-SCNC: 3.6 MMOL/L (ref 3.4–5.3)
PROT SERPL-MCNC: 7.1 G/DL (ref 6.4–8.3)
RBC # BLD AUTO: 3.99 10E6/UL (ref 3.8–5.2)
SODIUM SERPL-SCNC: 139 MMOL/L (ref 136–145)
WBC # BLD AUTO: 6.9 10E3/UL (ref 4–11)

## 2023-08-22 PROCEDURE — 80053 COMPREHEN METABOLIC PANEL: CPT

## 2023-08-22 PROCEDURE — 36415 COLL VENOUS BLD VENIPUNCTURE: CPT

## 2023-08-22 PROCEDURE — 85025 COMPLETE CBC W/AUTO DIFF WBC: CPT

## 2023-08-22 PROCEDURE — 86140 C-REACTIVE PROTEIN: CPT

## 2023-08-22 PROCEDURE — 85652 RBC SED RATE AUTOMATED: CPT

## 2023-08-22 PROCEDURE — 96413 CHEMO IV INFUSION 1 HR: CPT | Performed by: INTERNAL MEDICINE

## 2023-08-22 RX ORDER — ALBUTEROL SULFATE 0.83 MG/ML
2.5 SOLUTION RESPIRATORY (INHALATION)
Status: CANCELLED | OUTPATIENT
Start: 2023-09-18

## 2023-08-22 RX ORDER — DIPHENHYDRAMINE HCL 25 MG
25 CAPSULE ORAL ONCE
Status: CANCELLED
Start: 2023-09-18 | End: 2023-09-18

## 2023-08-22 RX ORDER — HEPARIN SODIUM (PORCINE) LOCK FLUSH IV SOLN 100 UNIT/ML 100 UNIT/ML
5 SOLUTION INTRAVENOUS
Status: CANCELLED | OUTPATIENT
Start: 2023-09-18

## 2023-08-22 RX ORDER — DIPHENHYDRAMINE HYDROCHLORIDE 50 MG/ML
50 INJECTION INTRAMUSCULAR; INTRAVENOUS
Status: CANCELLED
Start: 2023-09-18

## 2023-08-22 RX ORDER — ACETAMINOPHEN 325 MG/1
650 TABLET ORAL ONCE
Status: COMPLETED | OUTPATIENT
Start: 2023-08-22 | End: 2023-08-22

## 2023-08-22 RX ORDER — ALBUTEROL SULFATE 90 UG/1
1-2 AEROSOL, METERED RESPIRATORY (INHALATION)
Status: CANCELLED
Start: 2023-09-18

## 2023-08-22 RX ORDER — METHYLPREDNISOLONE SODIUM SUCCINATE 125 MG/2ML
125 INJECTION, POWDER, LYOPHILIZED, FOR SOLUTION INTRAMUSCULAR; INTRAVENOUS
Status: CANCELLED
Start: 2023-09-18

## 2023-08-22 RX ORDER — HEPARIN SODIUM,PORCINE 10 UNIT/ML
5 VIAL (ML) INTRAVENOUS
Status: CANCELLED | OUTPATIENT
Start: 2023-09-18

## 2023-08-22 RX ORDER — MEPERIDINE HYDROCHLORIDE 25 MG/ML
25 INJECTION INTRAMUSCULAR; INTRAVENOUS; SUBCUTANEOUS EVERY 30 MIN PRN
Status: CANCELLED | OUTPATIENT
Start: 2023-09-18

## 2023-08-22 RX ORDER — EPINEPHRINE 1 MG/ML
0.3 INJECTION, SOLUTION INTRAMUSCULAR; SUBCUTANEOUS EVERY 5 MIN PRN
Status: CANCELLED | OUTPATIENT
Start: 2023-09-18

## 2023-08-22 RX ORDER — METHYLPREDNISOLONE SODIUM SUCCINATE 125 MG/2ML
125 INJECTION, POWDER, LYOPHILIZED, FOR SOLUTION INTRAMUSCULAR; INTRAVENOUS ONCE
Status: CANCELLED | OUTPATIENT
Start: 2023-09-18 | End: 2023-09-18

## 2023-08-22 RX ORDER — ACETAMINOPHEN 325 MG/1
650 TABLET ORAL ONCE
Status: CANCELLED
Start: 2023-09-18 | End: 2023-09-18

## 2023-08-22 RX ORDER — DIPHENHYDRAMINE HCL 25 MG
25 CAPSULE ORAL ONCE
Status: COMPLETED | OUTPATIENT
Start: 2023-08-22 | End: 2023-08-22

## 2023-08-22 RX ADMIN — Medication 250 ML: at 14:44

## 2023-08-22 RX ADMIN — Medication 25 MG: at 14:49

## 2023-08-22 RX ADMIN — ACETAMINOPHEN 650 MG: 325 TABLET ORAL at 14:48

## 2023-08-22 ASSESSMENT — PAIN SCALES - GENERAL: PAINLEVEL: MODERATE PAIN (4)

## 2023-08-22 NOTE — PROGRESS NOTES
Infusion Nursing Note:  Janice Curranjuno Mayfield presents today for rapid remicade.    Patient seen by provider today: No   present during visit today: Not Applicable.      Intravenous Access:  Peripheral IV placed.    Treatment Conditions:  ~~~ NOTE: If the patient answers yes to any of the questions below, hold the infusion and contact ordering provider or on-call provider.    Do you currently have any signs of illness or infection or are you on any antibiotics? No  Have you recently had an elevated temperature, fever, chills, productive cough, coughing for 3 weeks or longer or hemoptysis, abnormal vital signs, night sweats, chest pain or have you noticed a decrease in your appetite, unexplained weight loss or fatigue? No  Have you had any new, sudden, or worsening abdominal pain? No  Do you have any open wounds or new incisions? (exclude for patients with hidradenitis suppurativa) No  Have you recently been diagnosed with any new nervous system diseases (ie. Multiple sclerosis, Guillain [place], seizures, neurological changes) or cancer diagnosis? Are you on any form of radiation or chemotherapy? No  Have there been any other new onset medical symptoms? No  Are you pregnant or breast feeding or do you have plans of pregnancy in the future? No; N/A  Do you have any upcoming hospitalizations or surgeries? Does not include esophagogastroduodenoscopy, colonoscopy, endoscopic retrograde cholangiopancreatography (ERCP), endoscopic ultrasound (EUS), dental procedures (including cleanings, fillings, implants, extractions)  or joint aspiration/steroid injections No  Have you or anyone in your household received a live vaccination in the past 4 weeks? Please note: No live vaccines while on biologic/chemotherapy until 6 months after the last treatment. Patient can receive the flu vaccine (shot only).  It is optimal for the patient to get it mid cycle, but it can be given at any time as long as it is not on the day  of the infusion. No  If applicable to prescribed medication, confirm negative PPD or quantiferon gold MTB. If positive, verify has negative chest x-ray or the patient is at least 4 weeks post initiation of INH/B6 therapy and have clearance from provider before infusion - negative TB 2019  If applicable to prescribed medication, confirm negative hepatitis B surface antigen or hepatitis C. If positive, clearance from provider before infusion. - hepatitis panel nonreactive in 2017.  Rheumatology patients receiving tocilizumab (Actemra): If labs were drawn within the past week, hold dosing until cleared to infuse If AST/ALT > 2 X upper limit normal; ANC < 1.0. ; N/A  Patients receiving belimumab (Benlysta): Have you been having any signs of worsening depression or suicidal ideations? ; N/A  .      Post Infusion Assessment:  Patient tolerated infusion without incident.  Blood return noted pre and post infusion.  Site patent and intact, free from redness, edema or discomfort.  No evidence of extravasations.  Access discontinued per protocol.       Discharge Plan:   Patient discharged in stable condition accompanied by: self.  Departure Mode: Ambulatory.  Verbally reviewed next infusion on 9/19/23. She alos has October appointment scheduled.      Luli Newman RN

## 2023-08-24 ENCOUNTER — TELEPHONE (OUTPATIENT)
Dept: RHEUMATOLOGY | Facility: CLINIC | Age: 63
End: 2023-08-24
Payer: COMMERCIAL

## 2023-08-24 DIAGNOSIS — M25.512 CHRONIC LEFT SHOULDER PAIN: ICD-10-CM

## 2023-08-24 DIAGNOSIS — Z79.52 LONG TERM CURRENT USE OF SYSTEMIC STEROIDS: ICD-10-CM

## 2023-08-24 DIAGNOSIS — M15.3 OTHER SECONDARY OSTEOARTHRITIS OF MULTIPLE SITES: ICD-10-CM

## 2023-08-24 DIAGNOSIS — Z79.60 LONG-TERM USE OF IMMUNOSUPPRESSANT MEDICATION: ICD-10-CM

## 2023-08-24 DIAGNOSIS — M05.79 RHEUMATOID ARTHRITIS INVOLVING MULTIPLE SITES WITH POSITIVE RHEUMATOID FACTOR (H): ICD-10-CM

## 2023-08-24 DIAGNOSIS — E55.9 VITAMIN D DEFICIENCY: ICD-10-CM

## 2023-08-24 DIAGNOSIS — G89.29 CHRONIC LEFT SHOULDER PAIN: ICD-10-CM

## 2023-08-24 RX ORDER — PREDNISONE 5 MG/1
5 TABLET ORAL DAILY
Qty: 90 TABLET | Refills: 1 | Status: SHIPPED | OUTPATIENT
Start: 2023-08-24 | End: 2024-01-30

## 2023-08-24 RX ORDER — FOLIC ACID 1 MG/1
2 TABLET ORAL DAILY
Qty: 180 TABLET | Refills: 1 | Status: SHIPPED | OUTPATIENT
Start: 2023-08-24 | End: 2024-06-06

## 2023-08-24 NOTE — TELEPHONE ENCOUNTER
[facility] Call Center    Phone Message    May a detailed message be left on voicemail: yes     Reason for Call: Medication Refill Request    Has the patient contacted the pharmacy for the refill? Yes   Name of medication being requested: Folic acid 3 tabs daily & prednisone 5 mg daily  Provider who prescribed the medication: Dr Penny   Pharmacy: [facility]/pharmacy #[id number] - [place], [place] - [address]   Date medication is needed: 8/28    Action Taken: Message routed to:  [facility] ([facility] Med Refills Team     Travel Screening: Not Applicable

## 2023-08-24 NOTE — TELEPHONE ENCOUNTER
folic acid (FOLVITE) 1 MG tablet     Last Written Prescription Date:  3/29/23  Last Fill Quantity: 180,   # refills: 3         predniSONE (DELTASONE) 5 MG tablet      Last Written Prescription Date:  5/30/23  Last Fill Quantity: 90,   # refills: 0  Last Office Visit : 9/22/22  Future Office visit:  2/1/24  Labs 8/22/23          Name of medication being requested: Folic acid 3 tabs daily & prednisone 5 mg daily  Provider who prescribed the medication: Dr Penny   Pharmacy: [facility]/pharmacy [facility][id number] [place] [place] - [address]   Date medication is needed: 8/28

## 2023-08-31 ENCOUNTER — OFFICE VISIT (OUTPATIENT)
Dept: RHEUMATOLOGY | Facility: CLINIC | Age: 63
End: 2023-08-31
Attending: INTERNAL MEDICINE
Payer: COMMERCIAL

## 2023-08-31 VITALS
WEIGHT: 161.5 LBS | SYSTOLIC BLOOD PRESSURE: 130 MMHG | HEART RATE: 63 BPM | DIASTOLIC BLOOD PRESSURE: 82 MMHG | BODY MASS INDEX: 27.57 KG/M2 | HEIGHT: 64 IN

## 2023-08-31 DIAGNOSIS — Z79.52 LONG TERM CURRENT USE OF SYSTEMIC STEROIDS: ICD-10-CM

## 2023-08-31 DIAGNOSIS — M05.79 RHEUMATOID ARTHRITIS INVOLVING MULTIPLE SITES WITH POSITIVE RHEUMATOID FACTOR (H): Primary | ICD-10-CM

## 2023-08-31 DIAGNOSIS — Z79.899 HIGH RISK MEDICATION USE: ICD-10-CM

## 2023-08-31 PROCEDURE — 99214 OFFICE O/P EST MOD 30 MIN: CPT | Mod: GC | Performed by: INTERNAL MEDICINE

## 2023-08-31 PROCEDURE — G0463 HOSPITAL OUTPT CLINIC VISIT: HCPCS | Performed by: INTERNAL MEDICINE

## 2023-08-31 RX ORDER — FOLIC ACID 1 MG/1
3 TABLET ORAL DAILY
Qty: 90 TABLET | Refills: 3 | Status: SHIPPED | OUTPATIENT
Start: 2023-08-31 | End: 2023-11-28

## 2023-08-31 ASSESSMENT — PAIN SCALES - GENERAL: PAINLEVEL: NO PAIN (0)

## 2023-08-31 NOTE — PROGRESS NOTES
"[facility] Outpatient Rheumatology Follow-up  Date of service: 8/31/2023  Date of last visit: September 22, 2022    Patient name: Janice Mayfield  YOB: 1960  MRN: [id number]    Reason for Follow-up: Seropositive Rheumatoid Arthritis on methotrexate/infliximab/prednisone 5mg once ajay.    Interval history 8/31/2023    - Continues on methotrexate 10 mg once weekly  - Continues on infliximab infusions every 4 weeks  - Continues on prednisone 5 mg once daily    Presents today for follow-up and reports flares involving right knee and right wrist  in June (with pain, swelling and decreased range of motion).  She also had pain in her left shoulder in July. They all have resolved. She continues to have stiffness of the knees R>L worse  1 week before and 1 week after taking  infiliximab infusion. She doesn't have pain or swelling in the joints of the hands and feet.    14 point ROS collected and negative if not documented above.     HPI from initial consultation with me on September 22, 2022  Has steroid injection into the right knee in June. Did have positive response to this over the summer with ability to hike/walk/ do most of what she wanted with minimal issues, though slowly worsening again since that time. Swelling has been worse also. Has also found that pain also returned to the right wrist over the last 6 months or so. She has continued on 5mg of prednisone daily despite trying.  She has about 1 week of improvement of symptoms after her Remicade infusion which she receives once every 4 weeks.  She continues to feel what she describes as \"out of it\" for 1 day after her methotrexate weekly dose.  To the point that she is unable to drive.  It then resolved.  She describes her joint symptoms as a persistent sprain particularly in the right wrist and right knee.  Previously did acupuncture with significant improvement and resolution of her symptoms.  She does this yearly in [place].  " No interval infections.  No fevers chills.  No new rashes.  No other side effect from therapy.  Interested in pool physical therapy.  [place]ives [place] minutes from [place] and interested in going there.  14 point review of systems collected and otherwise negative    Current RA treatment:   Methotrexate 10mg weekly  Remicade 400mg q4 weeks   Prednisone 5mg once daily since 2022, previously on 2mg daily    Prior therapy  Humira- hives  HCQ- LFT elevation  Enbrel   Past Medical History:  Past Medical History:   Diagnosis Date    Lichen sclerosus et atrophicus of the vulva 2018    Long term current use of systemic steroids 5/3/2017    Long-term use of immunosuppressant medication 2018    Other secondary osteoarthritis of multiple sites 2018    Rheumatoid arthritis involving multiple sites with positive rheumatoid factor (H) 2017    Vitamin D deficiency 5/3/2017       Past Surgical History:  Past Surgical History:   Procedure Laterality Date    C/SECTION, LOW TRANSVERSE      , Low Transverse    HC TOOTH EXTRACTION W/FORCEP         Medications:  Current Outpatient Medications   Medication    augmented betamethasone dipropionate (DIPROLENE-AF) 0.05 % external ointment    clobetasol (TEMOVATE) 0.05 % external ointment    DiphenhydrAMINE HCl (BENADRYL PO)    folic acid (FOLVITE) 1 MG tablet    IBUPROFEN PO    InFLIXimab (REMICADE IV)    methotrexate 2.5 MG tablet    predniSONE (DELTASONE) 1 MG tablet    predniSONE (DELTASONE) 5 MG tablet    Vitamin D, Cholecalciferol, 1000 UNITS CAPS     No current facility-administered medications for this visit.       Allergies:  Allergies   Allergen Reactions    Adalimumab      hives    Adalimumab Hives    Methotrexate      Anxiety. No allergic signs.     Plaquenil [Hydroxychloroquine]      Elevated LFTs    Sulfa Antibiotics      delusions       Family history: reviewed    Social History:   has children.  Her father had a recent hip fracture and  "COVID.  Her  has ocular disease and seeing ophthalmology today 9/22/2022    Objective:  /82   Pulse 63   Ht 1.626 m (5' 4\")   Wt 73.3 kg (161 lb 8 oz)   BMI 27.72 kg/m    Sitting up unassisted no acute distress  No acute cutaneous lesions  Wrist without tenderness or swelling. Range of motion slightly limited  on both right and left wrist with flexion.  She has an effusion of her right knee as well.   strength is strong bilaterally .  No synovitis of her MCPs PIPs or DIPs.  Able to make a full fist and touch her fingertips to her palms.  Full range of motion of all joints. Slightly puffy left knee but no tenderness or no evidence of joint effusion.    WBC   Date Value Ref Range Status   06/07/2021 6.9 4.0 - 11.0 10e9/L Final     WBC Count   Date Value Ref Range Status   08/22/2023 6.9 4.0 - 11.0 10e3/uL Final     Hemoglobin   Date Value Ref Range Status   08/22/2023 11.1 (L) 11.7 - 15.7 g/dL Final   06/07/2021 11.9 11.7 - 15.7 g/dL Final     Platelet Count   Date Value Ref Range Status   08/22/2023 306 150 - 450 10e3/uL Final   06/07/2021 265 150 - 450 10e9/L Final     Creatinine   Date Value Ref Range Status   08/22/2023 0.83 0.51 - 0.95 mg/dL Final   06/07/2021 0.86 0.52 - 1.04 mg/dL Final     Lab Results   Component Value Date    ALKPHOS 83 03/21/2022    ALKPHOS 92 11/08/2019     AST   Date Value Ref Range Status   08/22/2023 17 0 - 45 U/L Final     Comment:     Reference intervals for this test were updated on 6/12/2023 to more accurately reflect our healthy population. There may be differences in the flagging of prior results with similar values performed with this method. Interpretation of those prior results can be made in the context of the updated reference intervals.   06/07/2021 15 0 - 45 U/L Final     Lab Results   Component Value Date    ALT 43 06/30/2022    ALT 25 06/07/2021     Sed Rate   Date Value Ref Range Status   06/07/2021 18 0 - 30 mm/h Final     Erythrocyte Sedimentation " Rate   Date Value Ref Range Status   08/22/2023 44 (H) 0 - 30 mm/hr Final     CRP Inflammation   Date Value Ref Range Status   04/13/2023 <2.9 0.0 - 8.0 mg/L Final   06/07/2021 <2.9 0.0 - 8.0 mg/L Final     UA RESULTS:  Recent Labs   Lab Test 04/28/17  0940   COLOR Yellow   APPEARANCE Clear   URINEGLC Negative   URINEBILI Negative   URINEKETONE Negative   SG 1.016   UBLD Negative   URINEPH 6.0   PROTEIN Negative   NITRITE Negative   LEUKEST Moderate*   RBCU O - 2   WBCU 2-5*      ZANDER pattern 1   Date Value Ref Range Status   11/08/2019 HOMOGENEOUS  Final     Rheumatoid Factor   Date Value Ref Range Status   11/08/2019 <20 <20 IU/mL Final     Cyclic Citrullinated Peptide Antibody, IgG   Date Value Ref Range Status   11/08/2019 3 <7 U/mL Final     Comment:     Negative       Imaging    A/P  Seropositive RA: Had episodes of flares in June and July and  has significant morning stiffness 1 week before and after remecade  400mg infusions. She continues to take 10 mg methotrexate weekly and 5mg prednisone daily. Her fogginess related reported with methotrexate has improved with increased folic acid.  Will increase the dose of remecade to 8mg/kg ( 600mg)  for next infusion, continue metotrexate 10mg/.day and prednisone 5mg/d day     PLAN:  -Increase remicade  to 600mg (mg/kg) q4 weeks  -Continue methotrexate 10mg weekly  -Continue 5 mg prednisone once daily  -return to clinic in 3 months to assess improvement on above.     High risk medication use: remicade and methotrexate  --Risks and benefits of TNF inhibitor discussed today to include infection, injection site reactions, anaphylaxis, demyelinating disease, GI/hepatoxicity, bowel perforation, malignancy among others. Patient is agreeable to continue  ---Risks and benefits of methotrexate discussed today to include infection, BM suppression, GI/hepatoxicity, malignancy among others. Patient knows not to consume ETOH. Patient  is agreeable to continue.  --Most recent labs  reviewed from 8/22/2023 to include CBC with differential, AST, ALT, creatinine which did not show evidence of drug toxicity. Will continue q3 month routine drug screening lab monitoring.     Patient seen and evaluated with attending rheumatologist, Dr Penny, who agrees with the above assessment and plan.   Toribio Bowie MD  PGY-2    Staff addendum  I performed the history and physical examination of the patient and discussed the management with the resident. I reviewed the available lab and imaging studies. I reviewed the resident s note and agree with the documented findings and plan of care.    Gilson Penny MD  Rheumatology

## 2023-08-31 NOTE — PATIENT INSTRUCTIONS
Labs in 3 months  Follow-up in 4 months  Continue methotrexate  Continue folic acid  Will increase remicade infusion dose starting with your next infusion  Continue on prednisone

## 2023-08-31 NOTE — LETTER
"8/31/2023       RE: Janice Mayfield  [address] [address]     Dear Colleague,    Thank you for referring your patient, Janice Mayfield, to the [facility] [place] at [facility]. Please see a copy of my visit note below.    [facility] Outpatient Rheumatology Follow-up  Date of service: 8/31/2023  Date of last visit: September 22, 2022    Patient name: Janice Mayfield  YOB: 1960  MRN: [id number]    Reason for Follow-up: Seropositive Rheumatoid Arthritis on methotrexate/infliximab/prednisone 5mg once ajay.    Interval history 8/31/2023    - Continues on methotrexate 10 mg once weekly  - Continues on infliximab infusions every 4 weeks  - Continues on prednisone 5 mg once daily    Presents today for follow-up and reports flares involving right knee and right wrist  in June (with pain, swelling and decreased range of motion).  She also had pain in her left shoulder in July. They all have resolved. She continues to have stiffness of the knees R>L worse  1 week before and 1 week after taking  infiliximab infusion. She doesn't have pain or swelling in the joints of the hands and feet.    14 point ROS collected and negative if not documented above.     HPI from initial consultation with me on September 22, 2022  Has steroid injection into the right knee in June. Did have positive response to this over the summer with ability to hike/walk/ do most of what she wanted with minimal issues, though slowly worsening again since that time. Swelling has been worse also. Has also found that pain also returned to the right wrist over the last 6 months or so. She has continued on 5mg of prednisone daily despite trying.  She has about 1 week of improvement of symptoms after her Remicade infusion which she receives once every 4 weeks.  She continues to feel what she describes as \"out of it\" " for 1 day after her methotrexate weekly dose.  To the point that she is unable to drive.  It then resolved.  She describes her joint symptoms as a persistent sprain particularly in the right wrist and right knee.  Previously did acupuncture with significant improvement and resolution of her symptoms.  She does this yearly in [place].  No interval infections.  No fevers chills.  No new rashes.  No other side effect from therapy.  Interested in pool physical therapy.  [place]ives [place] from [place] and interested in going there.  14 point review of systems collected and otherwise negative    Current RA treatment:   Methotrexate 10mg weekly  Remicade 400mg q4 weeks   Prednisone 5mg once daily since 2022, previously on 2mg daily    Prior therapy  Humira- hives  HCQ- LFT elevation  Enbrel   Past Medical History:  Past Medical History:   Diagnosis Date    Lichen sclerosus et atrophicus of the vulva 2018    Long term current use of systemic steroids 5/3/2017    Long-term use of immunosuppressant medication 2018    Other secondary osteoarthritis of multiple sites 2018    Rheumatoid arthritis involving multiple sites with positive rheumatoid factor (H) 2017    Vitamin D deficiency 5/3/2017       Past Surgical History:  Past Surgical History:   Procedure Laterality Date    C/SECTION, LOW TRANSVERSE      , Low Transverse    HC TOOTH EXTRACTION W/FORCEP         Medications:  Current Outpatient Medications   Medication    augmented betamethasone dipropionate (DIPROLENE-AF) 0.05 % external ointment    clobetasol (TEMOVATE) 0.05 % external ointment    DiphenhydrAMINE HCl (BENADRYL PO)    folic acid (FOLVITE) 1 MG tablet    IBUPROFEN PO    InFLIXimab (REMICADE IV)    methotrexate 2.5 MG tablet    predniSONE (DELTASONE) 1 MG tablet    predniSONE (DELTASONE) 5 MG tablet    Vitamin D, Cholecalciferol, 1000 UNITS CAPS     No current facility-administered medications for this visit.  "      Allergies:  Allergies   Allergen Reactions    Adalimumab      hives    Adalimumab Hives    Methotrexate      Anxiety. No allergic signs.     Plaquenil [Hydroxychloroquine]      Elevated LFTs    Sulfa Antibiotics      delusions       Family history: reviewed    Social History:   has children.  Her father had a recent hip fracture and COVID.  Her  has ocular disease and seeing ophthalmology today 9/22/2022    Objective:  /82   Pulse 63   Ht 1.626 m (5' 4\")   Wt 73.3 kg (161 lb 8 oz)   BMI 27.72 kg/m    Sitting up unassisted no acute distress  No acute cutaneous lesions  Wrist without tenderness or swelling. Range of motion slightly limited  on both right and left wrist with flexion.  She has an effusion of her right knee as well.   strength is strong bilaterally .  No synovitis of her MCPs PIPs or DIPs.  Able to make a full fist and touch her fingertips to her palms.  Full range of motion of all joints. Slightly puffy left knee but no tenderness or no evidence of joint effusion.    WBC   Date Value Ref Range Status   06/07/2021 6.9 4.0 - 11.0 10e9/L Final     WBC Count   Date Value Ref Range Status   08/22/2023 6.9 4.0 - 11.0 10e3/uL Final     Hemoglobin   Date Value Ref Range Status   08/22/2023 11.1 (L) 11.7 - 15.7 g/dL Final   06/07/2021 11.9 11.7 - 15.7 g/dL Final     Platelet Count   Date Value Ref Range Status   08/22/2023 306 150 - 450 10e3/uL Final   06/07/2021 265 150 - 450 10e9/L Final     Creatinine   Date Value Ref Range Status   08/22/2023 0.83 0.51 - 0.95 mg/dL Final   06/07/2021 0.86 0.52 - 1.04 mg/dL Final     Lab Results   Component Value Date    ALKPHOS 83 03/21/2022    ALKPHOS 92 11/08/2019     AST   Date Value Ref Range Status   08/22/2023 17 0 - 45 U/L Final     Comment:     Reference intervals for this test were updated on 6/12/2023 to more accurately reflect our healthy population. There may be differences in the flagging of prior results with similar values " performed with this method. Interpretation of those prior results can be made in the context of the updated reference intervals.   06/07/2021 15 0 - 45 U/L Final     Lab Results   Component Value Date    ALT 43 06/30/2022    ALT 25 06/07/2021     Sed Rate   Date Value Ref Range Status   06/07/2021 18 0 - 30 mm/h Final     Erythrocyte Sedimentation Rate   Date Value Ref Range Status   08/22/2023 44 (H) 0 - 30 mm/hr Final     CRP Inflammation   Date Value Ref Range Status   04/13/2023 <2.9 0.0 - 8.0 mg/L Final   06/07/2021 <2.9 0.0 - 8.0 mg/L Final     UA RESULTS:  Recent Labs   Lab Test 04/28/17  0940   COLOR Yellow   APPEARANCE Clear   URINEGLC Negative   URINEBILI Negative   URINEKETONE Negative   SG 1.016   UBLD Negative   URINEPH 6.0   PROTEIN Negative   NITRITE Negative   LEUKEST Moderate*   RBCU O - 2   WBCU 2-5*      ZANDER pattern 1   Date Value Ref Range Status   11/08/2019 HOMOGENEOUS  Final     Rheumatoid Factor   Date Value Ref Range Status   11/08/2019 <20 <20 IU/mL Final     Cyclic Citrullinated Peptide Antibody, IgG   Date Value Ref Range Status   11/08/2019 3 <7 U/mL Final     Comment:     Negative       Imaging    A/P  Seropositive RA: Had episodes of flares in June and July and  has significant morning stiffness 1 week before and after remecade  400mg infusions. She continues to take 10 mg methotrexate weekly and 5mg prednisone daily. Her fogginess related reported with methotrexate has improved with increased folic acid.  Will increase the dose of remecade to 8mg/kg ( 600mg)  for next infusion, continue metotrexate 10mg/.day and prednisone 5mg/d day     PLAN:  -Increase remicade  to 600mg (mg/kg) q4 weeks  -Continue methotrexate 10mg weekly  -Continue 5 mg prednisone once daily  -return to clinic in 3 months to assess improvement on above.     High risk medication use: remicade and methotrexate  --Risks and benefits of TNF inhibitor discussed today to include infection, injection site reactions,  anaphylaxis, demyelinating disease, GI/hepatoxicity, bowel perforation, malignancy among others. Patient is agreeable to continue  ---Risks and benefits of methotrexate discussed today to include infection, BM suppression, GI/hepatoxicity, malignancy among others. Patient knows not to consume ETOH. Patient  is agreeable to continue.  --Most recent labs reviewed from 8/22/2023 to include CBC with differential, AST, ALT, creatinine which did not show evidence of drug toxicity. Will continue q3 month routine drug screening lab monitoring.     Patient seen and evaluated with attending rheumatologist, Dr Penny, who agrees with the above assessment and plan.   Toribio Bowie MD  PGY-2    Staff addendum  I performed the history and physical examination of the patient and discussed the management with the resident. I reviewed the available lab and imaging studies. I reviewed the resident s note and agree with the documented findings and plan of care.    Gilson Penny MD  Rheumatology

## 2023-08-31 NOTE — NURSING NOTE
"Chief Complaint   Patient presents with    RECHECK     Follow up with RA     /82   Pulse 63   Ht 1.626 m (5' 4\")   Wt 73.3 kg (161 lb 8 oz)   BMI 27.72 kg/m    Yun Garcia CMA on 8/31/2023 at 9:51 AM    "

## 2023-09-19 ENCOUNTER — INFUSION THERAPY VISIT (OUTPATIENT)
Dept: INFUSION THERAPY | Facility: CLINIC | Age: 63
End: 2023-09-19
Payer: COMMERCIAL

## 2023-09-19 VITALS
BODY MASS INDEX: 28.05 KG/M2 | WEIGHT: 163.4 LBS | DIASTOLIC BLOOD PRESSURE: 85 MMHG | HEART RATE: 70 BPM | OXYGEN SATURATION: 98 % | RESPIRATION RATE: 16 BRPM | SYSTOLIC BLOOD PRESSURE: 121 MMHG | TEMPERATURE: 97.8 F

## 2023-09-19 DIAGNOSIS — M05.79 RHEUMATOID ARTHRITIS INVOLVING MULTIPLE SITES WITH POSITIVE RHEUMATOID FACTOR (H): Primary | ICD-10-CM

## 2023-09-19 PROCEDURE — 96413 CHEMO IV INFUSION 1 HR: CPT | Performed by: INTERNAL MEDICINE

## 2023-09-19 RX ORDER — ALBUTEROL SULFATE 90 UG/1
1-2 AEROSOL, METERED RESPIRATORY (INHALATION)
Status: CANCELLED
Start: 2023-10-17

## 2023-09-19 RX ORDER — HEPARIN SODIUM,PORCINE 10 UNIT/ML
5 VIAL (ML) INTRAVENOUS
Status: CANCELLED | OUTPATIENT
Start: 2023-10-17

## 2023-09-19 RX ORDER — HEPARIN SODIUM (PORCINE) LOCK FLUSH IV SOLN 100 UNIT/ML 100 UNIT/ML
5 SOLUTION INTRAVENOUS
Status: CANCELLED | OUTPATIENT
Start: 2023-10-17

## 2023-09-19 RX ORDER — MEPERIDINE HYDROCHLORIDE 25 MG/ML
25 INJECTION INTRAMUSCULAR; INTRAVENOUS; SUBCUTANEOUS EVERY 30 MIN PRN
Status: CANCELLED | OUTPATIENT
Start: 2023-10-17

## 2023-09-19 RX ORDER — METHYLPREDNISOLONE SODIUM SUCCINATE 125 MG/2ML
125 INJECTION, POWDER, LYOPHILIZED, FOR SOLUTION INTRAMUSCULAR; INTRAVENOUS ONCE
Status: CANCELLED | OUTPATIENT
Start: 2023-10-17 | End: 2023-10-17

## 2023-09-19 RX ORDER — DIPHENHYDRAMINE HYDROCHLORIDE 50 MG/ML
50 INJECTION INTRAMUSCULAR; INTRAVENOUS
Status: CANCELLED
Start: 2023-10-17

## 2023-09-19 RX ORDER — EPINEPHRINE 1 MG/ML
0.3 INJECTION, SOLUTION INTRAMUSCULAR; SUBCUTANEOUS EVERY 5 MIN PRN
Status: CANCELLED | OUTPATIENT
Start: 2023-10-17

## 2023-09-19 RX ORDER — DIPHENHYDRAMINE HCL 25 MG
25 CAPSULE ORAL ONCE
Status: COMPLETED | OUTPATIENT
Start: 2023-09-19 | End: 2023-09-19

## 2023-09-19 RX ORDER — ALBUTEROL SULFATE 0.83 MG/ML
2.5 SOLUTION RESPIRATORY (INHALATION)
Status: CANCELLED | OUTPATIENT
Start: 2023-10-17

## 2023-09-19 RX ORDER — DIPHENHYDRAMINE HCL 25 MG
25 CAPSULE ORAL ONCE
Status: CANCELLED
Start: 2023-10-17 | End: 2023-10-17

## 2023-09-19 RX ORDER — METHYLPREDNISOLONE SODIUM SUCCINATE 125 MG/2ML
125 INJECTION, POWDER, LYOPHILIZED, FOR SOLUTION INTRAMUSCULAR; INTRAVENOUS
Status: CANCELLED
Start: 2023-10-17

## 2023-09-19 RX ORDER — ACETAMINOPHEN 325 MG/1
650 TABLET ORAL ONCE
Status: COMPLETED | OUTPATIENT
Start: 2023-09-19 | End: 2023-09-19

## 2023-09-19 RX ORDER — ACETAMINOPHEN 325 MG/1
650 TABLET ORAL ONCE
Status: CANCELLED
Start: 2023-10-17 | End: 2023-10-17

## 2023-09-19 RX ADMIN — Medication 25 MG: at 08:22

## 2023-09-19 RX ADMIN — ACETAMINOPHEN 650 MG: 325 TABLET ORAL at 08:21

## 2023-09-19 RX ADMIN — Medication 250 ML: at 08:27

## 2023-09-19 ASSESSMENT — PAIN SCALES - GENERAL: PAINLEVEL: MILD PAIN (2)

## 2023-09-19 NOTE — PROGRESS NOTES
Infusion Nursing Note:  Janice SIDNEY Erickirk Mayfield presents today for rapid infliximab.    Patient seen by provider today: No   present during visit today: Not Applicable.    Note: Dose increase to 600mg today.      Intravenous Access:  Peripheral IV placed.    Treatment Conditions:  ~~~ NOTE: If the patient answers yes to any of the questions below, hold the infusion and contact ordering provider or on-call provider.    Do you currently have any signs of illness or infection or are you on any antibiotics? No  Have you recently had an elevated temperature, fever, chills, productive cough, coughing for 3 weeks or longer or hemoptysis, abnormal vital signs, night sweats, chest pain or have you noticed a decrease in your appetite, unexplained weight loss or fatigue? No  Have you had any new, sudden, or worsening abdominal pain? No  Do you have any open wounds or new incisions? (exclude for patients with hidradenitis suppurativa) No  Have you recently been diagnosed with any new nervous system diseases (ie. Multiple sclerosis, Guillain [place], seizures, neurological changes) or cancer diagnosis? Are you on any form of radiation or chemotherapy? No  Have there been any other new onset medical symptoms? No  Are you pregnant or breast feeding or do you have plans of pregnancy in the future? No; N/A  Do you have any upcoming hospitalizations or surgeries? Does not include esophagogastroduodenoscopy, colonoscopy, endoscopic retrograde cholangiopancreatography (ERCP), endoscopic ultrasound (EUS), dental procedures (including cleanings, fillings, implants, extractions)  or joint aspiration/steroid injections No  Have you or anyone in your household received a live vaccination in the past 4 weeks? Please note: No live vaccines while on biologic/chemotherapy until 6 months after the last treatment. Patient can receive the flu vaccine (shot only).  It is optimal for the patient to get it mid cycle, but it can be given at  any time as long as it is not on the day of the infusion. No  If applicable to prescribed medication, confirm negative PPD or quantiferon gold MTB. If positive, verify has negative chest x-ray or the patient is at least 4 weeks post initiation of INH/B6 therapy and have clearance from provider before infusion - negative TB 2019  If applicable to prescribed medication, confirm negative hepatitis B surface antigen or hepatitis C. If positive, clearance from provider before infusion. Nonreactive hepatitis B & C in 2017  Rheumatology patients receiving tocilizumab (Actemra): If labs were drawn within the past week, hold dosing until cleared to infuse If AST/ALT > 2 X upper limit normal; ANC < 1.0.  N/A  Patients receiving belimumab (Benlysta): Have you been having any signs of worsening depression or suicidal ideations?  N/A  .      Post Infusion Assessment:  Patient tolerated infusion without incident.  Blood return noted pre and post infusion.  Site patent and intact, free from redness, edema or discomfort.  No evidence of extravasations.  Access discontinued per protocol.       Discharge Plan:   Patient discharged in stable condition accompanied by: self.  Departure Mode: Ambulatory.  Verbally reviewed next infliximab appointment on 10/17. Janice stopped and made lab & infusion appointment for November as well.      Luli Newman RN

## 2023-10-07 ENCOUNTER — HEALTH MAINTENANCE LETTER (OUTPATIENT)
Age: 63
End: 2023-10-07

## 2023-10-17 ENCOUNTER — INFUSION THERAPY VISIT (OUTPATIENT)
Dept: INFUSION THERAPY | Facility: CLINIC | Age: 63
End: 2023-10-17
Payer: COMMERCIAL

## 2023-10-17 VITALS
SYSTOLIC BLOOD PRESSURE: 123 MMHG | WEIGHT: 162.5 LBS | TEMPERATURE: 97.7 F | OXYGEN SATURATION: 95 % | HEART RATE: 72 BPM | DIASTOLIC BLOOD PRESSURE: 75 MMHG | RESPIRATION RATE: 16 BRPM | BODY MASS INDEX: 27.89 KG/M2

## 2023-10-17 DIAGNOSIS — M05.79 RHEUMATOID ARTHRITIS INVOLVING MULTIPLE SITES WITH POSITIVE RHEUMATOID FACTOR (H): Primary | ICD-10-CM

## 2023-10-17 PROCEDURE — 96413 CHEMO IV INFUSION 1 HR: CPT | Performed by: INTERNAL MEDICINE

## 2023-10-17 RX ORDER — ACETAMINOPHEN 325 MG/1
650 TABLET ORAL ONCE
Status: COMPLETED | OUTPATIENT
Start: 2023-10-17 | End: 2023-10-17

## 2023-10-17 RX ORDER — HEPARIN SODIUM (PORCINE) LOCK FLUSH IV SOLN 100 UNIT/ML 100 UNIT/ML
5 SOLUTION INTRAVENOUS
Status: CANCELLED | OUTPATIENT
Start: 2023-11-14

## 2023-10-17 RX ORDER — MEPERIDINE HYDROCHLORIDE 25 MG/ML
25 INJECTION INTRAMUSCULAR; INTRAVENOUS; SUBCUTANEOUS EVERY 30 MIN PRN
Status: CANCELLED | OUTPATIENT
Start: 2023-11-14

## 2023-10-17 RX ORDER — ALBUTEROL SULFATE 90 UG/1
1-2 AEROSOL, METERED RESPIRATORY (INHALATION)
Status: CANCELLED
Start: 2023-11-14

## 2023-10-17 RX ORDER — METHYLPREDNISOLONE SODIUM SUCCINATE 125 MG/2ML
125 INJECTION, POWDER, LYOPHILIZED, FOR SOLUTION INTRAMUSCULAR; INTRAVENOUS ONCE
Status: CANCELLED | OUTPATIENT
Start: 2023-11-14 | End: 2023-11-14

## 2023-10-17 RX ORDER — HEPARIN SODIUM,PORCINE 10 UNIT/ML
5 VIAL (ML) INTRAVENOUS
Status: CANCELLED | OUTPATIENT
Start: 2023-11-14

## 2023-10-17 RX ORDER — EPINEPHRINE 1 MG/ML
0.3 INJECTION, SOLUTION INTRAMUSCULAR; SUBCUTANEOUS EVERY 5 MIN PRN
Status: CANCELLED | OUTPATIENT
Start: 2023-11-14

## 2023-10-17 RX ORDER — DIPHENHYDRAMINE HCL 25 MG
25 CAPSULE ORAL ONCE
Status: COMPLETED | OUTPATIENT
Start: 2023-10-17 | End: 2023-10-17

## 2023-10-17 RX ORDER — DIPHENHYDRAMINE HYDROCHLORIDE 50 MG/ML
50 INJECTION INTRAMUSCULAR; INTRAVENOUS
Status: CANCELLED
Start: 2023-11-14

## 2023-10-17 RX ORDER — DIPHENHYDRAMINE HCL 25 MG
25 CAPSULE ORAL ONCE
Status: CANCELLED
Start: 2023-11-14 | End: 2023-11-14

## 2023-10-17 RX ORDER — ALBUTEROL SULFATE 0.83 MG/ML
2.5 SOLUTION RESPIRATORY (INHALATION)
Status: CANCELLED | OUTPATIENT
Start: 2023-11-14

## 2023-10-17 RX ORDER — METHYLPREDNISOLONE SODIUM SUCCINATE 125 MG/2ML
125 INJECTION, POWDER, LYOPHILIZED, FOR SOLUTION INTRAMUSCULAR; INTRAVENOUS
Status: CANCELLED
Start: 2023-11-14

## 2023-10-17 RX ORDER — ACETAMINOPHEN 325 MG/1
650 TABLET ORAL ONCE
Status: CANCELLED
Start: 2023-11-14 | End: 2023-11-14

## 2023-10-17 RX ADMIN — Medication 25 MG: at 08:57

## 2023-10-17 RX ADMIN — Medication 250 ML: at 08:55

## 2023-10-17 RX ADMIN — ACETAMINOPHEN 650 MG: 325 TABLET ORAL at 08:57

## 2023-10-17 ASSESSMENT — PAIN SCALES - GENERAL: PAINLEVEL: NO PAIN (0)

## 2023-10-17 NOTE — PROGRESS NOTES
Infusion Nursing Note:  Janice Mayfield presents today for Rapid Remicade.    Patient seen by provider today: No   present during visit today: Not Applicable.    Note: The patient reports feeling well today and denies any concerns at this time.      Intravenous Access:  Peripheral IV placed.    Treatment Conditions:  Biological Infusion Checklist:  ~~~ NOTE: If the patient answers yes to any of the questions below, hold the infusion and contact ordering provider or on-call provider.    Have you recently had an elevated temperature, fever, chills, productive cough, coughing for 3 weeks or longer or hemoptysis,  abnormal vital signs, night sweats,  chest pain or have you noticed a decrease in your appetite, unexplained weight loss or fatigue? No  Do you have any open wounds or new incisions? No  Do you have any upcoming hospitalizations or surgeries? Does not include esophagogastroduodenoscopy, colonoscopy, endoscopic retrograde cholangiopancreatography (ERCP), endoscopic ultrasound (EUS), dental procedures or joint aspiration/steroid injections No  Do you currently have any signs of illness or infection or are you on any antibiotics? No  Have you had any new, sudden or worsening abdominal pain? No  Have you or anyone in your household received a live vaccination in the past 4 weeks? Please note: No live vaccines while on biologic/chemotherapy until 6 months after the last treatment. Patient can receive the flu vaccine (shot only), pneumovax and the Covid vaccine. It is optimal for the patient to get these vaccines mid cycle, but they can be given at any time as long as it is not on the day of the infusion. No  Have you recently been diagnosed with any new nervous system diseases (ie. Multiple sclerosis, Guillain [place], seizures, neurological changes) or cancer diagnosis? Are you on any form of radiation or chemotherapy? No  Are you pregnant or breast feeding or do you have plans of pregnancy in  the future? No  Have you been having any signs of worsening depression or suicidal ideations?  (benlysta only) No  Have there been any other new onset medical symptoms? No  Have you had any new blood clots? (IVIG only) No      Post Infusion Assessment:  Patient tolerated infusion without incident.  Site patent and intact, free from redness, edema or discomfort.  No evidence of extravasations.  Access discontinued per protocol.  Biologic Infusion Post Education: Call the triage nurse at your clinic or seek medical attention if you have chills and/or temperature greater than or equal to 100.5, uncontrolled nausea/vomiting, diarrhea, constipation, dizziness, shortness of breath, chest pain, heart palpitations, weakness or any other new or concerning symptoms, questions or concerns.  You cannot have any live virus vaccines prior to or during treatment or up to 6 months post infusion.  If you have an upcoming surgery, medical procedure or dental procedure during treatment, this should be discussed with your ordering physician and your surgeon/dentist.  If you are having any concerning symptom, if you are unsure if you should get your next infusion or wish to speak to a provider before your next infusion, please call your care coordinator or triage nurse at your clinic to notify them so we can adequately serve you.       Discharge Plan:   AVS to patient via [employer].  Patient will return 11/14/23 for next appointment. Future appts have been reviewed and crosschecked with appt note and plan.   Patient discharged in stable condition accompanied by: self.  Departure Mode: Ambulatory.      Che Mcfadden RN

## 2023-10-25 ENCOUNTER — TELEPHONE (OUTPATIENT)
Dept: RHEUMATOLOGY | Facility: CLINIC | Age: 63
End: 2023-10-25
Payer: COMMERCIAL

## 2023-10-25 NOTE — TELEPHONE ENCOUNTER
Left Voicemail (1st Attempt) for the patient to call back and schedule the following:    Appointment type: Return visit  Provider: Dr. Penny  Return date: December 28th 2023  Specialty phone number: [phone]  Additional appointment(s) needed: NA  Additonal Notes: Reschedule attempt as Dr. Penny will be out of office December 28th 2023. MADHU slot use approved for reschedule by Dr. Penny.

## 2023-11-01 RX ORDER — PREDNISONE 5 MG/1
5 TABLET ORAL DAILY
Qty: 90 TABLET | Refills: 0 | OUTPATIENT
Start: 2023-11-01

## 2023-11-03 ENCOUNTER — TELEPHONE (OUTPATIENT)
Dept: RHEUMATOLOGY | Facility: CLINIC | Age: 63
End: 2023-11-03
Payer: COMMERCIAL

## 2023-11-03 NOTE — TELEPHONE ENCOUNTER
Patient Contacted for the patient to call back and schedule the following:    Appointment type: Return visit  Provider: Dr. Penny  Return date: February 21st 2024  Specialty phone number: [phone]  Additional appointment(s) needed: NA  Additonal Notes: NA

## 2023-11-14 ENCOUNTER — LAB (OUTPATIENT)
Dept: LAB | Facility: CLINIC | Age: 63
End: 2023-11-14
Payer: COMMERCIAL

## 2023-11-14 ENCOUNTER — INFUSION THERAPY VISIT (OUTPATIENT)
Dept: INFUSION THERAPY | Facility: CLINIC | Age: 63
End: 2023-11-14
Payer: COMMERCIAL

## 2023-11-14 VITALS
BODY MASS INDEX: 28.08 KG/M2 | HEART RATE: 64 BPM | TEMPERATURE: 98 F | DIASTOLIC BLOOD PRESSURE: 90 MMHG | WEIGHT: 163.6 LBS | RESPIRATION RATE: 16 BRPM | SYSTOLIC BLOOD PRESSURE: 143 MMHG | OXYGEN SATURATION: 98 %

## 2023-11-14 DIAGNOSIS — M05.79 RHEUMATOID ARTHRITIS INVOLVING MULTIPLE SITES WITH POSITIVE RHEUMATOID FACTOR (H): Primary | ICD-10-CM

## 2023-11-14 DIAGNOSIS — M05.79 RHEUMATOID ARTHRITIS INVOLVING MULTIPLE SITES WITH POSITIVE RHEUMATOID FACTOR (H): ICD-10-CM

## 2023-11-14 DIAGNOSIS — Z79.899 HIGH RISK MEDICATION USE: ICD-10-CM

## 2023-11-14 LAB
ALBUMIN SERPL BCG-MCNC: 3.6 G/DL (ref 3.5–5.2)
ALP SERPL-CCNC: 98 U/L (ref 35–104)
ALT SERPL W P-5'-P-CCNC: 44 U/L (ref 0–50)
ANION GAP SERPL CALCULATED.3IONS-SCNC: 9 MMOL/L (ref 7–15)
AST SERPL W P-5'-P-CCNC: 25 U/L (ref 0–45)
BASOPHILS # BLD AUTO: 0 10E3/UL (ref 0–0.2)
BASOPHILS NFR BLD AUTO: 0 %
BILIRUB SERPL-MCNC: 0.3 MG/DL
BUN SERPL-MCNC: 13.2 MG/DL (ref 8–23)
CALCIUM SERPL-MCNC: 8.7 MG/DL (ref 8.8–10.2)
CHLORIDE SERPL-SCNC: 109 MMOL/L (ref 98–107)
CREAT SERPL-MCNC: 0.63 MG/DL (ref 0.51–0.95)
CRP SERPL-MCNC: 3.87 MG/L
DEPRECATED HCO3 PLAS-SCNC: 21 MMOL/L (ref 22–29)
EGFRCR SERPLBLD CKD-EPI 2021: >90 ML/MIN/1.73M2
EOSINOPHIL # BLD AUTO: 0 10E3/UL (ref 0–0.7)
EOSINOPHIL NFR BLD AUTO: 0 %
ERYTHROCYTE [DISTWIDTH] IN BLOOD BY AUTOMATED COUNT: 13.8 % (ref 10–15)
ERYTHROCYTE [SEDIMENTATION RATE] IN BLOOD BY WESTERGREN METHOD: 40 MM/HR (ref 0–30)
GLUCOSE SERPL-MCNC: 108 MG/DL (ref 70–99)
HCT VFR BLD AUTO: 36.6 % (ref 35–47)
HGB BLD-MCNC: 11.5 G/DL (ref 11.7–15.7)
HOLD SPECIMEN: NORMAL
IMM GRANULOCYTES # BLD: 0 10E3/UL
IMM GRANULOCYTES NFR BLD: 0 %
LYMPHOCYTES # BLD AUTO: 2.4 10E3/UL (ref 0.8–5.3)
LYMPHOCYTES NFR BLD AUTO: 45 %
MCH RBC QN AUTO: 28.5 PG (ref 26.5–33)
MCHC RBC AUTO-ENTMCNC: 31.4 G/DL (ref 31.5–36.5)
MCV RBC AUTO: 91 FL (ref 78–100)
MONOCYTES # BLD AUTO: 0.6 10E3/UL (ref 0–1.3)
MONOCYTES NFR BLD AUTO: 12 %
NEUTROPHILS # BLD AUTO: 2.2 10E3/UL (ref 1.6–8.3)
NEUTROPHILS NFR BLD AUTO: 43 %
NRBC # BLD AUTO: 0 10E3/UL
NRBC BLD AUTO-RTO: 0 /100
PLATELET # BLD AUTO: 298 10E3/UL (ref 150–450)
POTASSIUM SERPL-SCNC: 4.1 MMOL/L (ref 3.4–5.3)
PROT SERPL-MCNC: 7.2 G/DL (ref 6.4–8.3)
RBC # BLD AUTO: 4.04 10E6/UL (ref 3.8–5.2)
SODIUM SERPL-SCNC: 139 MMOL/L (ref 135–145)
WBC # BLD AUTO: 5.2 10E3/UL (ref 4–11)

## 2023-11-14 PROCEDURE — 96413 CHEMO IV INFUSION 1 HR: CPT | Performed by: INTERNAL MEDICINE

## 2023-11-14 PROCEDURE — 36415 COLL VENOUS BLD VENIPUNCTURE: CPT

## 2023-11-14 PROCEDURE — 85652 RBC SED RATE AUTOMATED: CPT

## 2023-11-14 PROCEDURE — 85025 COMPLETE CBC W/AUTO DIFF WBC: CPT

## 2023-11-14 PROCEDURE — 86140 C-REACTIVE PROTEIN: CPT

## 2023-11-14 PROCEDURE — 80053 COMPREHEN METABOLIC PANEL: CPT

## 2023-11-14 RX ORDER — EPINEPHRINE 1 MG/ML
0.3 INJECTION, SOLUTION INTRAMUSCULAR; SUBCUTANEOUS EVERY 5 MIN PRN
Status: CANCELLED | OUTPATIENT
Start: 2023-12-12

## 2023-11-14 RX ORDER — HEPARIN SODIUM,PORCINE 10 UNIT/ML
5 VIAL (ML) INTRAVENOUS
Status: CANCELLED | OUTPATIENT
Start: 2023-12-12

## 2023-11-14 RX ORDER — HEPARIN SODIUM (PORCINE) LOCK FLUSH IV SOLN 100 UNIT/ML 100 UNIT/ML
5 SOLUTION INTRAVENOUS
Status: CANCELLED | OUTPATIENT
Start: 2023-12-12

## 2023-11-14 RX ORDER — DIPHENHYDRAMINE HCL 25 MG
25 CAPSULE ORAL ONCE
Status: CANCELLED
Start: 2023-12-12 | End: 2023-12-12

## 2023-11-14 RX ORDER — METHYLPREDNISOLONE SODIUM SUCCINATE 125 MG/2ML
125 INJECTION, POWDER, LYOPHILIZED, FOR SOLUTION INTRAMUSCULAR; INTRAVENOUS
Status: CANCELLED
Start: 2023-12-12

## 2023-11-14 RX ORDER — MEPERIDINE HYDROCHLORIDE 25 MG/ML
25 INJECTION INTRAMUSCULAR; INTRAVENOUS; SUBCUTANEOUS EVERY 30 MIN PRN
Status: CANCELLED | OUTPATIENT
Start: 2023-12-12

## 2023-11-14 RX ORDER — METHYLPREDNISOLONE SODIUM SUCCINATE 125 MG/2ML
125 INJECTION, POWDER, LYOPHILIZED, FOR SOLUTION INTRAMUSCULAR; INTRAVENOUS ONCE
Status: CANCELLED | OUTPATIENT
Start: 2023-12-12 | End: 2023-12-12

## 2023-11-14 RX ORDER — ACETAMINOPHEN 325 MG/1
650 TABLET ORAL ONCE
Status: COMPLETED | OUTPATIENT
Start: 2023-11-14 | End: 2023-11-14

## 2023-11-14 RX ORDER — DIPHENHYDRAMINE HYDROCHLORIDE 50 MG/ML
50 INJECTION INTRAMUSCULAR; INTRAVENOUS
Status: CANCELLED
Start: 2023-12-12

## 2023-11-14 RX ORDER — ACETAMINOPHEN 325 MG/1
650 TABLET ORAL ONCE
Status: CANCELLED
Start: 2023-12-12 | End: 2023-12-12

## 2023-11-14 RX ORDER — DIPHENHYDRAMINE HCL 25 MG
25 CAPSULE ORAL ONCE
Status: COMPLETED | OUTPATIENT
Start: 2023-11-14 | End: 2023-11-14

## 2023-11-14 RX ORDER — ALBUTEROL SULFATE 90 UG/1
1-2 AEROSOL, METERED RESPIRATORY (INHALATION)
Status: CANCELLED
Start: 2023-12-12

## 2023-11-14 RX ORDER — ALBUTEROL SULFATE 0.83 MG/ML
2.5 SOLUTION RESPIRATORY (INHALATION)
Status: CANCELLED | OUTPATIENT
Start: 2023-12-12

## 2023-11-14 RX ADMIN — Medication 250 ML: at 08:20

## 2023-11-14 RX ADMIN — Medication 25 MG: at 08:10

## 2023-11-14 RX ADMIN — ACETAMINOPHEN 650 MG: 325 TABLET ORAL at 08:10

## 2023-11-14 ASSESSMENT — PAIN SCALES - GENERAL: PAINLEVEL: NO PAIN (0)

## 2023-11-14 NOTE — PROGRESS NOTES
Infusion Nursing Note:  Janice Mayfield presents today for Remicade.    Patient seen by provider today: No   present during visit today: Not Applicable.    Note: The patient reports feeling well today and denies any concerns at this time.      Intravenous Access:  Peripheral IV placed.    Treatment Conditions:  Biological Infusion Checklist:  ~~~ NOTE: If the patient answers yes to any of the questions below, hold the infusion and contact ordering provider or on-call provider.    Have you recently had an elevated temperature, fever, chills, productive cough, coughing for 3 weeks or longer or hemoptysis,  abnormal vital signs, night sweats,  chest pain or have you noticed a decrease in your appetite, unexplained weight loss or fatigue? No  Do you have any open wounds or new incisions? No  Do you have any upcoming hospitalizations or surgeries? Does not include esophagogastroduodenoscopy, colonoscopy, endoscopic retrograde cholangiopancreatography (ERCP), endoscopic ultrasound (EUS), dental procedures or joint aspiration/steroid injections No  Do you currently have any signs of illness or infection or are you on any antibiotics? No  Have you had any new, sudden or worsening abdominal pain? No  Have you or anyone in your household received a live vaccination in the past 4 weeks? Please note: No live vaccines while on biologic/chemotherapy until 6 months after the last treatment. Patient can receive the flu vaccine (shot only), pneumovax and the Covid vaccine. It is optimal for the patient to get these vaccines mid cycle, but they can be given at any time as long as it is not on the day of the infusion. No  Have you recently been diagnosed with any new nervous system diseases (ie. Multiple sclerosis, Guillain [place], seizures, neurological changes) or cancer diagnosis? Are you on any form of radiation or chemotherapy? No  Are you pregnant or breast feeding or do you have plans of pregnancy in the  future? No  Have you been having any signs of worsening depression or suicidal ideations?  (benlysta only) No  Have there been any other new onset medical symptoms? No  Have you had any new blood clots? (IVIG only) No      Post Infusion Assessment:  Patient tolerated infusion without incident.  Site patent and intact, free from redness, edema or discomfort.  No evidence of extravasations.  Access discontinued per protocol.  Biologic Infusion Post Education: Call the triage nurse at your clinic or seek medical attention if you have chills and/or temperature greater than or equal to 100.5, uncontrolled nausea/vomiting, diarrhea, constipation, dizziness, shortness of breath, chest pain, heart palpitations, weakness or any other new or concerning symptoms, questions or concerns.  You cannot have any live virus vaccines prior to or during treatment or up to 6 months post infusion.  If you have an upcoming surgery, medical procedure or dental procedure during treatment, this should be discussed with your ordering physician and your surgeon/dentist.  If you are having any concerning symptom, if you are unsure if you should get your next infusion or wish to speak to a provider before your next infusion, please call your care coordinator or triage nurse at your clinic to notify them so we can adequately serve you.       Discharge Plan:   AVS to patient via [employer].  Patient will return 12/4/23 for next appointment. Future appts have been reviewed and crosschecked with appt note and plan.  Patient discharged in stable condition accompanied by: self.  Departure Mode: Ambulatory.      Che Mcfadden RN

## 2023-11-22 DIAGNOSIS — M05.79 RHEUMATOID ARTHRITIS INVOLVING MULTIPLE SITES WITH POSITIVE RHEUMATOID FACTOR (H): ICD-10-CM

## 2023-11-28 RX ORDER — FOLIC ACID 1 MG/1
3000 TABLET ORAL DAILY
Qty: 270 TABLET | Refills: 2 | Status: SHIPPED | OUTPATIENT
Start: 2023-11-28

## 2023-11-28 NOTE — TELEPHONE ENCOUNTER
LVD 8/31/2023  [facility] [place]     Gilson Penny MD  Rheumatology

## 2023-12-07 ENCOUNTER — TELEPHONE (OUTPATIENT)
Dept: RHEUMATOLOGY | Facility: CLINIC | Age: 63
End: 2023-12-07
Payer: COMMERCIAL

## 2023-12-11 ENCOUNTER — TELEPHONE (OUTPATIENT)
Dept: RHEUMATOLOGY | Facility: CLINIC | Age: 63
End: 2023-12-11
Payer: COMMERCIAL

## 2023-12-12 ENCOUNTER — INFUSION THERAPY VISIT (OUTPATIENT)
Dept: INFUSION THERAPY | Facility: CLINIC | Age: 63
End: 2023-12-12
Payer: COMMERCIAL

## 2023-12-12 VITALS
HEART RATE: 72 BPM | TEMPERATURE: 97.6 F | WEIGHT: 167.7 LBS | DIASTOLIC BLOOD PRESSURE: 83 MMHG | OXYGEN SATURATION: 99 % | RESPIRATION RATE: 16 BRPM | BODY MASS INDEX: 28.79 KG/M2 | SYSTOLIC BLOOD PRESSURE: 131 MMHG

## 2023-12-12 DIAGNOSIS — M05.79 RHEUMATOID ARTHRITIS INVOLVING MULTIPLE SITES WITH POSITIVE RHEUMATOID FACTOR (H): Primary | ICD-10-CM

## 2023-12-12 PROCEDURE — 96413 CHEMO IV INFUSION 1 HR: CPT | Performed by: INTERNAL MEDICINE

## 2023-12-12 RX ORDER — MEPERIDINE HYDROCHLORIDE 25 MG/ML
25 INJECTION INTRAMUSCULAR; INTRAVENOUS; SUBCUTANEOUS EVERY 30 MIN PRN
Status: CANCELLED | OUTPATIENT
Start: 2024-01-09

## 2023-12-12 RX ORDER — DIPHENHYDRAMINE HCL 25 MG
25 CAPSULE ORAL ONCE
Status: COMPLETED | OUTPATIENT
Start: 2023-12-12 | End: 2023-12-12

## 2023-12-12 RX ORDER — METHYLPREDNISOLONE SODIUM SUCCINATE 125 MG/2ML
125 INJECTION, POWDER, LYOPHILIZED, FOR SOLUTION INTRAMUSCULAR; INTRAVENOUS ONCE
Status: CANCELLED | OUTPATIENT
Start: 2024-01-09 | End: 2024-01-09

## 2023-12-12 RX ORDER — ACETAMINOPHEN 325 MG/1
650 TABLET ORAL ONCE
Status: CANCELLED
Start: 2024-01-09 | End: 2024-01-09

## 2023-12-12 RX ORDER — DIPHENHYDRAMINE HYDROCHLORIDE 50 MG/ML
50 INJECTION INTRAMUSCULAR; INTRAVENOUS
Status: CANCELLED
Start: 2024-01-09

## 2023-12-12 RX ORDER — ALBUTEROL SULFATE 90 UG/1
1-2 AEROSOL, METERED RESPIRATORY (INHALATION)
Status: CANCELLED
Start: 2024-01-09

## 2023-12-12 RX ORDER — EPINEPHRINE 1 MG/ML
0.3 INJECTION, SOLUTION INTRAMUSCULAR; SUBCUTANEOUS EVERY 5 MIN PRN
Status: CANCELLED | OUTPATIENT
Start: 2024-01-09

## 2023-12-12 RX ORDER — HEPARIN SODIUM,PORCINE 10 UNIT/ML
5 VIAL (ML) INTRAVENOUS
Status: CANCELLED | OUTPATIENT
Start: 2024-01-09

## 2023-12-12 RX ORDER — DIPHENHYDRAMINE HCL 25 MG
25 CAPSULE ORAL ONCE
Status: CANCELLED
Start: 2024-01-09 | End: 2024-01-09

## 2023-12-12 RX ORDER — METHYLPREDNISOLONE SODIUM SUCCINATE 125 MG/2ML
125 INJECTION, POWDER, LYOPHILIZED, FOR SOLUTION INTRAMUSCULAR; INTRAVENOUS
Status: CANCELLED
Start: 2024-01-09

## 2023-12-12 RX ORDER — ALBUTEROL SULFATE 0.83 MG/ML
2.5 SOLUTION RESPIRATORY (INHALATION)
Status: CANCELLED | OUTPATIENT
Start: 2024-01-09

## 2023-12-12 RX ORDER — ACETAMINOPHEN 325 MG/1
650 TABLET ORAL ONCE
Status: COMPLETED | OUTPATIENT
Start: 2023-12-12 | End: 2023-12-12

## 2023-12-12 RX ORDER — HEPARIN SODIUM (PORCINE) LOCK FLUSH IV SOLN 100 UNIT/ML 100 UNIT/ML
5 SOLUTION INTRAVENOUS
Status: CANCELLED | OUTPATIENT
Start: 2024-01-09

## 2023-12-12 RX ADMIN — Medication 250 ML: at 08:04

## 2023-12-12 RX ADMIN — Medication 25 MG: at 07:50

## 2023-12-12 RX ADMIN — ACETAMINOPHEN 650 MG: 325 TABLET ORAL at 07:50

## 2023-12-12 NOTE — PROGRESS NOTES
Infusion Nursing Note:  Janice Mayfield presents today for Rapid Remicade.    Patient seen by provider today: No   present during visit today: Not Applicable.    Note: Patient reports feeling well overall, states energy level is low prior to infusion but improves post. See flowsheets for full assessment.    Intravenous Access:  Peripheral IV placed.    Treatment Conditions:  Biological Infusion Checklist:  ~~~ NOTE: If the patient answers yes to any of the questions below, hold the infusion and contact ordering provider or on-call provider.    Have you recently had an elevated temperature, fever, chills, productive cough, coughing for 3 weeks or longer or hemoptysis,  abnormal vital signs, night sweats,  chest pain or have you noticed a decrease in your appetite, unexplained weight loss or fatigue? No  Do you have any open wounds or new incisions? No  Do you have any upcoming hospitalizations or surgeries? Does not include esophagogastroduodenoscopy, colonoscopy, endoscopic retrograde cholangiopancreatography (ERCP), endoscopic ultrasound (EUS), dental procedures or joint aspiration/steroid injections No  Do you currently have any signs of illness or infection or are you on any antibiotics? No  Have you had any new, sudden or worsening abdominal pain? No  Have you or anyone in your household received a live vaccination in the past 4 weeks? Please note: No live vaccines while on biologic/chemotherapy until 6 months after the last treatment. Patient can receive the flu vaccine (shot only), pneumovax and the Covid vaccine. It is optimal for the patient to get these vaccines mid cycle, but they can be given at any time as long as it is not on the day of the infusion. No  Have you recently been diagnosed with any new nervous system diseases (ie. Multiple sclerosis, Guillain [place], seizures, neurological changes) or cancer diagnosis? Are you on any form of radiation or chemotherapy? No  Are you  pregnant or breast feeding or do you have plans of pregnancy in the future? No  Have you been having any signs of worsening depression or suicidal ideations?  (benlysta only) No  Have there been any other new onset medical symptoms? No  Have you had any new blood clots? (IVIG only) No    Post Infusion Assessment:  Patient tolerated infusion without incident.  Site patent and intact, free from redness, edema or discomfort.  No evidence of extravasations.  Access discontinued per protocol.  Biologic Infusion Post Education: Call the triage nurse at your clinic or seek medical attention if you have chills and/or temperature greater than or equal to 100.5, uncontrolled nausea/vomiting, diarrhea, constipation, dizziness, shortness of breath, chest pain, heart palpitations, weakness or any other new or concerning symptoms, questions or concerns.  You cannot have any live virus vaccines prior to or during treatment or up to 6 months post infusion.  If you have an upcoming surgery, medical procedure or dental procedure during treatment, this should be discussed with your ordering physician and your surgeon/dentist.  If you are having any concerning symptom, if you are unsure if you should get your next infusion or wish to speak to a provider before your next infusion, please call your care coordinator or triage nurse at your clinic to notify them so we can adequately serve you.     Discharge Plan:   Future appts have been reviewed and crosschecked with appt note and plan.  AVS to patient via [employer].  Patient will return 1/9/2024 for next appointment.   Patient discharged in stable condition accompanied by: self.  Departure Mode: Ambulatory.      Tatyana Peacock RN BSN [facility]

## 2024-01-01 DIAGNOSIS — Z79.52 LONG TERM CURRENT USE OF SYSTEMIC STEROIDS: ICD-10-CM

## 2024-01-01 DIAGNOSIS — M05.79 RHEUMATOID ARTHRITIS INVOLVING MULTIPLE SITES WITH POSITIVE RHEUMATOID FACTOR (H): ICD-10-CM

## 2024-01-01 DIAGNOSIS — M15.3 OTHER SECONDARY OSTEOARTHRITIS OF MULTIPLE SITES: ICD-10-CM

## 2024-01-01 DIAGNOSIS — Z79.60 LONG-TERM USE OF IMMUNOSUPPRESSANT MEDICATION: ICD-10-CM

## 2024-01-01 DIAGNOSIS — E55.9 VITAMIN D DEFICIENCY: ICD-10-CM

## 2024-01-05 RX ORDER — METHOTREXATE 2.5 MG/1
10 TABLET ORAL
Qty: 48 TABLET | Refills: 0 | Status: SHIPPED | OUTPATIENT
Start: 2024-01-05 | End: 2024-05-15

## 2024-01-05 NOTE — TELEPHONE ENCOUNTER
METHOTREXATE 2.5 MG TABLET   Last Written Prescription Date:  6/25/2023  Last Fill Quantity: 48,   # refills: 0  Last Office Visit: 8/31/2023  Future Office visit:  None    CBC RESULTS:   Recent Labs   Lab Test 11/14/23 0754   WBC 5.2   RBC 4.04   HGB 11.5*   HCT 36.6   MCV 91   MCH 28.5   MCHC 31.4*   RDW 13.8          Creatinine   Date Value Ref Range Status   11/14/2023 0.63 0.51 - 0.95 mg/dL Final   06/07/2021 0.86 0.52 - 1.04 mg/dL Final   ]    Liver Function Studies -   Recent Labs   Lab Test 11/14/23 0754   PROTTOTAL 7.2   ALBUMIN 3.6   BILITOTAL 0.3   ALKPHOS 98   AST 25   ALT 44       Routing refill request to provider for review/approval because:  Drug not on the [facility], [facility] or [facility] refill protocol or controlled substance      Shanique Nina RN  Central Triage Red Flags/Med Refills

## 2024-01-09 ENCOUNTER — INFUSION THERAPY VISIT (OUTPATIENT)
Dept: INFUSION THERAPY | Facility: CLINIC | Age: 64
End: 2024-01-09
Attending: NURSE PRACTITIONER
Payer: COMMERCIAL

## 2024-01-09 VITALS
DIASTOLIC BLOOD PRESSURE: 64 MMHG | SYSTOLIC BLOOD PRESSURE: 112 MMHG | HEART RATE: 70 BPM | BODY MASS INDEX: 29.03 KG/M2 | OXYGEN SATURATION: 99 % | TEMPERATURE: 97.5 F | WEIGHT: 169.1 LBS | RESPIRATION RATE: 16 BRPM

## 2024-01-09 DIAGNOSIS — M05.79 RHEUMATOID ARTHRITIS INVOLVING MULTIPLE SITES WITH POSITIVE RHEUMATOID FACTOR (H): Primary | ICD-10-CM

## 2024-01-09 PROCEDURE — 250N000011 HC RX IP 250 OP 636: Mod: JZ | Performed by: INTERNAL MEDICINE

## 2024-01-09 PROCEDURE — 96413 CHEMO IV INFUSION 1 HR: CPT

## 2024-01-09 PROCEDURE — 250N000013 HC RX MED GY IP 250 OP 250 PS 637: Performed by: INTERNAL MEDICINE

## 2024-01-09 PROCEDURE — 258N000003 HC RX IP 258 OP 636: Performed by: INTERNAL MEDICINE

## 2024-01-09 PROCEDURE — 99207 PR NO CHARGE LOS: CPT

## 2024-01-09 RX ORDER — DIPHENHYDRAMINE HYDROCHLORIDE 50 MG/ML
50 INJECTION INTRAMUSCULAR; INTRAVENOUS
Status: CANCELLED
Start: 2024-02-06

## 2024-01-09 RX ORDER — HEPARIN SODIUM (PORCINE) LOCK FLUSH IV SOLN 100 UNIT/ML 100 UNIT/ML
5 SOLUTION INTRAVENOUS
Status: CANCELLED | OUTPATIENT
Start: 2024-02-06

## 2024-01-09 RX ORDER — METHYLPREDNISOLONE SODIUM SUCCINATE 125 MG/2ML
125 INJECTION, POWDER, LYOPHILIZED, FOR SOLUTION INTRAMUSCULAR; INTRAVENOUS
Status: CANCELLED
Start: 2024-02-06

## 2024-01-09 RX ORDER — DIPHENHYDRAMINE HCL 25 MG
25 CAPSULE ORAL ONCE
Status: COMPLETED | OUTPATIENT
Start: 2024-01-09 | End: 2024-01-09

## 2024-01-09 RX ORDER — ACETAMINOPHEN 325 MG/1
650 TABLET ORAL ONCE
Status: COMPLETED | OUTPATIENT
Start: 2024-01-09 | End: 2024-01-09

## 2024-01-09 RX ORDER — MEPERIDINE HYDROCHLORIDE 25 MG/ML
25 INJECTION INTRAMUSCULAR; INTRAVENOUS; SUBCUTANEOUS EVERY 30 MIN PRN
Status: CANCELLED | OUTPATIENT
Start: 2024-02-06

## 2024-01-09 RX ORDER — METHYLPREDNISOLONE SODIUM SUCCINATE 125 MG/2ML
125 INJECTION, POWDER, LYOPHILIZED, FOR SOLUTION INTRAMUSCULAR; INTRAVENOUS ONCE
Status: CANCELLED | OUTPATIENT
Start: 2024-02-06 | End: 2024-02-06

## 2024-01-09 RX ORDER — ALBUTEROL SULFATE 90 UG/1
1-2 AEROSOL, METERED RESPIRATORY (INHALATION)
Status: CANCELLED
Start: 2024-02-06

## 2024-01-09 RX ORDER — HEPARIN SODIUM,PORCINE 10 UNIT/ML
5 VIAL (ML) INTRAVENOUS
Status: CANCELLED | OUTPATIENT
Start: 2024-02-06

## 2024-01-09 RX ORDER — ALBUTEROL SULFATE 0.83 MG/ML
2.5 SOLUTION RESPIRATORY (INHALATION)
Status: CANCELLED | OUTPATIENT
Start: 2024-02-06

## 2024-01-09 RX ORDER — ACETAMINOPHEN 325 MG/1
650 TABLET ORAL ONCE
Status: CANCELLED
Start: 2024-02-06 | End: 2024-02-06

## 2024-01-09 RX ORDER — DIPHENHYDRAMINE HCL 25 MG
25 CAPSULE ORAL ONCE
Status: CANCELLED
Start: 2024-02-06 | End: 2024-02-06

## 2024-01-09 RX ORDER — EPINEPHRINE 1 MG/ML
0.3 INJECTION, SOLUTION INTRAMUSCULAR; SUBCUTANEOUS EVERY 5 MIN PRN
Status: CANCELLED | OUTPATIENT
Start: 2024-02-06

## 2024-01-09 RX ADMIN — DIPHENHYDRAMINE HYDROCHLORIDE 25 MG: 25 CAPSULE ORAL at 08:13

## 2024-01-09 RX ADMIN — INFLIXIMAB 600 MG: 100 INJECTION, POWDER, LYOPHILIZED, FOR SOLUTION INTRAVENOUS at 08:15

## 2024-01-09 RX ADMIN — SODIUM CHLORIDE 250 ML: 9 INJECTION, SOLUTION INTRAVENOUS at 08:11

## 2024-01-09 RX ADMIN — ACETAMINOPHEN 650 MG: 325 TABLET ORAL at 08:13

## 2024-01-09 NOTE — PROGRESS NOTES
Infusion Nursing Note:  Janice Mayfield presents today for Remicade.    Patient seen by provider today: No   present during visit today: Not Applicable.    Note: Patient reports feeling well overall today, no new medical concerns reported.    Intravenous Access:  Peripheral IV placed.    Treatment Conditions:  Biological Infusion Checklist:  ~~~ NOTE: If the patient answers yes to any of the questions below, hold the infusion and contact ordering provider or on-call provider.    Have you recently had an elevated temperature, fever, chills, productive cough, coughing for 3 weeks or longer or hemoptysis,  abnormal vital signs, night sweats,  chest pain or have you noticed a decrease in your appetite, unexplained weight loss or fatigue? No  Do you have any open wounds or new incisions? No  Do you have any upcoming hospitalizations or surgeries? Does not include esophagogastroduodenoscopy, colonoscopy, endoscopic retrograde cholangiopancreatography (ERCP), endoscopic ultrasound (EUS), dental procedures or joint aspiration/steroid injections No  Do you currently have any signs of illness or infection or are you on any antibiotics? No  Have you had any new, sudden or worsening abdominal pain? No  Have you or anyone in your household received a live vaccination in the past 4 weeks? Please note: No live vaccines while on biologic/chemotherapy until 6 months after the last treatment. Patient can receive the flu vaccine (shot only), pneumovax and the Covid vaccine. It is optimal for the patient to get these vaccines mid cycle, but they can be given at any time as long as it is not on the day of the infusion. No  Have you recently been diagnosed with any new nervous system diseases (ie. Multiple sclerosis, Guillain [place], seizures, neurological changes) or cancer diagnosis? Are you on any form of radiation or chemotherapy? No  Are you pregnant or breast feeding or do you have plans of pregnancy in the  future? No  Have you been having any signs of worsening depression or suicidal ideations?  (benlysta only) No  Have there been any other new onset medical symptoms? No  Have you had any new blood clots? (IVIG only) No    Post Infusion Assessment:  Patient tolerated infusion without incident.  Site patent and intact, free from redness, edema or discomfort.  No evidence of extravasations.  Access discontinued per protocol.  Biologic Infusion Post Education: Call the triage nurse at your clinic or seek medical attention if you have chills and/or temperature greater than or equal to 100.5, uncontrolled nausea/vomiting, diarrhea, constipation, dizziness, shortness of breath, chest pain, heart palpitations, weakness or any other new or concerning symptoms, questions or concerns.  You cannot have any live virus vaccines prior to or during treatment or up to 6 months post infusion.  If you have an upcoming surgery, medical procedure or dental procedure during treatment, this should be discussed with your ordering physician and your surgeon/dentist.  If you are having any concerning symptom, if you are unsure if you should get your next infusion or wish to speak to a provider before your next infusion, please call your care coordinator or triage nurse at your clinic to notify them so we can adequately serve you.     Discharge Plan:   Future appts have been reviewed and crosschecked with appt note and plan.  AVS to patient via [employer].  Patient will return 2/6/2024 for next appointment.   Patient discharged in stable condition accompanied by: self.  Departure Mode: Ambulatory.      Tatyana Peacock RN BSN OCN

## 2024-01-30 DIAGNOSIS — M05.79 RHEUMATOID ARTHRITIS INVOLVING MULTIPLE SITES WITH POSITIVE RHEUMATOID FACTOR (H): ICD-10-CM

## 2024-01-30 NOTE — TELEPHONE ENCOUNTER
Health Call Center    Phone Message    May a detailed message be left on voicemail: yes     Reason for Call: Medication Refill Request    Has the patient contacted the pharmacy for the refill? Yes   Name of medication being requested: predniSONE (DELTASONE) 5 MG tablet    Provider who prescribed the medication: Dr. Penny    [id number] [address]      Date medication is needed: Pt will out of this medication in the next 3 weeks; Pt wants to be proactive to make sure refills are available when she is out since Dr. Penny has left [facility]    Remind patient there is a 72-business hour turn around time on refill requests- yes    Action Taken: Message routed to:  [facility] ([facility] Med Refill Team

## 2024-01-30 NOTE — TELEPHONE ENCOUNTER
predniSONE (DELTASONE) 5 MG tablet   90 tablet 1 8/24/2023 8/31/2023  [facility] [place]    HemalathaGilson mcelroy MD  Rheumatology    Nv:  3/22/24      Routed because:who to sign for this?

## 2024-01-31 RX ORDER — PREDNISONE 5 MG/1
5 TABLET ORAL DAILY
Qty: 90 TABLET | Refills: 0 | Status: SHIPPED | OUTPATIENT
Start: 2024-01-31 | End: 2024-05-15

## 2024-02-06 ENCOUNTER — INFUSION THERAPY VISIT (OUTPATIENT)
Dept: INFUSION THERAPY | Facility: CLINIC | Age: 64
End: 2024-02-06
Attending: NURSE PRACTITIONER
Payer: COMMERCIAL

## 2024-02-06 VITALS
HEART RATE: 63 BPM | WEIGHT: 169.1 LBS | OXYGEN SATURATION: 98 % | BODY MASS INDEX: 29.03 KG/M2 | RESPIRATION RATE: 16 BRPM | DIASTOLIC BLOOD PRESSURE: 84 MMHG | TEMPERATURE: 97.8 F | SYSTOLIC BLOOD PRESSURE: 123 MMHG

## 2024-02-06 DIAGNOSIS — Z79.899 HIGH RISK MEDICATION USE: ICD-10-CM

## 2024-02-06 DIAGNOSIS — M05.79 RHEUMATOID ARTHRITIS INVOLVING MULTIPLE SITES WITH POSITIVE RHEUMATOID FACTOR (H): ICD-10-CM

## 2024-02-06 DIAGNOSIS — M05.79 RHEUMATOID ARTHRITIS INVOLVING MULTIPLE SITES WITH POSITIVE RHEUMATOID FACTOR (H): Primary | ICD-10-CM

## 2024-02-06 LAB
ALBUMIN SERPL BCG-MCNC: 3.9 G/DL (ref 3.5–5.2)
ALP SERPL-CCNC: 94 U/L (ref 40–150)
ALT SERPL W P-5'-P-CCNC: <5 U/L (ref 0–50)
ANION GAP SERPL CALCULATED.3IONS-SCNC: 9 MMOL/L (ref 7–15)
AST SERPL W P-5'-P-CCNC: 17 U/L (ref 0–45)
BASOPHILS # BLD AUTO: 0 10E3/UL (ref 0–0.2)
BASOPHILS NFR BLD AUTO: 0 %
BILIRUB SERPL-MCNC: 0.4 MG/DL
BUN SERPL-MCNC: 11.3 MG/DL (ref 8–23)
CALCIUM SERPL-MCNC: 9.2 MG/DL (ref 8.8–10.2)
CHLORIDE SERPL-SCNC: 107 MMOL/L (ref 98–107)
CREAT SERPL-MCNC: 0.73 MG/DL (ref 0.51–0.95)
CRP SERPL-MCNC: <3 MG/L
DEPRECATED HCO3 PLAS-SCNC: 25 MMOL/L (ref 22–29)
EGFRCR SERPLBLD CKD-EPI 2021: >90 ML/MIN/1.73M2
EOSINOPHIL # BLD AUTO: 0 10E3/UL (ref 0–0.7)
EOSINOPHIL NFR BLD AUTO: 0 %
ERYTHROCYTE [DISTWIDTH] IN BLOOD BY AUTOMATED COUNT: 14.1 % (ref 10–15)
ERYTHROCYTE [SEDIMENTATION RATE] IN BLOOD BY WESTERGREN METHOD: 19 MM/HR (ref 0–30)
GLUCOSE SERPL-MCNC: 95 MG/DL (ref 70–99)
HCT VFR BLD AUTO: 39.2 % (ref 35–47)
HGB BLD-MCNC: 12.6 G/DL (ref 11.7–15.7)
HOLD SPECIMEN: NORMAL
IMM GRANULOCYTES # BLD: 0 10E3/UL
IMM GRANULOCYTES NFR BLD: 0 %
LYMPHOCYTES # BLD AUTO: 3 10E3/UL (ref 0.8–5.3)
LYMPHOCYTES NFR BLD AUTO: 45 %
MCH RBC QN AUTO: 28.9 PG (ref 26.5–33)
MCHC RBC AUTO-ENTMCNC: 32.1 G/DL (ref 31.5–36.5)
MCV RBC AUTO: 90 FL (ref 78–100)
MONOCYTES # BLD AUTO: 0.8 10E3/UL (ref 0–1.3)
MONOCYTES NFR BLD AUTO: 11 %
NEUTROPHILS # BLD AUTO: 2.9 10E3/UL (ref 1.6–8.3)
NEUTROPHILS NFR BLD AUTO: 44 %
NRBC # BLD AUTO: 0 10E3/UL
NRBC BLD AUTO-RTO: 0 /100
PLATELET # BLD AUTO: 292 10E3/UL (ref 150–450)
POTASSIUM SERPL-SCNC: 4 MMOL/L (ref 3.4–5.3)
PROT SERPL-MCNC: 7.6 G/DL (ref 6.4–8.3)
RBC # BLD AUTO: 4.36 10E6/UL (ref 3.8–5.2)
SODIUM SERPL-SCNC: 141 MMOL/L (ref 135–145)
WBC # BLD AUTO: 6.7 10E3/UL (ref 4–11)

## 2024-02-06 PROCEDURE — 250N000011 HC RX IP 250 OP 636: Mod: JZ | Performed by: INTERNAL MEDICINE

## 2024-02-06 PROCEDURE — 85652 RBC SED RATE AUTOMATED: CPT

## 2024-02-06 PROCEDURE — 99207 PR NO CHARGE LOS: CPT

## 2024-02-06 PROCEDURE — 85041 AUTOMATED RBC COUNT: CPT

## 2024-02-06 PROCEDURE — 258N000003 HC RX IP 258 OP 636: Performed by: INTERNAL MEDICINE

## 2024-02-06 PROCEDURE — 80053 COMPREHEN METABOLIC PANEL: CPT

## 2024-02-06 PROCEDURE — 250N000013 HC RX MED GY IP 250 OP 250 PS 637: Performed by: INTERNAL MEDICINE

## 2024-02-06 PROCEDURE — 86140 C-REACTIVE PROTEIN: CPT

## 2024-02-06 PROCEDURE — 96413 CHEMO IV INFUSION 1 HR: CPT

## 2024-02-06 PROCEDURE — 36415 COLL VENOUS BLD VENIPUNCTURE: CPT

## 2024-02-06 RX ORDER — MEPERIDINE HYDROCHLORIDE 25 MG/ML
25 INJECTION INTRAMUSCULAR; INTRAVENOUS; SUBCUTANEOUS EVERY 30 MIN PRN
Status: CANCELLED | OUTPATIENT
Start: 2024-03-05

## 2024-02-06 RX ORDER — METHYLPREDNISOLONE SODIUM SUCCINATE 125 MG/2ML
125 INJECTION, POWDER, LYOPHILIZED, FOR SOLUTION INTRAMUSCULAR; INTRAVENOUS
Status: CANCELLED
Start: 2024-03-05

## 2024-02-06 RX ORDER — HEPARIN SODIUM,PORCINE 10 UNIT/ML
5 VIAL (ML) INTRAVENOUS
Status: CANCELLED | OUTPATIENT
Start: 2024-03-05

## 2024-02-06 RX ORDER — ACETAMINOPHEN 325 MG/1
650 TABLET ORAL ONCE
Status: CANCELLED
Start: 2024-03-05 | End: 2024-03-05

## 2024-02-06 RX ORDER — ALBUTEROL SULFATE 90 UG/1
1-2 AEROSOL, METERED RESPIRATORY (INHALATION)
Status: CANCELLED
Start: 2024-03-05

## 2024-02-06 RX ORDER — DIPHENHYDRAMINE HCL 25 MG
25 CAPSULE ORAL ONCE
Status: CANCELLED
Start: 2024-03-05 | End: 2024-03-05

## 2024-02-06 RX ORDER — ACETAMINOPHEN 325 MG/1
650 TABLET ORAL ONCE
Status: COMPLETED | OUTPATIENT
Start: 2024-02-06 | End: 2024-02-06

## 2024-02-06 RX ORDER — ALBUTEROL SULFATE 0.83 MG/ML
2.5 SOLUTION RESPIRATORY (INHALATION)
Status: CANCELLED | OUTPATIENT
Start: 2024-03-05

## 2024-02-06 RX ORDER — METHYLPREDNISOLONE SODIUM SUCCINATE 125 MG/2ML
125 INJECTION, POWDER, LYOPHILIZED, FOR SOLUTION INTRAMUSCULAR; INTRAVENOUS ONCE
Status: CANCELLED | OUTPATIENT
Start: 2024-03-05 | End: 2024-03-05

## 2024-02-06 RX ORDER — EPINEPHRINE 1 MG/ML
0.3 INJECTION, SOLUTION INTRAMUSCULAR; SUBCUTANEOUS EVERY 5 MIN PRN
Status: CANCELLED | OUTPATIENT
Start: 2024-03-05

## 2024-02-06 RX ORDER — DIPHENHYDRAMINE HCL 25 MG
25 CAPSULE ORAL ONCE
Status: COMPLETED | OUTPATIENT
Start: 2024-02-06 | End: 2024-02-06

## 2024-02-06 RX ORDER — HEPARIN SODIUM (PORCINE) LOCK FLUSH IV SOLN 100 UNIT/ML 100 UNIT/ML
5 SOLUTION INTRAVENOUS
Status: CANCELLED | OUTPATIENT
Start: 2024-03-05

## 2024-02-06 RX ORDER — DIPHENHYDRAMINE HYDROCHLORIDE 50 MG/ML
50 INJECTION INTRAMUSCULAR; INTRAVENOUS
Status: CANCELLED
Start: 2024-03-05

## 2024-02-06 RX ADMIN — SODIUM CHLORIDE 250 ML: 9 INJECTION, SOLUTION INTRAVENOUS at 07:54

## 2024-02-06 RX ADMIN — DIPHENHYDRAMINE HYDROCHLORIDE 25 MG: 25 CAPSULE ORAL at 07:57

## 2024-02-06 RX ADMIN — INFLIXIMAB 600 MG: 100 INJECTION, POWDER, LYOPHILIZED, FOR SOLUTION INTRAVENOUS at 07:59

## 2024-02-06 RX ADMIN — ACETAMINOPHEN 650 MG: 325 TABLET ORAL at 07:57

## 2024-02-06 NOTE — PROGRESS NOTES
Infusion Nursing Note:  Janice SIDNEY Taimargot Mayfield presents today for Rapid Remicade.    Patient seen by provider today: No   present during visit today: Not Applicable.    Note: Patient reports feeling well overall today. Continues to tolerate infusions.    Intravenous Access:  Peripheral IV placed.    Treatment Conditions:  Biological Infusion Checklist:  ~~~ NOTE: If the patient answers yes to any of the questions below, hold the infusion and contact ordering provider or on-call provider.    Have you recently had an elevated temperature, fever, chills, productive cough, coughing for 3 weeks or longer or hemoptysis,  abnormal vital signs, night sweats,  chest pain or have you noticed a decrease in your appetite, unexplained weight loss or fatigue? No  Do you have any open wounds or new incisions? No  Do you have any upcoming hospitalizations or surgeries? Does not include esophagogastroduodenoscopy, colonoscopy, endoscopic retrograde cholangiopancreatography (ERCP), endoscopic ultrasound (EUS), dental procedures or joint aspiration/steroid injections No  Do you currently have any signs of illness or infection or are you on any antibiotics? No  Have you had any new, sudden or worsening abdominal pain? No  Have you or anyone in your household received a live vaccination in the past 4 weeks? Please note: No live vaccines while on biologic/chemotherapy until 6 months after the last treatment. Patient can receive the flu vaccine (shot only), pneumovax and the Covid vaccine. It is optimal for the patient to get these vaccines mid cycle, but they can be given at any time as long as it is not on the day of the infusion. No  Have you recently been diagnosed with any new nervous system diseases (ie. Multiple sclerosis, Guillain [place], seizures, neurological changes) or cancer diagnosis? Are you on any form of radiation or chemotherapy? No  Are you pregnant or breast feeding or do you have plans of pregnancy in the  future? No  Have you been having any signs of worsening depression or suicidal ideations?  (benlysta only) No  Have there been any other new onset medical symptoms? No  Have you had any new blood clots? (IVIG only) No    Post Infusion Assessment:  Patient tolerated infusion without incident.  Site patent and intact, free from redness, edema or discomfort.  No evidence of extravasations.  Access discontinued per protocol.  Biologic Infusion Post Education: Call the triage nurse at your clinic or seek medical attention if you have chills and/or temperature greater than or equal to 100.5, uncontrolled nausea/vomiting, diarrhea, constipation, dizziness, shortness of breath, chest pain, heart palpitations, weakness or any other new or concerning symptoms, questions or concerns.  You cannot have any live virus vaccines prior to or during treatment or up to 6 months post infusion.  If you have an upcoming surgery, medical procedure or dental procedure during treatment, this should be discussed with your ordering physician and your surgeon/dentist.  If you are having any concerning symptom, if you are unsure if you should get your next infusion or wish to speak to a provider before your next infusion, please call your care coordinator or triage nurse at your clinic to notify them so we can adequately serve you.     Discharge Plan:   Future appts have been reviewed and crosschecked with appt note and plan.  AVS to patient via [employer].  Patient will return 3/5/24 for next appointment.   Patient discharged in stable condition accompanied by: self.  Departure Mode: Ambulatory.      Tatyana Peacock RN BSN OCN

## 2024-03-05 ENCOUNTER — INFUSION THERAPY VISIT (OUTPATIENT)
Dept: INFUSION THERAPY | Facility: CLINIC | Age: 64
End: 2024-03-05
Attending: NURSE PRACTITIONER
Payer: COMMERCIAL

## 2024-03-05 ENCOUNTER — TELEPHONE (OUTPATIENT)
Dept: RHEUMATOLOGY | Facility: CLINIC | Age: 64
End: 2024-03-05

## 2024-03-05 VITALS
HEART RATE: 67 BPM | RESPIRATION RATE: 16 BRPM | BODY MASS INDEX: 29.35 KG/M2 | TEMPERATURE: 98.1 F | DIASTOLIC BLOOD PRESSURE: 73 MMHG | WEIGHT: 171 LBS | SYSTOLIC BLOOD PRESSURE: 120 MMHG | OXYGEN SATURATION: 100 %

## 2024-03-05 DIAGNOSIS — M05.79 RHEUMATOID ARTHRITIS INVOLVING MULTIPLE SITES WITH POSITIVE RHEUMATOID FACTOR (H): Primary | ICD-10-CM

## 2024-03-05 PROCEDURE — 96413 CHEMO IV INFUSION 1 HR: CPT

## 2024-03-05 PROCEDURE — 258N000003 HC RX IP 258 OP 636: Performed by: INTERNAL MEDICINE

## 2024-03-05 PROCEDURE — 250N000011 HC RX IP 250 OP 636: Mod: JZ | Performed by: INTERNAL MEDICINE

## 2024-03-05 PROCEDURE — 250N000013 HC RX MED GY IP 250 OP 250 PS 637: Performed by: INTERNAL MEDICINE

## 2024-03-05 RX ORDER — DIPHENHYDRAMINE HYDROCHLORIDE 50 MG/ML
50 INJECTION INTRAMUSCULAR; INTRAVENOUS
Status: CANCELLED
Start: 2024-03-11

## 2024-03-05 RX ORDER — HEPARIN SODIUM (PORCINE) LOCK FLUSH IV SOLN 100 UNIT/ML 100 UNIT/ML
5 SOLUTION INTRAVENOUS
Status: CANCELLED | OUTPATIENT
Start: 2024-03-11

## 2024-03-05 RX ORDER — ACETAMINOPHEN 325 MG/1
650 TABLET ORAL ONCE
Status: COMPLETED | OUTPATIENT
Start: 2024-03-05 | End: 2024-03-05

## 2024-03-05 RX ORDER — DIPHENHYDRAMINE HCL 25 MG
25 CAPSULE ORAL ONCE
Status: CANCELLED
Start: 2024-03-11 | End: 2024-03-11

## 2024-03-05 RX ORDER — ACETAMINOPHEN 325 MG/1
650 TABLET ORAL ONCE
Status: CANCELLED
Start: 2024-03-11 | End: 2024-03-11

## 2024-03-05 RX ORDER — ALBUTEROL SULFATE 0.83 MG/ML
2.5 SOLUTION RESPIRATORY (INHALATION)
Status: CANCELLED | OUTPATIENT
Start: 2024-03-11

## 2024-03-05 RX ORDER — METHYLPREDNISOLONE SODIUM SUCCINATE 125 MG/2ML
125 INJECTION, POWDER, LYOPHILIZED, FOR SOLUTION INTRAMUSCULAR; INTRAVENOUS
Status: CANCELLED
Start: 2024-03-11

## 2024-03-05 RX ORDER — MEPERIDINE HYDROCHLORIDE 25 MG/ML
25 INJECTION INTRAMUSCULAR; INTRAVENOUS; SUBCUTANEOUS EVERY 30 MIN PRN
Status: CANCELLED | OUTPATIENT
Start: 2024-03-11

## 2024-03-05 RX ORDER — METHYLPREDNISOLONE SODIUM SUCCINATE 125 MG/2ML
125 INJECTION, POWDER, LYOPHILIZED, FOR SOLUTION INTRAMUSCULAR; INTRAVENOUS ONCE
Status: CANCELLED | OUTPATIENT
Start: 2024-03-11 | End: 2024-03-11

## 2024-03-05 RX ORDER — HEPARIN SODIUM,PORCINE 10 UNIT/ML
5 VIAL (ML) INTRAVENOUS
Status: CANCELLED | OUTPATIENT
Start: 2024-03-11

## 2024-03-05 RX ORDER — DIPHENHYDRAMINE HCL 25 MG
25 CAPSULE ORAL ONCE
Status: COMPLETED | OUTPATIENT
Start: 2024-03-05 | End: 2024-03-05

## 2024-03-05 RX ORDER — ALBUTEROL SULFATE 90 UG/1
1-2 AEROSOL, METERED RESPIRATORY (INHALATION)
Status: CANCELLED
Start: 2024-03-11

## 2024-03-05 RX ORDER — EPINEPHRINE 1 MG/ML
0.3 INJECTION, SOLUTION INTRAMUSCULAR; SUBCUTANEOUS EVERY 5 MIN PRN
Status: CANCELLED | OUTPATIENT
Start: 2024-03-11

## 2024-03-05 RX ADMIN — INFLIXIMAB 600 MG: 100 INJECTION, POWDER, LYOPHILIZED, FOR SOLUTION INTRAVENOUS at 10:36

## 2024-03-05 RX ADMIN — ACETAMINOPHEN 650 MG: 325 TABLET ORAL at 10:25

## 2024-03-05 RX ADMIN — DIPHENHYDRAMINE HYDROCHLORIDE 25 MG: 25 CAPSULE ORAL at 10:25

## 2024-03-05 RX ADMIN — SODIUM CHLORIDE 250 ML: 9 INJECTION, SOLUTION INTRAVENOUS at 10:26

## 2024-03-05 NOTE — TELEPHONE ENCOUNTER
Patient has upcoming appointment with Dr. Harvey at [place], per [facility] [place], to get orders thru them. Dulce Maria in Infusion informed to reach out to Dr. Harvey.     YENNY Shin   Email: [email or web address]  [facility] - Rheumatology  Phone: [phone]  Fax: [phone]

## 2024-03-05 NOTE — PROGRESS NOTES
Infusion Nursing Note:  Janice SIDNEY Melly Mayfield presents today for Rapid Remicade.    Patient seen by provider today: No   present during visit today: Not Applicable.    Note: Patient reports she has a crown placement today. She discussed this with her Rheumatologist who told her when to have the invasive part done and patient followed direction.     When trying to release orders, a hard stop flagged writer that the signing provider is no longer an Authorised Provider and unable to release medication. Unable to connect with multiple providers in a timely matter. This caused delay in care of 3 hours for patient to receive her infusion. Patient has an appointment with her new provider, Dr. Analia Cabral on 3/22.    Intravenous Access:  Peripheral IV placed.    Treatment Conditions:  Not Applicable.  ~~~ NOTE: If the patient answers yes to any of the questions below, hold the infusion and contact ordering provider or on-call provider.    Do you currently have any signs of illness or infection or are you on any antibiotics? No  Have you recently had an elevated temperature, fever, chills, productive cough, coughing for 3 weeks or longer or hemoptysis, abnormal vital signs, night sweats, chest pain or have you noticed a decrease in your appetite, unexplained weight loss or fatigue? No  Have you had any new, sudden, or worsening abdominal pain? No  Do you have any open wounds or new incisions? (exclude for patients with hidradenitis suppurativa) No  Have you recently been diagnosed with any new nervous system diseases (ie. Multiple sclerosis, Guillain [place], seizures, neurological changes) or cancer diagnosis? Are you on any form of radiation or chemotherapy? No  Have there been any other new onset medical symptoms? No  Are you pregnant or breast feeding or do you have plans of pregnancy in the future? No; N/A  Do you have any upcoming hospitalizations or surgeries? Does not include  esophagogastroduodenoscopy, colonoscopy, endoscopic retrograde cholangiopancreatography (ERCP), endoscopic ultrasound (EUS), dental procedures (including cleanings, fillings, implants, extractions)  or joint aspiration/steroid injections No  Have you or anyone in your household received a live vaccination in the past 4 weeks? Please note: No live vaccines while on biologic/chemotherapy until 6 months after the last treatment. Patient can receive the flu vaccine (shot only).  It is optimal for the patient to get it mid cycle, but it can be given at any time as long as it is not on the day of the infusion. No  If applicable to prescribed medication, confirm negative PPD or quantiferon gold MTB. If positive, verify has negative chest x-ray or the patient is at least 4 weeks post initiation of INH/B6 therapy and have clearance from provider before infusion (Y/N:[id number])  If applicable to prescribed medication, confirm negative hepatitis B surface antigen or hepatitis C. If positive, clearance from provider before infusion. (Y/N: [id number])  Rheumatology patients receiving tocilizumab (Actemra): If labs were drawn within the past week, hold dosing until cleared to infuse If AST/ALT > 2 X upper limit normal; ANC < 1.0. NO; N/A  Patients receiving belimumab (Benlysta): Have you been having any signs of worsening depression or suicidal ideations? No; N/A     Post Infusion Assessment:  Patient tolerated infusion without incident.  Site patent and intact, free from redness, edema or discomfort.  No evidence of extravasations.  Access discontinued per protocol.  Biologic Infusion Post Education: Call the triage nurse at your clinic or seek medical attention if you have chills and/or temperature greater than or equal to 100.5, uncontrolled nausea/vomiting, diarrhea, constipation, dizziness, shortness of breath, chest pain, heart palpitations, weakness or any other new or concerning symptoms, questions or concerns.  You cannot have  any live virus vaccines prior to or during treatment or up to 6 months post infusion.  If you have an upcoming surgery, medical procedure or dental procedure during treatment, this should be discussed with your ordering physician and your surgeon/dentist.  If you are having any concerning symptom, if you are unsure if you should get your next infusion or wish to speak to a provider before your next infusion, please call your care coordinator or triage nurse at your clinic to notify them so we can adequately serve you.       Discharge Plan:   AVS to patient via [employer].  Patient will return 4/2 for next appointment.   Patient discharged in stable condition accompanied by: self.  Departure Mode: Ambulatory.      Dulce Maria Clement RN

## 2024-03-05 NOTE — TELEPHONE ENCOUNTER
The issue is the patient's provider is no longer in our system i guess. Dr. Saenz signed a Remicade correct dose but it says for week 0 and 2. she has been on it for years. Pharmacy is hesitant to use that one. She also uses benadryl and tylenol that are not signed. Just wondering if he could sign the every 28 days dose and the premeds. She has been waiting for a little over an hour. (we tried on call first). Thank you!    Please call Dulce Maria back at [phone]    YENNY Shin [facility] - Rheumatology  Phone: [phone]  Fax: [phone]

## 2024-03-08 NOTE — ADDENDUM NOTE
Addended by: PAVAN HILARIO on: 3/5/2024 10:22 AM     Modules accepted: Orders     Continue Regimen: ketoconazole 2 % shampoo Use as face wash daily, for scalp use 2-3x per week\\n\\nhydrocortisone 2.5 % topical cream Apply twice a day for x1-2 weeks on x 1 week off then prn for flares Detail Level: Zone Render In Strict Bullet Format?: No Initiate Treatment: Clobetasol solution apply to scalp 1-2x a day for up to 2 weeks, hold at least one week. Repeat when needed for flares.

## 2024-04-01 ENCOUNTER — TELEPHONE (OUTPATIENT)
Dept: RHEUMATOLOGY | Facility: CLINIC | Age: 64
End: 2024-04-01
Payer: COMMERCIAL

## 2024-04-01 RX ORDER — ALBUTEROL SULFATE 0.83 MG/ML
2.5 SOLUTION RESPIRATORY (INHALATION)
Status: CANCELLED | OUTPATIENT
Start: 2024-04-01

## 2024-04-01 RX ORDER — ALBUTEROL SULFATE 90 UG/1
1-2 AEROSOL, METERED RESPIRATORY (INHALATION)
Status: CANCELLED
Start: 2024-04-01

## 2024-04-01 RX ORDER — METHYLPREDNISOLONE SODIUM SUCCINATE 125 MG/2ML
125 INJECTION, POWDER, LYOPHILIZED, FOR SOLUTION INTRAMUSCULAR; INTRAVENOUS ONCE
Status: CANCELLED | OUTPATIENT
Start: 2024-04-01 | End: 2024-04-01

## 2024-04-01 RX ORDER — DIPHENHYDRAMINE HCL 25 MG
25 CAPSULE ORAL ONCE
Status: CANCELLED
Start: 2024-04-01 | End: 2024-04-01

## 2024-04-01 RX ORDER — DIPHENHYDRAMINE HYDROCHLORIDE 50 MG/ML
50 INJECTION INTRAMUSCULAR; INTRAVENOUS
Status: CANCELLED
Start: 2024-04-01

## 2024-04-01 RX ORDER — ACETAMINOPHEN 325 MG/1
650 TABLET ORAL ONCE
Status: CANCELLED
Start: 2024-04-01 | End: 2024-04-01

## 2024-04-01 RX ORDER — HEPARIN SODIUM,PORCINE 10 UNIT/ML
5-20 VIAL (ML) INTRAVENOUS DAILY PRN
Status: CANCELLED | OUTPATIENT
Start: 2024-04-01

## 2024-04-01 RX ORDER — EPINEPHRINE 1 MG/ML
0.3 INJECTION, SOLUTION, CONCENTRATE INTRAVENOUS EVERY 5 MIN PRN
Status: CANCELLED | OUTPATIENT
Start: 2024-04-01

## 2024-04-01 RX ORDER — HEPARIN SODIUM (PORCINE) LOCK FLUSH IV SOLN 100 UNIT/ML 100 UNIT/ML
5 SOLUTION INTRAVENOUS
Status: CANCELLED | OUTPATIENT
Start: 2024-04-01

## 2024-04-01 RX ORDER — METHYLPREDNISOLONE SODIUM SUCCINATE 125 MG/2ML
125 INJECTION, POWDER, LYOPHILIZED, FOR SOLUTION INTRAMUSCULAR; INTRAVENOUS
Status: CANCELLED
Start: 2024-04-01

## 2024-04-01 NOTE — TELEPHONE ENCOUNTER
Entered therapy plan per Dr. Lynn' instructions and sent to provider for review and signature.    Urmila Daigle RN

## 2024-04-02 ENCOUNTER — LAB (OUTPATIENT)
Dept: INFUSION THERAPY | Facility: CLINIC | Age: 64
End: 2024-04-02
Attending: INTERNAL MEDICINE
Payer: COMMERCIAL

## 2024-04-02 VITALS
OXYGEN SATURATION: 99 % | SYSTOLIC BLOOD PRESSURE: 126 MMHG | BODY MASS INDEX: 29.08 KG/M2 | TEMPERATURE: 98.2 F | DIASTOLIC BLOOD PRESSURE: 78 MMHG | HEART RATE: 74 BPM | WEIGHT: 169.4 LBS | RESPIRATION RATE: 16 BRPM

## 2024-04-02 DIAGNOSIS — M05.79 RHEUMATOID ARTHRITIS INVOLVING MULTIPLE SITES WITH POSITIVE RHEUMATOID FACTOR (H): Primary | ICD-10-CM

## 2024-04-02 LAB
HOLD SPECIMEN: NORMAL
HOLD SPECIMEN: NORMAL

## 2024-04-02 PROCEDURE — 86481 TB AG RESPONSE T-CELL SUSP: CPT | Performed by: INTERNAL MEDICINE

## 2024-04-02 PROCEDURE — 96413 CHEMO IV INFUSION 1 HR: CPT

## 2024-04-02 PROCEDURE — 250N000013 HC RX MED GY IP 250 OP 250 PS 637: Performed by: INTERNAL MEDICINE

## 2024-04-02 PROCEDURE — 258N000003 HC RX IP 258 OP 636: Performed by: INTERNAL MEDICINE

## 2024-04-02 PROCEDURE — 36415 COLL VENOUS BLD VENIPUNCTURE: CPT | Performed by: INTERNAL MEDICINE

## 2024-04-02 PROCEDURE — 250N000011 HC RX IP 250 OP 636: Mod: JZ | Performed by: INTERNAL MEDICINE

## 2024-04-02 RX ORDER — ALBUTEROL SULFATE 0.83 MG/ML
2.5 SOLUTION RESPIRATORY (INHALATION)
Status: CANCELLED | OUTPATIENT
Start: 2024-04-30

## 2024-04-02 RX ORDER — HEPARIN SODIUM (PORCINE) LOCK FLUSH IV SOLN 100 UNIT/ML 100 UNIT/ML
5 SOLUTION INTRAVENOUS
Status: CANCELLED | OUTPATIENT
Start: 2024-04-30

## 2024-04-02 RX ORDER — DIPHENHYDRAMINE HYDROCHLORIDE 50 MG/ML
50 INJECTION INTRAMUSCULAR; INTRAVENOUS
Status: CANCELLED
Start: 2024-04-30

## 2024-04-02 RX ORDER — METHYLPREDNISOLONE SODIUM SUCCINATE 125 MG/2ML
125 INJECTION, POWDER, LYOPHILIZED, FOR SOLUTION INTRAMUSCULAR; INTRAVENOUS
Status: CANCELLED
Start: 2024-04-30

## 2024-04-02 RX ORDER — METHYLPREDNISOLONE SODIUM SUCCINATE 125 MG/2ML
125 INJECTION, POWDER, LYOPHILIZED, FOR SOLUTION INTRAMUSCULAR; INTRAVENOUS ONCE
Status: CANCELLED | OUTPATIENT
Start: 2024-04-30 | End: 2024-04-30

## 2024-04-02 RX ORDER — HEPARIN SODIUM,PORCINE 10 UNIT/ML
5-20 VIAL (ML) INTRAVENOUS DAILY PRN
Status: CANCELLED | OUTPATIENT
Start: 2024-04-30

## 2024-04-02 RX ORDER — ALBUTEROL SULFATE 90 UG/1
1-2 AEROSOL, METERED RESPIRATORY (INHALATION)
Status: CANCELLED
Start: 2024-04-30

## 2024-04-02 RX ORDER — DIPHENHYDRAMINE HCL 25 MG
25 CAPSULE ORAL ONCE
Status: CANCELLED
Start: 2024-04-30 | End: 2024-04-30

## 2024-04-02 RX ORDER — ACETAMINOPHEN 325 MG/1
650 TABLET ORAL ONCE
Status: COMPLETED | OUTPATIENT
Start: 2024-04-02 | End: 2024-04-02

## 2024-04-02 RX ORDER — EPINEPHRINE 1 MG/ML
0.3 INJECTION, SOLUTION INTRAMUSCULAR; SUBCUTANEOUS EVERY 5 MIN PRN
Status: CANCELLED | OUTPATIENT
Start: 2024-04-30

## 2024-04-02 RX ORDER — DIPHENHYDRAMINE HCL 25 MG
25 CAPSULE ORAL ONCE
Status: COMPLETED | OUTPATIENT
Start: 2024-04-02 | End: 2024-04-02

## 2024-04-02 RX ORDER — ACETAMINOPHEN 325 MG/1
650 TABLET ORAL ONCE
Status: CANCELLED
Start: 2024-04-30 | End: 2024-04-30

## 2024-04-02 RX ADMIN — DIPHENHYDRAMINE HYDROCHLORIDE 25 MG: 25 CAPSULE ORAL at 08:03

## 2024-04-02 RX ADMIN — INFLIXIMAB 600 MG: 100 INJECTION, POWDER, LYOPHILIZED, FOR SOLUTION INTRAVENOUS at 08:15

## 2024-04-02 RX ADMIN — SODIUM CHLORIDE 250 ML: 9 INJECTION, SOLUTION INTRAVENOUS at 08:15

## 2024-04-02 RX ADMIN — ACETAMINOPHEN 650 MG: 325 TABLET ORAL at 08:03

## 2024-04-02 NOTE — PROGRESS NOTES
Infusion Nursing Note:  Janice Erickirk Mayfield presents today for Remicade.    Patient seen by provider today: No   present during visit today: Not Applicable.    Note: Patient reports no new concerns.    Intravenous Access:  Peripheral IV placed.    Treatment Conditions:  ~~~ NOTE: If the patient answers yes to any of the questions below, hold the infusion and contact ordering provider or on-call provider.    Do you currently have any signs of illness or infection or are you on any antibiotics? No  Have you recently had an elevated temperature, fever, chills, productive cough, coughing for 3 weeks or longer or hemoptysis, abnormal vital signs, night sweats, chest pain or have you noticed a decrease in your appetite, unexplained weight loss or fatigue? No  Have you had any new, sudden, or worsening abdominal pain? No  Do you have any open wounds or new incisions? (exclude for patients with hidradenitis suppurativa) No  Have you recently been diagnosed with any new nervous system diseases (ie. Multiple sclerosis, Guillain [place], seizures, neurological changes) or cancer diagnosis? Are you on any form of radiation or chemotherapy? No  Have there been any other new onset medical symptoms? No  Are you pregnant or breast feeding or do you have plans of pregnancy in the future? No; N/A  Do you have any upcoming hospitalizations or surgeries? Does not include esophagogastroduodenoscopy, colonoscopy, endoscopic retrograde cholangiopancreatography (ERCP), endoscopic ultrasound (EUS), dental procedures (including cleanings, fillings, implants, extractions)  or joint aspiration/steroid injections No  Have you or anyone in your household received a live vaccination in the past 4 weeks? Please note: No live vaccines while on biologic/chemotherapy until 6 months after the last treatment. Patient can receive the flu vaccine (shot only).  It is optimal for the patient to get it mid cycle, but it can be given at any  time as long as it is not on the day of the infusion. No  If applicable to prescribed medication, confirm negative PPD or quantiferon gold MTB. If positive, verify has negative chest x-ray or the patient is at least 4 weeks post initiation of INH/B6 therapy and have clearance from provider before infusion y  If applicable to prescribed medication, confirm negative hepatitis B surface antigen or hepatitis C. If positive, clearance from provider before infusion. y  Rheumatology patients receiving tocilizumab (Actemra): If labs were drawn within the past week, hold dosing until cleared to infuse If AST/ALT > 2 X upper limit normal; ANC < 1.0.  N/A  Patients receiving belimumab (Benlysta): Have you been having any signs of worsening depression or suicidal ideations? N/A       Post Infusion Assessment:  Patient tolerated infusion without incident.  Site patent and intact, free from redness, edema or discomfort.  No evidence of extravasations.  Access discontinued per protocol.  Biologic Infusion Post Education: Call the triage nurse at your clinic or seek medical attention if you have chills and/or temperature greater than or equal to 100.5, uncontrolled nausea/vomiting, diarrhea, constipation, dizziness, shortness of breath, chest pain, heart palpitations, weakness or any other new or concerning symptoms, questions or concerns.  You cannot have any live virus vaccines prior to or during treatment or up to 6 months post infusion.  If you have an upcoming surgery, medical procedure or dental procedure during treatment, this should be discussed with your ordering physician and your surgeon/dentist.  If you are having any concerning symptom, if you are unsure if you should get your next infusion or wish to speak to a provider before your next infusion, please call your care coordinator or triage nurse at your clinic to notify them so we can adequately serve you.       Discharge Plan:   AVS to patient via [employer].  Patient  will return 4/30 for next appointment.   Patient discharged in stable condition accompanied by: self.  Departure Mode: Ambulatory.      Dulce Maria Clement RN

## 2024-04-03 LAB
GAMMA INTERFERON BACKGROUND BLD IA-ACNC: 0.06 IU/ML
M TB IFN-G BLD-IMP: NEGATIVE
M TB IFN-G CD4+ BCKGRND COR BLD-ACNC: 9.94 IU/ML
MITOGEN IGNF BCKGRD COR BLD-ACNC: 0 IU/ML
MITOGEN IGNF BCKGRD COR BLD-ACNC: 0.02 IU/ML
QUANTIFERON MITOGEN: 10 IU/ML
QUANTIFERON NIL TUBE: 0.06 IU/ML
QUANTIFERON TB1 TUBE: 0.06 IU/ML
QUANTIFERON TB2 TUBE: 0.08

## 2024-04-30 ENCOUNTER — INFUSION THERAPY VISIT (OUTPATIENT)
Dept: INFUSION THERAPY | Facility: CLINIC | Age: 64
End: 2024-04-30
Attending: INTERNAL MEDICINE
Payer: COMMERCIAL

## 2024-04-30 VITALS
BODY MASS INDEX: 28.84 KG/M2 | WEIGHT: 168 LBS | RESPIRATION RATE: 16 BRPM | DIASTOLIC BLOOD PRESSURE: 79 MMHG | SYSTOLIC BLOOD PRESSURE: 116 MMHG | OXYGEN SATURATION: 96 % | TEMPERATURE: 97.8 F | HEART RATE: 64 BPM

## 2024-04-30 DIAGNOSIS — M05.79 RHEUMATOID ARTHRITIS INVOLVING MULTIPLE SITES WITH POSITIVE RHEUMATOID FACTOR (H): Primary | ICD-10-CM

## 2024-04-30 PROCEDURE — 99207 PR NO CHARGE LOS: CPT

## 2024-04-30 PROCEDURE — 250N000013 HC RX MED GY IP 250 OP 250 PS 637: Performed by: INTERNAL MEDICINE

## 2024-04-30 PROCEDURE — 250N000011 HC RX IP 250 OP 636: Mod: JZ | Performed by: INTERNAL MEDICINE

## 2024-04-30 PROCEDURE — 96413 CHEMO IV INFUSION 1 HR: CPT

## 2024-04-30 PROCEDURE — 258N000003 HC RX IP 258 OP 636: Performed by: INTERNAL MEDICINE

## 2024-04-30 RX ORDER — HEPARIN SODIUM (PORCINE) LOCK FLUSH IV SOLN 100 UNIT/ML 100 UNIT/ML
5 SOLUTION INTRAVENOUS
Status: CANCELLED | OUTPATIENT
Start: 2024-05-28

## 2024-04-30 RX ORDER — DIPHENHYDRAMINE HYDROCHLORIDE 50 MG/ML
50 INJECTION INTRAMUSCULAR; INTRAVENOUS
Status: CANCELLED
Start: 2024-05-28

## 2024-04-30 RX ORDER — DIPHENHYDRAMINE HCL 25 MG
25 CAPSULE ORAL ONCE
Status: CANCELLED
Start: 2024-05-28 | End: 2024-05-28

## 2024-04-30 RX ORDER — EPINEPHRINE 1 MG/ML
0.3 INJECTION, SOLUTION INTRAMUSCULAR; SUBCUTANEOUS EVERY 5 MIN PRN
Status: CANCELLED | OUTPATIENT
Start: 2024-05-28

## 2024-04-30 RX ORDER — ACETAMINOPHEN 325 MG/1
650 TABLET ORAL ONCE
Status: CANCELLED
Start: 2024-05-28 | End: 2024-05-28

## 2024-04-30 RX ORDER — ALBUTEROL SULFATE 0.83 MG/ML
2.5 SOLUTION RESPIRATORY (INHALATION)
Status: CANCELLED | OUTPATIENT
Start: 2024-05-28

## 2024-04-30 RX ORDER — DIPHENHYDRAMINE HCL 25 MG
25 CAPSULE ORAL ONCE
Status: COMPLETED | OUTPATIENT
Start: 2024-04-30 | End: 2024-04-30

## 2024-04-30 RX ORDER — METHYLPREDNISOLONE SODIUM SUCCINATE 125 MG/2ML
125 INJECTION, POWDER, LYOPHILIZED, FOR SOLUTION INTRAMUSCULAR; INTRAVENOUS
Status: CANCELLED
Start: 2024-05-28

## 2024-04-30 RX ORDER — METHYLPREDNISOLONE SODIUM SUCCINATE 125 MG/2ML
125 INJECTION, POWDER, LYOPHILIZED, FOR SOLUTION INTRAMUSCULAR; INTRAVENOUS ONCE
Status: CANCELLED | OUTPATIENT
Start: 2024-05-28 | End: 2024-05-28

## 2024-04-30 RX ORDER — ACETAMINOPHEN 325 MG/1
650 TABLET ORAL ONCE
Status: COMPLETED | OUTPATIENT
Start: 2024-04-30 | End: 2024-04-30

## 2024-04-30 RX ORDER — ALBUTEROL SULFATE 90 UG/1
1-2 AEROSOL, METERED RESPIRATORY (INHALATION)
Status: CANCELLED
Start: 2024-05-28

## 2024-04-30 RX ORDER — HEPARIN SODIUM,PORCINE 10 UNIT/ML
5-20 VIAL (ML) INTRAVENOUS DAILY PRN
Status: CANCELLED | OUTPATIENT
Start: 2024-05-28

## 2024-04-30 RX ADMIN — DIPHENHYDRAMINE HYDROCHLORIDE 25 MG: 25 CAPSULE ORAL at 08:00

## 2024-04-30 RX ADMIN — INFLIXIMAB 600 MG: 100 INJECTION, POWDER, LYOPHILIZED, FOR SOLUTION INTRAVENOUS at 08:01

## 2024-04-30 RX ADMIN — SODIUM CHLORIDE 250 ML: 9 INJECTION, SOLUTION INTRAVENOUS at 07:48

## 2024-04-30 RX ADMIN — ACETAMINOPHEN 650 MG: 325 TABLET ORAL at 08:00

## 2024-04-30 NOTE — PROGRESS NOTES
Infusion Nursing Note:  Janice Mayfield presents today for Rapid Remicade.    Patient seen by provider today: No   present during visit today: Not Applicable.    Note: Patient reports arthritis pain 2/10 today. Premedications of tylenol and benadryl given.     Intravenous Access:  Peripheral IV placed.    Treatment Conditions:  Biological Infusion Checklist:  ~~~ NOTE: If the patient answers yes to any of the questions below, hold the infusion and contact ordering provider or on-call provider.    Have you recently had an elevated temperature, fever, chills, productive cough, coughing for 3 weeks or longer or hemoptysis,  abnormal vital signs, night sweats,  chest pain or have you noticed a decrease in your appetite, unexplained weight loss or fatigue? No  Do you have any open wounds or new incisions? No  Do you have any upcoming hospitalizations or surgeries? Does not include esophagogastroduodenoscopy, colonoscopy, endoscopic retrograde cholangiopancreatography (ERCP), endoscopic ultrasound (EUS), dental procedures or joint aspiration/steroid injections No  Do you currently have any signs of illness or infection or are you on any antibiotics? No  Have you had any new, sudden or worsening abdominal pain? No  Have you or anyone in your household received a live vaccination in the past 4 weeks? Please note: No live vaccines while on biologic/chemotherapy until 6 months after the last treatment. Patient can receive the flu vaccine (shot only), pneumovax and the Covid vaccine. It is optimal for the patient to get these vaccines mid cycle, but they can be given at any time as long as it is not on the day of the infusion. No  Have you recently been diagnosed with any new nervous system diseases (ie. Multiple sclerosis, Guillain [place], seizures, neurological changes) or cancer diagnosis? Are you on any form of radiation or chemotherapy? No  Have there been any other new onset medical symptoms?  No    Post Infusion Assessment:  Patient tolerated infusion without incident.  Site patent and intact, free from redness, edema or discomfort.  No evidence of extravasations.  Access discontinued per protocol.  Biologic Infusion Post Education: Call the triage nurse at your clinic or seek medical attention if you have chills and/or temperature greater than or equal to 100.5, uncontrolled nausea/vomiting, diarrhea, constipation, dizziness, shortness of breath, chest pain, heart palpitations, weakness or any other new or concerning symptoms, questions or concerns.  You cannot have any live virus vaccines prior to or during treatment or up to 6 months post infusion.  If you have an upcoming surgery, medical procedure or dental procedure during treatment, this should be discussed with your ordering physician and your surgeon/dentist.  If you are having any concerning symptom, if you are unsure if you should get your next infusion or wish to speak to a provider before your next infusion, please call your care coordinator or triage nurse at your clinic to notify them so we can adequately serve you.     Discharge Plan:   Discharge instructions reviewed with: Patient.  Patient and/or family verbalized understanding of discharge instructions and all questions answered.  Patient discharged in stable condition accompanied by: self.  Departure Mode: Ambulatory.  Future appts have been reviewed and crosschecked with appt note and plan.    Kimberley Nix RN

## 2024-05-06 DIAGNOSIS — M05.79 RHEUMATOID ARTHRITIS INVOLVING MULTIPLE SITES WITH POSITIVE RHEUMATOID FACTOR (H): ICD-10-CM

## 2024-05-15 DIAGNOSIS — Z79.899 HIGH RISK MEDICATION USE: Primary | ICD-10-CM

## 2024-05-15 DIAGNOSIS — M15.3 OTHER SECONDARY OSTEOARTHRITIS OF MULTIPLE SITES: ICD-10-CM

## 2024-05-15 DIAGNOSIS — Z79.52 LONG TERM CURRENT USE OF SYSTEMIC STEROIDS: ICD-10-CM

## 2024-05-15 DIAGNOSIS — E55.9 VITAMIN D DEFICIENCY: ICD-10-CM

## 2024-05-15 DIAGNOSIS — Z79.60 LONG-TERM USE OF IMMUNOSUPPRESSANT MEDICATION: ICD-10-CM

## 2024-05-15 DIAGNOSIS — M05.79 RHEUMATOID ARTHRITIS INVOLVING MULTIPLE SITES WITH POSITIVE RHEUMATOID FACTOR (H): ICD-10-CM

## 2024-05-15 RX ORDER — PREDNISONE 5 MG/1
5 TABLET ORAL DAILY
Qty: 90 TABLET | Refills: 0 | Status: SHIPPED | OUTPATIENT
Start: 2024-05-15 | End: 2024-08-12

## 2024-05-15 RX ORDER — METHOTREXATE 2.5 MG/1
10 TABLET ORAL
Qty: 48 TABLET | Refills: 0 | Status: SHIPPED | OUTPATIENT
Start: 2024-05-15 | End: 2024-08-12

## 2024-05-15 NOTE — TELEPHONE ENCOUNTER
methotrexate      Last Written Prescription Date:  1/5/24  Last Fill Quantity: 48,   # refills: 0  Last Office Visit: 8/31/24 [facility]  Future Office visit:  7/8/24    Called patient- getting labs drawn at next infusion later this month. New standing order placed as they were under Dr. Penny.    CBC RESULTS:   Recent Labs   Lab Test 02/06/24  0728   WBC 6.7   RBC 4.36   HGB 12.6   HCT 39.2   MCV 90   MCH 28.9   MCHC 32.1   RDW 14.1          Creatinine   Date Value Ref Range Status   02/06/2024 0.73 0.51 - 0.95 mg/dL Final   06/07/2021 0.86 0.52 - 1.04 mg/dL Final       Liver Function Studies -   Recent Labs   Lab Test 02/06/24  0728   PROTTOTAL 7.6   ALBUMIN 3.9   BILITOTAL 0.4   ALKPHOS 94   AST 17   ALT <5       Routing refill request to provider for review/approval because:  DMARD, due for labs- will be getting drawn at next infusion later this month per patient      Urmila Daigle, RN

## 2024-05-15 NOTE — TELEPHONE ENCOUNTER
[facility] Call Center    Phone Message    May a detailed message be left on voicemail: yes     Reason for Call: Medication Refill Request    Has the patient contacted the pharmacy for the refill? Yes   Name of medication being requested:   predniSONE (DELTASONE) 5 MG tablet   methotrexate 2.5 MG tablet   Provider who prescribed the medication: Previous pt of Gilson Penny MD; upcoming appt with Latanya Lynn MD   Pharmacy:   [id number] [place] [facility] [address]     Date medication is needed: ASAP, per pt, completely out and needs to be filled TODAY. Please call pt back, she is very frazzled about getting the refill. Thank you.       Action Taken: Other: Rheum    Travel Screening: Not Applicable

## 2024-05-15 NOTE — TELEPHONE ENCOUNTER
predniSONE (DELTASONE) 5 MG tablet   90 tablet 0 1/31/2024       Last Office Visit : 8-  Future Office visit:  7-8-2024    Gap in refill

## 2024-05-28 ENCOUNTER — LAB (OUTPATIENT)
Dept: INFUSION THERAPY | Facility: CLINIC | Age: 64
End: 2024-05-28
Attending: INTERNAL MEDICINE
Payer: COMMERCIAL

## 2024-05-28 DIAGNOSIS — Z79.899 HIGH RISK MEDICATION USE: ICD-10-CM

## 2024-05-28 LAB
ALBUMIN SERPL BCG-MCNC: 3.8 G/DL (ref 3.5–5.2)
ALT SERPL W P-5'-P-CCNC: 13 U/L (ref 0–50)
AST SERPL W P-5'-P-CCNC: 18 U/L (ref 0–45)
BASOPHILS # BLD AUTO: 0 10E3/UL (ref 0–0.2)
BASOPHILS NFR BLD AUTO: 0 %
CREAT SERPL-MCNC: 0.79 MG/DL (ref 0.51–0.95)
EGFRCR SERPLBLD CKD-EPI 2021: 85 ML/MIN/1.73M2
EOSINOPHIL # BLD AUTO: 0.1 10E3/UL (ref 0–0.7)
EOSINOPHIL NFR BLD AUTO: 2 %
ERYTHROCYTE [DISTWIDTH] IN BLOOD BY AUTOMATED COUNT: 13.1 % (ref 10–15)
HCT VFR BLD AUTO: 39 % (ref 35–47)
HGB BLD-MCNC: 12.5 G/DL (ref 11.7–15.7)
HOLD SPECIMEN: NORMAL
IMM GRANULOCYTES # BLD: 0 10E3/UL
IMM GRANULOCYTES NFR BLD: 0 %
LYMPHOCYTES # BLD AUTO: 1.8 10E3/UL (ref 0.8–5.3)
LYMPHOCYTES NFR BLD AUTO: 30 %
MCH RBC QN AUTO: 28.9 PG (ref 26.5–33)
MCHC RBC AUTO-ENTMCNC: 32.1 G/DL (ref 31.5–36.5)
MCV RBC AUTO: 90 FL (ref 78–100)
MONOCYTES # BLD AUTO: 0.7 10E3/UL (ref 0–1.3)
MONOCYTES NFR BLD AUTO: 11 %
NEUTROPHILS # BLD AUTO: 3.3 10E3/UL (ref 1.6–8.3)
NEUTROPHILS NFR BLD AUTO: 56 %
NRBC # BLD AUTO: 0 10E3/UL
NRBC BLD AUTO-RTO: 0 /100
PLATELET # BLD AUTO: 282 10E3/UL (ref 150–450)
RBC # BLD AUTO: 4.32 10E6/UL (ref 3.8–5.2)
WBC # BLD AUTO: 6 10E3/UL (ref 4–11)

## 2024-05-28 PROCEDURE — 82565 ASSAY OF CREATININE: CPT

## 2024-05-28 PROCEDURE — 84450 TRANSFERASE (AST) (SGOT): CPT

## 2024-05-28 PROCEDURE — 82040 ASSAY OF SERUM ALBUMIN: CPT

## 2024-05-28 PROCEDURE — 84460 ALANINE AMINO (ALT) (SGPT): CPT

## 2024-05-28 PROCEDURE — 36415 COLL VENOUS BLD VENIPUNCTURE: CPT

## 2024-05-28 PROCEDURE — 85025 COMPLETE CBC W/AUTO DIFF WBC: CPT

## 2024-05-30 DIAGNOSIS — M05.79 RHEUMATOID ARTHRITIS INVOLVING MULTIPLE SITES WITH POSITIVE RHEUMATOID FACTOR (H): Primary | ICD-10-CM

## 2024-06-06 ENCOUNTER — INFUSION THERAPY VISIT (OUTPATIENT)
Dept: INFUSION THERAPY | Facility: CLINIC | Age: 64
End: 2024-06-06
Attending: NURSE PRACTITIONER
Payer: COMMERCIAL

## 2024-06-06 VITALS
TEMPERATURE: 97.4 F | DIASTOLIC BLOOD PRESSURE: 83 MMHG | RESPIRATION RATE: 16 BRPM | SYSTOLIC BLOOD PRESSURE: 134 MMHG | OXYGEN SATURATION: 97 % | HEART RATE: 75 BPM | WEIGHT: 168 LBS | BODY MASS INDEX: 28.84 KG/M2

## 2024-06-06 DIAGNOSIS — M05.79 RHEUMATOID ARTHRITIS INVOLVING MULTIPLE SITES WITH POSITIVE RHEUMATOID FACTOR (H): Primary | ICD-10-CM

## 2024-06-06 PROCEDURE — 250N000011 HC RX IP 250 OP 636: Mod: JZ | Performed by: INTERNAL MEDICINE

## 2024-06-06 PROCEDURE — 96413 CHEMO IV INFUSION 1 HR: CPT

## 2024-06-06 PROCEDURE — 99207 PR NO CHARGE LOS: CPT

## 2024-06-06 PROCEDURE — 258N000003 HC RX IP 258 OP 636: Mod: JZ | Performed by: INTERNAL MEDICINE

## 2024-06-06 RX ORDER — DIPHENHYDRAMINE HYDROCHLORIDE 50 MG/ML
50 INJECTION INTRAMUSCULAR; INTRAVENOUS
Status: CANCELLED
Start: 2024-06-25

## 2024-06-06 RX ORDER — HEPARIN SODIUM (PORCINE) LOCK FLUSH IV SOLN 100 UNIT/ML 100 UNIT/ML
5 SOLUTION INTRAVENOUS
Status: CANCELLED | OUTPATIENT
Start: 2024-06-25

## 2024-06-06 RX ORDER — ALBUTEROL SULFATE 90 UG/1
1-2 AEROSOL, METERED RESPIRATORY (INHALATION)
Status: CANCELLED
Start: 2024-06-25

## 2024-06-06 RX ORDER — ACETAMINOPHEN 325 MG/1
650 TABLET ORAL ONCE
Status: CANCELLED
Start: 2024-06-25 | End: 2024-06-25

## 2024-06-06 RX ORDER — ALBUTEROL SULFATE 0.83 MG/ML
2.5 SOLUTION RESPIRATORY (INHALATION)
Status: CANCELLED | OUTPATIENT
Start: 2024-06-25

## 2024-06-06 RX ORDER — DIPHENHYDRAMINE HCL 25 MG
25 CAPSULE ORAL ONCE
Status: CANCELLED
Start: 2024-06-25 | End: 2024-06-25

## 2024-06-06 RX ORDER — METHYLPREDNISOLONE SODIUM SUCCINATE 125 MG/2ML
125 INJECTION, POWDER, LYOPHILIZED, FOR SOLUTION INTRAMUSCULAR; INTRAVENOUS
Status: CANCELLED
Start: 2024-06-25

## 2024-06-06 RX ORDER — METHYLPREDNISOLONE SODIUM SUCCINATE 125 MG/2ML
125 INJECTION, POWDER, LYOPHILIZED, FOR SOLUTION INTRAMUSCULAR; INTRAVENOUS ONCE
Status: CANCELLED | OUTPATIENT
Start: 2024-06-25 | End: 2024-06-25

## 2024-06-06 RX ORDER — HEPARIN SODIUM,PORCINE 10 UNIT/ML
5-20 VIAL (ML) INTRAVENOUS DAILY PRN
Status: CANCELLED | OUTPATIENT
Start: 2024-06-25

## 2024-06-06 RX ORDER — EPINEPHRINE 1 MG/ML
0.3 INJECTION, SOLUTION INTRAMUSCULAR; SUBCUTANEOUS EVERY 5 MIN PRN
Status: CANCELLED | OUTPATIENT
Start: 2024-06-25

## 2024-06-06 RX ADMIN — SODIUM CHLORIDE 250 ML: 9 INJECTION, SOLUTION INTRAVENOUS at 13:01

## 2024-06-06 RX ADMIN — INFLIXIMAB 600 MG: 100 INJECTION, POWDER, LYOPHILIZED, FOR SOLUTION INTRAVENOUS at 13:23

## 2024-06-06 NOTE — PROGRESS NOTES
Infusion Nursing Note:  Janice Mayfield presents today for Monthly Rapid Remicade .    Patient seen by provider today: No   present during visit today: Not Applicable.    Note: denies side effects to infusion.  No new symptoms.  Notes increase in joint pain prior to infusion, and symptoms also affected by weather changes.      Intravenous Access:  Peripheral IV placed.    Treatment Conditions:  Biological Infusion Checklist:  ~~~ NOTE: If the patient answers yes to any of the questions below, hold the infusion and contact ordering provider or on-call provider.    Have you recently had an elevated temperature, fever, chills, productive cough, coughing for 3 weeks or longer or hemoptysis,  abnormal vital signs, night sweats,  chest pain or have you noticed a decrease in your appetite, unexplained weight loss or fatigue? No  Do you have any open wounds or new incisions? No  Do you have any upcoming hospitalizations or surgeries? Does not include esophagogastroduodenoscopy, colonoscopy, endoscopic retrograde cholangiopancreatography (ERCP), endoscopic ultrasound (EUS), dental procedures or joint aspiration/steroid injections No  Do you currently have any signs of illness or infection or are you on any antibiotics? No  Have you had any new, sudden or worsening abdominal pain? No  Have you or anyone in your household received a live vaccination in the past 4 weeks? Please note: No live vaccines while on biologic/chemotherapy until 6 months after the last treatment. Patient can receive the flu vaccine (shot only), pneumovax and the Covid vaccine. It is optimal for the patient to get these vaccines mid cycle, but they can be given at any time as long as it is not on the day of the infusion. No  Have you recently been diagnosed with any new nervous system diseases (ie. Multiple sclerosis, Guillain [place], seizures, neurological changes) or cancer diagnosis? Are you on any form of radiation or  chemotherapy? No  Are you pregnant or breast feeding or do you have plans of pregnancy in the future? No  Have you been having any signs of worsening depression or suicidal ideations?  (benlysta only) No  Have there been any other new onset medical symptoms? No  Have you had any new blood clots? (IVIG only) No      Post Infusion Assessment:  Patient tolerated infusion without incident.       Discharge Plan:   RTC as scheduled 7/9 for next infusion .      HARRY WISE RN

## 2024-07-08 ENCOUNTER — OFFICE VISIT (OUTPATIENT)
Dept: RHEUMATOLOGY | Facility: CLINIC | Age: 64
End: 2024-07-08
Attending: INTERNAL MEDICINE
Payer: COMMERCIAL

## 2024-07-08 VITALS
SYSTOLIC BLOOD PRESSURE: 132 MMHG | BODY MASS INDEX: 28.67 KG/M2 | OXYGEN SATURATION: 99 % | DIASTOLIC BLOOD PRESSURE: 86 MMHG | HEART RATE: 64 BPM | WEIGHT: 167 LBS

## 2024-07-08 DIAGNOSIS — R76.8 RHEUMATOID FACTOR POSITIVE: ICD-10-CM

## 2024-07-08 DIAGNOSIS — M85.80 BONE EROSION DETERMINED BY X-RAY: ICD-10-CM

## 2024-07-08 DIAGNOSIS — Z79.899 LONG-TERM USE OF HIGH-RISK MEDICATION: ICD-10-CM

## 2024-07-08 DIAGNOSIS — M05.9 SEROPOSITIVE RHEUMATOID ARTHRITIS (H): Primary | ICD-10-CM

## 2024-07-08 DIAGNOSIS — M85.852 OSTEOPENIA OF NECKS OF BOTH FEMURS: ICD-10-CM

## 2024-07-08 DIAGNOSIS — M85.851 OSTEOPENIA OF NECKS OF BOTH FEMURS: ICD-10-CM

## 2024-07-08 PROCEDURE — G0463 HOSPITAL OUTPT CLINIC VISIT: HCPCS | Performed by: INTERNAL MEDICINE

## 2024-07-08 PROCEDURE — 99214 OFFICE O/P EST MOD 30 MIN: CPT | Performed by: INTERNAL MEDICINE

## 2024-07-08 PROCEDURE — G2211 COMPLEX E/M VISIT ADD ON: HCPCS | Performed by: INTERNAL MEDICINE

## 2024-07-08 ASSESSMENT — PAIN SCALES - GENERAL: PAINLEVEL: MODERATE PAIN (4)

## 2024-07-08 NOTE — LETTER
7/8/2024       RE: Janice Mayfield  [address] [address]     Dear Colleague,    Thank you for referring your patient, Janice Mayfield, to the [facility] RHEUMATOLOGY at [facility]. Please see a copy of my visit note below.                           Chief Complaint/Reason for Visit: seropositive RA     HPI:    Janice Mayfield is a 64 year old White female with past medical history listed below.  Today she reports generalized achiness but denied having pain or swelling of joints.  However, she reports she has had a chronically swollen right knee and achiness of right wrist, right knee and left shoulder over the years.  She states she has had pain before and what she has now is different from pain but overall achiness.  She denied having morning stiffness.  She takes prednisone 5 mg at bedtime which helps her with the stiffness in the morning.  She has not had any infections while being on monthly Remicade infusions.  She is also on methotrexate 10 mg/week along with folic acid 3 mg daily.     REVIEW OF SYSTEMS    General -negative for fever  HEENT -negative for oral ulcers  Cardiovascular -negative for chest pain  Respiratory -negative for cough or shortness of breath  Gastrointestinal -negative for blood in stool  Genitourinary -negative for blood in urine  Neuro -no history of stroke or seizures  Hematologic -no history of blood clots or personal history of malignancy      Past Medical History:   Diagnosis Date    Lichen sclerosus et atrophicus of the vulva 11/21/2018    Long term current use of systemic steroids 5/3/2017    Long-term use of immunosuppressant medication 5/11/2018    Other secondary osteoarthritis of multiple sites 5/11/2018    Rheumatoid arthritis involving multiple sites with positive rheumatoid factor (H) 4/28/2017    Vitamin D deficiency 5/3/2017     Past Surgical History:   Procedure  Laterality Date    C/SECTION, LOW TRANSVERSE      , Low Transverse    HC TOOTH EXTRACTION W/FORCEP       Family History   Problem Relation Age of Onset    Cancer Mother 72    Skin Cancer Father      Social History     Socioeconomic History    Marital status:      Spouse name: Maksim    Number of children: 3    Years of education: None    Highest education level: None   Occupational History    Occupation: artist- contract work   Tobacco Use    Smoking status: Never    Smokeless tobacco: Never   Vaping Use    Vaping status: Never Used   Substance and Sexual Activity    Alcohol use: No    Drug use: No    Sexual activity: Not Currently     Partners: Male       Allergies   Allergen Reactions    Adalimumab Hives    Methotrexate      Anxiety. No allergic signs.     Plaquenil [Hydroxychloroquine]      Elevated LFTs    Sulfa Antibiotics      delusions       Current Outpatient Medications   Medication Sig Dispense Refill    DiphenhydrAMINE HCl (BENADRYL PO) Take 25 mg by mouth as needed (Pre-medication for remicade)      folic acid (FOLVITE) 1 MG tablet TAKE 3 TABLETS BY MOUTH DAILY 270 tablet 2    IBUPROFEN PO Take 200 mg by mouth at bedtime      InFLIXimab (REMICADE IV) Inject 100 mg into the vein every 28 days       methotrexate 2.5 MG tablet Take 4 tablets (10 mg) by mouth every 7 days 48 tablet 0    predniSONE (DELTASONE) 5 MG tablet Take 1 tablet (5 mg) by mouth daily 90 tablet 0    Vitamin D, Cholecalciferol, 1000 UNITS CAPS Take 2,000 Units by mouth daily 1 capsule 0    augmented betamethasone dipropionate (DIPROLENE-AF) 0.05 % external ointment Apply sparingly to affected area in groin 1-2 times weekly. (Patient not taking: Reported on 2023) 15 g 3    predniSONE (DELTASONE) 1 MG tablet Take 2 tablets (2 mg) by mouth daily Taper as directed (Patient not taking: Reported on 2023) 60 tablet 1     No current facility-administered medications for this visit.       PHYSICAL EXAM    /86 (BP  Location: Left arm, Patient Position: Sitting, Cuff Size: Adult Regular)   Pulse 64   Wt 75.8 kg (167 lb)   SpO2 99%   BMI 28.67 kg/m        General: Alert, No apparent distress   Psych: Affect euthymic  Eyes: Sclera noninjected  Ears, Nose, Throat, Mouth: Oral mucosa moist with normal salivary pool  Skin: No rashes noted  Cardiovascular: Regular rate and rhythm, Normal S1 and S2 and No murmurs, rubs or gallops  Respiratory: Clear to auscultation with no wheezing or crackles  Neuro: Normal gait and Able to arise from seated position unassisted  Musculoskeletal: Hands:  Normal.  Wrists:  Normal.  Elbows:  Normal.  Shoulders:  Normal.  Feet:  Normal.  Ankles:  Normal.  Knees:  Normal.      LABS   Reviewed as below.     May 2024  Creatinine normal   AST, ALT normal  CBC normal    April 2024  QTB neg     Lab Results   Component Value Date    Rheumatoid Factor <20 11/08/2019    Cyclic Citrullinated Peptide Antibody, IgG 3 11/08/2019     OSH Labs           ASSESSMENT      1. Seropositive rheumatoid arthritis (H)    2. Rheumatoid factor positive    3. Bone erosion determined by x-ray    4. Long-term use of high-risk medication    5. Osteopenia of necks of both femurs      (M05.9) Seropositive rheumatoid arthritis (H)  (primary encounter diagnosis)  Comment: RF pos ( later turned neg), CCP neg. X ray - humeral head erosions. Meds tried in the past - hydroxychloroquine-did not help, methotrexate (d/c liver enzymes elevation), enbrel (ineffective), humira *d/c (uticarial reaction), sulfites (anxiety). Currently on methotrexate 10 mg weekly along with folic acid 3 mg daily. She is also on prednisone 5 mg daily. Also on infliximab 8 mg/kg every 4 weeks.  No synovitis on exam.  The achiness that she experiences is likely secondary to secondary OA which could be a sequela of chronic inflammation.  However, given absence of synovitis on exam, I do not think her RA is active at this point.  Plan:   Continue methotrexate 10 mg  weekly along with folic acid 3 mg daily.  Continue infliximab infusion 8 mg per kilogram every 4 weeks.  Continue prednisone 5 mg daily.  Will refer to endocrinology for management of osteopenia and to recheck DEXA.  Will hold off on labs at this visit as labs were reviewed.  Will check labs at next visit.    (R76.8) Rheumatoid factor positive  Comment: 2/2 RA   Plan: as above     (M85.80) Bone erosion determined by x-ray  Comment: 2/2 RA   Plan: as above     (Z79.899) Long-term use of high-risk medication  Comment: infliximab, methotrexate  Plan: have discussed with potential adverse effects with use of biologics such as suppression of immune system making pt more prone to infections, to hold the medication if the patient has any signs of any infection and to restart only after infection has cleared, reactivation of infections particularly TB and hepatitis B, C, fungal infections, risk of demyelinating disorders and to stop medication immediately if there's any weakness or paresthesias, risk of malignancy. Patient verbalized understanding and agrees to proceed.    The adverse reactions of MTX was discussed which includes cytopenia, immunosuppression, infection, oversedation, pneumonitis, dizziness, nausea, stomach upset, risk of falls, seizures, and derangements in LFTs. Recommended against use of alcohol while on MTX. pt counseled on the adverse effects of Methotrexate including teratogenicity and need for reliable contraception, Alopecia, infection, liver dysfunction and myelosuppression, including the importance of taking daily Folic acid. Patient verbalized understanding and agrees to proceed.    Osteopenia  Comment : DEXA in 2019 - osteopenia of bilateral femoral neck and L spine  Plan : ref to endocrinology    Long term use of systemic steroids  Comment : prednisone   Plan : I have explained to pt the potential adverse effects of long term use of steroids, including suppressing immune system making pts prone  to infections, cataracts, glaucoma, thinning of skin, diabetes, increasing blood pressure, thinning of bones, fractures, AVN, weight gain, importance of taking osteoporosis protection with daily Chris + vitamin D. Patient verbalized understanding and agrees to procced.     I spent minutes on the date of the encounter doing chart review, history and exam, documentation and orders per the note.    RTC - 4 months    GILBERTO RIZO MD    Division of Rheumatic & Autoimmune Diseases  [facility]

## 2024-07-08 NOTE — NURSING NOTE
Chief Complaint   Patient presents with    Consult     /86 (BP Location: Left arm, Patient Position: Sitting, Cuff Size: Adult Regular)   Pulse 64   Wt 75.8 kg (167 lb)   SpO2 99%   BMI 28.67 kg/m

## 2024-07-08 NOTE — PROGRESS NOTES
Chief Complaint/Reason for Visit: seropositive RA     HPI:    Janice Mayfield is a 64 year old White female with past medical history listed below.  Today she reports generalized achiness but denied having pain or swelling of joints.  However, she reports she has had a chronically swollen right knee and achiness of right wrist, right knee and left shoulder over the years.  She states she has had pain before and what she has now is different from pain but overall achiness.  She denied having morning stiffness.  She takes prednisone 5 mg at bedtime which helps her with the stiffness in the morning.  She has not had any infections while being on monthly Remicade infusions.  She is also on methotrexate 10 mg/week along with folic acid 3 mg daily.     REVIEW OF SYSTEMS    General -negative for fever  HEENT -negative for oral ulcers  Cardiovascular -negative for chest pain  Respiratory -negative for cough or shortness of breath  Gastrointestinal -negative for blood in stool  Genitourinary -negative for blood in urine  Neuro -no history of stroke or seizures  Hematologic -no history of blood clots or personal history of malignancy      Past Medical History:   Diagnosis Date    Lichen sclerosus et atrophicus of the vulva 2018    Long term current use of systemic steroids 5/3/2017    Long-term use of immunosuppressant medication 2018    Other secondary osteoarthritis of multiple sites 2018    Rheumatoid arthritis involving multiple sites with positive rheumatoid factor (H) 2017    Vitamin D deficiency 5/3/2017     Past Surgical History:   Procedure Laterality Date    C/SECTION, LOW TRANSVERSE      , Low Transverse    HC TOOTH EXTRACTION W/FORCEP       Family History   Problem Relation Age of Onset    Cancer Mother 72    Skin Cancer Father      Social History     Socioeconomic History    Marital status:      Spouse name: Maksim    Number of children: 3     Years of education: None    Highest education level: None   Occupational History    Occupation: artist- contract work   Tobacco Use    Smoking status: Never    Smokeless tobacco: Never   Vaping Use    Vaping status: Never Used   Substance and Sexual Activity    Alcohol use: No    Drug use: No    Sexual activity: Not Currently     Partners: Male       Allergies   Allergen Reactions    Adalimumab Hives    Methotrexate      Anxiety. No allergic signs.     Plaquenil [Hydroxychloroquine]      Elevated LFTs    Sulfa Antibiotics      delusions       Current Outpatient Medications   Medication Sig Dispense Refill    DiphenhydrAMINE HCl (BENADRYL PO) Take 25 mg by mouth as needed (Pre-medication for remicade)      folic acid (FOLVITE) 1 MG tablet TAKE 3 TABLETS BY MOUTH DAILY 270 tablet 2    IBUPROFEN PO Take 200 mg by mouth at bedtime      InFLIXimab (REMICADE IV) Inject 100 mg into the vein every 28 days       methotrexate 2.5 MG tablet Take 4 tablets (10 mg) by mouth every 7 days 48 tablet 0    predniSONE (DELTASONE) 5 MG tablet Take 1 tablet (5 mg) by mouth daily 90 tablet 0    Vitamin D, Cholecalciferol, 1000 UNITS CAPS Take 2,000 Units by mouth daily 1 capsule 0    augmented betamethasone dipropionate (DIPROLENE-AF) 0.05 % external ointment Apply sparingly to affected area in groin 1-2 times weekly. (Patient not taking: Reported on 12/12/2023) 15 g 3    predniSONE (DELTASONE) 1 MG tablet Take 2 tablets (2 mg) by mouth daily Taper as directed (Patient not taking: Reported on 12/12/2023) 60 tablet 1     No current facility-administered medications for this visit.       PHYSICAL EXAM    /86 (BP Location: Left arm, Patient Position: Sitting, Cuff Size: Adult Regular)   Pulse 64   Wt 75.8 kg (167 lb)   SpO2 99%   BMI 28.67 kg/m        General: Alert, No apparent distress   Psych: Affect euthymic  Eyes: Sclera noninjected  Ears, Nose, Throat, Mouth: Oral mucosa moist with normal salivary pool  Skin: No rashes  noted  Cardiovascular: Regular rate and rhythm, Normal S1 and S2 and No murmurs, rubs or gallops  Respiratory: Clear to auscultation with no wheezing or crackles  Neuro: Normal gait and Able to arise from seated position unassisted  Musculoskeletal: Hands:  Normal.  Wrists:  Normal.  Elbows:  Normal.  Shoulders:  Normal.  Feet:  Normal.  Ankles:  Normal.  Knees:  Normal.      LABS   Reviewed as below.     May 2024  Creatinine normal   AST, ALT normal  CBC normal    April 2024  QTB neg     Lab Results   Component Value Date    Rheumatoid Factor <20 11/08/2019    Cyclic Citrullinated Peptide Antibody, IgG 3 11/08/2019     OSH Labs           ASSESSMENT      1. Seropositive rheumatoid arthritis (H)    2. Rheumatoid factor positive    3. Bone erosion determined by x-ray    4. Long-term use of high-risk medication    5. Osteopenia of necks of both femurs      (M05.9) Seropositive rheumatoid arthritis (H)  (primary encounter diagnosis)  Comment: RF pos ( later turned neg), CCP neg. X ray - humeral head erosions. Meds tried in the past - hydroxychloroquine-did not help, methotrexate (d/c liver enzymes elevation), enbrel (ineffective), humira *d/c (uticarial reaction), sulfites (anxiety). Currently on methotrexate 10 mg weekly along with folic acid 3 mg daily. She is also on prednisone 5 mg daily. Also on infliximab 8 mg/kg every 4 weeks.  No synovitis on exam.  The achiness that she experiences is likely secondary to secondary OA which could be a sequela of chronic inflammation.  However, given absence of synovitis on exam, I do not think her RA is active at this point.  Plan:   Continue methotrexate 10 mg weekly along with folic acid 3 mg daily.  Continue infliximab infusion 8 mg per kilogram every 4 weeks.  Continue prednisone 5 mg daily.  Will refer to endocrinology for management of osteopenia and to recheck DEXA.  Will hold off on labs at this visit as labs were reviewed.  Will check labs at next visit.    (R76.8)  Rheumatoid factor positive  Comment: 2/2 RA   Plan: as above     (M85.80) Bone erosion determined by x-ray  Comment: 2/2 RA   Plan: as above     (Z79.899) Long-term use of high-risk medication  Comment: infliximab, methotrexate  Plan: have discussed with potential adverse effects with use of biologics such as suppression of immune system making pt more prone to infections, to hold the medication if the patient has any signs of any infection and to restart only after infection has cleared, reactivation of infections particularly TB and hepatitis B, C, fungal infections, risk of demyelinating disorders and to stop medication immediately if there's any weakness or paresthesias, risk of malignancy. Patient verbalized understanding and agrees to proceed.    The adverse reactions of MTX was discussed which includes cytopenia, immunosuppression, infection, oversedation, pneumonitis, dizziness, nausea, stomach upset, risk of falls, seizures, and derangements in LFTs. Recommended against use of alcohol while on MTX. pt counseled on the adverse effects of Methotrexate including teratogenicity and need for reliable contraception, Alopecia, infection, liver dysfunction and myelosuppression, including the importance of taking daily Folic acid. Patient verbalized understanding and agrees to proceed.    Osteopenia  Comment : DEXA in 2019 - osteopenia of bilateral femoral neck and L spine  Plan : ref to endocrinology    Long term use of systemic steroids  Comment : prednisone   Plan : I have explained to pt the potential adverse effects of long term use of steroids, including suppressing immune system making pts prone to infections, cataracts, glaucoma, thinning of skin, diabetes, increasing blood pressure, thinning of bones, fractures, AVN, weight gain, importance of taking osteoporosis protection with daily Chris + vitamin D. Patient verbalized understanding and agrees to procced.     I spent minutes on the date of the encounter  doing chart review, history and exam, documentation and orders per the note.    RTC - 4 months    GILBERTO RIZO MD    Division of Rheumatic & Autoimmune Diseases  [facility]

## 2024-07-09 ENCOUNTER — INFUSION THERAPY VISIT (OUTPATIENT)
Dept: INFUSION THERAPY | Facility: CLINIC | Age: 64
End: 2024-07-09
Attending: NURSE PRACTITIONER
Payer: COMMERCIAL

## 2024-07-09 VITALS
TEMPERATURE: 97.7 F | OXYGEN SATURATION: 99 % | WEIGHT: 166.5 LBS | HEART RATE: 72 BPM | RESPIRATION RATE: 16 BRPM | DIASTOLIC BLOOD PRESSURE: 80 MMHG | SYSTOLIC BLOOD PRESSURE: 120 MMHG | BODY MASS INDEX: 28.58 KG/M2

## 2024-07-09 DIAGNOSIS — M05.79 RHEUMATOID ARTHRITIS INVOLVING MULTIPLE SITES WITH POSITIVE RHEUMATOID FACTOR (H): Primary | ICD-10-CM

## 2024-07-09 PROCEDURE — 99207 PR NO CHARGE LOS: CPT

## 2024-07-09 PROCEDURE — 258N000003 HC RX IP 258 OP 636: Performed by: INTERNAL MEDICINE

## 2024-07-09 PROCEDURE — 250N000011 HC RX IP 250 OP 636: Performed by: INTERNAL MEDICINE

## 2024-07-09 PROCEDURE — 96413 CHEMO IV INFUSION 1 HR: CPT

## 2024-07-09 RX ORDER — HEPARIN SODIUM (PORCINE) LOCK FLUSH IV SOLN 100 UNIT/ML 100 UNIT/ML
5 SOLUTION INTRAVENOUS
Status: CANCELLED | OUTPATIENT
Start: 2024-08-01

## 2024-07-09 RX ORDER — METHYLPREDNISOLONE SODIUM SUCCINATE 125 MG/2ML
125 INJECTION, POWDER, LYOPHILIZED, FOR SOLUTION INTRAMUSCULAR; INTRAVENOUS ONCE
Status: CANCELLED | OUTPATIENT
Start: 2024-08-01 | End: 2024-08-01

## 2024-07-09 RX ORDER — HEPARIN SODIUM,PORCINE 10 UNIT/ML
5-20 VIAL (ML) INTRAVENOUS DAILY PRN
Status: CANCELLED | OUTPATIENT
Start: 2024-08-01

## 2024-07-09 RX ORDER — ACETAMINOPHEN 325 MG/1
650 TABLET ORAL ONCE
Status: CANCELLED
Start: 2024-08-01 | End: 2024-08-01

## 2024-07-09 RX ORDER — ALBUTEROL SULFATE 90 UG/1
1-2 AEROSOL, METERED RESPIRATORY (INHALATION)
Status: CANCELLED
Start: 2024-08-01

## 2024-07-09 RX ORDER — DIPHENHYDRAMINE HYDROCHLORIDE 50 MG/ML
50 INJECTION INTRAMUSCULAR; INTRAVENOUS
Status: CANCELLED
Start: 2024-08-01

## 2024-07-09 RX ORDER — EPINEPHRINE 1 MG/ML
0.3 INJECTION, SOLUTION INTRAMUSCULAR; SUBCUTANEOUS EVERY 5 MIN PRN
Status: CANCELLED | OUTPATIENT
Start: 2024-08-01

## 2024-07-09 RX ORDER — ALBUTEROL SULFATE 0.83 MG/ML
2.5 SOLUTION RESPIRATORY (INHALATION)
Status: CANCELLED | OUTPATIENT
Start: 2024-08-01

## 2024-07-09 RX ORDER — METHYLPREDNISOLONE SODIUM SUCCINATE 125 MG/2ML
125 INJECTION, POWDER, LYOPHILIZED, FOR SOLUTION INTRAMUSCULAR; INTRAVENOUS
Status: CANCELLED
Start: 2024-08-01

## 2024-07-09 RX ORDER — DIPHENHYDRAMINE HCL 25 MG
25 CAPSULE ORAL ONCE
Status: CANCELLED
Start: 2024-08-01 | End: 2024-08-01

## 2024-07-09 RX ADMIN — INFLIXIMAB 600 MG: 100 INJECTION, POWDER, LYOPHILIZED, FOR SOLUTION INTRAVENOUS at 08:09

## 2024-07-09 RX ADMIN — SODIUM CHLORIDE 250 ML: 9 INJECTION, SOLUTION INTRAVENOUS at 07:57

## 2024-07-09 NOTE — PROGRESS NOTES
Infusion Nursing Note:  Janice SIDNEY Taimargot Mayfield presents today for Rapid Remicade.    Patient seen by provider today: No, Dr. Lynn 7/8   present during visit today: Not Applicable.    Note: Patient reports last time she did not receive the tylenol and benadry and reports she did very well without and would prefer to not take these again today.    Intravenous Access:  Peripheral IV placed.    Treatment Conditions:  ~~~ NOTE: If the patient answers yes to any of the questions below, hold the infusion and contact ordering provider or on-call provider.    Do you currently have any signs of illness or infection or are you on any antibiotics? No  Have you recently had an elevated temperature, fever, chills, productive cough, coughing for 3 weeks or longer or hemoptysis, abnormal vital signs, night sweats, chest pain or have you noticed a decrease in your appetite, unexplained weight loss or fatigue? No  Have you had any new, sudden, or worsening abdominal pain? No  Do you have any open wounds or new incisions? (exclude for patients with hidradenitis suppurativa) No  Have you recently been diagnosed with any new nervous system diseases (ie. Multiple sclerosis, Guillain [place], seizures, neurological changes) or cancer diagnosis? Are you on any form of radiation or chemotherapy? No  Have there been any other new onset medical symptoms? No  Are you pregnant or breast feeding or do you have plans of pregnancy in the future? N/A  Do you have any upcoming hospitalizations or surgeries? Does not include esophagogastroduodenoscopy, colonoscopy, endoscopic retrograde cholangiopancreatography (ERCP), endoscopic ultrasound (EUS), dental procedures (including cleanings, fillings, implants, extractions)  or joint aspiration/steroid injections No  Have you or anyone in your household received a live vaccination in the past 4 weeks? Please note: No live vaccines while on biologic/chemotherapy until 6 months after the last  treatment. Patient can receive the flu vaccine (shot only).  It is optimal for the patient to get it mid cycle, but it can be given at any time as long as it is not on the day of the infusion. No  If applicable to prescribed medication, confirm negative PPD or quantiferon gold MTB. If positive, verify has negative chest x-ray or the patient is at least 4 weeks post initiation of INH/B6 therapy and have clearance from provider before infusion Y  If applicable to prescribed medication, confirm negative hepatitis B surface antigen or hepatitis C. If positive, clearance from provider before infusion. Y  Rheumatology patients receiving tocilizumab (Actemra): If labs were drawn within the past week, hold dosing until cleared to infuse If AST/ALT > 2 X upper limit normal; ANC < 1.0. NO; N/A  Patients receiving belimumab (Benlysta): Have you been having any signs of worsening depression or suicidal ideations? N/A     Post Infusion Assessment:  Patient tolerated infusion without incident.  Site patent and intact, free from redness, edema or discomfort.  No evidence of extravasations.  Access discontinued per protocol.       Discharge Plan:   [facility]  Patient will return 8/6 for next appointment.   Patient discharged in stable condition accompanied by: self.  Departure Mode: Ambulatory.      Dulce Maria Clement RN

## 2024-08-05 ENCOUNTER — TELEPHONE (OUTPATIENT)
Dept: RHEUMATOLOGY | Facility: CLINIC | Age: 64
End: 2024-08-05
Payer: COMMERCIAL

## 2024-08-05 DIAGNOSIS — Z79.52 LONG TERM CURRENT USE OF SYSTEMIC STEROIDS: ICD-10-CM

## 2024-08-05 DIAGNOSIS — M15.3 OTHER SECONDARY OSTEOARTHRITIS OF MULTIPLE SITES: ICD-10-CM

## 2024-08-05 DIAGNOSIS — E55.9 VITAMIN D DEFICIENCY: ICD-10-CM

## 2024-08-05 DIAGNOSIS — Z79.60 LONG-TERM USE OF IMMUNOSUPPRESSANT MEDICATION: ICD-10-CM

## 2024-08-05 DIAGNOSIS — M05.79 RHEUMATOID ARTHRITIS INVOLVING MULTIPLE SITES WITH POSITIVE RHEUMATOID FACTOR (H): ICD-10-CM

## 2024-08-05 NOTE — TELEPHONE ENCOUNTER
Health Call Center    Phone Message    May a detailed message be left on voicemail: yes     Reason for Call: Other: Pt stated she need Dr Lynn to send over a confirmation regarding an order that was put in for pt infusion tomorrow. Per pt she stated she was covid + last week but today she is clear of covid and would like for Dr Lynn to follow-up with the infusion clinic. Per pt if there is any additional question, please reach out to pt. Per pt please review this ASAP. Thank you      Action Taken: Other: RHEUM    Travel Screening: Not Applicable     Date of Service:

## 2024-08-05 NOTE — TELEPHONE ENCOUNTER
"LM for patient to call back.  Per Dr Lynn, \"We can hold off on infusion for a week.\"    Lorna De La Cruz RN    "

## 2024-08-05 NOTE — TELEPHONE ENCOUNTER
Patient tested positive for Covid 7/30.  S/s were mild.  No fever, dry cough, cold like s/s.  Today feeling back to normal.  She cares for her 94yo dad and he tested positive yesterday.  He had a bad day yesterday with a fever, but no fever today.  She is wondering if she can still go ahead with her infusion tomoroow?    Lorna De La Cruz RN

## 2024-08-12 DIAGNOSIS — M05.79 RHEUMATOID ARTHRITIS INVOLVING MULTIPLE SITES WITH POSITIVE RHEUMATOID FACTOR (H): ICD-10-CM

## 2024-08-12 NOTE — TELEPHONE ENCOUNTER
"Methotrexate:  Last Written Prescription Date:  5/15/24  Last Fill Quantity: 48,  # refills: 0   Last office visit: Visit date not found ; last virtual visit: Visit date not found with prescribing provider:  Shane   Future Office Visit:  11/12/24    Prednisone:  Last Written Prescription Date:  5/15/24  Last Fill Quantity: 90,  # refills: 0   Last office visit: Visit date not found ; last virtual visit: Visit date not found with prescribing provider:  Shane   Future Office Visit:  11/12/24    Last monitoring labs 5/28/24. All WNL.     Per last office notes, 7/8/24, \"Continue methotrexate 10 mg weekly along with folic acid 3 mg daily.  Continue prednisone 5 mg daily\".  DX:  sero+ RA    Requested Prescriptions   Pending Prescriptions Disp Refills    methotrexate 2.5 MG tablet 48 tablet 0     Sig: Take 4 tablets (10 mg) by mouth every 7 days       There is no refill protocol information for this order       predniSONE (DELTASONE) 5 MG tablet 90 tablet 0     Sig: Take 1 tablet (5 mg) by mouth daily       There is no refill protocol information for this order            Lorna De La Cruz RN                    "

## 2024-08-13 ENCOUNTER — INFUSION THERAPY VISIT (OUTPATIENT)
Dept: INFUSION THERAPY | Facility: CLINIC | Age: 64
End: 2024-08-13
Attending: INTERNAL MEDICINE
Payer: COMMERCIAL

## 2024-08-13 VITALS
DIASTOLIC BLOOD PRESSURE: 72 MMHG | OXYGEN SATURATION: 94 % | HEART RATE: 66 BPM | WEIGHT: 167 LBS | TEMPERATURE: 98.3 F | SYSTOLIC BLOOD PRESSURE: 115 MMHG | BODY MASS INDEX: 28.67 KG/M2

## 2024-08-13 DIAGNOSIS — M05.79 RHEUMATOID ARTHRITIS INVOLVING MULTIPLE SITES WITH POSITIVE RHEUMATOID FACTOR (H): Primary | ICD-10-CM

## 2024-08-13 PROCEDURE — 258N000003 HC RX IP 258 OP 636: Performed by: INTERNAL MEDICINE

## 2024-08-13 PROCEDURE — 96413 CHEMO IV INFUSION 1 HR: CPT

## 2024-08-13 PROCEDURE — 99207 PR NO CHARGE LOS: CPT

## 2024-08-13 PROCEDURE — 250N000011 HC RX IP 250 OP 636: Performed by: INTERNAL MEDICINE

## 2024-08-13 RX ORDER — METHYLPREDNISOLONE SODIUM SUCCINATE 125 MG/2ML
125 INJECTION, POWDER, LYOPHILIZED, FOR SOLUTION INTRAMUSCULAR; INTRAVENOUS ONCE
OUTPATIENT
Start: 2024-09-03 | End: 2024-09-03

## 2024-08-13 RX ORDER — HEPARIN SODIUM,PORCINE 10 UNIT/ML
5-20 VIAL (ML) INTRAVENOUS DAILY PRN
OUTPATIENT
Start: 2024-09-03

## 2024-08-13 RX ORDER — ACETAMINOPHEN 325 MG/1
650 TABLET ORAL ONCE
Start: 2024-09-03 | End: 2024-09-03

## 2024-08-13 RX ORDER — DIPHENHYDRAMINE HCL 25 MG
25 CAPSULE ORAL ONCE
Start: 2024-09-03 | End: 2024-09-03

## 2024-08-13 RX ORDER — ALBUTEROL SULFATE 90 UG/1
1-2 AEROSOL, METERED RESPIRATORY (INHALATION)
Start: 2024-09-03

## 2024-08-13 RX ORDER — DIPHENHYDRAMINE HYDROCHLORIDE 50 MG/ML
50 INJECTION INTRAMUSCULAR; INTRAVENOUS
Start: 2024-09-03

## 2024-08-13 RX ORDER — EPINEPHRINE 1 MG/ML
0.3 INJECTION, SOLUTION INTRAMUSCULAR; SUBCUTANEOUS EVERY 5 MIN PRN
OUTPATIENT
Start: 2024-09-03

## 2024-08-13 RX ORDER — HEPARIN SODIUM (PORCINE) LOCK FLUSH IV SOLN 100 UNIT/ML 100 UNIT/ML
5 SOLUTION INTRAVENOUS
OUTPATIENT
Start: 2024-09-03

## 2024-08-13 RX ORDER — PREDNISONE 5 MG/1
5 TABLET ORAL DAILY
Qty: 90 TABLET | Refills: 0 | Status: SHIPPED | OUTPATIENT
Start: 2024-08-13

## 2024-08-13 RX ORDER — METHOTREXATE 2.5 MG/1
10 TABLET ORAL
Qty: 48 TABLET | Refills: 0 | Status: SHIPPED | OUTPATIENT
Start: 2024-08-13

## 2024-08-13 RX ORDER — ALBUTEROL SULFATE 0.83 MG/ML
2.5 SOLUTION RESPIRATORY (INHALATION)
OUTPATIENT
Start: 2024-09-03

## 2024-08-13 RX ORDER — METHYLPREDNISOLONE SODIUM SUCCINATE 125 MG/2ML
125 INJECTION, POWDER, LYOPHILIZED, FOR SOLUTION INTRAMUSCULAR; INTRAVENOUS
Start: 2024-09-03

## 2024-08-13 RX ADMIN — SODIUM CHLORIDE 250 ML: 9 INJECTION, SOLUTION INTRAVENOUS at 10:41

## 2024-08-13 RX ADMIN — INFLIXIMAB 600 MG: 100 INJECTION, POWDER, LYOPHILIZED, FOR SOLUTION INTRAVENOUS at 10:48

## 2024-08-13 NOTE — PROGRESS NOTES
"Infusion Nursing Note:  Janice Mayfield presents today for Rapid Remicade.    Patient seen by provider today: No   present during visit today: Not Applicable.    Note: delayed infusion today due to recent covid infection.  Patient is very fatigued, which she normally gets is she is due or overdue for infusion.     Patient on covid ISolation today (<20days)  She will home test prior to next infusion    Intravenous Access:  Peripheral IV placed.    Treatment Conditions:  Biological Infusion Checklist:  ~~~ NOTE: If the patient answers yes to any of the questions below, hold the infusion and contact ordering provider or on-call provider.    Have you recently had an elevated temperature, fever, chills, productive cough, coughing for 3 weeks or longer or hemoptysis,  abnormal vital signs, night sweats,  chest pain or have you noticed a decrease in your appetite, unexplained weight loss or fatigue? Yes, symptoms resolved 10 days ago   - \"cold symptoms\"  Do you have any open wounds or new incisions? No  Do you have any upcoming hospitalizations or surgeries? Does not include esophagogastroduodenoscopy, colonoscopy, endoscopic retrograde cholangiopancreatography (ERCP), endoscopic ultrasound (EUS), dental procedures or joint aspiration/steroid injections No  Do you currently have any signs of illness or infection or are you on any antibiotics? Yes, see above  Have you had any new, sudden or worsening abdominal pain? No  Have you or anyone in your household received a live vaccination in the past 4 weeks? Please note: No live vaccines while on biologic/chemotherapy until 6 months after the last treatment. Patient can receive the flu vaccine (shot only), pneumovax and the Covid vaccine. It is optimal for the patient to get these vaccines mid cycle, but they can be given at any time as long as it is not on the day of the infusion. No  Have you recently been diagnosed with any new nervous system diseases " (ie. Multiple sclerosis, Guillain [place], seizures, neurological changes) or cancer diagnosis? Are you on any form of radiation or chemotherapy? No  Are you pregnant or breast feeding or do you have plans of pregnancy in the future? No  Have you been having any signs of worsening depression or suicidal ideations?  (benlysta only) No  Have there been any other new onset medical symptoms? No  Have you had any new blood clots? (IVIG only) No      Post Infusion Assessment:  Patient tolerated infusion without incident.       Discharge Plan:   Message sent to scheduling to update patient future appts, as she is off schedule since delay in Remicade.      HARRY WISE RN

## 2024-08-15 RX ORDER — PREDNISONE 5 MG/1
5 TABLET ORAL DAILY
Qty: 90 TABLET | Refills: 0 | OUTPATIENT
Start: 2024-08-15

## 2024-09-10 ENCOUNTER — INFUSION THERAPY VISIT (OUTPATIENT)
Dept: INFUSION THERAPY | Facility: CLINIC | Age: 64
End: 2024-09-10
Attending: INTERNAL MEDICINE
Payer: COMMERCIAL

## 2024-09-10 VITALS
WEIGHT: 169.9 LBS | TEMPERATURE: 98 F | HEART RATE: 76 BPM | BODY MASS INDEX: 29.16 KG/M2 | DIASTOLIC BLOOD PRESSURE: 67 MMHG | RESPIRATION RATE: 16 BRPM | OXYGEN SATURATION: 97 % | SYSTOLIC BLOOD PRESSURE: 111 MMHG

## 2024-09-10 DIAGNOSIS — M05.79 RHEUMATOID ARTHRITIS INVOLVING MULTIPLE SITES WITH POSITIVE RHEUMATOID FACTOR (H): Primary | ICD-10-CM

## 2024-09-10 DIAGNOSIS — Z79.899 HIGH RISK MEDICATION USE: ICD-10-CM

## 2024-09-10 LAB
ALBUMIN SERPL BCG-MCNC: 3.7 G/DL (ref 3.5–5.2)
ALT SERPL W P-5'-P-CCNC: 8 U/L (ref 0–50)
AST SERPL W P-5'-P-CCNC: 18 U/L (ref 0–45)
BASOPHILS # BLD AUTO: 0 10E3/UL (ref 0–0.2)
BASOPHILS NFR BLD AUTO: 1 %
CREAT SERPL-MCNC: 0.87 MG/DL (ref 0.51–0.95)
EGFRCR SERPLBLD CKD-EPI 2021: 75 ML/MIN/1.73M2
EOSINOPHIL # BLD AUTO: 0 10E3/UL (ref 0–0.7)
EOSINOPHIL NFR BLD AUTO: 0 %
ERYTHROCYTE [DISTWIDTH] IN BLOOD BY AUTOMATED COUNT: 13.6 % (ref 10–15)
HCT VFR BLD AUTO: 35.9 % (ref 35–47)
HGB BLD-MCNC: 11.4 G/DL (ref 11.7–15.7)
HOLD SPECIMEN: NORMAL
IMM GRANULOCYTES # BLD: 0 10E3/UL
IMM GRANULOCYTES NFR BLD: 0 %
LYMPHOCYTES # BLD AUTO: 2.7 10E3/UL (ref 0.8–5.3)
LYMPHOCYTES NFR BLD AUTO: 40 %
MCH RBC QN AUTO: 28.9 PG (ref 26.5–33)
MCHC RBC AUTO-ENTMCNC: 31.8 G/DL (ref 31.5–36.5)
MCV RBC AUTO: 91 FL (ref 78–100)
MONOCYTES # BLD AUTO: 0.7 10E3/UL (ref 0–1.3)
MONOCYTES NFR BLD AUTO: 10 %
NEUTROPHILS # BLD AUTO: 3.3 10E3/UL (ref 1.6–8.3)
NEUTROPHILS NFR BLD AUTO: 49 %
NRBC # BLD AUTO: 0 10E3/UL
NRBC BLD AUTO-RTO: 0 /100
PLATELET # BLD AUTO: 294 10E3/UL (ref 150–450)
RBC # BLD AUTO: 3.95 10E6/UL (ref 3.8–5.2)
WBC # BLD AUTO: 6.8 10E3/UL (ref 4–11)

## 2024-09-10 PROCEDURE — 85025 COMPLETE CBC W/AUTO DIFF WBC: CPT

## 2024-09-10 PROCEDURE — 84460 ALANINE AMINO (ALT) (SGPT): CPT

## 2024-09-10 PROCEDURE — 82040 ASSAY OF SERUM ALBUMIN: CPT

## 2024-09-10 PROCEDURE — 36415 COLL VENOUS BLD VENIPUNCTURE: CPT

## 2024-09-10 PROCEDURE — 84450 TRANSFERASE (AST) (SGOT): CPT

## 2024-09-10 PROCEDURE — 99207 PR NO CHARGE LOS: CPT

## 2024-09-10 PROCEDURE — 250N000011 HC RX IP 250 OP 636: Performed by: INTERNAL MEDICINE

## 2024-09-10 PROCEDURE — 258N000003 HC RX IP 258 OP 636: Performed by: INTERNAL MEDICINE

## 2024-09-10 PROCEDURE — 82565 ASSAY OF CREATININE: CPT

## 2024-09-10 PROCEDURE — 96413 CHEMO IV INFUSION 1 HR: CPT

## 2024-09-10 RX ORDER — ACETAMINOPHEN 325 MG/1
650 TABLET ORAL ONCE
Start: 2024-10-08 | End: 2024-10-08

## 2024-09-10 RX ORDER — ALBUTEROL SULFATE 90 UG/1
1-2 AEROSOL, METERED RESPIRATORY (INHALATION)
Start: 2024-10-08

## 2024-09-10 RX ORDER — METHYLPREDNISOLONE SODIUM SUCCINATE 125 MG/2ML
125 INJECTION, POWDER, LYOPHILIZED, FOR SOLUTION INTRAMUSCULAR; INTRAVENOUS
Start: 2024-10-08

## 2024-09-10 RX ORDER — HEPARIN SODIUM (PORCINE) LOCK FLUSH IV SOLN 100 UNIT/ML 100 UNIT/ML
5 SOLUTION INTRAVENOUS
OUTPATIENT
Start: 2024-10-08

## 2024-09-10 RX ORDER — ALBUTEROL SULFATE 0.83 MG/ML
2.5 SOLUTION RESPIRATORY (INHALATION)
OUTPATIENT
Start: 2024-10-08

## 2024-09-10 RX ORDER — HEPARIN SODIUM,PORCINE 10 UNIT/ML
5-20 VIAL (ML) INTRAVENOUS DAILY PRN
OUTPATIENT
Start: 2024-10-08

## 2024-09-10 RX ORDER — EPINEPHRINE 1 MG/ML
0.3 INJECTION, SOLUTION INTRAMUSCULAR; SUBCUTANEOUS EVERY 5 MIN PRN
OUTPATIENT
Start: 2024-10-08

## 2024-09-10 RX ORDER — METHYLPREDNISOLONE SODIUM SUCCINATE 125 MG/2ML
125 INJECTION, POWDER, LYOPHILIZED, FOR SOLUTION INTRAMUSCULAR; INTRAVENOUS ONCE
OUTPATIENT
Start: 2024-10-08 | End: 2024-10-08

## 2024-09-10 RX ORDER — DIPHENHYDRAMINE HCL 25 MG
25 CAPSULE ORAL ONCE
Start: 2024-10-08 | End: 2024-10-08

## 2024-09-10 RX ORDER — DIPHENHYDRAMINE HYDROCHLORIDE 50 MG/ML
50 INJECTION INTRAMUSCULAR; INTRAVENOUS
Start: 2024-10-08

## 2024-09-10 RX ADMIN — SODIUM CHLORIDE 250 ML: 9 INJECTION, SOLUTION INTRAVENOUS at 09:51

## 2024-09-10 RX ADMIN — INFLIXIMAB 600 MG: 100 INJECTION, POWDER, LYOPHILIZED, FOR SOLUTION INTRAVENOUS at 10:11

## 2024-09-10 ASSESSMENT — PAIN SCALES - GENERAL: PAINLEVEL: MILD PAIN (2)

## 2024-09-10 NOTE — PROGRESS NOTES
Infusion Nursing Note:  Janice Mayfield presents today for rapid remicade.    Patient seen by provider today: No   present during visit today: Not Applicable.    Note: Janice has been doing well without premedications and wishes to do the same today.      Intravenous Access:  Peripheral IV placed.    Treatment Conditions:  Biological Infusion Checklist:  ~~~ NOTE: If the patient answers yes to any of the questions below, hold the infusion and contact ordering provider or on-call provider.    Have you recently had an elevated temperature, fever, chills, productive cough, coughing for 3 weeks or longer or hemoptysis,  abnormal vital signs, night sweats,  chest pain or have you noticed a decrease in your appetite, unexplained weight loss or fatigue? No  Do you have any open wounds or new incisions? No  Do you have any upcoming hospitalizations or surgeries? Does not include esophagogastroduodenoscopy, colonoscopy, endoscopic retrograde cholangiopancreatography (ERCP), endoscopic ultrasound (EUS), dental procedures or joint aspiration/steroid injections No  Do you currently have any signs of illness or infection or are you on any antibiotics? No  Have you had any new, sudden or worsening abdominal pain? No  Have you or anyone in your household received a live vaccination in the past 4 weeks? Please note: No live vaccines while on biologic/chemotherapy until 6 months after the last treatment. Patient can receive the flu vaccine (shot only), pneumovax and the Covid vaccine. It is optimal for the patient to get these vaccines mid cycle, but they can be given at any time as long as it is not on the day of the infusion. No  Have you recently been diagnosed with any new nervous system diseases (ie. Multiple sclerosis, Guillain [place], seizures, neurological changes) or cancer diagnosis? Are you on any form of radiation or chemotherapy? No  Are you pregnant or breast feeding or do you have plans of  pregnancy in the future? N/A  Have you been having any signs of worsening depression or suicidal ideations?  (benlysta only) N/A  Have there been any other new onset medical symptoms? No  Have you had any new blood clots? (IVIG only) N/A      Post Infusion Assessment:  Patient tolerated infusion without incident.  Blood return noted pre and post infusion.  Site patent and intact, free from redness, edema or discomfort.  No evidence of extravasations.  Access discontinued per protocol.       Discharge Plan:   Patient discharged in stable condition accompanied by: self.  Departure Mode: Ambulatory.  Verbally reviewed next infusion scheduled for 10/7/24.      Luli Newman RN

## 2024-09-18 ENCOUNTER — TELEPHONE (OUTPATIENT)
Dept: RHEUMATOLOGY | Facility: CLINIC | Age: 64
End: 2024-09-18
Payer: COMMERCIAL

## 2024-09-18 NOTE — TELEPHONE ENCOUNTER
"Called pt to relay lab results from provider and find out how she's feeling per provider \"Hb slightly low. Could be secondary to inflammation. How does she feel from a symptom standpoint?\" Call not answered. Will send a MyChart message.     Margarita BURCH RN  [facility]    "

## 2024-09-19 NOTE — TELEPHONE ENCOUNTER
LM with patient regarding lab results. Advised patient to return call or respond to [employer] message. Awaiting response.    Urmila Daigle RN

## 2024-09-20 NOTE — TELEPHONE ENCOUNTER
LM with patient regarding lab results. Advised patient to return call or respond to 3rd [employer] message. Awaiting response.     Urmila Daigle RN

## 2024-10-06 ENCOUNTER — TELEPHONE (OUTPATIENT)
Dept: ONCOLOGY | Facility: CLINIC | Age: 64
End: 2024-10-06
Payer: COMMERCIAL

## 2024-10-06 NOTE — TELEPHONE ENCOUNTER
Placed call to patient regarding her Remicade (infliximab) infusion therapy. Left message requesting call back. Attempting to contact patient regarding cancellation/postponement of her infusion appointment tomorrow at 7:30 at [facility]. No drug names were mentioned.     German Schmidt, PharmD, BCOP  Oncology Pharmacy Manager  [facility] [place]  [phone]

## 2024-10-27 RX ORDER — DIPHENHYDRAMINE HYDROCHLORIDE 50 MG/ML
25 INJECTION INTRAMUSCULAR; INTRAVENOUS
Status: CANCELLED
Start: 2024-10-27

## 2024-10-27 RX ORDER — METHYLPREDNISOLONE SODIUM SUCCINATE 40 MG/ML
40 INJECTION INTRAMUSCULAR; INTRAVENOUS
Status: CANCELLED
Start: 2024-10-27

## 2024-10-27 RX ORDER — DIPHENHYDRAMINE HYDROCHLORIDE 50 MG/ML
50 INJECTION INTRAMUSCULAR; INTRAVENOUS
Status: CANCELLED
Start: 2024-10-27

## 2024-10-27 RX ORDER — EPINEPHRINE 1 MG/ML
0.3 INJECTION, SOLUTION, CONCENTRATE INTRAVENOUS EVERY 5 MIN PRN
Status: CANCELLED | OUTPATIENT
Start: 2024-10-27

## 2024-10-27 RX ORDER — ALBUTEROL SULFATE 90 UG/1
1-2 INHALANT RESPIRATORY (INHALATION)
Status: CANCELLED
Start: 2024-10-27

## 2024-10-27 RX ORDER — MEPERIDINE HYDROCHLORIDE 25 MG/ML
25 INJECTION INTRAMUSCULAR; INTRAVENOUS; SUBCUTANEOUS
Status: CANCELLED | OUTPATIENT
Start: 2024-10-27

## 2024-10-27 RX ORDER — ALBUTEROL SULFATE 0.83 MG/ML
2.5 SOLUTION RESPIRATORY (INHALATION)
Status: CANCELLED | OUTPATIENT
Start: 2024-10-27

## 2024-10-29 DIAGNOSIS — E55.9 VITAMIN D DEFICIENCY: ICD-10-CM

## 2024-10-29 DIAGNOSIS — M05.79 RHEUMATOID ARTHRITIS INVOLVING MULTIPLE SITES WITH POSITIVE RHEUMATOID FACTOR (H): ICD-10-CM

## 2024-10-29 DIAGNOSIS — M15.3 OTHER SECONDARY OSTEOARTHRITIS OF MULTIPLE SITES: ICD-10-CM

## 2024-10-29 DIAGNOSIS — Z79.52 LONG TERM CURRENT USE OF SYSTEMIC STEROIDS: ICD-10-CM

## 2024-10-29 DIAGNOSIS — Z79.60 LONG-TERM USE OF IMMUNOSUPPRESSANT MEDICATION: ICD-10-CM

## 2024-11-01 RX ORDER — METHOTREXATE 2.5 MG/1
TABLET ORAL
Qty: 48 TABLET | Refills: 0 | Status: SHIPPED | OUTPATIENT
Start: 2024-11-01 | End: 2024-11-12

## 2024-11-01 NOTE — TELEPHONE ENCOUNTER
Spoke with pt regarding listed allergy to methotrexate    Pt clarified the dose she is currently on is well tolerated. She states higher doses cause anxiety.    Mel Allred RN

## 2024-11-01 NOTE — TELEPHONE ENCOUNTER
Left voicemail for pt to discuss allergy to methotrexate as this is the medication she is requesting.  Awaiting call back.    Mel Allred RN

## 2024-11-01 NOTE — TELEPHONE ENCOUNTER
methotrexate 2.5 MG tablet       Last Written Prescription Date:  8/13/24  Last Fill Quantity: 48,   # refills: 0  Last Office Visit: 7/8/24 Lynn  Future Office visit:  11/12/24    CBC RESULTS:   Recent Labs   Lab Test 09/10/24  0911   WBC 6.8   RBC 3.95   HGB 11.4*   HCT 35.9   MCV 91   MCH 28.9   MCHC 31.8   RDW 13.6          Creatinine   Date Value Ref Range Status   09/10/2024 0.87 0.51 - 0.95 mg/dL Final   06/07/2021 0.86 0.52 - 1.04 mg/dL Final   ]    Liver Function Studies -   Recent Labs   Lab Test 09/10/24  0911 05/28/24  0710 02/06/24  0728   PROTTOTAL  --   --  7.6   ALBUMIN 3.7   < > 3.9   BILITOTAL  --   --  0.4   ALKPHOS  --   --  94   AST 18   < > 17   ALT 8   < > <5    < > = values in this interval not displayed.       Routing refill request to provider for review/approval because:  DMARD/Not on protocol/Provider to authorize  A. Daz unavailable through 11/5/24, routed to on call provider  Geeta JUNG RN  P Central Nursing/Red Flag Triage & Med Refill Team

## 2024-11-04 ENCOUNTER — INFUSION THERAPY VISIT (OUTPATIENT)
Dept: INFUSION THERAPY | Facility: CLINIC | Age: 64
End: 2024-11-04
Attending: NURSE PRACTITIONER
Payer: COMMERCIAL

## 2024-11-04 VITALS
WEIGHT: 168.1 LBS | RESPIRATION RATE: 16 BRPM | TEMPERATURE: 98.1 F | DIASTOLIC BLOOD PRESSURE: 76 MMHG | BODY MASS INDEX: 28.85 KG/M2 | OXYGEN SATURATION: 98 % | SYSTOLIC BLOOD PRESSURE: 116 MMHG | HEART RATE: 81 BPM

## 2024-11-04 DIAGNOSIS — M05.79 RHEUMATOID ARTHRITIS INVOLVING MULTIPLE SITES WITH POSITIVE RHEUMATOID FACTOR (H): Primary | ICD-10-CM

## 2024-11-04 PROCEDURE — 258N000003 HC RX IP 258 OP 636: Performed by: INTERNAL MEDICINE

## 2024-11-04 PROCEDURE — 96413 CHEMO IV INFUSION 1 HR: CPT

## 2024-11-04 PROCEDURE — 99207 PR NO CHARGE LOS: CPT

## 2024-11-04 PROCEDURE — 250N000011 HC RX IP 250 OP 636: Mod: JZ | Performed by: INTERNAL MEDICINE

## 2024-11-04 RX ORDER — EPINEPHRINE 1 MG/ML
0.3 INJECTION, SOLUTION INTRAMUSCULAR; SUBCUTANEOUS EVERY 5 MIN PRN
OUTPATIENT
Start: 2024-11-05

## 2024-11-04 RX ORDER — HEPARIN SODIUM (PORCINE) LOCK FLUSH IV SOLN 100 UNIT/ML 100 UNIT/ML
5 SOLUTION INTRAVENOUS
OUTPATIENT
Start: 2024-11-05

## 2024-11-04 RX ORDER — ALBUTEROL SULFATE 90 UG/1
1-2 INHALANT RESPIRATORY (INHALATION)
Start: 2024-11-05

## 2024-11-04 RX ORDER — DIPHENHYDRAMINE HYDROCHLORIDE 50 MG/ML
25 INJECTION INTRAMUSCULAR; INTRAVENOUS
Start: 2024-11-05

## 2024-11-04 RX ORDER — ACETAMINOPHEN 325 MG/1
650 TABLET ORAL ONCE
Start: 2024-11-05 | End: 2024-11-05

## 2024-11-04 RX ORDER — DIPHENHYDRAMINE HCL 25 MG
25 CAPSULE ORAL ONCE
Start: 2024-11-05 | End: 2024-11-05

## 2024-11-04 RX ORDER — MEPERIDINE HYDROCHLORIDE 25 MG/ML
25 INJECTION INTRAMUSCULAR; INTRAVENOUS; SUBCUTANEOUS
OUTPATIENT
Start: 2024-11-05

## 2024-11-04 RX ORDER — HEPARIN SODIUM,PORCINE 10 UNIT/ML
5-20 VIAL (ML) INTRAVENOUS DAILY PRN
OUTPATIENT
Start: 2024-11-05

## 2024-11-04 RX ORDER — METHYLPREDNISOLONE SODIUM SUCCINATE 40 MG/ML
40 INJECTION INTRAMUSCULAR; INTRAVENOUS
Start: 2024-11-05

## 2024-11-04 RX ORDER — ALBUTEROL SULFATE 0.83 MG/ML
2.5 SOLUTION RESPIRATORY (INHALATION)
OUTPATIENT
Start: 2024-11-05

## 2024-11-04 RX ORDER — DIPHENHYDRAMINE HYDROCHLORIDE 50 MG/ML
50 INJECTION INTRAMUSCULAR; INTRAVENOUS
Start: 2024-11-05

## 2024-11-04 RX ORDER — METHYLPREDNISOLONE SODIUM SUCCINATE 125 MG/2ML
125 INJECTION INTRAMUSCULAR; INTRAVENOUS ONCE
OUTPATIENT
Start: 2024-11-05 | End: 2024-11-05

## 2024-11-04 RX ADMIN — INFLIXIMAB 600 MG: 100 INJECTION, POWDER, LYOPHILIZED, FOR SOLUTION INTRAVENOUS at 08:09

## 2024-11-04 NOTE — PROGRESS NOTES
Infusion Nursing Note:  Janice Mayfield presents today for Rapid Remicade.    Patient seen by provider today: No   present during visit today: Not Applicable.    Note: Patient reports feeling achy and stiff since she missed a month of her infusion.  She states it's most noticeable in her hips and feet.      Labs due in December 2024 and patient aware.     Intravenous Access:  Peripheral IV placed.    Treatment Conditions:  Biological Infusion Checklist:  ~~~ NOTE: If the patient answers yes to any of the questions below, hold the infusion and contact ordering provider or on-call provider.    Have you recently had an elevated temperature, fever, chills, productive cough, coughing for 3 weeks or longer or hemoptysis,  abnormal vital signs, night sweats,  chest pain or have you noticed a decrease in your appetite, unexplained weight loss or fatigue? No  Do you have any open wounds or new incisions? No  Do you have any upcoming hospitalizations or surgeries? Does not include esophagogastroduodenoscopy, colonoscopy, endoscopic retrograde cholangiopancreatography (ERCP), endoscopic ultrasound (EUS), dental procedures or joint aspiration/steroid injections No  Do you currently have any signs of illness or infection or are you on any antibiotics? No  Have you had any new, sudden or worsening abdominal pain? No  Have you or anyone in your household received a live vaccination in the past 4 weeks? Please note: No live vaccines while on biologic/chemotherapy until 6 months after the last treatment. Patient can receive the flu vaccine (shot only), pneumovax and the Covid vaccine. It is optimal for the patient to get these vaccines mid cycle, but they can be given at any time as long as it is not on the day of the infusion. No  Have you recently been diagnosed with any new nervous system diseases (ie. Multiple sclerosis, Guillain [place], seizures, neurological changes) or cancer diagnosis? Are you on any form  of radiation or chemotherapy? No  Are you pregnant or breast feeding or do you have plans of pregnancy in the future? No  Have you been having any signs of worsening depression or suicidal ideations?  (benlysta only) N/A  Have there been any other new onset medical symptoms? No  Have you had any new blood clots? (IVIG only) N/A      Post Infusion Assessment:  Patient tolerated infusion without incident.  Blood return noted pre and post infusion.  Access discontinued per protocol.  Biologic Infusion Post Education: Call the triage nurse at your clinic or seek medical attention if you have chills and/or temperature greater than or equal to 100.5, uncontrolled nausea/vomiting, diarrhea, constipation, dizziness, shortness of breath, chest pain, heart palpitations, weakness or any other new or concerning symptoms, questions or concerns.  You cannot have any live virus vaccines prior to or during treatment or up to 6 months post infusion.  If you have an upcoming surgery, medical procedure or dental procedure during treatment, this should be discussed with your ordering physician and your surgeon/dentist.  If you are having any concerning symptom, if you are unsure if you should get your next infusion or wish to speak to a provider before your next infusion, please call your care coordinator or triage nurse at your clinic to notify them so we can adequately serve you.       Discharge Plan:   Discharge instructions reviewed with: Patient.  Patient and/or family verbalized understanding of discharge instructions and all questions answered.  Patient discharged in stable condition accompanied by: self.  Departure Mode: Ambulatory.      Shanique Nair RN

## 2024-11-12 ENCOUNTER — OFFICE VISIT (OUTPATIENT)
Dept: RHEUMATOLOGY | Facility: CLINIC | Age: 64
End: 2024-11-12
Attending: INTERNAL MEDICINE
Payer: COMMERCIAL

## 2024-11-12 VITALS
WEIGHT: 170 LBS | BODY MASS INDEX: 29.18 KG/M2 | HEART RATE: 77 BPM | OXYGEN SATURATION: 100 % | SYSTOLIC BLOOD PRESSURE: 133 MMHG | DIASTOLIC BLOOD PRESSURE: 78 MMHG

## 2024-11-12 DIAGNOSIS — Z79.60 LONG-TERM USE OF IMMUNOSUPPRESSANT MEDICATION: ICD-10-CM

## 2024-11-12 DIAGNOSIS — Z79.52 LONG TERM CURRENT USE OF SYSTEMIC STEROIDS: ICD-10-CM

## 2024-11-12 DIAGNOSIS — M05.9 SEROPOSITIVE RHEUMATOID ARTHRITIS (H): Primary | ICD-10-CM

## 2024-11-12 DIAGNOSIS — R76.8 RHEUMATOID FACTOR POSITIVE: ICD-10-CM

## 2024-11-12 DIAGNOSIS — M05.79 RHEUMATOID ARTHRITIS INVOLVING MULTIPLE SITES WITH POSITIVE RHEUMATOID FACTOR (H): ICD-10-CM

## 2024-11-12 DIAGNOSIS — M85.80 BONE EROSION DETERMINED BY X-RAY: ICD-10-CM

## 2024-11-12 DIAGNOSIS — Z79.899 LONG-TERM USE OF HIGH-RISK MEDICATION: ICD-10-CM

## 2024-11-12 DIAGNOSIS — E55.9 VITAMIN D DEFICIENCY: ICD-10-CM

## 2024-11-12 DIAGNOSIS — M15.3 OTHER SECONDARY OSTEOARTHRITIS OF MULTIPLE SITES: ICD-10-CM

## 2024-11-12 DIAGNOSIS — M85.851 OSTEOPENIA OF NECKS OF BOTH FEMURS: ICD-10-CM

## 2024-11-12 DIAGNOSIS — M85.852 OSTEOPENIA OF NECKS OF BOTH FEMURS: ICD-10-CM

## 2024-11-12 PROCEDURE — G0463 HOSPITAL OUTPT CLINIC VISIT: HCPCS | Performed by: INTERNAL MEDICINE

## 2024-11-12 RX ORDER — PREDNISONE 5 MG/1
5 TABLET ORAL DAILY
Qty: 90 TABLET | Refills: 0 | Status: SHIPPED | OUTPATIENT
Start: 2024-11-12

## 2024-11-12 RX ORDER — METHOTREXATE 2.5 MG/1
10 TABLET ORAL
Qty: 48 TABLET | Refills: 0 | Status: SHIPPED | OUTPATIENT
Start: 2024-11-12

## 2024-11-12 RX ORDER — FOLIC ACID 1 MG/1
3000 TABLET ORAL DAILY
Qty: 270 TABLET | Refills: 2 | Status: SHIPPED | OUTPATIENT
Start: 2024-11-12

## 2024-11-12 ASSESSMENT — PAIN SCALES - GENERAL: PAINLEVEL_OUTOF10: MILD PAIN (2)

## 2024-11-12 NOTE — NURSING NOTE
Chief Complaint   Patient presents with    RECHECK     Seropositive rheumatoid arthritis (H)     /78 (BP Location: Right arm, Patient Position: Sitting, Cuff Size: Adult Regular)   Pulse 77   Wt 77.1 kg (170 lb)   SpO2 100%   BMI 29.18 kg/m

## 2024-11-12 NOTE — PROGRESS NOTES
Chief Complaint/Reason for Visit: seropositive RA     HPI:    Janice Mayfield is a 64 year old White female with past medical history listed below.  Morning stiffness lasts for an hour. No pain or swelling of joints. She is on methotrexate 10 mg per week and folic acid 1 mg daily. She missed one dose of infliximab infusion due to IV fluid shortage.     REVIEW OF SYSTEMS    General -negative for fever  HEENT -negative for oral or nasal ulcers  Cardiovascular -negative for chest pain or palpitations   Respiratory -negative for cough or shortness of breath  Gastrointestinal -negative for blood in stool  Genitourinary -negative for blood in urine  Neuro -no history of stroke or seizures  Hematologic -no history of blood clots or personal history of malignancy      Past Medical History:   Diagnosis Date    Lichen sclerosus et atrophicus of the vulva 2018    Long term current use of systemic steroids 5/3/2017    Long-term use of immunosuppressant medication 2018    Other secondary osteoarthritis of multiple sites 2018    Rheumatoid arthritis involving multiple sites with positive rheumatoid factor (H) 2017    Vitamin D deficiency 5/3/2017     Past Surgical History:   Procedure Laterality Date    C/SECTION, LOW TRANSVERSE      , Low Transverse    HC TOOTH EXTRACTION W/FORCEP       Family History   Problem Relation Age of Onset    Cancer Mother 72    Skin Cancer Father      Social History     Socioeconomic History    Marital status:      Spouse name: Maksim    Number of children: 3    Years of education: None    Highest education level: None   Occupational History    Occupation: artist- contract work   Tobacco Use    Smoking status: Never    Smokeless tobacco: Never   Vaping Use    Vaping status: Never Used   Substance and Sexual Activity    Alcohol use: No    Drug use: No    Sexual activity: Not Currently     Partners: Male       Allergies   Allergen  Reactions    Adalimumab Hives    Plaquenil [Hydroxychloroquine]      Elevated LFTs    Sulfa Antibiotics      delusions       Current Outpatient Medications   Medication Sig Dispense Refill    augmented betamethasone dipropionate (DIPROLENE-AF) 0.05 % external ointment Apply sparingly to affected area in groin 1-2 times weekly. 15 g 3    DiphenhydrAMINE HCl (BENADRYL PO) Take 25 mg by mouth as needed (Pre-medication for remicade)      folic acid (FOLVITE) 1 MG tablet Take 3 tablets (3,000 mcg) by mouth daily. 270 tablet 2    IBUPROFEN PO Take 200 mg by mouth at bedtime      InFLIXimab (REMICADE IV) Inject 100 mg into the vein every 28 days       methotrexate 2.5 MG tablet Take 4 tablets (10 mg) by mouth every 7 days. 48 tablet 0    predniSONE (DELTASONE) 5 MG tablet Take 1 tablet (5 mg) by mouth daily. 90 tablet 0    Vitamin D, Cholecalciferol, 1000 UNITS CAPS Take 2,000 Units by mouth daily 1 capsule 0     No current facility-administered medications for this visit.       PHYSICAL EXAM    /78 (BP Location: Right arm, Patient Position: Sitting, Cuff Size: Adult Regular)   Pulse 77   Wt 77.1 kg (170 lb)   SpO2 100%   BMI 29.18 kg/m        General: Alert, No apparent distress   Psych: Affect euthymic  Eyes: Sclera noninjected  Ears, Nose, Throat, Mouth: Oral mucosa moist with normal salivary pool  Skin: No rashes noted  Cardiovascular: Regular rate and rhythm, Normal S1 and S2 and No murmurs, rubs or gallops  Respiratory: Clear to auscultation with no wheezing or crackles  Neuro: Normal gait and Able to arise from seated position unassisted  Musculoskeletal: Hands:  Normal. No synovitis.   Wrists:  Normal.  Elbows:  Normal.  Shoulders:  Normal.  Feet:  Normal.  Ankles:  Normal.  Knees:  Normal.      LABS   Reviewed as below.     Sept 2024  CBC - Hb low at 11.4, normal wbc and plt  Creatinine normal  AST, ALT normal    Nov 2019  QTB neg     April 2024  QTB neg     Lab Results   Component Value Date     Rheumatoid Factor <20 11/08/2019    Cyclic Citrullinated Peptide Antibody, IgG 3 11/08/2019      Latest Reference Range & Units 04/28/17 09:29   Hep B Surface Agn NR  Nonreactive   Hepatitis B Core Colleen NR  Nonreactive   Hepatitis C Antibody NR  Nonreactive   Assay performance characteristics have not been established for newborns,   infants, and children       [facility]         9/2022    XR hand bilateral    Impression:  1. No acute osseous abnormality.   2. Radiocarpal joint predominant osteoarthrosis bilaterally, similar  to prior.  Atypical distribution compatible with history of rheumatoid  arthritis.  3. Diffuse osteopenic-appearing bones.    ASSESSMENT      1. Seropositive rheumatoid arthritis (H)    2. Rheumatoid factor positive    3. Bone erosion determined by x-ray    4. Long-term use of high-risk medication    5. Osteopenia of necks of both femurs    6. Long term current use of systemic steroids    7. Rheumatoid arthritis involving multiple sites with positive rheumatoid factor (H)    8. Other secondary osteoarthritis of multiple sites    9. Vitamin D deficiency    10. Long-term use of immunosuppressant medication        (M05.9) Seropositive rheumatoid arthritis (H)  (primary encounter diagnosis)  Comment: RF pos ( later turned neg), CCP neg. X ray - humeral head erosions. Meds tried in the past - hydroxychloroquine-did not help, methotrexate (d/c liver enzymes elevation), enbrel (ineffective), humira *d/c (uticarial reaction), sulfites (anxiety). Currently on methotrexate 10 mg weekly along with folic acid 3 mg daily. She is also on prednisone 5 mg daily. Also on infliximab 8 mg/kg every 4 weeks.  No synovitis on exam.  The achiness that she experiences is likely secondary to secondary OA which could be a sequela of chronic inflammation.  However, given absence of synovitis on exam, I do not think her RA is active at this point.  Plan:   Continue methotrexate 10 mg weekly along with folic acid 3 mg  daily.  Continue infliximab infusion 8 mg per kilogram every 4 weeks.  Continue prednisone 5 mg daily.  Will check labs at next visit.    Clinical disease activity index (CDAI) - 4 low disease activity  PGA - 3  EGA - 1  TJC - 0  SJC -0      (R76.8) Rheumatoid factor positive  Comment: 2/2 RA   Plan: as above     (M85.80) Bone erosion determined by x-ray  Comment: 2/2 RA   Plan: as above     (Z79.899) Long-term use of high-risk medication  Comment: infliximab, methotrexate  Plan: have discussed with potential adverse effects with use of biologics such as suppression of immune system making pt more prone to infections, to hold the medication if the patient has any signs of any infection and to restart only after infection has cleared, reactivation of infections particularly TB and hepatitis B, C, fungal infections, risk of demyelinating disorders and to stop medication immediately if there's any weakness or paresthesias, risk of malignancy. Patient verbalized understanding and agrees to proceed.    The adverse reactions of MTX was discussed which includes cytopenia, immunosuppression, infection, oversedation, pneumonitis, dizziness, nausea, stomach upset, risk of falls, seizures, and derangements in LFTs. Recommended against use of alcohol while on MTX. pt counseled on the adverse effects of Methotrexate including teratogenicity and need for reliable contraception, Alopecia, infection, liver dysfunction and myelosuppression, including the importance of taking daily Folic acid. Patient verbalized understanding and agrees to proceed.    Osteopenia  Comment : DEXA in 2019 - osteopenia of bilateral femoral neck and L spine  Plan : per endocrinology    Long term use of systemic steroids  Comment : prednisone   Plan : I have explained to pt the potential adverse effects of long term use of steroids, including suppressing immune system making pts prone to infections, cataracts, glaucoma, thinning of skin, diabetes,  increasing blood pressure, thinning of bones, fractures, AVN, weight gain, importance of taking osteoporosis protection with daily Chris + vitamin D. Patient verbalized understanding and agrees to procced.         RTC - 4 months    GILBERTO RIZO MD    Division of Rheumatic & Autoimmune Diseases  [facility]

## 2024-11-12 NOTE — LETTER
2024       RE: Janice Mayfield  [address] [address]     Dear Colleague,    Thank you for referring your patient, Janice Mayfield, to the [facility] RHEUMATOLOGY at [facility]. Please see a copy of my visit note below.                           Chief Complaint/Reason for Visit: seropositive RA     HPI:    Janice Mayfield is a 64 year old White female with past medical history listed below.  Morning stiffness lasts for an hour. No pain or swelling of joints. She is on methotrexate 10 mg per week and folic acid 1 mg daily. She missed one dose of infliximab infusion due to IV fluid shortage.     REVIEW OF SYSTEMS    General -negative for fever  HEENT -negative for oral or nasal ulcers  Cardiovascular -negative for chest pain or palpitations   Respiratory -negative for cough or shortness of breath  Gastrointestinal -negative for blood in stool  Genitourinary -negative for blood in urine  Neuro -no history of stroke or seizures  Hematologic -no history of blood clots or personal history of malignancy      Past Medical History:   Diagnosis Date     Lichen sclerosus et atrophicus of the vulva 2018     Long term current use of systemic steroids 5/3/2017     Long-term use of immunosuppressant medication 2018     Other secondary osteoarthritis of multiple sites 2018     Rheumatoid arthritis involving multiple sites with positive rheumatoid factor (H) 2017     Vitamin D deficiency 5/3/2017     Past Surgical History:   Procedure Laterality Date     C/SECTION, LOW TRANSVERSE      , Low Transverse     HC TOOTH EXTRACTION W/FORCEP       Family History   Problem Relation Age of Onset     Cancer Mother 72     Skin Cancer Father      Social History     Socioeconomic History     Marital status:      Spouse name: Maksim     Number of children: 3     Years of education: None      Highest education level: None   Occupational History     Occupation: artist- contract work   Tobacco Use     Smoking status: Never     Smokeless tobacco: Never   Vaping Use     Vaping status: Never Used   Substance and Sexual Activity     Alcohol use: No     Drug use: No     Sexual activity: Not Currently     Partners: Male       Allergies   Allergen Reactions     Adalimumab Hives     Plaquenil [Hydroxychloroquine]      Elevated LFTs     Sulfa Antibiotics      delusions       Current Outpatient Medications   Medication Sig Dispense Refill     augmented betamethasone dipropionate (DIPROLENE-AF) 0.05 % external ointment Apply sparingly to affected area in groin 1-2 times weekly. 15 g 3     DiphenhydrAMINE HCl (BENADRYL PO) Take 25 mg by mouth as needed (Pre-medication for remicade)       folic acid (FOLVITE) 1 MG tablet Take 3 tablets (3,000 mcg) by mouth daily. 270 tablet 2     IBUPROFEN PO Take 200 mg by mouth at bedtime       InFLIXimab (REMICADE IV) Inject 100 mg into the vein every 28 days        methotrexate 2.5 MG tablet Take 4 tablets (10 mg) by mouth every 7 days. 48 tablet 0     predniSONE (DELTASONE) 5 MG tablet Take 1 tablet (5 mg) by mouth daily. 90 tablet 0     Vitamin D, Cholecalciferol, 1000 UNITS CAPS Take 2,000 Units by mouth daily 1 capsule 0     No current facility-administered medications for this visit.       PHYSICAL EXAM    /78 (BP Location: Right arm, Patient Position: Sitting, Cuff Size: Adult Regular)   Pulse 77   Wt 77.1 kg (170 lb)   SpO2 100%   BMI 29.18 kg/m        General: Alert, No apparent distress   Psych: Affect euthymic  Eyes: Sclera noninjected  Ears, Nose, Throat, Mouth: Oral mucosa moist with normal salivary pool  Skin: No rashes noted  Cardiovascular: Regular rate and rhythm, Normal S1 and S2 and No murmurs, rubs or gallops  Respiratory: Clear to auscultation with no wheezing or crackles  Neuro: Normal gait and Able to arise from seated position  unassisted  Musculoskeletal: Hands:  Normal. No synovitis.   Wrists:  Normal.  Elbows:  Normal.  Shoulders:  Normal.  Feet:  Normal.  Ankles:  Normal.  Knees:  Normal.      LABS   Reviewed as below.     Sept 2024  CBC - Hb low at 11.4, normal wbc and plt  Creatinine normal  AST, ALT normal    Nov 2019  QTB neg     April 2024  QTB neg     Lab Results   Component Value Date    Rheumatoid Factor <20 11/08/2019    Cyclic Citrullinated Peptide Antibody, IgG 3 11/08/2019      Latest Reference Range & Units 04/28/17 09:29   Hep B Surface Agn NR  Nonreactive   Hepatitis B Core Colleen NR  Nonreactive   Hepatitis C Antibody NR  Nonreactive   Assay performance characteristics have not been established for newborns,   infants, and children       [facility]         9/2022    XR hand bilateral    Impression:  1. No acute osseous abnormality.   2. Radiocarpal joint predominant osteoarthrosis bilaterally, similar  to prior.  Atypical distribution compatible with history of rheumatoid  arthritis.  3. Diffuse osteopenic-appearing bones.    ASSESSMENT      1. Seropositive rheumatoid arthritis (H)    2. Rheumatoid factor positive    3. Bone erosion determined by x-ray    4. Long-term use of high-risk medication    5. Osteopenia of necks of both femurs    6. Long term current use of systemic steroids    7. Rheumatoid arthritis involving multiple sites with positive rheumatoid factor (H)    8. Other secondary osteoarthritis of multiple sites    9. Vitamin D deficiency    10. Long-term use of immunosuppressant medication        (M05.9) Seropositive rheumatoid arthritis (H)  (primary encounter diagnosis)  Comment: RF pos ( later turned neg), CCP neg. X ray - humeral head erosions. Meds tried in the past - hydroxychloroquine-did not help, methotrexate (d/c liver enzymes elevation), enbrel (ineffective), humira *d/c (uticarial reaction), sulfites (anxiety). Currently on methotrexate 10 mg weekly along with folic acid 3 mg daily. She is also on  prednisone 5 mg daily. Also on infliximab 8 mg/kg every 4 weeks.  No synovitis on exam.  The achiness that she experiences is likely secondary to secondary OA which could be a sequela of chronic inflammation.  However, given absence of synovitis on exam, I do not think her RA is active at this point.  Plan:   Continue methotrexate 10 mg weekly along with folic acid 3 mg daily.  Continue infliximab infusion 8 mg per kilogram every 4 weeks.  Continue prednisone 5 mg daily.  Will check labs at next visit.    Clinical disease activity index (CDAI) - 4 low disease activity  PGA - 3  EGA - 1  TJC - 0  SJC -0      (R76.8) Rheumatoid factor positive  Comment: 2/2 RA   Plan: as above     (M85.80) Bone erosion determined by x-ray  Comment: 2/2 RA   Plan: as above     (Z79.899) Long-term use of high-risk medication  Comment: infliximab, methotrexate  Plan: have discussed with potential adverse effects with use of biologics such as suppression of immune system making pt more prone to infections, to hold the medication if the patient has any signs of any infection and to restart only after infection has cleared, reactivation of infections particularly TB and hepatitis B, C, fungal infections, risk of demyelinating disorders and to stop medication immediately if there's any weakness or paresthesias, risk of malignancy. Patient verbalized understanding and agrees to proceed.    The adverse reactions of MTX was discussed which includes cytopenia, immunosuppression, infection, oversedation, pneumonitis, dizziness, nausea, stomach upset, risk of falls, seizures, and derangements in LFTs. Recommended against use of alcohol while on MTX. pt counseled on the adverse effects of Methotrexate including teratogenicity and need for reliable contraception, Alopecia, infection, liver dysfunction and myelosuppression, including the importance of taking daily Folic acid. Patient verbalized understanding and agrees to  proceed.    Osteopenia  Comment : DEXA in 2019 - osteopenia of bilateral femoral neck and L spine  Plan : per endocrinology    Long term use of systemic steroids  Comment : prednisone   Plan : I have explained to pt the potential adverse effects of long term use of steroids, including suppressing immune system making pts prone to infections, cataracts, glaucoma, thinning of skin, diabetes, increasing blood pressure, thinning of bones, fractures, AVN, weight gain, importance of taking osteoporosis protection with daily Chris + vitamin D. Patient verbalized understanding and agrees to procced.         RTC - 4 months    LATANYA RIZO MD    Division of Rheumatic & Autoimmune Diseases  [facility]       Again, thank you for allowing me to participate in the care of your patient.      Sincerely,    Latanya Rizo MD

## 2024-11-30 ENCOUNTER — HEALTH MAINTENANCE LETTER (OUTPATIENT)
Age: 64
End: 2024-11-30

## 2024-12-02 ENCOUNTER — LAB (OUTPATIENT)
Dept: INFUSION THERAPY | Facility: CLINIC | Age: 64
End: 2024-12-02
Attending: INTERNAL MEDICINE
Payer: COMMERCIAL

## 2024-12-02 ENCOUNTER — INFUSION THERAPY VISIT (OUTPATIENT)
Dept: INFUSION THERAPY | Facility: CLINIC | Age: 64
End: 2024-12-02
Attending: NURSE PRACTITIONER
Payer: COMMERCIAL

## 2024-12-02 VITALS
WEIGHT: 169.7 LBS | SYSTOLIC BLOOD PRESSURE: 136 MMHG | TEMPERATURE: 97.7 F | OXYGEN SATURATION: 94 % | HEART RATE: 69 BPM | DIASTOLIC BLOOD PRESSURE: 93 MMHG | BODY MASS INDEX: 29.13 KG/M2

## 2024-12-02 DIAGNOSIS — M05.79 RHEUMATOID ARTHRITIS INVOLVING MULTIPLE SITES WITH POSITIVE RHEUMATOID FACTOR (H): Primary | ICD-10-CM

## 2024-12-02 DIAGNOSIS — Z79.899 HIGH RISK MEDICATION USE: ICD-10-CM

## 2024-12-02 LAB
ALBUMIN SERPL BCG-MCNC: 4 G/DL (ref 3.5–5.2)
ALT SERPL W P-5'-P-CCNC: 8 U/L (ref 0–50)
AST SERPL W P-5'-P-CCNC: 18 U/L (ref 0–45)
BASOPHILS # BLD AUTO: 0 10E3/UL (ref 0–0.2)
BASOPHILS NFR BLD AUTO: 0 %
CREAT SERPL-MCNC: 0.73 MG/DL (ref 0.51–0.95)
EGFRCR SERPLBLD CKD-EPI 2021: >90 ML/MIN/1.73M2
EOSINOPHIL # BLD AUTO: 0 10E3/UL (ref 0–0.7)
EOSINOPHIL NFR BLD AUTO: 0 %
ERYTHROCYTE [DISTWIDTH] IN BLOOD BY AUTOMATED COUNT: 13.3 % (ref 10–15)
HCT VFR BLD AUTO: 39.2 % (ref 35–47)
HGB BLD-MCNC: 12.5 G/DL (ref 11.7–15.7)
HOLD SPECIMEN: NORMAL
IMM GRANULOCYTES # BLD: 0 10E3/UL
IMM GRANULOCYTES NFR BLD: 0 %
LYMPHOCYTES # BLD AUTO: 2.5 10E3/UL (ref 0.8–5.3)
LYMPHOCYTES NFR BLD AUTO: 37 %
MCH RBC QN AUTO: 29.2 PG (ref 26.5–33)
MCHC RBC AUTO-ENTMCNC: 31.9 G/DL (ref 31.5–36.5)
MCV RBC AUTO: 92 FL (ref 78–100)
MONOCYTES # BLD AUTO: 0.8 10E3/UL (ref 0–1.3)
MONOCYTES NFR BLD AUTO: 12 %
NEUTROPHILS # BLD AUTO: 3.4 10E3/UL (ref 1.6–8.3)
NEUTROPHILS NFR BLD AUTO: 50 %
NRBC # BLD AUTO: 0 10E3/UL
NRBC BLD AUTO-RTO: 0 /100
PLATELET # BLD AUTO: 308 10E3/UL (ref 150–450)
RBC # BLD AUTO: 4.28 10E6/UL (ref 3.8–5.2)
WBC # BLD AUTO: 6.8 10E3/UL (ref 4–11)

## 2024-12-02 PROCEDURE — 258N000003 HC RX IP 258 OP 636: Performed by: INTERNAL MEDICINE

## 2024-12-02 PROCEDURE — 82565 ASSAY OF CREATININE: CPT

## 2024-12-02 PROCEDURE — 250N000011 HC RX IP 250 OP 636: Mod: JZ | Performed by: INTERNAL MEDICINE

## 2024-12-02 PROCEDURE — 96413 CHEMO IV INFUSION 1 HR: CPT

## 2024-12-02 PROCEDURE — 84450 TRANSFERASE (AST) (SGOT): CPT

## 2024-12-02 PROCEDURE — 99207 PR NO CHARGE LOS: CPT

## 2024-12-02 PROCEDURE — 85004 AUTOMATED DIFF WBC COUNT: CPT

## 2024-12-02 PROCEDURE — 82040 ASSAY OF SERUM ALBUMIN: CPT

## 2024-12-02 PROCEDURE — 36415 COLL VENOUS BLD VENIPUNCTURE: CPT

## 2024-12-02 PROCEDURE — 84460 ALANINE AMINO (ALT) (SGPT): CPT

## 2024-12-02 RX ORDER — DIPHENHYDRAMINE HYDROCHLORIDE 50 MG/ML
50 INJECTION INTRAMUSCULAR; INTRAVENOUS
Start: 2024-12-30

## 2024-12-02 RX ORDER — ACETAMINOPHEN 325 MG/1
650 TABLET ORAL ONCE
Start: 2024-12-30 | End: 2024-12-30

## 2024-12-02 RX ORDER — METHYLPREDNISOLONE SODIUM SUCCINATE 125 MG/2ML
125 INJECTION INTRAMUSCULAR; INTRAVENOUS ONCE
OUTPATIENT
Start: 2024-12-30 | End: 2024-12-30

## 2024-12-02 RX ORDER — HEPARIN SODIUM (PORCINE) LOCK FLUSH IV SOLN 100 UNIT/ML 100 UNIT/ML
5 SOLUTION INTRAVENOUS
OUTPATIENT
Start: 2024-12-30

## 2024-12-02 RX ORDER — DIPHENHYDRAMINE HCL 25 MG
25 CAPSULE ORAL ONCE
Start: 2024-12-30 | End: 2024-12-30

## 2024-12-02 RX ORDER — METHYLPREDNISOLONE SODIUM SUCCINATE 40 MG/ML
40 INJECTION INTRAMUSCULAR; INTRAVENOUS
Start: 2024-12-30

## 2024-12-02 RX ORDER — EPINEPHRINE 1 MG/ML
0.3 INJECTION, SOLUTION INTRAMUSCULAR; SUBCUTANEOUS EVERY 5 MIN PRN
OUTPATIENT
Start: 2024-12-30

## 2024-12-02 RX ORDER — MEPERIDINE HYDROCHLORIDE 25 MG/ML
25 INJECTION INTRAMUSCULAR; INTRAVENOUS; SUBCUTANEOUS
OUTPATIENT
Start: 2024-12-30

## 2024-12-02 RX ORDER — HEPARIN SODIUM,PORCINE 10 UNIT/ML
5-20 VIAL (ML) INTRAVENOUS DAILY PRN
OUTPATIENT
Start: 2024-12-30

## 2024-12-02 RX ORDER — ALBUTEROL SULFATE 90 UG/1
1-2 INHALANT RESPIRATORY (INHALATION)
Start: 2024-12-30

## 2024-12-02 RX ORDER — DIPHENHYDRAMINE HYDROCHLORIDE 50 MG/ML
25 INJECTION INTRAMUSCULAR; INTRAVENOUS
Start: 2024-12-30

## 2024-12-02 RX ORDER — ALBUTEROL SULFATE 0.83 MG/ML
2.5 SOLUTION RESPIRATORY (INHALATION)
OUTPATIENT
Start: 2024-12-30

## 2024-12-02 RX ADMIN — INFLIXIMAB 600 MG: 100 INJECTION, POWDER, LYOPHILIZED, FOR SOLUTION INTRAVENOUS at 08:08

## 2024-12-02 ASSESSMENT — PAIN SCALES - GENERAL: PAINLEVEL_OUTOF10: MILD PAIN (3)

## 2024-12-02 NOTE — PROGRESS NOTES
Infusion Nursing Note:  Janice SIDNEY Taimargot Mayfield presents today for Rapid Remicade.    Patient seen by provider today: No   present during visit today: Not Applicable.    Note: Pt c/o increased stiffness/generalized body aches, can tell it is time for her infusion, see flow sheet for assessment.      Intravenous Access:  Peripheral IV placed.    Treatment Conditions:  Biological Infusion Checklist:  ~~~ NOTE: If the patient answers yes to any of the questions below, hold the infusion and contact ordering provider or on-call provider.    Have you recently had an elevated temperature, fever, chills, productive cough, coughing for 3 weeks or longer or hemoptysis,  abnormal vital signs, night sweats,  chest pain or have you noticed a decrease in your appetite, unexplained weight loss or fatigue? No  Do you have any open wounds or new incisions? No  Do you have any upcoming hospitalizations or surgeries? Does not include esophagogastroduodenoscopy, colonoscopy, endoscopic retrograde cholangiopancreatography (ERCP), endoscopic ultrasound (EUS), dental procedures or joint aspiration/steroid injections No  Do you currently have any signs of illness or infection or are you on any antibiotics? No  Have you had any new, sudden or worsening abdominal pain? No  Have you or anyone in your household received a live vaccination in the past 4 weeks? Please note: No live vaccines while on biologic/chemotherapy until 6 months after the last treatment. Patient can receive the flu vaccine (shot only), pneumovax and the Covid vaccine. It is optimal for the patient to get these vaccines mid cycle, but they can be given at any time as long as it is not on the day of the infusion. No  Have you recently been diagnosed with any new nervous system diseases (ie. Multiple sclerosis, Guillain [place], seizures, neurological changes) or cancer diagnosis? Are you on any form of radiation or chemotherapy? No  Are you pregnant or breast  feeding or do you have plans of pregnancy in the future? No  Have you been having any signs of worsening depression or suicidal ideations?  (benlysta only) No  Have there been any other new onset medical symptoms? No  Have you had any new blood clots? (IVIG only) No      Post Infusion Assessment:  Patient tolerated infusion without incident.  Blood return noted pre and post infusion.  Site patent and intact, free from redness, edema or discomfort.  No evidence of extravasations.  Access discontinued per protocol.       Discharge Plan:   Patient discharged in stable condition accompanied by: self.  Departure Mode: Ambulatory.  Pt will RTC 12/30/24 for rapid remicade. Appts verified and pt aware.      Pravin Blanco RN

## 2024-12-30 ENCOUNTER — INFUSION THERAPY VISIT (OUTPATIENT)
Dept: INFUSION THERAPY | Facility: CLINIC | Age: 64
End: 2024-12-30
Attending: NURSE PRACTITIONER
Payer: COMMERCIAL

## 2024-12-30 VITALS
SYSTOLIC BLOOD PRESSURE: 118 MMHG | HEART RATE: 79 BPM | OXYGEN SATURATION: 96 % | RESPIRATION RATE: 18 BRPM | DIASTOLIC BLOOD PRESSURE: 70 MMHG | WEIGHT: 169.1 LBS | BODY MASS INDEX: 29.03 KG/M2 | TEMPERATURE: 98.3 F

## 2024-12-30 DIAGNOSIS — M05.79 RHEUMATOID ARTHRITIS INVOLVING MULTIPLE SITES WITH POSITIVE RHEUMATOID FACTOR (H): Primary | ICD-10-CM

## 2024-12-30 PROCEDURE — 99207 PR NO CHARGE LOS: CPT

## 2024-12-30 PROCEDURE — 258N000003 HC RX IP 258 OP 636: Performed by: INTERNAL MEDICINE

## 2024-12-30 PROCEDURE — 96413 CHEMO IV INFUSION 1 HR: CPT

## 2024-12-30 PROCEDURE — 250N000011 HC RX IP 250 OP 636: Performed by: INTERNAL MEDICINE

## 2024-12-30 RX ORDER — DIPHENHYDRAMINE HYDROCHLORIDE 50 MG/ML
25 INJECTION INTRAMUSCULAR; INTRAVENOUS
Start: 2025-01-27

## 2024-12-30 RX ORDER — ALBUTEROL SULFATE 0.83 MG/ML
2.5 SOLUTION RESPIRATORY (INHALATION)
OUTPATIENT
Start: 2025-01-27

## 2024-12-30 RX ORDER — HEPARIN SODIUM,PORCINE 10 UNIT/ML
5-20 VIAL (ML) INTRAVENOUS DAILY PRN
OUTPATIENT
Start: 2025-01-27

## 2024-12-30 RX ORDER — HEPARIN SODIUM (PORCINE) LOCK FLUSH IV SOLN 100 UNIT/ML 100 UNIT/ML
5 SOLUTION INTRAVENOUS
OUTPATIENT
Start: 2025-01-27

## 2024-12-30 RX ORDER — DIPHENHYDRAMINE HYDROCHLORIDE 50 MG/ML
50 INJECTION INTRAMUSCULAR; INTRAVENOUS
Start: 2025-01-27

## 2024-12-30 RX ORDER — ACETAMINOPHEN 325 MG/1
650 TABLET ORAL ONCE
Start: 2025-01-27 | End: 2025-01-27

## 2024-12-30 RX ORDER — METHYLPREDNISOLONE SODIUM SUCCINATE 40 MG/ML
40 INJECTION INTRAMUSCULAR; INTRAVENOUS
Start: 2025-01-27

## 2024-12-30 RX ORDER — EPINEPHRINE 1 MG/ML
0.3 INJECTION, SOLUTION INTRAMUSCULAR; SUBCUTANEOUS EVERY 5 MIN PRN
OUTPATIENT
Start: 2025-01-27

## 2024-12-30 RX ORDER — ALBUTEROL SULFATE 90 UG/1
1-2 INHALANT RESPIRATORY (INHALATION)
Start: 2025-01-27

## 2024-12-30 RX ORDER — METHYLPREDNISOLONE SODIUM SUCCINATE 125 MG/2ML
125 INJECTION INTRAMUSCULAR; INTRAVENOUS ONCE
OUTPATIENT
Start: 2025-01-27 | End: 2025-01-27

## 2024-12-30 RX ORDER — DIPHENHYDRAMINE HCL 25 MG
25 CAPSULE ORAL ONCE
Start: 2025-01-27 | End: 2025-01-27

## 2024-12-30 RX ORDER — MEPERIDINE HYDROCHLORIDE 25 MG/ML
25 INJECTION INTRAMUSCULAR; INTRAVENOUS; SUBCUTANEOUS
OUTPATIENT
Start: 2025-01-27

## 2024-12-30 RX ADMIN — SODIUM CHLORIDE 100 ML: 9 INJECTION, SOLUTION INTRAVENOUS at 08:12

## 2024-12-30 RX ADMIN — INFLIXIMAB 600 MG: 100 INJECTION, POWDER, LYOPHILIZED, FOR SOLUTION INTRAVENOUS at 08:12

## 2024-12-30 NOTE — PROGRESS NOTES
Infusion Nursing Note:  Janice SIDNEY Taimargot Mayfield presents today for Rapid Remicade.    Patient seen by provider today: No   present during visit today: Not Applicable.    Note: Patient expressed no new medical concerns.     Intravenous Access:  Peripheral IV placed.    Treatment Conditions:  Biological Infusion Checklist:  ~~~ NOTE: If the patient answers yes to any of the questions below, hold the infusion and contact ordering provider or on-call provider.    Have you recently had an elevated temperature, fever, chills, productive cough, coughing for 3 weeks or longer or hemoptysis,  abnormal vital signs, night sweats,  chest pain or have you noticed a decrease in your appetite, unexplained weight loss or fatigue? No  Do you have any open wounds or new incisions? No  Do you have any upcoming hospitalizations or surgeries? Does not include esophagogastroduodenoscopy, colonoscopy, endoscopic retrograde cholangiopancreatography (ERCP), endoscopic ultrasound (EUS), dental procedures or joint aspiration/steroid injections No  Do you currently have any signs of illness or infection or are you on any antibiotics? No  Have you had any new, sudden or worsening abdominal pain? No  Have you or anyone in your household received a live vaccination in the past 4 weeks? Please note: No live vaccines while on biologic/chemotherapy until 6 months after the last treatment. Patient can receive the flu vaccine (shot only), pneumovax and the Covid vaccine. It is optimal for the patient to get these vaccines mid cycle, but they can be given at any time as long as it is not on the day of the infusion. No  Have you recently been diagnosed with any new nervous system diseases (ie. Multiple sclerosis, Guillain [place], seizures, neurological changes) or cancer diagnosis? Are you on any form of radiation or chemotherapy? No  Have there been any other new onset medical symptoms? No    Post Infusion Assessment:  Patient tolerated  infusion without incident.  Blood return noted pre and post infusion.  Site patent and intact, free from redness, edema or discomfort.  No evidence of extravasations.  Access discontinued per protocol.  Biologic Infusion Post Education: Call the triage nurse at your clinic or seek medical attention if you have chills and/or temperature greater than or equal to 100.5, uncontrolled nausea/vomiting, diarrhea, constipation, dizziness, shortness of breath, chest pain, heart palpitations, weakness or any other new or concerning symptoms, questions or concerns.  You cannot have any live virus vaccines prior to or during treatment or up to 6 months post infusion.  If you have an upcoming surgery, medical procedure or dental procedure during treatment, this should be discussed with your ordering physician and your surgeon/dentist.  If you are having any concerning symptom, if you are unsure if you should get your next infusion or wish to speak to a provider before your next infusion, please call your care coordinator or triage nurse at your clinic to notify them so we can adequately serve you.       Discharge Plan:   AVS to patient via [employer].  Patient will return 1/31/25 for next appointment.   Patient discharged in stable condition accompanied by: self.  Departure Mode: Ambulatory.      Ursula Kothari RN

## 2025-02-05 ENCOUNTER — MYC REFILL (OUTPATIENT)
Dept: RHEUMATOLOGY | Facility: CLINIC | Age: 65
End: 2025-02-05
Payer: COMMERCIAL

## 2025-02-05 DIAGNOSIS — M05.79 RHEUMATOID ARTHRITIS INVOLVING MULTIPLE SITES WITH POSITIVE RHEUMATOID FACTOR (H): ICD-10-CM

## 2025-02-10 RX ORDER — PREDNISONE 5 MG/1
5 TABLET ORAL DAILY
Qty: 90 TABLET | Refills: 0 | OUTPATIENT
Start: 2025-02-10

## 2025-02-27 ENCOUNTER — ENROLLMENT (OUTPATIENT)
Dept: HOME HEALTH SERVICES | Facility: HOME HEALTH | Age: 65
End: 2025-02-27
Payer: COMMERCIAL

## 2025-03-16 NOTE — PROGRESS NOTES
Chief Complaint/Reason for Visit: seropositive RA     HPI:    Janice Mayfield is a 64 year old White female with past medical history listed below.  Morning stiffness lasts for an hour. No pain or swelling of joints. She is on methotrexate 10 mg per week and folic acid 1 mg daily. She received 2 doses of inflectra so far, the last dose was 2 weeks ago. She is upset about the change to the new biosimilar and is requesting time to read and learn about the medication.     She has swelling of right knee. Joint stiffness in the AM lasts for couple of hours. She is fine if she stays moving. She is able to make a full fist. 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 She is taking time to process the changes that happened in her life during this time period. She said she will be ready to start tapering prednisone this summer.      REVIEW OF SYSTEMS    General -negative for fever or recurrent infections   HEENT -negative for oral or nasal ulcers  Cardiovascular -negative for chest pain or palpitations   Respiratory -negative for cough or shortness of breath  Gastrointestinal -negative for blood in stool  Genitourinary -negative for blood in urine  Neuro -no history of stroke or seizures  Hematologic -no history of blood clots or personal history of malignancy      Past Medical History:   Diagnosis Date    Lichen sclerosus et atrophicus of the vulva 2018    Long term current use of systemic steroids 5/3/2017    Long-term use of immunosuppressant medication 2018    Other secondary osteoarthritis of multiple sites 2018    Rheumatoid arthritis involving multiple sites with positive rheumatoid factor (H) 2017    Vitamin D deficiency 5/3/2017     Past Surgical History:   Procedure Laterality Date    C/SECTION, LOW TRANSVERSE      , Low Transverse    HC TOOTH EXTRACTION W/FORCEP       Family History   Problem Relation Age of Onset    Cancer Mother 72     Skin Cancer Father      Social History     Socioeconomic History    Marital status:      Spouse name: Maksim    Number of children: 3    Years of education: None    Highest education level: None   Occupational History    Occupation: artist- contract work   Tobacco Use    Smoking status: Never    Smokeless tobacco: Never   Vaping Use    Vaping status: Never Used   Substance and Sexual Activity    Alcohol use: No    Drug use: No    Sexual activity: Not Currently     Partners: Male       Allergies   Allergen Reactions    Adalimumab Hives    Plaquenil [Hydroxychloroquine]      Elevated LFTs    Sulfa Antibiotics      delusions       Current Outpatient Medications   Medication Sig Dispense Refill    augmented betamethasone dipropionate (DIPROLENE-AF) 0.05 % external ointment Apply sparingly to affected area in groin 1-2 times weekly. (Patient not taking: Reported on 2/28/2025) 15 g 3    DiphenhydrAMINE HCl (BENADRYL PO) Take 25 mg by mouth as needed (Pre-medication for remicade) (Patient not taking: Reported on 2/28/2025)      folic acid (FOLVITE) 1 MG tablet Take 3 tablets (3,000 mcg) by mouth daily. 270 tablet 2    IBUPROFEN PO Take 200 mg by mouth at bedtime      InFLIXimab (REMICADE IV) Inject 100 mg into the vein every 28 days       methotrexate 2.5 MG tablet Take 4 tablets (10 mg) by mouth every 7 days. 48 tablet 0    predniSONE (DELTASONE) 5 MG tablet TAKE 1 TABLET BY MOUTH EVERY DAY 90 tablet 1    Vitamin D, Cholecalciferol, 1000 UNITS CAPS Take 2,000 Units by mouth daily (Patient not taking: Reported on 2/28/2025) 1 capsule 0     No current facility-administered medications for this visit.       PHYSICAL EXAM    There were no vitals taken for this visit.      General: Alert, No apparent distress   Psych: Affect euthymic  Eyes: Sclera noninjected  Ears, Nose, Throat, Mouth: Oral mucosa moist with normal salivary pool  Skin: No rashes noted  Cardiovascular: Regular rate and rhythm, Normal S1 and S2 and No  murmurs, rubs or gallops  Respiratory: Clear to auscultation with no wheezing or crackles  Neuro: Normal gait and Able to arise from seated position unassisted  Musculoskeletal: Hands:  Normal. No synovitis.   Wrists:  Normal.  Elbows:  Normal.  Shoulders:  Normal.  Feet:  Normal.  Ankles:  Normal.  Knees:  Normal.      LABS   Reviewed as below.     Dec 2024  CBC - normal   AST, ALT, creatinine normal     Nov 2019  QTB neg     April 2024  QTB neg     Lab Results   Component Value Date    Rheumatoid Factor <20 11/08/2019    Cyclic Citrullinated Peptide Antibody, IgG 3 11/08/2019      Latest Reference Range & Units 04/28/17 09:29   Hep B Surface Agn NR  Nonreactive   Hepatitis B Core Colleen NR  Nonreactive   Hepatitis C Antibody NR  Nonreactive   Assay performance characteristics have not been established for newborns,   infants, and children       [facility]         9/2022    XR hand bilateral    Impression:  1. No acute osseous abnormality.   2. Radiocarpal joint predominant osteoarthrosis bilaterally, similar  to prior.  Atypical distribution compatible with history of rheumatoid  arthritis.  3. Diffuse osteopenic-appearing bones.    ASSESSMENT      1. Seropositive rheumatoid arthritis (H)    2. Rheumatoid factor positive    3. Bone erosion determined by x-ray    4. Long-term use of high-risk medication    5. Osteopenia of necks of both femurs    6. Long term current use of systemic steroids          (M05.9) Seropositive rheumatoid arthritis (H)  (primary encounter diagnosis)  Comment: RF pos ( RF-20 in 2009, later turned neg), CCP neg. X ray - humeral head erosions. Meds tried in the past - hydroxychloroquine-did not help, methotrexate (d/c liver enzymes elevation), enbrel (ineffective), humira *d/c (uticarial reaction), sulfites (anxiety). Currently on methotrexate 10 mg weekly along with folic acid 3 mg daily. She is also on prednisone 5 mg daily. Also on infliximab 8 mg/kg every 4 weeks.  She has swelling of right  knee.   Plan:   Continue methotrexate 10 mg weekly along with folic acid 3 mg daily.  Continue infliximab infusion 8 mg per kilogram every 4 weeks.   Set up appt with MTM to discuss about inflectra and alternate options - orencia is an option   Continue prednisone 5 mg daily. Will start tapering in summer when she is ready. Script sent to pharmacy.   Follow up in 4 months.   Orders Placed This Encounter   Procedures    AST    ALT    Creatinine    CBC with platelets differential     Clinical disease activity index (CDAI) - 7 low disease activity  PGA - 4  EGA - 2  TJC - 0  SJC -1      (R76.8) Rheumatoid factor positive  Comment: 2/2 RA   Plan: as above     (M85.80) Bone erosion determined by x-ray  Comment: 2/2 RA   Plan: as above     (Z79.899) Long-term use of high-risk medication  Comment: infliximab, methotrexate  Plan: have discussed with potential adverse effects with use of biologics such as suppression of immune system making pt more prone to infections, to hold the medication if the patient has any signs of any infection and to restart only after infection has cleared, reactivation of infections particularly TB and hepatitis B, C, fungal infections, risk of demyelinating disorders and to stop medication immediately if there's any weakness or paresthesias, risk of malignancy. Patient verbalized understanding and agrees to proceed.    The adverse reactions of MTX was discussed which includes cytopenia, immunosuppression, infection, oversedation, pneumonitis, dizziness, nausea, stomach upset, risk of falls, seizures, and derangements in LFTs. Recommended against use of alcohol while on MTX. pt counseled on the adverse effects of Methotrexate including teratogenicity and need for reliable contraception, Alopecia, infection, liver dysfunction and myelosuppression, including the importance of taking daily Folic acid. Patient verbalized understanding and agrees to proceed.    Osteopenia  Comment : DEXA in 2019 -  osteopenia of bilateral femoral neck and L spine  Plan : per endocrinology    Long term use of systemic steroids  Comment : prednisone 5 mg  Plan : I have explained to pt the potential adverse effects of long term use of steroids, including suppressing immune system making pts prone to infections, cataracts, glaucoma, thinning of skin, diabetes, increasing blood pressure, thinning of bones, fractures, AVN, weight gain, importance of taking osteoporosis protection with daily Chris + vitamin D. Patient verbalized understanding and agrees to procced.       RTC - 4 months    GILBERTO RIZO MD    Division of Rheumatic & Autoimmune Diseases  [facility]

## 2025-03-18 ENCOUNTER — OFFICE VISIT (OUTPATIENT)
Dept: RHEUMATOLOGY | Facility: CLINIC | Age: 65
End: 2025-03-18
Attending: INTERNAL MEDICINE
Payer: COMMERCIAL

## 2025-03-18 ENCOUNTER — TELEPHONE (OUTPATIENT)
Dept: RHEUMATOLOGY | Facility: CLINIC | Age: 65
End: 2025-03-18

## 2025-03-18 VITALS
WEIGHT: 173 LBS | HEART RATE: 73 BPM | BODY MASS INDEX: 29.7 KG/M2 | OXYGEN SATURATION: 97 % | DIASTOLIC BLOOD PRESSURE: 73 MMHG | SYSTOLIC BLOOD PRESSURE: 120 MMHG

## 2025-03-18 DIAGNOSIS — Z79.899 LONG-TERM USE OF HIGH-RISK MEDICATION: ICD-10-CM

## 2025-03-18 DIAGNOSIS — R76.8 RHEUMATOID FACTOR POSITIVE: ICD-10-CM

## 2025-03-18 DIAGNOSIS — Z79.52 LONG TERM CURRENT USE OF SYSTEMIC STEROIDS: ICD-10-CM

## 2025-03-18 DIAGNOSIS — M85.80 BONE EROSION DETERMINED BY X-RAY: ICD-10-CM

## 2025-03-18 DIAGNOSIS — M85.851 OSTEOPENIA OF NECKS OF BOTH FEMURS: ICD-10-CM

## 2025-03-18 DIAGNOSIS — M05.9 SEROPOSITIVE RHEUMATOID ARTHRITIS (H): Primary | ICD-10-CM

## 2025-03-18 DIAGNOSIS — M85.852 OSTEOPENIA OF NECKS OF BOTH FEMURS: ICD-10-CM

## 2025-03-18 PROCEDURE — 99213 OFFICE O/P EST LOW 20 MIN: CPT | Performed by: INTERNAL MEDICINE

## 2025-03-18 PROCEDURE — 3074F SYST BP LT 130 MM HG: CPT | Performed by: INTERNAL MEDICINE

## 2025-03-18 PROCEDURE — 99214 OFFICE O/P EST MOD 30 MIN: CPT | Performed by: INTERNAL MEDICINE

## 2025-03-18 PROCEDURE — G2211 COMPLEX E/M VISIT ADD ON: HCPCS | Performed by: INTERNAL MEDICINE

## 2025-03-18 PROCEDURE — 3078F DIAST BP <80 MM HG: CPT | Performed by: INTERNAL MEDICINE

## 2025-03-18 PROCEDURE — 1125F AMNT PAIN NOTED PAIN PRSNT: CPT | Performed by: INTERNAL MEDICINE

## 2025-03-18 RX ORDER — PREDNISONE 1 MG/1
TABLET ORAL
Qty: 30 TABLET | Refills: 2 | Status: SHIPPED | OUTPATIENT
Start: 2025-03-18 | End: 2025-09-14

## 2025-03-18 ASSESSMENT — PAIN SCALES - GENERAL: PAINLEVEL_OUTOF10: MODERATE PAIN (5)

## 2025-03-18 NOTE — LETTER
3/18/2025       RE: Janice Mayfield  [address] [address]     Dear Colleague,    Thank you for referring your patient, Janice Mayfield, to the [facility] at [facility]. Please see a copy of my visit note below.                           Chief Complaint/Reason for Visit: seropositive RA     HPI:    Janice Mayfield is a 64 year old White female with past medical history listed below.  Morning stiffness lasts for an hour. No pain or swelling of joints. She is on methotrexate 10 mg per week and folic acid 1 mg daily. She received 2 doses of inflectra so far, the last dose was 2 weeks ago. She is upset about the change to the new biosimilar and is requesting time to read and learn about the medication.     She has swelling of right knee. Joint stiffness in the AM lasts for couple of hours. She is fine if she stays moving. She is able to make a full fist. 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 She is taking time to process the changes that happened in her life during this time period. She said she will be ready to start tapering prednisone this summer.      REVIEW OF SYSTEMS    General -negative for fever or recurrent infections   HEENT -negative for oral or nasal ulcers  Cardiovascular -negative for chest pain or palpitations   Respiratory -negative for cough or shortness of breath  Gastrointestinal -negative for blood in stool  Genitourinary -negative for blood in urine  Neuro -no history of stroke or seizures  Hematologic -no history of blood clots or personal history of malignancy      Past Medical History:   Diagnosis Date     Lichen sclerosus et atrophicus of the vulva 11/21/2018     Long term current use of systemic steroids 5/3/2017     Long-term use of immunosuppressant medication 5/11/2018     Other secondary osteoarthritis of multiple sites 5/11/2018     Rheumatoid  arthritis involving multiple sites with positive rheumatoid factor (H) 2017     Vitamin D deficiency 5/3/2017     Past Surgical History:   Procedure Laterality Date     C/SECTION, LOW TRANSVERSE      , Low Transverse     HC TOOTH EXTRACTION W/FORCEP       Family History   Problem Relation Age of Onset     Cancer Mother 72     Skin Cancer Father      Social History     Socioeconomic History     Marital status:      Spouse name: Maksim     Number of children: 3     Years of education: None     Highest education level: None   Occupational History     Occupation: artist- contract work   Tobacco Use     Smoking status: Never     Smokeless tobacco: Never   Vaping Use     Vaping status: Never Used   Substance and Sexual Activity     Alcohol use: No     Drug use: No     Sexual activity: Not Currently     Partners: Male       Allergies   Allergen Reactions     Adalimumab Hives     Plaquenil [Hydroxychloroquine]      Elevated LFTs     Sulfa Antibiotics      delusions       Current Outpatient Medications   Medication Sig Dispense Refill     augmented betamethasone dipropionate (DIPROLENE-AF) 0.05 % external ointment Apply sparingly to affected area in groin 1-2 times weekly. (Patient not taking: Reported on 2025) 15 g 3     DiphenhydrAMINE HCl (BENADRYL PO) Take 25 mg by mouth as needed (Pre-medication for remicade) (Patient not taking: Reported on 2025)       folic acid (FOLVITE) 1 MG tablet Take 3 tablets (3,000 mcg) by mouth daily. 270 tablet 2     IBUPROFEN PO Take 200 mg by mouth at bedtime       InFLIXimab (REMICADE IV) Inject 100 mg into the vein every 28 days        methotrexate 2.5 MG tablet Take 4 tablets (10 mg) by mouth every 7 days. 48 tablet 0     predniSONE (DELTASONE) 5 MG tablet TAKE 1 TABLET BY MOUTH EVERY DAY 90 tablet 1     Vitamin D, Cholecalciferol, 1000 UNITS CAPS Take 2,000 Units by mouth daily (Patient not taking: Reported on 2025) 1 capsule 0     No current  facility-administered medications for this visit.       PHYSICAL EXAM    There were no vitals taken for this visit.      General: Alert, No apparent distress   Psych: Affect euthymic  Eyes: Sclera noninjected  Ears, Nose, Throat, Mouth: Oral mucosa moist with normal salivary pool  Skin: No rashes noted  Cardiovascular: Regular rate and rhythm, Normal S1 and S2 and No murmurs, rubs or gallops  Respiratory: Clear to auscultation with no wheezing or crackles  Neuro: Normal gait and Able to arise from seated position unassisted  Musculoskeletal: Hands:  Normal. No synovitis.   Wrists:  Normal.  Elbows:  Normal.  Shoulders:  Normal.  Feet:  Normal.  Ankles:  Normal.  Knees:  Normal.      LABS   Reviewed as below.     Dec 2024  CBC - normal   AST, ALT, creatinine normal     Nov 2019  QTB neg     April 2024  QTB neg     Lab Results   Component Value Date    Rheumatoid Factor <20 11/08/2019    Cyclic Citrullinated Peptide Antibody, IgG 3 11/08/2019      Latest Reference Range & Units 04/28/17 09:29   Hep B Surface Agn NR  Nonreactive   Hepatitis B Core Colleen NR  Nonreactive   Hepatitis C Antibody NR  Nonreactive   Assay performance characteristics have not been established for newborns,   infants, and children       [facility]         9/2022    XR hand bilateral    Impression:  1. No acute osseous abnormality.   2. Radiocarpal joint predominant osteoarthrosis bilaterally, similar  to prior.  Atypical distribution compatible with history of rheumatoid  arthritis.  3. Diffuse osteopenic-appearing bones.    ASSESSMENT      1. Seropositive rheumatoid arthritis (H)    2. Rheumatoid factor positive    3. Bone erosion determined by x-ray    4. Long-term use of high-risk medication    5. Osteopenia of necks of both femurs    6. Long term current use of systemic steroids          (M05.9) Seropositive rheumatoid arthritis (H)  (primary encounter diagnosis)  Comment: RF pos ( RF-20 in 2009, later turned neg), CCP neg. X ray - humeral head  erosions. Meds tried in the past - hydroxychloroquine-did not help, methotrexate (d/c liver enzymes elevation), enbrel (ineffective), humira *d/c (uticarial reaction), sulfites (anxiety). Currently on methotrexate 10 mg weekly along with folic acid 3 mg daily. She is also on prednisone 5 mg daily. Also on infliximab 8 mg/kg every 4 weeks.  She has swelling of right knee.   Plan:   Continue methotrexate 10 mg weekly along with folic acid 3 mg daily.  Continue infliximab infusion 8 mg per kilogram every 4 weeks.   Set up appt with MTM to discuss about inflectra and alternate options - orencia is an option   Continue prednisone 5 mg daily. Will start tapering in summer when she is ready. Script sent to pharmacy.   Follow up in 4 months.   Orders Placed This Encounter   Procedures     AST     ALT     Creatinine     CBC with platelets differential     Clinical disease activity index (CDAI) - 7 low disease activity  PGA - 4  EGA - 2  TJC - 0  SJC -1      (R76.8) Rheumatoid factor positive  Comment: 2/2 RA   Plan: as above     (M85.80) Bone erosion determined by x-ray  Comment: 2/2 RA   Plan: as above     (Z79.899) Long-term use of high-risk medication  Comment: infliximab, methotrexate  Plan: have discussed with potential adverse effects with use of biologics such as suppression of immune system making pt more prone to infections, to hold the medication if the patient has any signs of any infection and to restart only after infection has cleared, reactivation of infections particularly TB and hepatitis B, C, fungal infections, risk of demyelinating disorders and to stop medication immediately if there's any weakness or paresthesias, risk of malignancy. Patient verbalized understanding and agrees to proceed.    The adverse reactions of MTX was discussed which includes cytopenia, immunosuppression, infection, oversedation, pneumonitis, dizziness, nausea, stomach upset, risk of falls, seizures, and derangements in LFTs.  Recommended against use of alcohol while on MTX. pt counseled on the adverse effects of Methotrexate including teratogenicity and need for reliable contraception, Alopecia, infection, liver dysfunction and myelosuppression, including the importance of taking daily Folic acid. Patient verbalized understanding and agrees to proceed.    Osteopenia  Comment : DEXA in 2019 - osteopenia of bilateral femoral neck and L spine  Plan : per endocrinology    Long term use of systemic steroids  Comment : prednisone 5 mg  Plan : I have explained to pt the potential adverse effects of long term use of steroids, including suppressing immune system making pts prone to infections, cataracts, glaucoma, thinning of skin, diabetes, increasing blood pressure, thinning of bones, fractures, AVN, weight gain, importance of taking osteoporosis protection with daily Chris + vitamin D. Patient verbalized understanding and agrees to procced.       RTC - 4 months    LATANYA RIZO MD    Division of Rheumatic & Autoimmune Diseases  [facility]       Again, thank you for allowing me to participate in the care of your patient.      Sincerely,    Latanya Rizo MD

## 2025-03-18 NOTE — NURSING NOTE
Chief Complaint   Patient presents with    RECHECK     3-4 mo f/u       /73 (BP Location: Left arm, Patient Position: Sitting, Cuff Size: Adult Regular)   Pulse 73   Wt 78.5 kg (173 lb)   SpO2 97%   BMI 29.70 kg/m

## 2025-03-18 NOTE — Clinical Note
Ivone, could you pls set up a phone call to discuss inflectra and may be orencia in future? Thanks much,

## 2025-03-20 ENCOUNTER — TELEPHONE (OUTPATIENT)
Dept: RHEUMATOLOGY | Facility: CLINIC | Age: 65
End: 2025-03-20
Payer: COMMERCIAL

## 2025-03-20 NOTE — TELEPHONE ENCOUNTER
MTM referral from: [place] clinic visit (referral by provider)    MTM referral outreach attempt #2 on March 20, 2025 at 12:23 PM      Outcome: Patient not reachable after several attempts, routed to Pharmacist Team/Provider as an [id number] Message Sent    [facility]

## 2025-03-25 ENCOUNTER — VIRTUAL VISIT (OUTPATIENT)
Dept: PHARMACY | Facility: CLINIC | Age: 65
End: 2025-03-25
Attending: INTERNAL MEDICINE
Payer: COMMERCIAL

## 2025-03-25 DIAGNOSIS — M05.79 RHEUMATOID ARTHRITIS INVOLVING MULTIPLE SITES WITH POSITIVE RHEUMATOID FACTOR (H): Primary | Chronic | ICD-10-CM

## 2025-03-25 NOTE — PATIENT INSTRUCTIONS
"Recommendations from today's MTM visit:                                                      Writer will await patient's response re: continuing Inflectra or switching to Orencia. If continuing Inflectra, will reach out to infusion finance team regarding  assistance programs which may be available to to reduce cost.  Please have lab results from primary care faxed to [phone].  Continue methotrexate 10 mg weekly and folic acid 3 mg daily.  Continue prednisone 4 mg daily.    Follow-up: Pending patient response re: decision for continuing Inflectra vs. switching to Orencia. Sees rheumatologist 7/24/25. Needs CMR and vaccine counseling at follow up, unable to complete today due to time constraints.    It was great speaking with you today.  I value your experience and would be very thankful for your time in providing feedback in our clinic survey. In the next few days, you may receive an email or text message from [employer] with a link to a survey related to your  clinical pharmacist.\"     To schedule another MTM appointment, please call the clinic directly or you may call the MTM scheduling line at [phone].    My Clinical Pharmacist's contact information:                                                      Please feel free to contact me with any questions or concerns you have.      Ivone Aguirre, PharmD  Medication Therapy Management Pharmacist  [facility]    "

## 2025-03-25 NOTE — PROGRESS NOTES
Medication Therapy Management (MTM) Encounter    ASSESSMENT:                            Medication Adherence/Access: If continuing Inflectra, may benefit from looking into  assistance programs to bring down cost similar to what she was enrolled in while on Remicade.    Rheumatoid arthritis: Patient was switched from Remicade to Inflectra without her knowledge following an insurance change several months ago. Discussed definition of a biosimilar today and monitoring for changes in symptoms and side effects following biosimilar switch. Difficult to ascertain current disease control as she has had considerable stressors in her life recently, although does note increased stiffness and joint pain prior to next infliximab dose. Discussed potential change from infliximab to Orencia, which is available either as infusion or self injection. Patient prefers to research both options and will reach out to writer once she has made a decision for further detailed discussion. Could also consider appealing for Remicade if she continues to notice increased breakthrough symptoms between Inflectra doses.    PLAN:                            Writer will await patient's response re: continuing Inflectra or switching to Orencia. If continuing Inflectra, will reach out to infusion finance team regarding  assistance programs which may be available to to reduce cost.  Please have lab results from primary care faxed to [phone].  Continue methotrexate 10 mg weekly and folic acid 3 mg daily.  Continue prednisone 4 mg daily.    Follow-up: Pending patient response re: decision for continuing Inflectra vs. switching to Orencia. Sees rheumatologist 7/24/25. Needs CMR and vaccine counseling at follow up, unable to complete today due to time constraints.    SUBJECTIVE/OBJECTIVE:                          Janice Mayfield is a 64 year old female called for an initial visit. She was referred to me from Latanya Lynn  "MD.    Reason for visit: Discuss Inflectra biosimilar, Orencia.    Allergies/ADRs: Reviewed in chart  Past Medical History: Reviewed in chart  Tobacco: She reports that she has never smoked. She has never used smokeless tobacco.  Alcohol: not assessed today    Medication Adherence/Access: Patient was switched from Remicade to Inflectra without her knowledge and is now incurring higher costs, see below for details.    Rheumatoid arthritis:   Infliximab (Inflectra) 8 mg/kg every 4 weeks  Methotrexate 10 mg weekly  Folic acid 3 mg daily  Prednisone 4 mg daily    Previously tried: hydroxychloroquine (ineffective), Humira (hives), Enbrel (ineffective), methotrexate (LFT elevations/brain fog at higher doses), avoid sulfasalazine due to sulfa allergy    Patient expresses frustration over switch from Remicade to Inflectra, which she was not told about. She has now had two doses of Inflectra. 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 She has also been contacted about switching to home infusion and is unsure of this process. In regards to RA symptoms, does notice increased pain and \"tightness\" in jaw, shoulders, knees, and feet as well as \"deep fatigue\" especially in the week or so before she is due for an infliximab injection. This was happening to a lesser extent when on Remicade. Briefly discussed Orencia as an alternative option, she would like to research both Inflectra and Orencia more on her own time and have some labs updated with her primary care provider before making a decision. She will have lab results sent to us and reach out to writer once she knows more.    Last labs updated 12/2/24    Today's Vitals: There were no vitals taken for this visit.  ----------------    I spent 65 minutes with this " patient today. All changes were made via collaborative practice agreement with Latanya Lynn. A copy of the visit note was provided to the patient's provider(s).    A summary of these recommendations was sent via [employer].    Ivone Aguirre, PharmD  Medication Therapy Management Pharmacist  [facility]     Telemedicine Visit Details  Type of service:  Telephone visit  Start Time:  0802  End Time:  0907     Medication Therapy Recommendations  No medication therapy recommendations to display

## 2025-03-25 NOTE — Clinical Note
Patient started out visit very frustrated by switch to Inflectra, I let her know that I have discussed with my  and they have reached out to the infusion finance team to ask them to let us know if a biosim switch is made in the future so we can monitor appropriately. Following our discussion she was much calmer. She is going to do some research on Inflectra vs Orencia following our discussion and will send me a MyChart letting me know her preference in the next week or two. For now, she will continue with Inflectra as scheduled in two days. Thanks!

## 2025-04-16 ENCOUNTER — VIRTUAL VISIT (OUTPATIENT)
Dept: PHARMACY | Facility: CLINIC | Age: 65
End: 2025-04-16
Attending: INTERNAL MEDICINE
Payer: COMMERCIAL

## 2025-04-16 DIAGNOSIS — M05.79 RHEUMATOID ARTHRITIS INVOLVING MULTIPLE SITES WITH POSITIVE RHEUMATOID FACTOR (H): Primary | ICD-10-CM

## 2025-04-16 NOTE — PROGRESS NOTES
Medication Therapy Management (MTM) Encounter    ASSESSMENT:                            Medication Adherence/Access: Would benefit from continued follow up with infusion finance team for Inflectra cost savings.    Rheumatoid arthritis: Patient has been holding methotrexate for > 2 weeks, encouraged her to have LFTs redrawn to determine if elevations were medication related. She has not decided if she wants to switch from Inflectra to Orencia so would benefit from continuing Inflectra for now and letting us know if she is interested in making the switch in the next few months.    PLAN:                            Please have your ALT and AST drawn at your earliest convenience. Continue holding methotrexate and folic acid until further guidance is provided pending these results.  Continue Inflectra infusions. Please reach out if you are interested in discussing switch to Orencia in the next few months. Continue to work with the infusion benefits team for cost savings.  Continue prednisone 4 mg daily.    Follow-up: Pending lab results, then 3 months. Next rheumatologist follow up is 7/24/25.    SUBJECTIVE/OBJECTIVE:                          Janice Mayfield is a 64 year old female called for a follow up visit from 3/25/25. She was referred to me from Latanya Lynn MD.    Reason for visit: Methotrexate hold.    Allergies/ADRs: Reviewed in chart  Past Medical History: Reviewed in chart  Tobacco: She reports that she has never smoked. She has never used smokeless tobacco.  Alcohol: not assessed today    Medication Adherence/Access: Patient reports her  is working with the infusion finance team on Inflectra cost.    Rheumatoid arthritis:   Infliximab (Inflectra) 8 mg/kg every 4 weeks  Methotrexate 10 mg weekly (holding from week of 3/31/25)  Folic acid 3 mg daily  Prednisone 4 mg daily    Previously tried: hydroxychloroquine (ineffective), Humira (hives), Enbrel (ineffective), methotrexate (LFT elevations/brain  fog at higher doses), avoid sulfasalazine due to sulfa allergy    Patient has been holding methotrexate for 2 weeks now and is open to having her LFTs repeated this week. She will likely not restart it regardless as she has a tooth infection which has been a recurring issue and will have it pulled in May. Difficult for her to tell how she is doing right now due to fluctuations in weather, other life stressors. She would like to continue with the Inflectra for a few more months and then determine if a switch to Orencia is warranted.    Last labs updated 3/28/25    Today's Vitals: There were no vitals taken for this visit.  ----------------    I spent 20 minutes with this patient today. All changes were made via collaborative practice agreement with Latanya Lynn MD. A copy of the visit note was provided to the patient's provider(s).    A summary of these recommendations was sent via [employer].    Ivone Aguirre, PharmD  Medication Therapy Management Pharmacist  [facility]     Telemedicine Visit Details  Type of service:  Telephone visit  Start Time: 1000  End Time: 1020     Medication Therapy Recommendations  Rheumatoid arthritis involving multiple sites with positive rheumatoid factor (H)   1 Current Medication: methotrexate 2.5 MG tablet   Current Medication Sig: Take 4 tablets (10 mg) by mouth every 7 days.   Rationale: Medication requires monitoring - Needs additional monitoring   Recommendation: Order Lab - Hold methotrexate pending LFT reasessment   Status: Patient Agreed - Adherence/Education   Identified Date: 4/16/2025 Completed Date: 4/17/2025

## 2025-04-17 NOTE — PATIENT INSTRUCTIONS
"Recommendations from today's MTM visit:                                                      Please have your ALT and AST drawn at your earliest convenience. Continue holding methotrexate and folic acid until further guidance is provided pending these results.  Continue Inflectra infusions. Please reach out if you are interested in discussing switch to Orencia in the next few months. Continue to work with the infusion benefits team for cost savings.  Continue prednisone 4 mg daily.    Follow-up: Pending lab results, then 3 months. Next rheumatologist follow up is 7/24/25.    It was great speaking with you today.  I value your experience and would be very thankful for your time in providing feedback in our clinic survey. In the next few days, you may receive an email or text message from [employer] with a link to a survey related to your  clinical pharmacist.\"     To schedule another MTM appointment, please call the clinic directly or you may call the MTM scheduling line at [phone].    My Clinical Pharmacist's contact information:                                                      Please feel free to contact me with any questions or concerns you have.      Ivone Aguirre, PharmD  Medication Therapy Management Pharmacist  [facility]    "

## 2025-04-23 ENCOUNTER — LAB (OUTPATIENT)
Dept: LAB | Facility: CLINIC | Age: 65
End: 2025-04-23
Payer: COMMERCIAL

## 2025-04-23 DIAGNOSIS — Z79.52 LONG TERM CURRENT USE OF SYSTEMIC STEROIDS: ICD-10-CM

## 2025-04-23 DIAGNOSIS — M05.9 SEROPOSITIVE RHEUMATOID ARTHRITIS (H): ICD-10-CM

## 2025-04-23 LAB
ALT SERPL W P-5'-P-CCNC: 198 U/L (ref 0–50)
AST SERPL W P-5'-P-CCNC: 135 U/L (ref 0–45)
BASOPHILS # BLD AUTO: 0 10E3/UL (ref 0–0.2)
BASOPHILS NFR BLD AUTO: 0 %
CREAT SERPL-MCNC: 0.78 MG/DL (ref 0.51–0.95)
EGFRCR SERPLBLD CKD-EPI 2021: 85 ML/MIN/1.73M2
EOSINOPHIL # BLD AUTO: 0 10E3/UL (ref 0–0.7)
EOSINOPHIL NFR BLD AUTO: 0 %
ERYTHROCYTE [DISTWIDTH] IN BLOOD BY AUTOMATED COUNT: 12.8 % (ref 10–15)
HCT VFR BLD AUTO: 36.6 % (ref 35–47)
HGB BLD-MCNC: 11.7 G/DL (ref 11.7–15.7)
IMM GRANULOCYTES # BLD: 0 10E3/UL
IMM GRANULOCYTES NFR BLD: 0 %
LYMPHOCYTES # BLD AUTO: 1.8 10E3/UL (ref 0.8–5.3)
LYMPHOCYTES NFR BLD AUTO: 24 %
MCH RBC QN AUTO: 28.5 PG (ref 26.5–33)
MCHC RBC AUTO-ENTMCNC: 32 G/DL (ref 31.5–36.5)
MCV RBC AUTO: 89 FL (ref 78–100)
MONOCYTES # BLD AUTO: 0.8 10E3/UL (ref 0–1.3)
MONOCYTES NFR BLD AUTO: 10 %
NEUTROPHILS # BLD AUTO: 5 10E3/UL (ref 1.6–8.3)
NEUTROPHILS NFR BLD AUTO: 65 %
NRBC # BLD AUTO: 0 10E3/UL
NRBC BLD AUTO-RTO: 0 /100
PLATELET # BLD AUTO: 302 10E3/UL (ref 150–450)
RBC # BLD AUTO: 4.1 10E6/UL (ref 3.8–5.2)
WBC # BLD AUTO: 7.6 10E3/UL (ref 4–11)

## 2025-04-23 PROCEDURE — 85025 COMPLETE CBC W/AUTO DIFF WBC: CPT

## 2025-04-23 PROCEDURE — 82565 ASSAY OF CREATININE: CPT

## 2025-04-23 PROCEDURE — 36415 COLL VENOUS BLD VENIPUNCTURE: CPT

## 2025-04-23 PROCEDURE — 84460 ALANINE AMINO (ALT) (SGPT): CPT

## 2025-04-23 PROCEDURE — 84450 TRANSFERASE (AST) (SGOT): CPT

## 2025-04-24 ENCOUNTER — TELEPHONE (OUTPATIENT)
Dept: PHARMACY | Facility: CLINIC | Age: 65
End: 2025-04-24
Payer: COMMERCIAL

## 2025-04-24 NOTE — TELEPHONE ENCOUNTER
Call placed to pt to initiate scheduling follow up regarding lab results. Pt did not answer, but I LVM

## 2025-04-28 ENCOUNTER — HOME INFUSION BILLING (OUTPATIENT)
Dept: HOME HEALTH SERVICES | Facility: HOME HEALTH | Age: 65
End: 2025-04-28
Payer: COMMERCIAL

## 2025-04-28 PROCEDURE — A4245 ALCOHOL WIPES PER BOX: HCPCS

## 2025-04-28 PROCEDURE — A4215 STERILE NEEDLE: HCPCS

## 2025-04-28 PROCEDURE — A4217 STERILE WATER/SALINE, 500 ML: HCPCS | Mod: JZ

## 2025-04-28 PROCEDURE — A4213 20+ CC SYRINGE ONLY: HCPCS

## 2025-04-28 PROCEDURE — A4452 WATERPROOF TAPE: HCPCS

## 2025-04-28 PROCEDURE — S1015 IV TUBING EXTENSION SET: HCPCS

## 2025-04-29 PROCEDURE — S9359 HIT ANTI-TNF PER DIEM: HCPCS

## 2025-05-21 ENCOUNTER — LAB (OUTPATIENT)
Dept: LAB | Facility: CLINIC | Age: 65
End: 2025-05-21
Payer: COMMERCIAL

## 2025-05-21 DIAGNOSIS — M05.79 RHEUMATOID ARTHRITIS INVOLVING MULTIPLE SITES WITH POSITIVE RHEUMATOID FACTOR (H): ICD-10-CM

## 2025-05-21 LAB
ALT SERPL W P-5'-P-CCNC: 70 U/L (ref 0–50)
AST SERPL W P-5'-P-CCNC: 31 U/L (ref 0–45)
BASOPHILS # BLD AUTO: 0 10E3/UL (ref 0–0.2)
BASOPHILS NFR BLD AUTO: 1 %
CREAT SERPL-MCNC: 0.73 MG/DL (ref 0.51–0.95)
EGFRCR SERPLBLD CKD-EPI 2021: >90 ML/MIN/1.73M2
EOSINOPHIL # BLD AUTO: 0.1 10E3/UL (ref 0–0.7)
EOSINOPHIL NFR BLD AUTO: 1 %
ERYTHROCYTE [DISTWIDTH] IN BLOOD BY AUTOMATED COUNT: 13.1 % (ref 10–15)
HCT VFR BLD AUTO: 35.9 % (ref 35–47)
HGB BLD-MCNC: 11.5 G/DL (ref 11.7–15.7)
IMM GRANULOCYTES # BLD: 0 10E3/UL
IMM GRANULOCYTES NFR BLD: 0 %
LYMPHOCYTES # BLD AUTO: 1.9 10E3/UL (ref 0.8–5.3)
LYMPHOCYTES NFR BLD AUTO: 24 %
MCH RBC QN AUTO: 28.1 PG (ref 26.5–33)
MCHC RBC AUTO-ENTMCNC: 32 G/DL (ref 31.5–36.5)
MCV RBC AUTO: 88 FL (ref 78–100)
MONOCYTES # BLD AUTO: 0.7 10E3/UL (ref 0–1.3)
MONOCYTES NFR BLD AUTO: 9 %
NEUTROPHILS # BLD AUTO: 5 10E3/UL (ref 1.6–8.3)
NEUTROPHILS NFR BLD AUTO: 65 %
NRBC # BLD AUTO: 0 10E3/UL
NRBC BLD AUTO-RTO: 0 /100
PLATELET # BLD AUTO: 342 10E3/UL (ref 150–450)
RBC # BLD AUTO: 4.09 10E6/UL (ref 3.8–5.2)
WBC # BLD AUTO: 7.8 10E3/UL (ref 4–11)

## 2025-05-21 PROCEDURE — 84450 TRANSFERASE (AST) (SGOT): CPT

## 2025-05-21 PROCEDURE — 84460 ALANINE AMINO (ALT) (SGPT): CPT

## 2025-05-21 PROCEDURE — 85025 COMPLETE CBC W/AUTO DIFF WBC: CPT

## 2025-05-21 PROCEDURE — 36415 COLL VENOUS BLD VENIPUNCTURE: CPT

## 2025-05-21 PROCEDURE — 82565 ASSAY OF CREATININE: CPT

## 2025-05-23 ENCOUNTER — RESULTS FOLLOW-UP (OUTPATIENT)
Dept: RHEUMATOLOGY | Facility: CLINIC | Age: 65
End: 2025-05-23

## 2025-06-02 ENCOUNTER — RESULTS FOLLOW-UP (OUTPATIENT)
Dept: FAMILY MEDICINE | Facility: CLINIC | Age: 65
End: 2025-06-02

## 2025-06-02 ENCOUNTER — ANCILLARY PROCEDURE (OUTPATIENT)
Dept: GENERAL RADIOLOGY | Facility: CLINIC | Age: 65
End: 2025-06-02
Attending: NURSE PRACTITIONER
Payer: COMMERCIAL

## 2025-06-02 ENCOUNTER — OFFICE VISIT (OUTPATIENT)
Dept: FAMILY MEDICINE | Facility: CLINIC | Age: 65
End: 2025-06-02
Payer: COMMERCIAL

## 2025-06-02 VITALS
WEIGHT: 174.6 LBS | TEMPERATURE: 98.1 F | HEART RATE: 68 BPM | HEIGHT: 64 IN | OXYGEN SATURATION: 99 % | RESPIRATION RATE: 14 BRPM | BODY MASS INDEX: 29.81 KG/M2 | DIASTOLIC BLOOD PRESSURE: 89 MMHG | SYSTOLIC BLOOD PRESSURE: 128 MMHG

## 2025-06-02 DIAGNOSIS — E28.39 MENOPAUSE OVARIAN FAILURE: ICD-10-CM

## 2025-06-02 DIAGNOSIS — M25.561 RIGHT KNEE PAIN, UNSPECIFIED CHRONICITY: ICD-10-CM

## 2025-06-02 DIAGNOSIS — Z12.4 SCREENING FOR MALIGNANT NEOPLASM OF CERVIX: ICD-10-CM

## 2025-06-02 DIAGNOSIS — Z12.31 ENCOUNTER FOR SCREENING MAMMOGRAM FOR BREAST CANCER: ICD-10-CM

## 2025-06-02 DIAGNOSIS — Z13.29 SCREENING FOR THYROID DISORDER: ICD-10-CM

## 2025-06-02 DIAGNOSIS — M17.11 PRIMARY OSTEOARTHRITIS OF RIGHT KNEE: ICD-10-CM

## 2025-06-02 DIAGNOSIS — Z12.4 CERVICAL CANCER SCREENING: ICD-10-CM

## 2025-06-02 DIAGNOSIS — Z13.6 CARDIOVASCULAR SCREENING; LDL GOAL LESS THAN 160: ICD-10-CM

## 2025-06-02 DIAGNOSIS — E28.39 MENOPAUSE OVARIAN FAILURE: Primary | ICD-10-CM

## 2025-06-02 DIAGNOSIS — Z00.00 ROUTINE GENERAL MEDICAL EXAMINATION AT A HEALTH CARE FACILITY: Primary | ICD-10-CM

## 2025-06-02 DIAGNOSIS — Z13.1 SCREENING FOR DIABETES MELLITUS (DM): ICD-10-CM

## 2025-06-02 LAB
ALBUMIN SERPL BCG-MCNC: 3.7 G/DL (ref 3.5–5.2)
ALP SERPL-CCNC: 79 U/L (ref 40–150)
ALT SERPL W P-5'-P-CCNC: 26 U/L (ref 0–50)
ANION GAP SERPL CALCULATED.3IONS-SCNC: 9 MMOL/L (ref 7–15)
AST SERPL W P-5'-P-CCNC: 20 U/L (ref 0–45)
BILIRUB SERPL-MCNC: 0.3 MG/DL
BUN SERPL-MCNC: 15.5 MG/DL (ref 8–23)
CALCIUM SERPL-MCNC: 9.3 MG/DL (ref 8.8–10.4)
CHLORIDE SERPL-SCNC: 106 MMOL/L (ref 98–107)
CHOLEST SERPL-MCNC: 166 MG/DL
CREAT SERPL-MCNC: 0.7 MG/DL (ref 0.51–0.95)
EGFRCR SERPLBLD CKD-EPI 2021: >90 ML/MIN/1.73M2
EST. AVERAGE GLUCOSE BLD GHB EST-MCNC: 111 MG/DL
FASTING STATUS PATIENT QL REPORTED: YES
FASTING STATUS PATIENT QL REPORTED: YES
GLUCOSE SERPL-MCNC: 94 MG/DL (ref 70–99)
HBA1C MFR BLD: 5.5 % (ref 0–5.6)
HCO3 SERPL-SCNC: 26 MMOL/L (ref 22–29)
HDLC SERPL-MCNC: 83 MG/DL
LDLC SERPL CALC-MCNC: 75 MG/DL
NONHDLC SERPL-MCNC: 83 MG/DL
POTASSIUM SERPL-SCNC: 4.3 MMOL/L (ref 3.4–5.3)
PROT SERPL-MCNC: 7.5 G/DL (ref 6.4–8.3)
SODIUM SERPL-SCNC: 141 MMOL/L (ref 135–145)
TRIGL SERPL-MCNC: 40 MG/DL
TSH SERPL DL<=0.005 MIU/L-ACNC: 3.14 UIU/ML (ref 0.3–4.2)

## 2025-06-02 PROCEDURE — 99386 PREV VISIT NEW AGE 40-64: CPT | Performed by: NURSE PRACTITIONER

## 2025-06-02 PROCEDURE — 83036 HEMOGLOBIN GLYCOSYLATED A1C: CPT | Performed by: NURSE PRACTITIONER

## 2025-06-02 PROCEDURE — 36415 COLL VENOUS BLD VENIPUNCTURE: CPT | Performed by: NURSE PRACTITIONER

## 2025-06-02 PROCEDURE — 3074F SYST BP LT 130 MM HG: CPT | Performed by: NURSE PRACTITIONER

## 2025-06-02 PROCEDURE — 80053 COMPREHEN METABOLIC PANEL: CPT | Performed by: NURSE PRACTITIONER

## 2025-06-02 PROCEDURE — 3048F LDL-C <100 MG/DL: CPT | Performed by: NURSE PRACTITIONER

## 2025-06-02 PROCEDURE — 87624 HPV HI-RISK TYP POOLED RSLT: CPT | Performed by: NURSE PRACTITIONER

## 2025-06-02 PROCEDURE — G0145 SCR C/V CYTO,THINLAYER,RESCR: HCPCS | Performed by: NURSE PRACTITIONER

## 2025-06-02 PROCEDURE — 80061 LIPID PANEL: CPT | Performed by: NURSE PRACTITIONER

## 2025-06-02 PROCEDURE — 73562 X-RAY EXAM OF KNEE 3: CPT | Mod: TC | Performed by: RADIOLOGY

## 2025-06-02 PROCEDURE — 3079F DIAST BP 80-89 MM HG: CPT | Performed by: NURSE PRACTITIONER

## 2025-06-02 PROCEDURE — 99213 OFFICE O/P EST LOW 20 MIN: CPT | Mod: 25 | Performed by: NURSE PRACTITIONER

## 2025-06-02 PROCEDURE — 84443 ASSAY THYROID STIM HORMONE: CPT | Performed by: NURSE PRACTITIONER

## 2025-06-02 PROCEDURE — 3044F HG A1C LEVEL LT 7.0%: CPT | Performed by: NURSE PRACTITIONER

## 2025-06-02 SDOH — HEALTH STABILITY: PHYSICAL HEALTH: ON AVERAGE, HOW MANY DAYS PER WEEK DO YOU ENGAGE IN MODERATE TO STRENUOUS EXERCISE (LIKE A BRISK WALK)?: 7 DAYS

## 2025-06-02 ASSESSMENT — SOCIAL DETERMINANTS OF HEALTH (SDOH): HOW OFTEN DO YOU GET TOGETHER WITH FRIENDS OR RELATIVES?: MORE THAN THREE TIMES A WEEK

## 2025-06-02 NOTE — PROGRESS NOTES
"Preventive Care Visit  [facility] FRANCIE Gonsalez LO Medrano CNP, Family Medicine  Jun 2, 2025      Assessment & Plan     Routine general medical examination at a health care facility    CARDIOVASCULAR SCREENING; LDL GOAL LESS THAN 160  - Lipid panel reflex to direct LDL Fasting    Screening for diabetes mellitus (DM)  - Comprehensive metabolic panel  - Hemoglobin A1c    Screening for thyroid disorder  - TSH with free T4 reflex    Encounter for screening mammogram for breast cancer  - MA Screen Bilateral w/Jaswant    Screening for malignant neoplasm of cervix  - HPV and Gynecologic Cytology Panel - Recommended Age 30-65 Years  - Gynecologic Cytology (PAP)    Menopause ovarian failure  - DX Bone Density    Right knee pain, unspecified chronicity  Will follow up and/or notify patient of  results via My Chart to determine further need for followup   - XR Knee Right 3 Views      Patient has been advised of split billing requirements and indicates understanding: Yes        BMI  Estimated body mass index is 29.81 kg/m  as calculated from the following:    Height as of this encounter: 1.63 m (5' 4.17\").    Weight as of this encounter: 79.2 kg (174 lb 9.6 oz).   Weight management plan: Discussed healthy diet and exercise guidelines    Counseling  Appropriate preventive services were addressed with this patient via screening, questionnaire, or discussion as appropriate for fall prevention, nutrition, physical activity, Tobacco-use cessation, social engagement, weight loss and cognition.  Checklist reviewing preventive services available has been given to the patient.  Reviewed patient's diet, addressing concerns and/or questions.   The patient was instructed to see the dentist every 6 months.       Follow-up    Follow-up Visit   Expected date:  Jun 02, 2026 (Approximate)      Follow Up Appointment Details:     Follow-up with whom?: PCP    Follow-Up for what?: Adult Preventive    How?: In Person          "        Bernie Camacho is a 64 year old, presenting for the following:  Physical        6/2/2025     6:54 AM   Additional Questions   Roomed by [facility]     Advance Care Planning    Discussed advance care planning with patient; however, patient declined at this time.        6/2/2025   General Health   How would you rate your overall physical health? (!) FAIR   Feel stress (tense, anxious, or unable to sleep) Only a little   (!) STRESS CONCERN      6/2/2025   Nutrition   Three or more servings of calcium each day? Yes   Diet: Regular (no restrictions)   How many servings of fruit and vegetables per day? 4 or more   How many sweetened beverages each day? 0-1         6/2/2025   Exercise   Days per week of moderate/strenous exercise 7 days         6/2/2025   Social Factors   Frequency of gathering with friends or relatives More than three times a week   Worry food won't last until get money to buy more No   Food not last or not have enough money for food? No   Do you have housing? (Housing is defined as stable permanent housing and does not include staying outside in a car, in a tent, in an abandoned building, in an overnight shelter, or couch-surfing.) Yes   Are you worried about losing your housing? No   Lack of transportation? No   Unable to get utilities (heat,electricity)? No         6/2/2025   Fall Risk   Fallen 2 or more times in the past year? No   Trouble with walking or balance? Yes         6/2/2025   Dental   Dentist two times every year? (!) NO           Today's PHQ-2 Score:       6/2/2025     6:59 AM   PHQ-2 ( 1999 [employer])   Q1: Little interest or pleasure in doing things 0   Q2: Feeling down, depressed or hopeless 0   PHQ-2 Score 0    Q1: Little interest or pleasure in doing things Not at all   Q2: Feeling down, depressed or hopeless Not at all   PHQ-2 Score 0       Patient-reported         6/2/2025   Substance Use   Alcohol more than 3/day or more than 7/wk Not Applicable   Do you use any  other substances recreationally? No     Social History     Tobacco Use    Smoking status: Never    Smokeless tobacco: Never   Vaping Use    Vaping status: Never Used   Substance Use Topics    Alcohol use: No    Drug use: No           7/28/2021   LAST FHS-7 RESULTS   1st degree relative breast or ovarian cancer No   Any relative bilateral breast cancer No   Any male have breast cancer No   Any ONE woman have BOTH breast AND ovarian cancer No   Any woman with breast cancer before 50yrs No   2 or more relatives with breast AND/OR ovarian cancer No   2 or more relatives with breast AND/OR bowel cancer No        Mammogram Screening - Mammogram every 1-2 years updated in Health Maintenance based on mutual decision making        6/2/2025   STI Screening   New sexual partner(s) since last STI/HIV test? No     History of abnormal Pap smear: YES - reflected in Problem List and Health Maintenance accordingly        Latest Ref Rng & Units 6/2/2025     7:10 AM 7/8/2021     8:45 AM 7/8/2021     8:42 AM   PAP / HPV   PAP  Negative for Intraepithelial Lesion or Malignancy (NILM)      PAP (Historical)    NIL    HPV 16 DNA Negative Negative  Negative     HPV 18 DNA Negative Negative  Negative     Other HR HPV Negative Negative  Negative       ASCVD Risk   The 10-year ASCVD risk score (Tara GOLDSMITH, et al., 2019) is: 2.8%    Values used to calculate the score:      Age: 64 years      Sex: Female      Is Non- : No      Diabetic: No      Tobacco smoker: No      Systolic Blood Pressure: 128 mmHg      Is BP treated: No      HDL Cholesterol: 83 mg/dL      Total Cholesterol: 166 mg/dL           Reviewed and updated as needed this visit by Provider   Tobacco  Allergies  Meds  Problems  Med Hx  Surg Hx  Fam Hx            Past Medical History:   Diagnosis Date    Lichen sclerosus et atrophicus of the vulva 11/21/2018    Long term current use of systemic steroids 5/3/2017    Long-term use of  immunosuppressant medication 2018    Other secondary osteoarthritis of multiple sites 2018    Rheumatoid arthritis involving multiple sites with positive rheumatoid factor (H) 2017    Vitamin D deficiency 5/3/2017     Past Surgical History:   Procedure Laterality Date    C/SECTION, LOW TRANSVERSE      , Low Transverse    HC TOOTH EXTRACTION W/FORCEP       Lab work is in process  Labs reviewed in EPIC  BP Readings from Last 3 Encounters:   25 128/89   25 124/81   25 120/73    Wt Readings from Last 3 Encounters:   25 79.2 kg (174 lb 9.6 oz)   25 79.6 kg (175 lb 6.4 oz)   25 78.5 kg (173 lb)                  Patient Active Problem List   Diagnosis    Rheumatoid arthritis involving multiple sites with positive rheumatoid factor (H)    Vitamin D deficiency    Long term current use of systemic steroids    Other secondary osteoarthritis of multiple sites    Long-term use of immunosuppressant medication    Varicose veins of both lower extremities, unspecified whether complicated    Lichen sclerosus et atrophicus of the vulva    Cervical cancer screening     Past Surgical History:   Procedure Laterality Date    C/SECTION, LOW TRANSVERSE      , Low Transverse    HC TOOTH EXTRACTION W/FORCEP         Social History     Tobacco Use    Smoking status: Never    Smokeless tobacco: Never   Substance Use Topics    Alcohol use: No     Family History   Problem Relation Age of Onset    Cancer Mother 72    Skin Cancer Father          Current Outpatient Medications   Medication Sig Dispense Refill    augmented betamethasone dipropionate (DIPROLENE-AF) 0.05 % external ointment Apply sparingly to affected area in groin 1-2 times weekly. 15 g 3    diphenhydrAMINE (BENADRYL) 50 MG/ML injection Inject 1 mL (50 mg) over 3-5 minutes into the vein via push as needed for other (infusion reaction). For RN use only.  Draw up in a syringe and administer IV push.  Discard remainder  "of vial. 02325 mL 0    Emergency Supply Kit, PIV, Patient use for emergency only. Contents: 3 sodium chloride 0.9% flushes, 1 IV start kit, 1 microclave ext set 14\", 1 each IV Cath 22 G/1\" and 24G/3/4\", 6 alcohol prep pads, 4 nitrile gloves (med). Call [phone] to reorder. 908496 kit 0    EPINEPHrine (ANY BX GENERIC EQUIV) 0.3 MG/0.3ML injection 2-pack Inject 0.3 mLs (0.3 mg) into the muscle as needed for anaphylaxis (infusion reaction). Administer into the mid-thigh in case of severe anaphylaxis (wheezing, throat tightening, mouth swelling, difficulty breathing). May repeat dose one time in 5-15 minutes if symptoms persist. 332180 mL 0    folic acid (FOLVITE) 1 MG tablet Take 3 tablets (3,000 mcg) by mouth daily. 270 tablet 2    IBUPROFEN PO Take 200 mg by mouth at bedtime      InFLIXimab (REMICADE IV) Inject 100 mg into the vein every 28 days       inFLIXimab-dyyb (INFLECTRA) injection Add to infusion 60 mLs (600 mg) every 4 weeks. Reconstitute inFLIXimab-dyyb vial(s). Draw up inFLIXimab-dyyb 10 mg/mL in syringe with 21 G needle and add to NaCl 0.9% bag immediately prior to infusing. MIX GENTLY BY INVERSION, DO NOT SHAKE. Discard remainder of vial(s). 267149 each 0    methotrexate 2.5 MG tablet Take 4 tablets (10 mg) by mouth every 7 days. 48 tablet 0    methylPREDNISolone Na Suc, PF, (SOLU-MEDROL) 125 mg/2 mL injection Inject 2 mLs (125 mg) over 3-5 minutes into the vein via push as needed (severe reaction). For RN use only.  Reconstitute vial. Draw up methylPREDNISolone in a syringe and administer.  Discard remainder of vial. 266173 mL 0    predniSONE (DELTASONE) 1 MG tablet Take 4 tablets (4 mg) by mouth daily for 60 days, THEN 3 tablets (3 mg) daily for 60 days, THEN 2 tablets (2 mg) daily. 30 tablet 2    predniSONE (DELTASONE) 5 MG tablet TAKE 1 TABLET BY MOUTH EVERY DAY 90 tablet 1    sodium chloride 0.9% bag Infuse 250 mLs over 1 hours into the vein every 4 weeks. Add 60 mL (600 mg) of infliximab 10 " mg/mL to bag immediately prior to infusing via gravity infusion. When complete, flush bag with 20 mL NS to flush tubing. 940738 mL 0    sodium chloride 0.9% infusion Infuse 500 mLs into the vein as needed for other (infusion reaction). In case of mild reaction, administer via gravity at 20 mL/hr to keep vein open. In case of severe reaction, administer via gravity wide open on prime setting. 446231 mL 0    sodium chloride, PF, 0.9% PF flush Inject 10 mLs into the vein as needed for other (infusion reaction). For RN use only as needed for infusion reaction 589331 mL 0    sodium chloride, PF, 0.9% PF flush Inject 10 mLs into the vein as needed for line flush. Flush IV before and after medication administration as directed and/or at least every 12 hours. 415757 mL 0    sterile water, preservative free, injection Use 60 mLs for reconstitution every 4 weeks. 1. Reconstitute each vial of inFLIXimab-dyyb (INFLECTRA) with 10 mL of sterile water for injection by slowly injecting down the inside wall of vial w/21 G needle. DO NOT SHAKE. Foaming of the solution on reconstitution is not unusual. Roll and tilt each vial gently.  2. Let drug stand for 5 minutes.  3. Inspect vials for particulate matter and/or discoloration prior to continuing. The solution should be colorless to light yellow and opalescent, and may develop a few translucent particles. DO NOT USE IF DRUG HAS NOT FULLY DISSOLVED OR IF OPAQUE PARTICLES, DISCOLORATION OR OTHER FOREIGN PARTICLES ARE PRESENT.  4. Draw up appropriate dose w/ 21 G needle from vial. 129411 mL 0    Vitamin D, Cholecalciferol, 1000 UNITS CAPS Take 2,000 Units by mouth daily. 1 capsule 0     Allergies   Allergen Reactions    Adalimumab Hives    Plaquenil [Hydroxychloroquine]      Elevated LFTs    Sulfa Antibiotics      delusions       CONSTITUTIONAL:NEGATIVE for fever, chills, change in weight  INTEGUMENTARY/SKIN: NEGATIVE for worrisome rashes, moles or lesions  EYES: NEGATIVE for vision  "changes or irritation  ENT: NEGATIVE for ear, mouth and throat problems  RESP:NEGATIVE for significant cough or SOB  BREAST: NEGATIVE for masses, tenderness or discharge  CV: NEGATIVE for chest pain, palpitations or peripheral edema  GI: NEGATIVE for nausea, abdominal pain, heartburn, or change in bowel habits   menopausal female: amenorrhea, no unusual urinary symptoms, no unusual vaginal symptoms, and vaginal dryness  MUSCULOSKELETAL:POSITIVE  for arthralgias  and NEGATIVE for joint swelling  and joint warmth   Right knee is seeming to be giving her more difficulty   NEURO: NEGATIVE for weakness, dizziness or paresthesias  ENDOCRINE: NEGATIVE for temperature intolerance, skin/hair changes  HEME/ALLERGY/IMMUNE: NEGATIVE for bleeding problems  PSYCHIATRIC: POSITIVE for grief for loss of father in January  and NEGATIVE forthoughts of hurting someone else and thoughts of self harm            Objective    Exam  /89 (BP Location: Right arm, Patient Position: Sitting, Cuff Size: Adult Regular)   Pulse 68   Temp 98.1  F (36.7  C) (Oral)   Resp 14   Ht 1.63 m (5' 4.17\")   Wt 79.2 kg (174 lb 9.6 oz)   SpO2 99%   BMI 29.81 kg/m     Estimated body mass index is 29.81 kg/m  as calculated from the following:    Height as of this encounter: 1.63 m (5' 4.17\").    Weight as of this encounter: 79.2 kg (174 lb 9.6 oz).  Wt Readings from Last 4 Encounters:   06/02/25 79.2 kg (174 lb 9.6 oz)   03/28/25 79.6 kg (175 lb 6.4 oz)   03/18/25 78.5 kg (173 lb)   02/28/25 78.3 kg (172 lb 9.6 oz)      Physical Exam  GENERAL: alert and no distress  EYES: Eyes grossly normal to inspection and conjunctivae and sclerae normal  HENT: ear canals and TM's normal, nose and mouth without ulcers or lesions  NECK: no adenopathy, no asymmetry, masses, or scars  RESP: lungs clear to auscultation - no rales, rhonchi or wheezes  BREAST: normal without masses, tenderness or nipple discharge and no palpable axillary masses or adenopathy  CV: " regular rates and rhythm, no murmur, click or rub, peripheral pulses strong, and no peripheral edema  ABDOMEN: soft, nontender, no hepatosplenomegaly, no masses and bowel sounds normal   (female): normal female external genitalia, normal urethral meatus , normal vaginal mucosa, and normal cervix, adnexae, and uterus without masses.  MS: extremities normal- no gross deformities noted, gait normal, and normal muscle tone  gait is age appropriate without ataxia  no active joints  Knee exam: right positive for moderate crepitations, some mild tenderness and pain on range of motion, no effusion is present, no pseudolaxity noted.   SKIN: no suspicious lesions or rashes  NEURO: Normal strength and tone, mentation intact and speech normal  PSYCH: mentation appears normal, affect normal/bright  LYMPH: no cervical, supraclavicular, axillary, or inguinal adenopathy    Results for orders placed or performed in visit on 06/02/25   XR Knee Right 3 Views     Status: None    Narrative    EXAM: XR KNEE RIGHT 3 VIEWS  LOCATION: [facility]  DATE: 6/2/2025    INDICATION:  Right knee pain, unspecified chronicity  COMPARISON: 5/10/2022.      Impression    IMPRESSION: Generalized demineralization. No fracture visualized. Moderate knee joint effusion. Normal joint alignment. Mild-moderate tricompartmental degenerative arthritis, with joint space narrowing, subchondral sclerosis, marginal spurring, greatest   in the medial compartment. Soft tissues are radiographically unremarkable.   Results for orders placed or performed in visit on 06/02/25   HPV and Gynecologic Cytology Panel - Recommended Age 30-65 Years     Status: None   Result Value Ref Range    Human Papilloma Virus 16 DNA Negative Negative    Human Papilloma Virus 18 DNA Negative Negative    Human Papilloma Virus Other Negative Negative    FINAL DIAGNOSIS       This patient's sample is negative for high risk HPV DNA.          METHODOLOGY: The [employer]  system uses automated extraction, simultaneous amplification of HPV (E6/E7 oncogenes) and beta-globin, followed by real time detection of fluorescent labeled HPV and beta globin using specific oligonucleotide probes. The test specifically identifies types HPV 16 DNA and HPV 18 DNA while concurrently detecting the rest of the high risk types (31, 33, 35, 39, 45, 51, 52, 56, 58, 59, 66 or 68).     COMMENTS: This test is not intended for use as a screening device for woman under age 30 with normal cervical cytology. Results should be correlated with cytologic and histologic findings. Close clinical follow up is recommended.      Please see the separate Gynecologic Cytology (Pap) report from the same collection date.     Lipid panel reflex to direct LDL Fasting     Status: None   Result Value Ref Range    Cholesterol 166 <200 mg/dL    Triglycerides 40 <150 mg/dL    Direct Measure HDL 83 >=50 mg/dL    LDL Cholesterol Calculated 75 <100 mg/dL    Non HDL Cholesterol 83 <130 mg/dL    Patient Fasting > 8hrs? Yes     Narrative    Cholesterol  Desirable: < 200 mg/dL  Borderline High: 200 - 239 mg/dL  High: >= 240 mg/dL    Triglycerides  Normal: < 150 mg/dL  Borderline High: 150 - 199 mg/dL  High: 200-499 mg/dL  Very High: >= 500 mg/dL    Direct Measure HDL  Female: >= 50 mg/dL   Male: >= 40 mg/dL    LDL Cholesterol  Desirable: < 100 mg/dL  Above Desirable: 100 - 129 mg/dL   Borderline High: 130 - 159 mg/dL   High:  160 - 189 mg/dL   Very High: >= 190 mg/dL    Non HDL Cholesterol  Desirable: < 130 mg/dL  Above Desirable: 130 - 159 mg/dL  Borderline High: 160 - 189 mg/dL  High: 190 - 219 mg/dL  Very High: >= 220 mg/dL   Comprehensive metabolic panel     Status: None   Result Value Ref Range    Sodium 141 135 - 145 mmol/L    Potassium 4.3 3.4 - 5.3 mmol/L    Carbon Dioxide (CO2) 26 22 - 29 mmol/L    Anion Gap 9 7 - 15 mmol/L    Urea Nitrogen 15.5 8.0 - 23.0 mg/dL    Creatinine 0.70 0.51 - 0.95 mg/dL    GFR Estimate >90 >60  mL/min/1.73m2    Calcium 9.3 8.8 - 10.4 mg/dL    Chloride 106 98 - 107 mmol/L    Glucose 94 70 - 99 mg/dL    Alkaline Phosphatase 79 40 - 150 U/L    AST 20 0 - 45 U/L    ALT 26 0 - 50 U/L    Protein Total 7.5 6.4 - 8.3 g/dL    Albumin 3.7 3.5 - 5.2 g/dL    Bilirubin Total 0.3 <=1.2 mg/dL    Patient Fasting > 8hrs? Yes    TSH with free T4 reflex     Status: Normal   Result Value Ref Range    TSH 3.14 0.30 - 4.20 uIU/mL   Hemoglobin A1c     Status: Normal   Result Value Ref Range    Estimated Average Glucose 111 <117 mg/dL    Hemoglobin A1C 5.5 0.0 - 5.6 %   Gynecologic Cytology (PAP)     Status: None   Result Value Ref Range    Interpretation        Negative for Intraepithelial Lesion or Malignancy (NILM)    Comment         Papanicolaou Test Limitations:  Cervical cytology is a screening test with limited sensitivity, and regular screening is critical for cancer prevention.  Pap tests are primarily effective for the diagnosis/prevention of squamous cell carcinoma, not adenocarcinoma or other cancers.        Specimen Adequacy       Satisfactory for evaluation, endocerv/transformation zone component absent, atrophy    Clinical Information       post-menopausal      Previous Abnormal?       No      Performing Labs       The technical component of this testing was completed at [facility].    Stain controls for all stains resulted within this report have been reviewed and show appropriate reactivity.      Associated HPV Report       Please see the associated HPV High Risk Types DNA Cervical report for Specimen [id number] from the same collection date.           Signed Electronically by: LO Perry CNP

## 2025-06-02 NOTE — PATIENT INSTRUCTIONS
PLAN:   1.   Symptomatic therapy suggested: Increase calcium to 1000mg and 1000 international unit(s) Vit D daily   2.  Orders Placed This Encounter   Procedures    REVIEW OF HEALTH MAINTENANCE PROTOCOL ORDERS    MA Screen Bilateral w/Jaswant    DX Bone Density    XR Knee Right 3 Views    Lipid panel reflex to direct LDL Fasting    Comprehensive metabolic panel    TSH with free T4 reflex    Hemoglobin A1c    HPV and Gynecologic Cytology Panel - Recommended Age 30-65 Years       3.  FURTHER TESTING:       - DXA (bone density) scan       - mammogram  Will follow up and/or notify patient of  results via My Chart to determine further need for followup   Follow up office visit in one year for annual health maintenance exam, sooner PRN.   Patient needs to follow up in if no improvement,or sooner if worsening of symptoms or other symptoms develop.            Patient Education   Preventive Care Advice   This is general advice given by our system to help you stay healthy. However, your care team may have specific advice just for you. Please talk to your care team about your preventive care needs.  Nutrition  Eat 5 or more servings of fruits and vegetables each day.  Try wheat bread, brown rice and whole grain pasta (instead of white bread, rice, and pasta).  Get enough calcium and vitamin D. Check the label on foods and aim for 100% of the RDA (recommended daily allowance).  Lifestyle  Exercise at least 150 minutes each week  (30 minutes a day, 5 days a week).  Do muscle strengthening activities 2 days a week. These help control your weight and prevent disease.  No smoking.  Wear sunscreen to prevent skin cancer.  Have a dental exam and cleaning every 6 months.  Yearly exams  See your health care team every year to talk about:  Any changes in your health.  Any medicines your care team has prescribed.  Preventive care, family planning, and ways to prevent chronic diseases.  Shots (vaccines)   HPV shots (up to age 26), if you've  never had them before.  Hepatitis B shots (up to age 59), if you've never had them before.  COVID-19 shot: Get this shot when it's due.  Flu shot: Get a flu shot every year.  Tetanus shot: Get a tetanus shot every 10 years.  Pneumococcal, hepatitis A, and RSV shots: Ask your care team if you need these based on your risk.  Shingles shot (for age 50 and up)  General health tests  Diabetes screening:  Starting at age 35, Get screened for diabetes at least every 3 years.  If you are younger than age 35, ask your care team if you should be screened for diabetes.  Cholesterol test: At age 39, start having a cholesterol test every 5 years, or more often if advised.  Bone density scan (DEXA): At age 50, ask your care team if you should have this scan for osteoporosis (brittle bones).  Hepatitis C: Get tested at least once in your life.  STIs (sexually transmitted infections)  Before age 24: Ask your care team if you should be screened for STIs.  After age 24: Get screened for STIs if you're at risk. You are at risk for STIs (including HIV) if:  You are sexually active with more than one person.  You don't use condoms every time.  You or a partner was diagnosed with a sexually transmitted infection.  If you are at risk for HIV, ask about PrEP medicine to prevent HIV.  Get tested for HIV at least once in your life, whether you are at risk for HIV or not.  Cancer screening tests  Cervical cancer screening: If you have a cervix, begin getting regular cervical cancer screening tests starting at age 21.  Breast cancer scan (mammogram): If you've ever had breasts, begin having regular mammograms starting at age 40. This is a scan to check for breast cancer.  Colon cancer screening: It is important to start screening for colon cancer at age 45.  Have a colonoscopy test every 10 years (or more often if you're at risk) Or, ask your provider about stool tests like a FIT test every year or Cologuard test every 3 years.  To learn more  about your testing options, visit:   .  For help making a decision, visit:   https://bit.ly/td38752.  Prostate cancer screening test: If you have a prostate, ask your care team if a prostate cancer screening test (PSA) at age 55 is right for you.  Lung cancer screening: If you are a current or former smoker ages 50 to 80, ask your care team if ongoing lung cancer screenings are right for you.  For informational purposes only. Not to replace the advice of your health care provider. Copyright   2023 [employer]. All rights reserved. Clinically reviewed by the  [employer] [facility]. [employer] 122264 - REV 01/24.  Preventing Falls: Care Instructions  Injuries and health problems such as trouble walking or poor eyesight can increase your risk of falling. So can some medicines. But there are things you can do to help prevent falls. You can exercise to get stronger. You can also arrange your home to make it safer.    Talk to your doctor about the medicines you take. Ask if any of them increase the risk of falls and whether they can be changed or stopped.   Try to exercise regularly. It can help improve your strength and balance. This can help lower your risk of falling.         Practice fall safety and prevention.   Wear low-heeled shoes that fit well and give your feet good support. Talk to your doctor if you have foot problems that make this hard.  Carry a cellphone or wear a medical alert device that you can use to call for help.  Use stepladders instead of chairs to reach high objects. Don't climb if you're at risk for falls. Ask for help, if needed.  Wear the correct eyeglasses, if you need them.        Make your home safer.   Remove rugs, cords, clutter, and furniture from walkways.  Keep your house well lit. Use night-lights in hallways and bathrooms.  Install and use sturdy handrails on stairways.  Wear nonskid footwear, even inside. Don't walk barefoot or in socks without shoes.       "  Be safe outside.   Use handrails, curb cuts, and ramps whenever possible.  Keep your hands free by using a shoulder bag or backpack.  Try to walk in well-lit areas. Watch out for uneven ground, changes in pavement, and debris.  Be careful in the winter. Walk on the grass or gravel when sidewalks are slippery. Use de-icer on steps and walkways. Add non-slip devices to shoes.    Put grab bars and nonskid mats in your shower or tub and near the toilet. Try to use a shower chair or bath bench when bathing.   Get into a tub or shower by putting in your weaker leg first. Get out with your strong side first. Have a phone or medical alert device in the bathroom with you.   Where can you learn more?  Go to https://www.[employer].net/patiented  Enter G117 in the search box to learn more about \"Preventing Falls: Care Instructions.\"  Current as of: July 31, 2024  Content Version: 14.4    0181-0962 [employer].   Care instructions adapted under license by your healthcare professional. If you have questions about a medical condition or this instruction, always ask your healthcare professional. [employer] disclaims any warranty or liability for your use of this information.       "

## 2025-06-03 ENCOUNTER — PATIENT OUTREACH (OUTPATIENT)
Dept: CARE COORDINATION | Facility: CLINIC | Age: 65
End: 2025-06-03
Payer: COMMERCIAL

## 2025-06-04 ENCOUNTER — PATIENT OUTREACH (OUTPATIENT)
Dept: CARE COORDINATION | Facility: CLINIC | Age: 65
End: 2025-06-04
Payer: COMMERCIAL

## 2025-06-04 LAB
HPV HR 12 DNA CVX QL NAA+PROBE: NEGATIVE
HPV16 DNA CVX QL NAA+PROBE: NEGATIVE
HPV18 DNA CVX QL NAA+PROBE: NEGATIVE
HUMAN PAPILLOMA VIRUS FINAL DIAGNOSIS: NORMAL

## 2025-06-05 PROBLEM — Z12.4 CERVICAL CANCER SCREENING: Status: ACTIVE | Noted: 2019-11-26

## 2025-06-05 LAB
BKR AP ASSOCIATED HPV REPORT: NORMAL
BKR LAB AP GYN ADEQUACY: NORMAL
BKR LAB AP GYN INTERPRETATION: NORMAL
BKR LAB AP PREVIOUS ABNORMAL: NORMAL
PATH REPORT.COMMENTS IMP SPEC: NORMAL
PATH REPORT.COMMENTS IMP SPEC: NORMAL
PATH REPORT.RELEVANT HX SPEC: NORMAL

## 2025-06-16 ENCOUNTER — TELEPHONE (OUTPATIENT)
Dept: RHEUMATOLOGY | Facility: CLINIC | Age: 65
End: 2025-06-16
Payer: COMMERCIAL

## 2025-06-16 NOTE — TELEPHONE ENCOUNTER
Called pt to r/s 7/24 appt w/ Dr. Lynn, no answer, lvm.   On callback, can use any hold timeslots on Dr. Lynn schedule.   Ruma Brannon RN on 6/16/2025 at 1:56 PM

## 2025-06-17 ENCOUNTER — ENROLLMENT (OUTPATIENT)
Dept: HOME HEALTH SERVICES | Facility: HOME HEALTH | Age: 65
End: 2025-06-17
Payer: COMMERCIAL

## 2025-06-20 NOTE — TELEPHONE ENCOUNTER
2nd attempt to r/s appt 7/24 with Dr. Lynn. No answer, left vm. Can use hold spot or New Patient timeslot on callback.   Ruma Brannon RN on 6/20/2025 at 11:07 AM

## 2025-06-25 ENCOUNTER — TELEPHONE (OUTPATIENT)
Dept: RHEUMATOLOGY | Facility: CLINIC | Age: 65
End: 2025-06-25
Payer: COMMERCIAL

## 2025-06-25 NOTE — TELEPHONE ENCOUNTER
[facility] Call Center    Phone Message    May a detailed message be left on voicemail: yes     Reason for Call: Pt's 7/24/25 appt with Dr. Lynn was cancelled per provider and rescheduled for 9/4/25, pt's name added to wait list.     Pt is concerned about needing to wait this long for her appt because she has already been of all of her rheumatology medications altogether for approximately 3 months now (had to stop her medications after one of them caused elevated LFT's). Now that her liver numbers have returned to normal, pt would really like to discuss going back on her medications. She is hoping she can be seen for an appt closer to her original appt date of 7/24/25? Please advise.    Action Taken: Other: Rheum    Travel Screening: Not Applicable

## 2025-06-25 NOTE — TELEPHONE ENCOUNTER
Patient called and advised there are no openings.  Will place patient on writers wait list.     Lorna De La Cruz RN

## 2025-06-25 NOTE — TELEPHONE ENCOUNTER
3rd attempt to r/s. No answer, lvm to callback if wanting to reschedule. Also sent My[facility] Msg.   Cancelled appt.   Ruma Brannon RN on 6/25/2025 at 1:47 PM

## 2025-07-02 ENCOUNTER — HOME INFUSION (OUTPATIENT)
Dept: HOME HEALTH SERVICES | Facility: HOME HEALTH | Age: 65
End: 2025-07-02
Payer: COMMERCIAL

## 2025-07-02 DIAGNOSIS — M05.79 RHEUMATOID ARTHRITIS INVOLVING MULTIPLE SITES WITH POSITIVE RHEUMATOID FACTOR (H): Primary | ICD-10-CM

## 2025-07-08 NOTE — TELEPHONE ENCOUNTER
[facility] Call Center    Phone Message    May a detailed message be left on voicemail: yes     Reason for Call: Other: [employer] will need a call back. Appeal is due is Friday 7/11/25 and they want to know if they want to set up a peer to peer for this medication. Please call back and advise.      Action Taken: Message routed to:  Other: rheum    Travel Screening: Not Applicable     Date of Service: 7/8/25

## 2025-07-16 RX ORDER — DIPHENHYDRAMINE HCL 25 MG
25 CAPSULE ORAL
Qty: 999999 CAPSULE | Refills: 0 | Status: DISPENSED | OUTPATIENT
Start: 2025-07-16 | End: 2025-10-27

## 2025-07-16 RX ORDER — ACETAMINOPHEN 325 MG/1
650 TABLET ORAL
Qty: 999999 TABLET | Refills: 0 | Status: DISPENSED | OUTPATIENT
Start: 2025-07-16 | End: 2025-10-27

## 2025-07-16 NOTE — PROGRESS NOTES
Pt will receive her Remicade dose on Thursday 7/24/25 with delivery of supplies on Wednesday 7/23/25 between 8am-10am.  Reviewed opening supplies and refrigerating anything with a sticker on it that says Refrigerate upon arrival.  She verbalizes understanding and will take these supplies out of the refrigerator the morning of her infusion.  Given contact information for I and encouraged to call with questions or concerns.   Reviewed 24/7 hour availability of Pharmacist and Nurse.  All questions answered.         Shefali SHELL  [facility]      [facility], [address] [address]   Office: [phone]  Direct: [phone]

## 2025-07-23 ENCOUNTER — HOME INFUSION BILLING (OUTPATIENT)
Dept: HOME HEALTH SERVICES | Facility: HOME HEALTH | Age: 65
End: 2025-07-23
Payer: COMMERCIAL

## 2025-07-23 PROCEDURE — E0776 IV POLE: HCPCS

## 2025-07-23 PROCEDURE — A4452 WATERPROOF TAPE: HCPCS

## 2025-07-23 PROCEDURE — A4217 STERILE WATER/SALINE, 500 ML: HCPCS | Mod: JZ

## 2025-07-23 PROCEDURE — A4215 STERILE NEEDLE: HCPCS

## 2025-07-23 PROCEDURE — S1015 IV TUBING EXTENSION SET: HCPCS

## 2025-07-23 PROCEDURE — A4245 ALCOHOL WIPES PER BOX: HCPCS

## 2025-07-23 PROCEDURE — A4213 20+ CC SYRINGE ONLY: HCPCS

## 2025-07-24 ENCOUNTER — HOME CARE VISIT (OUTPATIENT)
Dept: HOME HEALTH SERVICES | Facility: HOME HEALTH | Age: 65
End: 2025-07-24
Payer: COMMERCIAL

## 2025-07-24 VITALS
DIASTOLIC BLOOD PRESSURE: 80 MMHG | OXYGEN SATURATION: 97 % | SYSTOLIC BLOOD PRESSURE: 128 MMHG | RESPIRATION RATE: 16 BRPM | HEART RATE: 60 BPM | TEMPERATURE: 97.8 F

## 2025-07-24 PROCEDURE — S9359 HIT ANTI-TNF PER DIEM: HCPCS

## 2025-07-24 NOTE — PROGRESS NOTES
Nursing Visit Note:  Nurse visit today for Infliximab infusion  for Janice Mayfield.     present during visit today: Not Applicable.    Intravenous Access:  Peripheral IV placed.    Infusion Nursing Note:    Pre-infusion Checklist:   Have you had any delayed reaction since last infusion?   No     Have you recently had an elevated temperature, fever, chills productive cough, coughing for 3 weeks or longer or hemoptysis, abnormal vital signs, night sweats, chest pain, or have you noticed a decrease in your appetite, or noted unexplained weight loss or fatigue?   No     Do you have any open wounds or new incisions?  No     Do you have any recent or upcoming hospitalizations, surgeries, or dental procedures? Does not include esophagogastroduodenoscopy, colonoscopy, endoscopic retrograde cholangiopancreatography (ERCP), endoscopic ultrasound (EUS), dental procedures or joint aspiration/steroid injections.   No     Do you currently have or recently have had any signs of illness or infection or are you on any antibiotics?   No     Have you had any new, sudden, or worsening abdominal pain?   No     Have you or anyone in your household received a live vaccination in the past 4 weeks?   No     Have you recently been diagnosed with any new nervous system diseases or cancer diagnosis? (i.e., Multiple Sclerosis, Guillain [place], seizures, neurological changes) Are you receiving any form of radiation or chemotherapy?   No     Are you pregnant or breastfeeding, or do you have plans of pregnancy in the future?   No     Have you been having any signs of worsening depression or suicidal ideation?  No     Have you had any other new onset medical symptoms?  No    Entyvio/Ocrevus/Tyasabri only: Have you been having any new or worsening medical problems such as issues with thinking, eyesight, balance or strength that have persisted over several days?   N/A    Benlysta only: Have you been having any signs of worsening  "depression or suicidal ideations?    N/A    IVIG only: Have you had any new blood clots?  N/A    Did the patient answer \"YES\" to any of the questions above?  No     Will the patient receive a medication that has an order for infusion reaction management?  Yes, and all drugs and supplies are available and none have .     If ordered, has the patient taken pre-medications?  Yes    Plan:   Therapy is appropriate, will proceed with treatment.     Post Infusion Assessment:  Patient tolerated infusion without incident.     Note: Alert and oriented x3. VSS. Pre-medicated 30 minutes prior to infusion with Tyl 650 mg oral and Benadryl 25mg oral. Infliximab infusion completed without complication or reaction. No new health concerns.     Saline administered: 40 (ml)    Supply Check:   Does the patient have all the supplies they need for the next visit?  Yes    Next visit plan: Infliximab infusion in 4 weeks o 25    Vinay Mathews RN 2025  "

## 2025-07-28 ENCOUNTER — TELEPHONE (OUTPATIENT)
Dept: RHEUMATOLOGY | Facility: CLINIC | Age: 65
End: 2025-07-28
Payer: COMMERCIAL

## 2025-07-28 NOTE — TELEPHONE ENCOUNTER
Ivone,   Could you pls edit the orders here? I am not able to. There are two orders for benadryl - both PO and IV. Could you discontinue the PO?Thanks.

## 2025-07-31 NOTE — TELEPHONE ENCOUNTER
diphenhydrAMINE (BENADRYL) 25 mg in sodium chloride 0.9 % 50.5 mL intermittent infusion PRN  Until discontinued      25 mg, Intravenous, ONCE, Starting when released  Give 30 minutes prior to infliximab if prior history of reaction. Give IV or PO. Do not give both routes.

## 2025-08-09 DIAGNOSIS — M05.79 RHEUMATOID ARTHRITIS INVOLVING MULTIPLE SITES WITH POSITIVE RHEUMATOID FACTOR (H): ICD-10-CM

## 2025-08-11 DIAGNOSIS — M05.79 RHEUMATOID ARTHRITIS INVOLVING MULTIPLE SITES WITH POSITIVE RHEUMATOID FACTOR (H): ICD-10-CM

## 2025-08-11 RX ORDER — PREDNISONE 5 MG/1
5 TABLET ORAL DAILY
Qty: 90 TABLET | Refills: 1 | OUTPATIENT
Start: 2025-08-11

## 2025-08-11 RX ORDER — FOLIC ACID 1 MG/1
3000 TABLET ORAL DAILY
Qty: 270 TABLET | Refills: 2 | OUTPATIENT
Start: 2025-08-11

## 2025-08-13 RX ORDER — PREDNISONE 5 MG/1
5 TABLET ORAL DAILY
Qty: 90 TABLET | Refills: 1 | Status: SHIPPED | OUTPATIENT
Start: 2025-08-13

## 2025-08-13 RX ORDER — FOLIC ACID 1 MG/1
3000 TABLET ORAL DAILY
Qty: 270 TABLET | Refills: 2 | Status: SHIPPED | OUTPATIENT
Start: 2025-08-13

## 2025-08-17 ENCOUNTER — HOME INFUSION (OUTPATIENT)
Dept: HOME HEALTH SERVICES | Facility: HOME HEALTH | Age: 65
End: 2025-08-17
Payer: COMMERCIAL

## 2025-08-20 ENCOUNTER — HOME INFUSION BILLING (OUTPATIENT)
Dept: HOME HEALTH SERVICES | Facility: HOME HEALTH | Age: 65
End: 2025-08-20
Payer: COMMERCIAL

## 2025-08-20 PROCEDURE — A4215 STERILE NEEDLE: HCPCS

## 2025-08-20 PROCEDURE — A4213 20+ CC SYRINGE ONLY: HCPCS

## 2025-08-20 PROCEDURE — A4217 STERILE WATER/SALINE, 500 ML: HCPCS | Mod: JZ

## 2025-08-20 PROCEDURE — S1015 IV TUBING EXTENSION SET: HCPCS

## 2025-08-21 ENCOUNTER — HOME CARE VISIT (OUTPATIENT)
Dept: HOME HEALTH SERVICES | Facility: HOME HEALTH | Age: 65
End: 2025-08-21
Payer: COMMERCIAL

## 2025-08-21 VITALS
TEMPERATURE: 97.5 F | WEIGHT: 162 LBS | DIASTOLIC BLOOD PRESSURE: 88 MMHG | RESPIRATION RATE: 16 BRPM | BODY MASS INDEX: 27.66 KG/M2 | HEART RATE: 60 BPM | SYSTOLIC BLOOD PRESSURE: 128 MMHG | OXYGEN SATURATION: 98 %

## 2025-08-21 PROCEDURE — S9359 HIT ANTI-TNF PER DIEM: HCPCS
